# Patient Record
Sex: MALE | Race: ASIAN | NOT HISPANIC OR LATINO | Employment: OTHER | URBAN - METROPOLITAN AREA
[De-identification: names, ages, dates, MRNs, and addresses within clinical notes are randomized per-mention and may not be internally consistent; named-entity substitution may affect disease eponyms.]

---

## 2017-02-07 ENCOUNTER — OFFICE VISIT (OUTPATIENT)
Dept: MEDICAL GROUP | Facility: MEDICAL CENTER | Age: 72
End: 2017-02-07
Payer: COMMERCIAL

## 2017-02-07 VITALS
OXYGEN SATURATION: 94 % | SYSTOLIC BLOOD PRESSURE: 168 MMHG | WEIGHT: 172 LBS | HEART RATE: 75 BPM | BODY MASS INDEX: 25.48 KG/M2 | RESPIRATION RATE: 12 BRPM | DIASTOLIC BLOOD PRESSURE: 96 MMHG | HEIGHT: 69 IN | TEMPERATURE: 97.8 F

## 2017-02-07 DIAGNOSIS — E78.49 OTHER HYPERLIPIDEMIA: ICD-10-CM

## 2017-02-07 DIAGNOSIS — I10 ESSENTIAL HYPERTENSION: ICD-10-CM

## 2017-02-07 PROCEDURE — 99214 OFFICE O/P EST MOD 30 MIN: CPT | Performed by: NURSE PRACTITIONER

## 2017-02-07 RX ORDER — HYDROCHLOROTHIAZIDE 12.5 MG/1
12.5 TABLET ORAL DAILY
Qty: 90 TAB | Refills: 1 | Status: SHIPPED | OUTPATIENT
Start: 2017-02-07 | End: 2017-11-13 | Stop reason: SDUPTHER

## 2017-02-07 RX ORDER — ATORVASTATIN CALCIUM 40 MG/1
40 TABLET, FILM COATED ORAL
Qty: 90 TAB | Refills: 1 | Status: SHIPPED | OUTPATIENT
Start: 2017-02-07 | End: 2017-11-13 | Stop reason: SDUPTHER

## 2017-02-07 RX ORDER — GLIMEPIRIDE 2 MG/1
2 TABLET ORAL EVERY MORNING
Qty: 90 TAB | Refills: 1 | Status: SHIPPED | OUTPATIENT
Start: 2017-02-07 | End: 2017-11-13 | Stop reason: SDUPTHER

## 2017-02-07 RX ORDER — AMLODIPINE BESYLATE 10 MG/1
10 TABLET ORAL DAILY
Qty: 90 TAB | Refills: 1 | Status: SHIPPED | OUTPATIENT
Start: 2017-02-07 | End: 2017-11-13 | Stop reason: SDUPTHER

## 2017-02-07 RX ORDER — LISINOPRIL 40 MG/1
40 TABLET ORAL DAILY
Qty: 90 TAB | Refills: 1 | Status: SHIPPED | OUTPATIENT
Start: 2017-02-07 | End: 2017-11-13 | Stop reason: SDUPTHER

## 2017-02-07 NOTE — PROGRESS NOTES
Diabetes Focused Exam:    F/u on chronic issues     Subjective:   NHUNG Gamboa is a 71 y.o. male who presents for follow up of chronic conditions of diabetes mellitus, hypertension, and hyperlipidemia - his son is interpreting today. He indicates that he is feeling well and denies any symptoms referable to the above diagnoses. Specifically denies chest pain, palpitations, dyspnea, orthopnea, PND or peripheral edema. Also denies polyuria, polydipsia, urinary complaints, abdominal complaints, myalgias, numbness, weakness or other related symptoms.   The patient is NOT taking ASA every day and taking all other medications as prescribed. Patient denies any side effects of medication but he tells his son multiple times in the room that he feels like one of his blood pressure medications works better than the other one although he does not know the name of the medication.  After his last visit in June, glimepiride was added on and he denies any negative side effects of this. He is due for blood work.  DM: A1c goal <7. Glucose monitoring frequency: not checking at all.   Fasting sugars: unsure. Post-prandial sugars: unsure  Hypoglycemic episodes unsure  Diabetic complications: retinopathy  ACR Albumin/Creatinine Ratio goal <30   HTN: Blood pressure goal <140/<80. Currently Rx ACE/ARB: Yes  Hyperlipidemia:Cholesterol goal LDL <100, total/HDL <5. Currently Rx Statin: Yes  Last eye exam one year ago.  Denies visual blurring, double vision, eye pain and floaters  Last monofilament foot exam: today. Denies foot pain, numbness, calluses, ulcers    See medications and orders placed in encounter report.  Past medical history, family history, social history reviewed and updated as documented in medical record.  Current medications including changes today:  Current Outpatient Prescriptions   Medication Sig Dispense Refill   • glimepiride (AMARYL) 2 MG Tab Take 1 Tab by mouth every morning. 90 Tab 1   • atorvastatin (LIPITOR) 40 MG  "Tab Take 1 Tab by mouth every bedtime. 90 Tab 1   • hydrochlorothiazide (HYDRODIURIL) 12.5 MG tablet Take 1 Tab by mouth every day. 90 Tab 1   • metformin (GLUCOPHAGE) 1000 MG tablet Take 1 Tab by mouth 2 times a day, with meals. 180 Tab 1   • lisinopril (PRINIVIL, ZESTRIL) 40 MG tablet Take 1 Tab by mouth every day. 90 Tab 1   • amlodipine (NORVASC) 10 MG Tab Take 1 Tab by mouth every day. 90 Tab 1     No current facility-administered medications for this visit.     Allergies: No Known Allergies   Social History   Substance Use Topics   • Smoking status: Former Smoker     Quit date: 01/01/2007   • Smokeless tobacco: Never Used   • Alcohol Use: Yes      Comment: occasional     Exercise: work - busy as a   Health Maintenance/Immunizations: UTD    ROS  Pertinent  ROS findings as above. All other systems reviewed and are negative.      Objective:     OBJECTIVE:   Blood pressure 168/96, pulse 75, temperature 36.6 °C (97.8 °F), resp. rate 12, height 1.753 m (5' 9\"), weight 78.019 kg (172 lb), SpO2 94 %.   BMI: not obese  General: No apparent distress, conversant, cooperative and pleasant with the examination.  Psych: Alert and oriented x4, judgment and insight normal  Neck: No JVD or bruits, no adenopathy, supple  Thyroid: normal to inspection and palpation  Lungs: Lungs clear to auscultation bilaterally  Heart:  RRR. No murmurs, clicks, gallops, or rubs  Skin: No rashes, no cyanosis, no lesions or ulcers  Extremities: No cyanosis clubbing or edema.   Feet are examined   Monofilament testing with a 10 gram force: sensation intact in 4/4 bilaterally.   Visual Inspection: Feet without maceration, ulcers, fissures.       Lab Results   Component Value Date/Time    GLUCOSE - ACCU-* 06/09/2016 11:40 AM          Last labs    Lab Results   Component Value Date/Time    CHOLESTEROL,* 05/11/2016 08:02 AM    * 05/11/2016 08:02 AM    HDL 48 05/11/2016 08:02 AM    TRIGLYCERIDES 351* 05/11/2016 08:02 AM     "   Lab Results   Component Value Date/Time    SODIUM 138 05/11/2016 08:02 AM    POTASSIUM 3.8 05/11/2016 08:02 AM    GLUCOSE 168* 05/11/2016 08:02 AM     Lab Results   Component Value Date/Time    GLYCOHEMOGLOBIN 9.7* 05/11/2016 08:02 AM    GLYCOHEMOGLOBIN 7.3 10/02/2015 08:22 AM    GLYCOHEMOGLOBIN 9.9* 05/06/2015 09:43 AM     Lab Results   Component Value Date/Time    MICRO ALB CREAT RATIO see below 10/17/2015 07:40 AM    MICROALBUMIN, URINE RANDOM <0.5 10/17/2015 07:40 AM          Assessment/Plan:   Medications, refills, and referrals per orders.   1. Uncontrolled type 2 diabetes mellitus with complication, without long-term current use of insulin (CMS-[facility])  Discussed Eye exam once yearly -   - Discussed Dental exam once yearly    - Discussed vaccinations: Yearly flu vaccine, Pneumovax, and hepatitis B vaccine -  - Discussed at minimum checking BS bid - pt declines does this.  - Discussed A1C target below 7.5%  - Discussed CMP and A1C evaluation every 3 to 6 months  - Discussed Lipid and spot urine albumin to Cr ratio once per year at minimum  COMP METABOLIC PANEL    CBC WITH DIFFERENTIAL    LIPID PROFILE    HEMOGLOBIN A1C    MICROALBUMIN CREAT RATIO URINE    glimepiride (AMARYL) 2 MG Tab    metformin (GLUCOPHAGE) 1000 MG tablet   2. Other hyperlipidemia  Recommend updated lab work.  atorvastatin (LIPITOR) 40 MG Tab   3. Essential hypertension  Elevated today but pt has been out of medication for several days.  Recheck blood pressure by myself was actually worse at 170/95. Discussed the importance that he restart his medication as soon as possible. Encouraged that he start 81 mg aspirin daily. They do have a blood pressure monitor at home and I encouraged the son to give us a call if his blood pressures consistently greater than 140/90 despite restarting his medication. Hydrochlorothiazide (HYDRODIURIL) 12.5 MG tablet    lisinopril (PRINIVIL, ZESTRIL) 40 MG tablet    amlodipine (NORVASC) 10 MG Tab     Discussed  diet, exercise, disease management and weight loss goals.   Education and advise provided today:All medications, side effects and compliance (discussed carefully)  Annual eye examinations at Ophthalmology  Diabetic Sick Day rules reviewed, handout given  Foot care discussed and Podiatry visits  Home glucose monitoring emphasized  Long term diabetic complications  Low cholesterol diet, weight control and daily exercise    Followup:   Do labs and RTC 3-6 months depending on how labs return.

## 2017-02-07 NOTE — MR AVS SNAPSHOT
Howard Gamboa   2017 8:00 AM   Office Visit   MRN: [id number]    Department:  [address]   Dept Phone:  [phone]    Description:  Male : 1945   Provider:  MARC Hall           Allergies as of 2017     No Known Allergies      You were diagnosed with     Uncontrolled type 2 diabetes mellitus with complication, without long-term current use of insulin (CMS-HCC)   [1469565]       Other hyperlipidemia   [1435355]       Essential hypertension   [4635724]         Vital Signs     Smoking Status                   Former Smoker           Basic Information     Date Of Birth Sex Race Ethnicity Preferred Language    1945 Male  Non- Mandarin      Problem List              ICD-10-CM Priority Class Noted - Resolved    Hypertension I10   Unknown - Present    Hyperlipidemia E78.5   2014 - Present    Retinal vein occlusion, branch H34.8392   2014 - Present    Macular edema H35.81   2014 - Present    Adenomatous polyp of colon D12.6   10/2/2015 - Present    Microcytosis R71.8   2016 - Present    Uncontrolled type 2 diabetes mellitus with complication, without long-term current use of insulin (CMS-HCC) E11.8, E11.65   2017 - Present      Health Maintenance        Date Due Completion Dates    IMM ZOSTER VACCINE 2007 ---    DIABETES MONOFILAMENT / LE EXAM 2016, 4/10/2014, 2013 (Prv Comp)    Override on 2013: Previously completed    RETINAL SCREENING 2016    COLONOSCOPY 2016    IMM INFLUENZA (1) 2016 10/10/2013    URINE ACR / MICROALBUMIN 10/17/2016 10/17/2015, 10/17/2013    A1C SCREENING 2016, 10/2/2015, 2015, 2014, 2014, 10/17/2013, 2013, 2012, 2009, 2009    FASTING LIPID PROFILE 2017, 10/17/2015, 2015, 2014, 2013, 2009, 2009, 2009    SERUM CREATININE 2017, 10/17/2015, 2015,  5/20/2014, 10/17/2013, 5/28/2013, 5/5/2009    IMM DTaP/Tdap/Td Vaccine (2 - Td) 5/9/2022 5/9/2012            Current Immunizations     13-VALENT PCV PREVNAR 10/2/2015    Influenza TIV (IM) 10/10/2013  3:49 PM    Pneumococcal polysaccharide vaccine (PPSV-23) 4/3/2013  4:41 PM    Tdap Vaccine 5/9/2012      Below and/or attached are the medications your provider expects you to take. Review all of your home medications and newly ordered medications with your provider and/or pharmacist. Follow medication instructions as directed by your provider and/or pharmacist. Please keep your medication list with you and share with your provider. Update the information when medications are discontinued, doses are changed, or new medications (including over-the-counter products) are added; and carry medication information at all times in the event of emergency situations     Allergies:  No Known Allergies          Medications  Valid as of: February 07, 2017 -  9:20 AM    Generic Name Brand Name Tablet Size Instructions for use    AmLODIPine Besylate (Tab) NORVASC 10 MG Take 1 Tab by mouth every day.        Atorvastatin Calcium (Tab) LIPITOR 40 MG Take 1 Tab by mouth every bedtime.        Glimepiride (Tab) AMARYL 2 MG Take 1 Tab by mouth every morning.        HydroCHLOROthiazide (Tab) HYDRODIURIL 12.5 MG Take 1 Tab by mouth every day.        Lisinopril (Tab) PRINIVIL, ZESTRIL 40 MG Take 1 Tab by mouth every day.        MetFORMIN HCl (Tab) GLUCOPHAGE 1000 MG Take 1 Tab by mouth 2 times a day, with meals.        .                 Medicines prescribed today were sent to:     [facility] [address] [place] [address] [place] - [address] [facility] [place] [address]    Phone: [phone] Fax: [phone]    Open 24 Hours?: No      Medication refill instructions:       If your prescription bottle indicates you have medication refills left, it is not necessary to call your provider’s office. Please contact your pharmacy and they will refill your  medication.    If your prescription bottle indicates you do not have any refills left, you may request refills at any time through one of the following ways: The online [employer] system (except Urgent Care), by calling your provider’s office, or by asking your pharmacy to contact your provider’s office with a refill request. Medication refills are processed only during regular business hours and may not be available until the next business day. Your provider may request additional information or to have a follow-up visit with you prior to refilling your medication.   *Please Note: Medication refills are assigned a new Rx number when refilled electronically. Your pharmacy may indicate that no refills were authorized even though a new prescription for the same medication is available at the pharmacy. Please request the medicine by name with the pharmacy before contacting your provider for a refill.        Your To Do List     Future Labs/Procedures Complete By Expires    CBC WITH DIFFERENTIAL  As directed 2/8/2018    COMP METABOLIC PANEL  As directed 2/8/2018    HEMOGLOBIN A1C  As directed 2/8/2018    LIPID PROFILE  As directed 2/8/2018    MICROALBUMIN CREAT RATIO URINE  As directed 2/7/2018         [employer] Access Code: [email or web address]  Expires: 3/9/2017  9:20 AM    [employer]  A secure, online tool to manage your health information     [employer]® is a secure, online tool that connects you to your personalized health information from the privacy of your home -- day or night - making it very easy for you to manage your healthcare. Once the activation process is completed, you can even access your medical information using the [employer] frankie, which is available for free in the [employer] Frankie store or Google Play store.     [employer] provides the following levels of access (as shown below):   My Chart Features   [facility] Doctor [facility]  Specialists [facility]  Urgent  Care Non-[facility]  Primary Care  Doctor   Email  your healthcare team securely and privately 24/7 X X X    Manage appointments: schedule your next appointment; view details of past/upcoming appointments X      Request prescription refills. X      View recent personal medical records, including lab and immunizations X X X X   View health record, including health history, allergies, medications X X X X   Read reports about your outpatient visits, procedures, consult and ER notes X X X X   See your discharge summary, which is a recap of your hospital and/or ER visit that includes your diagnosis, lab results, and care plan. X X       How to register for [employer]:  1. Go to  [email or web address].  2. Click on the Sign Up Now box, which takes you to the New Member Sign Up page. You will need to provide the following information:  a. Enter your [employer] Access Code exactly as it appears at the top of this page. (You will not need to use this code after you’ve completed the sign-up process. If you do not sign up before the expiration date, you must request a new code.)   b. Enter your date of birth.   c. Enter your home email address.   d. Click Submit, and follow the next screen’s instructions.  3. Create a [employer] ID. This will be your [employer] login ID and cannot be changed, so think of one that is secure and easy to remember.  4. Create a [employer] password. You can change your password at any time.  5. Enter your Password Reset Question and Answer. This can be used at a later time if you forget your password.   6. Enter your e-mail address. This allows you to receive e-mail notifications when new information is available in [employer].  7. Click Sign Up. You can now view your health information.    For assistance activating your [employer] account, call [phone]

## 2017-04-21 PROCEDURE — 99284 EMERGENCY DEPT VISIT MOD MDM: CPT

## 2017-04-22 ENCOUNTER — HOSPITAL ENCOUNTER (EMERGENCY)
Facility: MEDICAL CENTER | Age: 72
End: 2017-04-22
Attending: EMERGENCY MEDICINE
Payer: COMMERCIAL

## 2017-04-22 VITALS
OXYGEN SATURATION: 94 % | HEART RATE: 78 BPM | RESPIRATION RATE: 18 BRPM | DIASTOLIC BLOOD PRESSURE: 99 MMHG | BODY MASS INDEX: 24.69 KG/M2 | WEIGHT: 166.67 LBS | SYSTOLIC BLOOD PRESSURE: 153 MMHG | HEIGHT: 69 IN | TEMPERATURE: 97.5 F

## 2017-04-22 DIAGNOSIS — R11.2 NAUSEA VOMITING AND DIARRHEA: ICD-10-CM

## 2017-04-22 DIAGNOSIS — R19.7 NAUSEA VOMITING AND DIARRHEA: ICD-10-CM

## 2017-04-22 LAB
ALBUMIN SERPL BCP-MCNC: 4.2 G/DL (ref 3.2–4.9)
ALBUMIN/GLOB SERPL: 1.4 G/DL
ALP SERPL-CCNC: 66 U/L (ref 30–99)
ALT SERPL-CCNC: 28 U/L (ref 2–50)
ANION GAP SERPL CALC-SCNC: 10 MMOL/L (ref 0–11.9)
APPEARANCE UR: CLEAR
AST SERPL-CCNC: 27 U/L (ref 12–45)
BASOPHILS # BLD AUTO: 0.4 % (ref 0–1.8)
BASOPHILS # BLD: 0.06 K/UL (ref 0–0.12)
BILIRUB SERPL-MCNC: 1 MG/DL (ref 0.1–1.5)
BUN SERPL-MCNC: 14 MG/DL (ref 8–22)
CALCIUM SERPL-MCNC: 8.9 MG/DL (ref 8.5–10.5)
CHLORIDE SERPL-SCNC: 99 MMOL/L (ref 96–112)
CO2 SERPL-SCNC: 25 MMOL/L (ref 20–33)
COLOR UR AUTO: YELLOW
CREAT SERPL-MCNC: 0.77 MG/DL (ref 0.5–1.4)
EOSINOPHIL # BLD AUTO: 0.01 K/UL (ref 0–0.51)
EOSINOPHIL NFR BLD: 0.1 % (ref 0–6.9)
ERYTHROCYTE [DISTWIDTH] IN BLOOD BY AUTOMATED COUNT: 35.7 FL (ref 35.9–50)
GFR SERPL CREATININE-BSD FRML MDRD: >60 ML/MIN/1.73 M 2
GLOBULIN SER CALC-MCNC: 3 G/DL (ref 1.9–3.5)
GLUCOSE SERPL-MCNC: 282 MG/DL (ref 65–99)
GLUCOSE UR QL STRIP.AUTO: 500 MG/DL
HCT VFR BLD AUTO: 46.2 % (ref 42–52)
HGB BLD-MCNC: 15.6 G/DL (ref 14–18)
IMM GRANULOCYTES # BLD AUTO: 0.1 K/UL (ref 0–0.11)
IMM GRANULOCYTES NFR BLD AUTO: 0.7 % (ref 0–0.9)
KETONES UR QL STRIP.AUTO: 15 MG/DL
LEUKOCYTE ESTERASE UR QL STRIP.AUTO: NEGATIVE
LIPASE SERPL-CCNC: 30 U/L (ref 11–82)
LYMPHOCYTES # BLD AUTO: 1.68 K/UL (ref 1–4.8)
LYMPHOCYTES NFR BLD: 11.5 % (ref 22–41)
MCH RBC QN AUTO: 26 PG (ref 27–33)
MCHC RBC AUTO-ENTMCNC: 33.8 G/DL (ref 33.7–35.3)
MCV RBC AUTO: 77 FL (ref 81.4–97.8)
MONOCYTES # BLD AUTO: 1.09 K/UL (ref 0–0.85)
MONOCYTES NFR BLD AUTO: 7.5 % (ref 0–13.4)
NEUTROPHILS # BLD AUTO: 11.65 K/UL (ref 1.82–7.42)
NEUTROPHILS NFR BLD: 79.8 % (ref 44–72)
NITRITE UR QL STRIP.AUTO: NEGATIVE
NRBC # BLD AUTO: 0 K/UL
NRBC BLD AUTO-RTO: 0 /100 WBC
PH UR STRIP.AUTO: 7.5 [PH]
PLATELET # BLD AUTO: 250 K/UL (ref 164–446)
PMV BLD AUTO: 8.5 FL (ref 9–12.9)
POTASSIUM SERPL-SCNC: 3.5 MMOL/L (ref 3.6–5.5)
PROT SERPL-MCNC: 7.2 G/DL (ref 6–8.2)
PROT UR QL STRIP: NEGATIVE MG/DL
RBC # BLD AUTO: 6 M/UL (ref 4.7–6.1)
RBC UR QL AUTO: NEGATIVE
SODIUM SERPL-SCNC: 134 MMOL/L (ref 135–145)
SP GR UR: 1.02
WBC # BLD AUTO: 14.6 K/UL (ref 4.8–10.8)

## 2017-04-22 PROCEDURE — 96361 HYDRATE IV INFUSION ADD-ON: CPT

## 2017-04-22 PROCEDURE — 81002 URINALYSIS NONAUTO W/O SCOPE: CPT

## 2017-04-22 PROCEDURE — 700111 HCHG RX REV CODE 636 W/ 250 OVERRIDE (IP): Performed by: EMERGENCY MEDICINE

## 2017-04-22 PROCEDURE — 85025 COMPLETE CBC W/AUTO DIFF WBC: CPT

## 2017-04-22 PROCEDURE — 96374 THER/PROPH/DIAG INJ IV PUSH: CPT

## 2017-04-22 PROCEDURE — 80053 COMPREHEN METABOLIC PANEL: CPT

## 2017-04-22 PROCEDURE — 83690 ASSAY OF LIPASE: CPT

## 2017-04-22 PROCEDURE — 700105 HCHG RX REV CODE 258: Performed by: EMERGENCY MEDICINE

## 2017-04-22 RX ORDER — ONDANSETRON 2 MG/ML
4 INJECTION INTRAMUSCULAR; INTRAVENOUS ONCE
Status: COMPLETED | OUTPATIENT
Start: 2017-04-22 | End: 2017-04-22

## 2017-04-22 RX ORDER — SODIUM CHLORIDE 9 MG/ML
1000 INJECTION, SOLUTION INTRAVENOUS ONCE
Status: COMPLETED | OUTPATIENT
Start: 2017-04-22 | End: 2017-04-22

## 2017-04-22 RX ORDER — DIPHENOXYLATE HYDROCHLORIDE AND ATROPINE SULFATE 2.5; .025 MG/1; MG/1
1 TABLET ORAL 4 TIMES DAILY PRN
Qty: 30 TAB | Refills: 0 | Status: SHIPPED | OUTPATIENT
Start: 2017-04-22 | End: 2017-05-20

## 2017-04-22 RX ORDER — ONDANSETRON 4 MG/1
4 TABLET, ORALLY DISINTEGRATING ORAL EVERY 8 HOURS PRN
Qty: 10 TAB | Refills: 0 | Status: SHIPPED | OUTPATIENT
Start: 2017-04-22 | End: 2017-05-20

## 2017-04-22 RX ADMIN — ONDANSETRON 4 MG: 2 INJECTION INTRAMUSCULAR; INTRAVENOUS at 03:19

## 2017-04-22 RX ADMIN — SODIUM CHLORIDE 1000 ML: 9 INJECTION, SOLUTION INTRAVENOUS at 03:21

## 2017-04-22 ASSESSMENT — ENCOUNTER SYMPTOMS
SHORTNESS OF BREATH: 0
FEVER: 0
VOMITING: 1
DIARRHEA: 1
ABDOMINAL PAIN: 1
CHILLS: 0
NAUSEA: 1

## 2017-04-22 ASSESSMENT — PAIN SCALES - GENERAL: PAINLEVEL_OUTOF10: 0

## 2017-04-22 NOTE — ED NOTES
Chief Complaint   Patient presents with   • N/V     woke with onset of nausea and vomiting     Pt actively vomiting in triage.  Pt states that he woke up and began vomiting.  Pt concerned that what he ate last night is not sitting well with pt.  No blood in vomit.  Triage process explained to patient.  Pt back to waiting room.  Pt instructed to inform RN if any changes or questions arise.

## 2017-04-22 NOTE — ED NOTES
Pt sitting up in [facility] talking to son at BS, NAD. Pt speaks Mandarin, son interpreting. Pt denies nausea or abd discomfort at this time. + diarrhea, 3- 4 episodes per pt's son. IV fluids infusing. Updated on POC. Call light in reach.

## 2017-04-22 NOTE — ED AVS SNAPSHOT
4/22/2017    Howard Gamboa  [address] [place] [address]    Dear Howard:    Critical access hospital wants to ensure your discharge home is safe and you or your loved ones have had all of your questions answered regarding your care after you leave the hospital.    Below is a list of resources and contact information should you have any questions regarding your hospital stay, follow-up instructions, or active medical symptoms.    Questions or Concerns Regarding… Contact   Medical Questions Related to Your Discharge  (7 days a week, 8am-5pm) Contact a Nurse Care Coordinator   [phone]   Medical Questions Not Related to Your Discharge  (24 hours a day / 7 days a week)  Contact the Nurse Health Line   [phone]    Medications or Discharge Instructions Refer to your discharge packet   or contact your [facility] Primary Care Provider   [phone]   Follow-up Appointment(s) Schedule your appointment via [employer]   or contact Scheduling [phone]   Billing Review your statement via [employer]  or contact Billing [phone]   Medical Records Review your records via [employer]   or contact Medical Records [phone]     You may receive a telephone call within two days of discharge. This call is to make certain you understand your discharge instructions and have the opportunity to have any questions answered. You can also easily access your medical information, test results and upcoming appointments via the [employer] free online health management tool. You can learn more and sign up at [employer]. For assistance setting up your [employer] account, please call [phone].    Once again, we want to ensure your discharge home is safe and that you have a clear understanding of any next steps in your care. If you have any questions or concerns, please do not hesitate to contact us, we are here for you. Thank you for choosing [facility] for your healthcare needs.    Sincerely,    Your [facility] Healthcare Team

## 2017-04-22 NOTE — DISCHARGE INSTRUCTIONS
Return if you have abdominal pain, fever, severe vomiting, blood in vomit or blood in stool.   Nausea and Vomiting  Nausea means you feel sick to your stomach. Throwing up (vomiting) is a reflex where stomach contents come out of your mouth.  HOME CARE   · Take medicine as told by your doctor.  · Do not force yourself to eat. However, you do need to drink fluids.  · If you feel like eating, eat a normal diet as told by your doctor.  ¨ Eat rice, wheat, potatoes, bread, lean meats, yogurt, fruits, and vegetables.  ¨ Avoid high-fat foods.  · Drink enough fluids to keep your pee (urine) clear or pale yellow.  · Ask your doctor how to replace body fluid losses (rehydrate). Signs of body fluid loss (dehydration) include:  ¨ Feeling very thirsty.  ¨ Dry lips and mouth.  ¨ Feeling dizzy.  ¨ Dark pee.  ¨ Peeing less than normal.  ¨ Feeling confused.  ¨ Fast breathing or heart rate.  GET HELP RIGHT AWAY IF:   · You have blood in your throw up.  · You have black or bloody poop (stool).  · You have a bad headache or stiff neck.  · You feel confused.  · You have bad belly (abdominal) pain.  · You have chest pain or trouble breathing.  · You do not pee at least once every 8 hours.  · You have cold, clammy skin.  · You keep throwing up after 24 to 48 hours.  · You have a fever.  MAKE SURE YOU:   · Understand these instructions.  · Will watch your condition.  · Will get help right away if you are not doing well or get worse.     This information is not intended to replace advice given to you by your health care provider. Make sure you discuss any questions you have with your health care provider.     Document Released: 06/05/2009 Document Revised: 03/11/2013 Document Reviewed: 05/18/2012  [employer] Patient Education ©2016 [employer]    Diarrhea  Diarrhea is watery poop (stool). It can make you feel weak, tired, thirsty, or give you a dry mouth (signs of dehydration). Watery poop is a sign of another problem, most  often an infection. It often lasts 2-3 days. It can last longer if it is a sign of something serious. Take care of yourself as told by your doctor.  HOME CARE   · Drink 1 cup (8 ounces) of fluid each time you have watery poop.  · Do not drink the following fluids:  ¨ Those that contain simple sugars (fructose, glucose, galactose, lactose, sucrose, maltose).  ¨ Sports drinks.  ¨ Fruit juices.  ¨ Whole milk products.  ¨ Sodas.  ¨ Drinks with caffeine (coffee, tea, soda) or alcohol.  · Oral rehydration solution may be used if the doctor says it is okay. You may make your own solution. Follow this recipe:  ¨  - teaspoon table salt.  ¨ ¾ teaspoon baking soda.  ¨  teaspoon salt substitute containing potassium chloride.  ¨ 1 tablespoons sugar.  ¨ 1 liter (34 ounces) of water.  · Avoid the following foods:  ¨ High fiber foods, such as raw fruits and vegetables.  ¨ Nuts, seeds, and whole grain breads and cereals.  ¨  Those that are sweetened with sugar alcohols (xylitol, sorbitol, mannitol).  · Try eating the following foods:  ¨ Starchy foods, such as rice, toast, pasta, low-sugar cereal, oatmeal, baked potatoes, crackers, and bagels.  ¨ Bananas.  ¨ Applesauce.  · Eat probiotic-rich foods, such as yogurt and milk products that are fermented.  · Wash your hands well after each time you have watery poop.  · Only take medicine as told by your doctor.  · Take a warm bath to help lessen burning or pain from having watery poop.  GET HELP RIGHT AWAY IF:   · You cannot drink fluids without throwing up (vomiting).  · You keep throwing up.  · You have blood in your poop, or your poop looks black and tarry.  · You do not pee (urinate) in 6-8 hours, or there is only a small amount of very dark pee.  · You have belly (abdominal) pain that gets worse or stays in the same spot (localizes).  · You are weak, dizzy, confused, or light-headed.  · You have a very bad headache.  · Your watery poop gets worse or does not get better.  · You have a  fever or lasting symptoms for more than 2-3 days.  · You have a fever and your symptoms suddenly get worse.  MAKE SURE YOU:   · Understand these instructions.  · Will watch your condition.  · Will get help right away if you are not doing well or get worse.     This information is not intended to replace advice given to you by your health care provider. Make sure you discuss any questions you have with your health care provider.     Document Released: 06/05/2009 Document Revised: 01/08/2016 Document Reviewed: 08/25/2013  [employer] Interactive Patient Education ©2016 [employer]

## 2017-04-22 NOTE — ED AVS SNAPSHOT
Migo Software Access Code: [email or web address]  Expires: 5/22/2017  4:48 AM    Migo Software  A secure, online tool to manage your health information     [employer]’s Migo Software® is a secure, online tool that connects you to your personalized health information from the privacy of your home -- day or night - making it very easy for you to manage your healthcare. Once the activation process is completed, you can even access your medical information using the Migo Software frankie, which is available for free in the Apple Frankie store or Google Play store.     Migo Software provides the following levels of access (as shown below):   My Chart Features   [facility] Primary Care Doctor [facility]  Specialists [facility]  Urgent  Care Non-[facility]  Primary Care  Doctor   Email your healthcare team securely and privately 24/7 X X X X   Manage appointments: schedule your next appointment; view details of past/upcoming appointments X      Request prescription refills. X      View recent personal medical records, including lab and immunizations X X X X   View health record, including health history, allergies, medications X X X X   Read reports about your outpatient visits, procedures, consult and ER notes X X X X   See your discharge summary, which is a recap of your hospital and/or ER visit that includes your diagnosis, lab results, and care plan. X X       How to register for Migo Software:  1. Go to  [email or web address].  2. Click on the Sign Up Now box, which takes you to the New Member Sign Up page. You will need to provide the following information:  a. Enter your Migo Software Access Code exactly as it appears at the top of this page. (You will not need to use this code after you’ve completed the sign-up process. If you do not sign up before the expiration date, you must request a new code.)   b. Enter your date of birth.   c. Enter your home email address.   d. Click Submit, and follow the next screen’s instructions.  3. Create a Migo Software ID. This will be your Migo Software  login ID and cannot be changed, so think of one that is secure and easy to remember.  4. Create a [employer] password. You can change your password at any time.  5. Enter your Password Reset Question and Answer. This can be used at a later time if you forget your password.   6. Enter your e-mail address. This allows you to receive e-mail notifications when new information is available in [employer].  7. Click Sign Up. You can now view your health information.    For assistance activating your [employer] account, call [phone]

## 2017-04-22 NOTE — ED PROVIDER NOTES
ED Provider Note    Scribed for Tommy Owusu M.D. by Walter Pearson. 4/22/2017, 3:41 AM.    Primary care provider: Sayda An M.D.  Means of arrival: Walk In  History obtained from: Patient  History limited by: None     CHIEF COMPLAINT  Chief Complaint   Patient presents with   • N/V     woke with onset of nausea and vomiting     NHUNG Gamboa is a 71 y.o. male who presents to the Emergency Department complaining of nausea and vomiting. The patient woke up with an onset of nausea and vomiting last night. He has had multiple episodes of normal appearing emesis. Patient has had a few episodes of diarrhea associated with his nausea and vomiting. He has had about 3 episodes of diarrhea since onset of his symptoms. The patient complains of mild mid upper abdominal pain associated with his symptoms. His abdominal pain has been constant. Patient's son does not believe patient ate anything unordinary in the last few days. Patient has a previous history of Diabetes. He denies any recent travel. Patient denies any fever, dysuria, hematuria, chest pain, shortness of breath.     REVIEW OF SYSTEMS  Review of Systems   Constitutional: Negative for fever and chills.   Respiratory: Negative for shortness of breath.    Cardiovascular: Negative for chest pain.   Gastrointestinal: Positive for nausea, vomiting, abdominal pain and diarrhea.   Genitourinary: Negative for dysuria and hematuria.   All other systems reviewed and are negative.    C.     PAST MEDICAL HISTORY   has a past medical history of Hypertension and Diabetes mellitus out of control (CMS-[facility]).    SURGICAL HISTORY  patient denies any surgical history    SOCIAL HISTORY  Social History   Substance Use Topics   • Smoking status: Former Smoker     Quit date: 01/01/2007   • Smokeless tobacco: Never Used   • Alcohol Use: No      History   Drug Use No     FAMILY HISTORY  Family History   Problem Relation Age of Onset   • Stroke Mother    • Diabetes Father    • Diabetes  "Brother      CURRENT MEDICATIONS  Home Medications     Reviewed by Sheron Pittman R.N. (Registered Nurse) on 04/22/17 at 0334  Med List Status: Complete    Medication Last Dose Status    amlodipine (NORVASC) 10 MG Tab 4/21/2017 Active    atorvastatin (LIPITOR) 40 MG Tab 4/21/2017 Active    glimepiride (AMARYL) 2 MG Tab 4/21/2017 Active    hydrochlorothiazide (HYDRODIURIL) 12.5 MG tablet 4/21/2017 Active    lisinopril (PRINIVIL, ZESTRIL) 40 MG tablet 4/21/2017 Active    metformin (GLUCOPHAGE) 1000 MG tablet 4/21/2017 Active              ALLERGIES  No Known Allergies    PHYSICAL EXAM  VITAL SIGNS: /90 mmHg  Pulse 80  Temp(Src) 36.1 °C (96.9 °F)  Resp 14  Ht 1.753 m (5' 9.02\")  Wt 75.6 kg (166 lb 10.7 oz)  BMI 24.60 kg/m2  SpO2 91%    Constitutional: Well developed, Well nourished, no distress.   HENT: Normocephalic, Atraumatic, Oropharynx moist.   Eyes: Conjunctiva normal, No discharge.   Cardiovascular: Normal heart rate, Normal rhythm, No murmurs, equal pulses.   Pulmonary: Normal breath sounds, No respiratory distress, No wheezing, No rales, No rhonchi.  Abdomen:Soft, No tenderness, No masses, no rebound, no guarding. Negative Santana sign, negative McBurney's point tenderness  Back: Slight left CVA tenderness   Musculoskeletal: No major deformities noted, No tenderness.   Skin: Warm, Dry, No erythema, No rash.   Neurologic: Alert & oriented x 3, Normal motor function,  No focal deficits noted.   Psychiatric: Affect normal, Judgment normal, Mood normal.     LABS  Results for orders placed or performed during the hospital encounter of 04/22/17   CBC WITH DIFFERENTIAL   Result Value Ref Range    WBC 14.6 (H) 4.8 - 10.8 K/uL    RBC 6.00 4.70 - 6.10 M/uL    Hemoglobin 15.6 14.0 - 18.0 g/dL    Hematocrit 46.2 42.0 - 52.0 %    MCV 77.0 (L) 81.4 - 97.8 fL    MCH 26.0 (L) 27.0 - 33.0 pg    MCHC 33.8 33.7 - 35.3 g/dL    RDW 35.7 (L) 35.9 - 50.0 fL    Platelet Count 250 164 - 446 K/uL    MPV 8.5 (L) 9.0 - 12.9 " fL    Neutrophils-Polys 79.80 (H) 44.00 - 72.00 %    Lymphocytes 11.50 (L) 22.00 - 41.00 %    Monocytes 7.50 0.00 - 13.40 %    Eosinophils 0.10 0.00 - 6.90 %    Basophils 0.40 0.00 - 1.80 %    Immature Granulocytes 0.70 0.00 - 0.90 %    Nucleated RBC 0.00 /100 WBC    Neutrophils (Absolute) 11.65 (H) 1.82 - 7.42 K/uL    Lymphs (Absolute) 1.68 1.00 - 4.80 K/uL    Monos (Absolute) 1.09 (H) 0.00 - 0.85 K/uL    Eos (Absolute) 0.01 0.00 - 0.51 K/uL    Baso (Absolute) 0.06 0.00 - 0.12 K/uL    Immature Granulocytes (abs) 0.10 0.00 - 0.11 K/uL    NRBC (Absolute) 0.00 K/uL   COMP METABOLIC PANEL   Result Value Ref Range    Sodium 134 (L) 135 - 145 mmol/L    Potassium 3.5 (L) 3.6 - 5.5 mmol/L    Chloride 99 96 - 112 mmol/L    Co2 25 20 - 33 mmol/L    Anion Gap 10.0 0.0 - 11.9    Glucose 282 (H) 65 - 99 mg/dL    Bun 14 8 - 22 mg/dL    Creatinine 0.77 0.50 - 1.40 mg/dL    Calcium 8.9 8.5 - 10.5 mg/dL    AST(SGOT) 27 12 - 45 U/L    ALT(SGPT) 28 2 - 50 U/L    Alkaline Phosphatase 66 30 - 99 U/L    Total Bilirubin 1.0 0.1 - 1.5 mg/dL    Albumin 4.2 3.2 - 4.9 g/dL    Total Protein 7.2 6.0 - 8.2 g/dL    Globulin 3.0 1.9 - 3.5 g/dL    A-G Ratio 1.4 g/dL   LIPASE   Result Value Ref Range    Lipase 30 11 - 82 U/L   ESTIMATED GFR   Result Value Ref Range    GFR If African American >60 >60 mL/min/1.73 m 2    GFR If Non African American >60 >60 mL/min/1.73 m 2   POC UA   Result Value Ref Range    POC Color Yellow     POC Appearance Clear     POC Glucose 500 (A) Negative mg/dL    POC Ketones 15 (A) Negative mg/dL    POC Specific Gravity 1.020 1.005-1.030    POC Blood Negative Negative    POC Urine PH 7.5 5.0-8.0    POC Protein Negative Negative mg/dL    POC Nitrites Negative Negative    POC Leukocyte Esterase Negative Negative    slight hyperglycemia  All labs reviewed by me.    COURSE & MEDICAL DECISION MAKING  Pertinent Labs & Imaging studies reviewed. (See chart for details)    3:41 AM - Patient seen and examined at bedside. Patient will  be treated with Zofran 4 mg IV. Patient resucitated with IV fluids for dehydration. Ordered urinalysis, CBC, CMP, lipase, GFR to evaluate his symptoms. The differential diagnoses include but are not limited to:   4:14 AM Recheck: Patient re-evaluated at [facility]. Patient has had improvement of nausea after IV fluids and zofran medication. . Ordered urinalysis for further evaluation.     4:48 AM Recheck: Patient re-evaluated at [facility]. Patient was updated of his urinalysis results. Patient will be prescribed Lomotil, Zofran, for treatment of his symptoms following discharge. Patient understanding and agreeable of plan.     Medical Decision Making: Patient presents with nausea vomiting and diarrhea. At this point time he has no abdominal pain certainly does not have a surgical abdomen. I suspect he has a viral gastroenteritis. His vomiting has been improved with Zofran is tolerating oral fluids. At this point time she could be discharged home in stable condition. I think the patient's leukocytosis is likely secondary to acute phase reaction with vomiting.    DISPOSITION:  Patient will be discharged home in stable condition.    FOLLOW UP:  Sayda An M.D.  [address] [address] [id number]  [phone]    Schedule an appointment as soon as possible for a visit in 3 days      OUTPATIENT MEDICATIONS:  Discharge Medication List as of 4/22/2017  4:48 AM      START taking these medications    Details   ondansetron (ZOFRAN ODT) 4 MG TABLET DISPERSIBLE Take 1 Tab by mouth every 8 hours as needed for Nausea/Vomiting., Disp-10 Tab, R-0, Print Rx Paper      diphenoxylate-atropine (LOMOTIL) 2.5-0.025 MG Tab Take 1 Tab by mouth 4 times a day as needed for Diarrhea., Disp-30 Tab, R-0, Print Rx Paper             FINAL IMPRESSION  1. Nausea vomiting and diarrhea          I, Walter Pearson (Arsenio), am scribing for, and in the presence of, Tommy Owusu M.D.    Electronically signed by: Walter Pearson (Arsenio),  4/22/2017    ITommy M.D. personally performed the services described in this documentation, as scribed by Walter Pearson in my presence, and it is both accurate and complete.    The note accurately reflects work and decisions made by me.  Tommy Owusu  4/22/2017  7:08 AM

## 2017-04-22 NOTE — ED NOTES
Discharge instructions provided with prescription teaching, pt and pt's son both verbalize understanding. Pt awake, alert, VSS. Pt ambulatory with steady gait out of ER with son.

## 2017-05-20 ENCOUNTER — APPOINTMENT (OUTPATIENT)
Dept: RADIOLOGY | Facility: MEDICAL CENTER | Age: 72
End: 2017-05-20
Attending: EMERGENCY MEDICINE
Payer: COMMERCIAL

## 2017-05-20 ENCOUNTER — HOSPITAL ENCOUNTER (EMERGENCY)
Facility: MEDICAL CENTER | Age: 72
End: 2017-05-20
Attending: EMERGENCY MEDICINE
Payer: COMMERCIAL

## 2017-05-20 ENCOUNTER — OFFICE VISIT (OUTPATIENT)
Dept: URGENT CARE | Facility: PHYSICIAN GROUP | Age: 72
End: 2017-05-20
Payer: COMMERCIAL

## 2017-05-20 VITALS
WEIGHT: 170 LBS | HEART RATE: 102 BPM | DIASTOLIC BLOOD PRESSURE: 90 MMHG | OXYGEN SATURATION: 94 % | TEMPERATURE: 97.1 F | RESPIRATION RATE: 22 BRPM | BODY MASS INDEX: 25.09 KG/M2 | SYSTOLIC BLOOD PRESSURE: 140 MMHG

## 2017-05-20 VITALS
DIASTOLIC BLOOD PRESSURE: 103 MMHG | WEIGHT: 160 LBS | HEART RATE: 98 BPM | OXYGEN SATURATION: 93 % | BODY MASS INDEX: 24.25 KG/M2 | RESPIRATION RATE: 16 BRPM | SYSTOLIC BLOOD PRESSURE: 155 MMHG | HEIGHT: 68 IN

## 2017-05-20 DIAGNOSIS — K29.70 GASTRITIS WITHOUT BLEEDING, UNSPECIFIED CHRONICITY, UNSPECIFIED GASTRITIS TYPE: ICD-10-CM

## 2017-05-20 DIAGNOSIS — I10 ESSENTIAL HYPERTENSION: ICD-10-CM

## 2017-05-20 DIAGNOSIS — R11.14 BILIOUS VOMITING WITH NAUSEA: ICD-10-CM

## 2017-05-20 DIAGNOSIS — I21.09 ANTERIOR WALL MYOCARDIAL INFARCTION (HCC): ICD-10-CM

## 2017-05-20 DIAGNOSIS — K29.00 ACUTE GASTRITIS, PRESENCE OF BLEEDING UNSPECIFIED, UNSPECIFIED GASTRITIS TYPE: ICD-10-CM

## 2017-05-20 DIAGNOSIS — R11.2 NAUSEA AND VOMITING, INTRACTABILITY OF VOMITING NOT SPECIFIED, UNSPECIFIED VOMITING TYPE: ICD-10-CM

## 2017-05-20 DIAGNOSIS — J32.9 SINUSITIS, UNSPECIFIED CHRONICITY, UNSPECIFIED LOCATION: ICD-10-CM

## 2017-05-20 DIAGNOSIS — R42 DIZZINESS: ICD-10-CM

## 2017-05-20 LAB
ALBUMIN SERPL BCP-MCNC: 4.6 G/DL (ref 3.2–4.9)
ALBUMIN/GLOB SERPL: 1.3 G/DL
ALP SERPL-CCNC: 75 U/L (ref 30–99)
ALT SERPL-CCNC: 22 U/L (ref 2–50)
ANION GAP SERPL CALC-SCNC: 12 MMOL/L (ref 0–11.9)
APTT PPP: 26 SEC (ref 24.7–36)
AST SERPL-CCNC: 17 U/L (ref 12–45)
BASOPHILS # BLD AUTO: 0.3 % (ref 0–1.8)
BASOPHILS # BLD: 0.04 K/UL (ref 0–0.12)
BILIRUB SERPL-MCNC: 1 MG/DL (ref 0.1–1.5)
BNP SERPL-MCNC: 11 PG/ML (ref 0–100)
BUN SERPL-MCNC: 15 MG/DL (ref 8–22)
CALCIUM SERPL-MCNC: 9.6 MG/DL (ref 8.5–10.5)
CHLORIDE SERPL-SCNC: 100 MMOL/L (ref 96–112)
CO2 SERPL-SCNC: 23 MMOL/L (ref 20–33)
CREAT SERPL-MCNC: 0.76 MG/DL (ref 0.5–1.4)
EOSINOPHIL # BLD AUTO: 0 K/UL (ref 0–0.51)
EOSINOPHIL NFR BLD: 0 % (ref 0–6.9)
ERYTHROCYTE [DISTWIDTH] IN BLOOD BY AUTOMATED COUNT: 37.1 FL (ref 35.9–50)
GFR SERPL CREATININE-BSD FRML MDRD: >60 ML/MIN/1.73 M 2
GLOBULIN SER CALC-MCNC: 3.5 G/DL (ref 1.9–3.5)
GLUCOSE SERPL-MCNC: 314 MG/DL (ref 65–99)
HCT VFR BLD AUTO: 48.7 % (ref 42–52)
HGB BLD-MCNC: 16.5 G/DL (ref 14–18)
IMM GRANULOCYTES # BLD AUTO: 0.07 K/UL (ref 0–0.11)
IMM GRANULOCYTES NFR BLD AUTO: 0.5 % (ref 0–0.9)
INR PPP: 0.93 (ref 0.87–1.13)
LIPASE SERPL-CCNC: 19 U/L (ref 11–82)
LYMPHOCYTES # BLD AUTO: 1.02 K/UL (ref 1–4.8)
LYMPHOCYTES NFR BLD: 7.1 % (ref 22–41)
MCH RBC QN AUTO: 26.4 PG (ref 27–33)
MCHC RBC AUTO-ENTMCNC: 33.9 G/DL (ref 33.7–35.3)
MCV RBC AUTO: 77.8 FL (ref 81.4–97.8)
MONOCYTES # BLD AUTO: 0.61 K/UL (ref 0–0.85)
MONOCYTES NFR BLD AUTO: 4.2 % (ref 0–13.4)
NEUTROPHILS # BLD AUTO: 12.67 K/UL (ref 1.82–7.42)
NEUTROPHILS NFR BLD: 87.9 % (ref 44–72)
NRBC # BLD AUTO: 0 K/UL
NRBC BLD AUTO-RTO: 0 /100 WBC
PLATELET # BLD AUTO: 312 K/UL (ref 164–446)
PMV BLD AUTO: 8.4 FL (ref 9–12.9)
POTASSIUM SERPL-SCNC: 3.6 MMOL/L (ref 3.6–5.5)
PROT SERPL-MCNC: 8.1 G/DL (ref 6–8.2)
PROTHROMBIN TIME: 12.8 SEC (ref 12–14.6)
RBC # BLD AUTO: 6.26 M/UL (ref 4.7–6.1)
SODIUM SERPL-SCNC: 135 MMOL/L (ref 135–145)
TROPONIN I SERPL-MCNC: <0.01 NG/ML (ref 0–0.04)
WBC # BLD AUTO: 14.4 K/UL (ref 4.8–10.8)

## 2017-05-20 PROCEDURE — 70450 CT HEAD/BRAIN W/O DYE: CPT

## 2017-05-20 PROCEDURE — 700111 HCHG RX REV CODE 636 W/ 250 OVERRIDE (IP): Performed by: EMERGENCY MEDICINE

## 2017-05-20 PROCEDURE — 96361 HYDRATE IV INFUSION ADD-ON: CPT

## 2017-05-20 PROCEDURE — 84484 ASSAY OF TROPONIN QUANT: CPT

## 2017-05-20 PROCEDURE — 83880 ASSAY OF NATRIURETIC PEPTIDE: CPT

## 2017-05-20 PROCEDURE — 85025 COMPLETE CBC W/AUTO DIFF WBC: CPT

## 2017-05-20 PROCEDURE — 71010 DX-CHEST-PORTABLE (1 VIEW): CPT

## 2017-05-20 PROCEDURE — 99284 EMERGENCY DEPT VISIT MOD MDM: CPT

## 2017-05-20 PROCEDURE — 93000 ELECTROCARDIOGRAM COMPLETE: CPT | Performed by: FAMILY MEDICINE

## 2017-05-20 PROCEDURE — 80053 COMPREHEN METABOLIC PANEL: CPT

## 2017-05-20 PROCEDURE — 94760 N-INVAS EAR/PLS OXIMETRY 1: CPT

## 2017-05-20 PROCEDURE — 99205 OFFICE O/P NEW HI 60 MIN: CPT | Mod: 25 | Performed by: FAMILY MEDICINE

## 2017-05-20 PROCEDURE — 96374 THER/PROPH/DIAG INJ IV PUSH: CPT

## 2017-05-20 PROCEDURE — 85610 PROTHROMBIN TIME: CPT

## 2017-05-20 PROCEDURE — 83690 ASSAY OF LIPASE: CPT

## 2017-05-20 PROCEDURE — 700101 HCHG RX REV CODE 250

## 2017-05-20 PROCEDURE — 700111 HCHG RX REV CODE 636 W/ 250 OVERRIDE (IP)

## 2017-05-20 PROCEDURE — 93005 ELECTROCARDIOGRAM TRACING: CPT | Performed by: EMERGENCY MEDICINE

## 2017-05-20 PROCEDURE — 700105 HCHG RX REV CODE 258: Performed by: EMERGENCY MEDICINE

## 2017-05-20 PROCEDURE — 85730 THROMBOPLASTIN TIME PARTIAL: CPT

## 2017-05-20 RX ORDER — ONDANSETRON 4 MG/1
4 TABLET, ORALLY DISINTEGRATING ORAL ONCE
Status: COMPLETED | OUTPATIENT
Start: 2017-05-20 | End: 2017-05-20

## 2017-05-20 RX ORDER — ASPIRIN 81 MG/1
162 TABLET, CHEWABLE ORAL ONCE
Status: COMPLETED | OUTPATIENT
Start: 2017-05-20 | End: 2017-05-20

## 2017-05-20 RX ORDER — PANTOPRAZOLE SODIUM 20 MG/1
20 TABLET, DELAYED RELEASE ORAL DAILY
Qty: 10 TAB | Refills: 0 | Status: SHIPPED | OUTPATIENT
Start: 2017-05-20 | End: 2017-11-27

## 2017-05-20 RX ORDER — ONDANSETRON 4 MG/1
4 TABLET, FILM COATED ORAL EVERY 8 HOURS PRN
Qty: 10 EACH | Refills: 0 | Status: SHIPPED | OUTPATIENT
Start: 2017-05-20 | End: 2017-11-27

## 2017-05-20 RX ORDER — SODIUM CHLORIDE 9 MG/ML
1000 INJECTION, SOLUTION INTRAVENOUS ONCE
Status: COMPLETED | OUTPATIENT
Start: 2017-05-20 | End: 2017-05-20

## 2017-05-20 RX ORDER — AMOXICILLIN AND CLAVULANATE POTASSIUM 875; 125 MG/1; MG/1
1 TABLET, FILM COATED ORAL 2 TIMES DAILY
Qty: 10 TAB | Refills: 0 | Status: SHIPPED | OUTPATIENT
Start: 2017-05-20 | End: 2017-11-27

## 2017-05-20 RX ORDER — HEPARIN SODIUM,PORCINE 1000/ML
VIAL (ML) INJECTION
Status: COMPLETED
Start: 2017-05-20 | End: 2017-05-20

## 2017-05-20 RX ORDER — ONDANSETRON 2 MG/ML
4 INJECTION INTRAMUSCULAR; INTRAVENOUS ONCE
Status: COMPLETED | OUTPATIENT
Start: 2017-05-20 | End: 2017-05-20

## 2017-05-20 RX ORDER — LIDOCAINE HYDROCHLORIDE 20 MG/ML
INJECTION, SOLUTION INFILTRATION; PERINEURAL
Status: COMPLETED
Start: 2017-05-20 | End: 2017-05-20

## 2017-05-20 RX ADMIN — SODIUM CHLORIDE 1000 ML: 9 INJECTION, SOLUTION INTRAVENOUS at 14:41

## 2017-05-20 RX ADMIN — ASPIRIN 162 MG: 81 TABLET, CHEWABLE ORAL at 14:11

## 2017-05-20 RX ADMIN — ONDANSETRON 4 MG: 4 TABLET, ORALLY DISINTEGRATING ORAL at 13:25

## 2017-05-20 RX ADMIN — ONDANSETRON 4 MG: 2 INJECTION INTRAMUSCULAR; INTRAVENOUS at 14:39

## 2017-05-20 ASSESSMENT — ENCOUNTER SYMPTOMS
FEVER: 0
MYALGIAS: 0
VOMITING: 1
COUGH: 0
ABDOMINAL PAIN: 0
CHANGE IN BOWEL HABIT: 0
NUMBER OF EPISODES OF EMESIS TODAY: 1
CHILLS: 0
FATIGUE: 1
HEADACHES: 1
NAUSEA: 1
VERTIGO: 1
DOUBLE VISION: 0
SORE THROAT: 0
DIZZINESS: 1
ANOREXIA: 1
BLURRED VISION: 0

## 2017-05-20 ASSESSMENT — PAIN SCALES - GENERAL: PAINLEVEL_OUTOF10: 0

## 2017-05-20 NOTE — PROGRESS NOTES
Subjective:      Howard Gamboa is a 71 y.o. male who presents with Emesis            Emesis  This is a new problem. The current episode started today. The problem occurs intermittently. The problem has been waxing and waning. Associated symptoms include anorexia, fatigue, headaches, nausea, vertigo and vomiting. Pertinent negatives include no abdominal pain, change in bowel habit, chest pain, chills, congestion, coughing, fever, myalgias, rash or sore throat. Associated symptoms comments: Loose stools a few days ago. Nothing aggravates the symptoms. He has tried nothing for the symptoms.       Review of Systems   Constitutional: Positive for fatigue. Negative for fever and chills.   HENT: Negative for congestion and sore throat.    Eyes: Negative for blurred vision and double vision.   Respiratory: Negative for cough.    Cardiovascular: Negative for chest pain.   Gastrointestinal: Positive for nausea, vomiting and anorexia. Negative for abdominal pain and change in bowel habit.   Musculoskeletal: Negative for myalgias.   Skin: Negative for rash.   Neurological: Positive for dizziness, vertigo and headaches.     PMH:  has a past medical history of Hypertension and Diabetes mellitus out of control (CMS-[facility]).  MEDS:   Current outpatient prescriptions:   •  glimepiride (AMARYL) 2 MG Tab, Take 1 Tab by mouth every morning., Disp: 90 Tab, Rfl: 1  •  atorvastatin (LIPITOR) 40 MG Tab, Take 1 Tab by mouth every bedtime., Disp: 90 Tab, Rfl: 1  •  hydrochlorothiazide (HYDRODIURIL) 12.5 MG tablet, Take 1 Tab by mouth every day., Disp: 90 Tab, Rfl: 1  •  metformin (GLUCOPHAGE) 1000 MG tablet, Take 1 Tab by mouth 2 times a day, with meals., Disp: 180 Tab, Rfl: 1  •  lisinopril (PRINIVIL, ZESTRIL) 40 MG tablet, Take 1 Tab by mouth every day., Disp: 90 Tab, Rfl: 1  •  amlodipine (NORVASC) 10 MG Tab, Take 1 Tab by mouth every day., Disp: 90 Tab, Rfl: 1  •  ondansetron (ZOFRAN ODT) 4 MG TABLET DISPERSIBLE, Take 1 Tab by mouth every 8  hours as needed for Nausea/Vomiting., Disp: 10 Tab, Rfl: 0  •  diphenoxylate-atropine (LOMOTIL) 2.5-0.025 MG Tab, Take 1 Tab by mouth 4 times a day as needed for Diarrhea., Disp: 30 Tab, Rfl: 0  ALLERGIES: No Known Allergies  SURGHX: History reviewed. No pertinent past surgical history.  SOCHX:  reports that he quit smoking about 10 years ago. He has never used smokeless tobacco. He reports that he does not drink alcohol or use illicit drugs.  FH: Family history was reviewed, no pertinent findings to report      Objective:     /90 mmHg  Pulse 102  Temp(Src) 36.2 °C (97.1 °F)  Resp 22  Wt 77.111 kg (170 lb)  SpO2 94%     Physical Exam   Constitutional: He appears well-developed.   HENT:   Head: Normocephalic.   Right Ear: External ear normal.   Left Ear: External ear normal.   Mouth/Throat: Oropharyngeal exudate present.   No Nasal congestion    Eyes: Right eye exhibits no discharge. Left eye exhibits no discharge.   Neck: Normal range of motion. No tracheal deviation present. No thyromegaly present.   Cardiovascular: Normal rate.  Exam reveals no friction rub.    No murmur heard.  Pulmonary/Chest: Effort normal. No stridor. No respiratory distress. He has no wheezes.   Abdominal: Soft. He exhibits no distension. There is no tenderness. There is no guarding.   Lymphadenopathy:     He has no cervical adenopathy.   Neurological: He is alert.   Skin: Skin is warm and dry. He is not diaphoretic.   Psychiatric: He has a normal mood and affect. His behavior is normal.           Assessment/Plan:     1. Essential hypertension  EKG   2. Bilious vomiting with nausea  ondansetron (ZOFRAN ODT) dispertab 4 mg   3. Dizziness     4. Acute gastritis, presence of bleeding unspecified, unspecified gastritis type  DISCONTINUED: hyoscyamine-maalox plus-lidocaine viscous (GI COCKTAIL)   5. Anterior wall myocardial infarction (CMS-HCC)  aspirin (ASA) chewable tab 162 mg       Sent to [facility] by [facility]  STEMI protocol  Discussed  plan with patient via translation line  Called and discussed with Dr. Gansert at [facility] whom accepted

## 2017-05-20 NOTE — MR AVS SNAPSHOT
Howard Gamboa   2017 12:30 PM   Office Visit   MRN: [id number]    Department:  [facility]   Dept Phone:  [phone]    Description:  Male : 1945   Provider:  Juanito Garrett M.D.           Reason for Visit     Emesis dizziness, vomiting, chest congestion, does burp a lot, cough and [id number]      Allergies as of 2017     No Known Allergies      You were diagnosed with     Essential hypertension   [0077132]       Bilious vomiting with nausea   [7412854]       Dizziness   [[id number]]       Acute gastritis, presence of bleeding unspecified, unspecified gastritis type   [1876930]         Vital Signs     Blood Pressure Pulse Temperature Respirations Weight Oxygen Saturation    140/90 mmHg 102 36.2 °C (97.1 °F) 22 77.111 kg (170 lb) 94%    Smoking Status                   Former Smoker           Basic Information     Date Of Birth Sex Race Ethnicity Preferred Language    1945 Male  Non- Mandarin      Problem List              ICD-10-CM Priority Class Noted - Resolved    Hypertension I10   Unknown - Present    Hyperlipidemia E78.5   2014 - Present    Retinal vein occlusion, branch H34.8392   2014 - Present    Macular edema H35.81   2014 - Present    Adenomatous polyp of colon D12.6   10/2/2015 - Present    Microcytosis R71.8   2016 - Present    Uncontrolled type 2 diabetes mellitus with complication, without long-term current use of insulin (CMS-[facility]) E11.8, E11.65   2017 - Present      Health Maintenance        Date Due Completion Dates    IMM ZOSTER VACCINE 2007 ---    RETINAL SCREENING 2016    COLONOSCOPY 2016    URINE ACR / MICROALBUMIN 10/17/2016 10/17/2015, 10/17/2013    A1C SCREENING 2016, 10/2/2015, 2015, 2014, 2014, 10/17/2013, 2013, 2012, 2009, 2009    FASTING LIPID PROFILE 2017, 10/17/2015, 2015, 2014, 2013, 2009, 2009,  5/5/2009    DIABETES MONOFILAMENT / LE EXAM 2/7/2018 2/7/2017, 5/7/2015, 4/10/2014, 7/1/2013 (Prv Comp)    Override on 7/1/2013: Previously completed    SERUM CREATININE 4/22/2018 4/22/2017, 5/11/2016, 10/17/2015, 5/6/2015, 5/20/2014, 10/17/2013, 5/28/2013, 5/5/2009    IMM DTaP/Tdap/Td Vaccine (2 - Td) 5/9/2022 5/9/2012            Current Immunizations     13-VALENT PCV PREVNAR 10/2/2015    Influenza TIV (IM) 10/10/2013  3:49 PM    Pneumococcal polysaccharide vaccine (PPSV-23) 4/3/2013  4:41 PM    Tdap Vaccine 5/9/2012      Below and/or attached are the medications your provider expects you to take. Review all of your home medications and newly ordered medications with your provider and/or pharmacist. Follow medication instructions as directed by your provider and/or pharmacist. Please keep your medication list with you and share with your provider. Update the information when medications are discontinued, doses are changed, or new medications (including over-the-counter products) are added; and carry medication information at all times in the event of emergency situations     Allergies:  No Known Allergies          Medications  Valid as of: May 20, 2017 -  1:53 PM    Generic Name Brand Name Tablet Size Instructions for use    AmLODIPine Besylate (Tab) NORVASC 10 MG Take 1 Tab by mouth every day.        Atorvastatin Calcium (Tab) LIPITOR 40 MG Take 1 Tab by mouth every bedtime.        Diphenoxylate-Atropine (Tab) LOMOTIL 2.5-0.025 MG Take 1 Tab by mouth 4 times a day as needed for Diarrhea.        Glimepiride (Tab) AMARYL 2 MG Take 1 Tab by mouth every morning.        HydroCHLOROthiazide (Tab) HYDRODIURIL 12.5 MG Take 1 Tab by mouth every day.        hyoscyamine-maalox plus-lidocaine viscous (GI COCKTAIL) GI COCKTAIL  Take 30 mL by mouth Once for 1 dose.        Lisinopril (Tab) PRINIVIL, ZESTRIL 40 MG Take 1 Tab by mouth every day.        MetFORMIN HCl (Tab) GLUCOPHAGE 1000 MG Take 1 Tab by mouth 2 times a day, with  meals.        .                 Medicines prescribed today were sent to:     [id number]  [place], [place] - [address]    [address] [place] [address]    Phone: [phone] Fax: [phone]    Open 24 Hours?: No      Medication refill instructions:       If your prescription bottle indicates you have medication refills left, it is not necessary to call your provider’s office. Please contact your pharmacy and they will refill your medication.    If your prescription bottle indicates you do not have any refills left, you may request refills at any time through one of the following ways: The online Ipselex system (except Urgent Care), by calling your provider’s office, or by asking your pharmacy to contact your provider’s office with a refill request. Medication refills are processed only during regular business hours and may not be available until the next business day. Your provider may request additional information or to have a follow-up visit with you prior to refilling your medication.   *Please Note: Medication refills are assigned a new Rx number when refilled electronically. Your pharmacy may indicate that no refills were authorized even though a new prescription for the same medication is available at the pharmacy. Please request the medicine by name with the pharmacy before contacting your provider for a refill.           Ipselex Access Code: [email or web address]  Expires: 5/22/2017  4:48 AM    Ipselex  A secure, online tool to manage your health information     [employer]’s Ipselex® is a secure, online tool that connects you to your personalized health information from the privacy of your home -- day or night - making it very easy for you to manage your healthcare. Once the activation process is completed, you can even access your medical information using the Ipselex frankie, which is available for free in the Apple Frankie store or Google Play store.     Ipselex provides the following levels of access  (as shown below):   My Chart Features   [facility] Doctor [facility]  Specialists [facility]  Urgent  Care Non-[facility]  Primary Care  Doctor   Email your healthcare team securely and privately 24/7 X X X    Manage appointments: schedule your next appointment; view details of past/upcoming appointments X      Request prescription refills. X      View recent personal medical records, including lab and immunizations X X X X   View health record, including health history, allergies, medications X X X X   Read reports about your outpatient visits, procedures, consult and ER notes X X X X   See your discharge summary, which is a recap of your hospital and/or ER visit that includes your diagnosis, lab results, and care plan. X X       How to register for relocality:  1. Go to  [email or web address].  2. Click on the Sign Up Now box, which takes you to the New Member Sign Up page. You will need to provide the following information:  a. Enter your relocality Access Code exactly as it appears at the top of this page. (You will not need to use this code after you’ve completed the sign-up process. If you do not sign up before the expiration date, you must request a new code.)   b. Enter your date of birth.   c. Enter your home email address.   d. Click Submit, and follow the next screen’s instructions.  3. Create a relocality ID. This will be your relocality login ID and cannot be changed, so think of one that is secure and easy to remember.  4. Create a relocality password. You can change your password at any time.  5. Enter your Password Reset Question and Answer. This can be used at a later time if you forget your password.   6. Enter your e-mail address. This allows you to receive e-mail notifications when new information is available in relocality.  7. Click Sign Up. You can now view your health information.    For assistance activating your relocality account, call [phone]

## 2017-05-20 NOTE — ED PROVIDER NOTES
"ED Provider Note    CHIEF COMPLAINT  Vomiting and headache    NHUNG Gamboa is a 71 y.o. male who presents from urgent care for evaluation of possible STEMI MI. The patient presented there with couple of days of intermittent nausea and general weakness and myalgias and a headache. They did an MI in the urgent care practitioner felt that he might have a STEMI. He was accepted in transfer. On arrival here he has a normal EKG with no ST-T wave change. No STEMI. No chest pain. He's had absolutelychest pain. No fever chills or cough. No difficulty breathing. No abdominal pain.    REVIEW OF SYSTEMS no jaw pain, no chest pain, no abdominal pain.  ALL OTHER SYSTEMS NEGATIVE    ALLERGIES  No Known Allergies    CURRENT MEDICATIONS  Home Medications     Reviewed by María Edouard (Pharmacy Tech) on 05/20/17 at 1416  Med List Status: Complete    Medication Last Dose Status    amlodipine (NORVASC) 10 MG Tab 5/19/2017 Active    atorvastatin (LIPITOR) 40 MG Tab 5/19/2017 Active    glimepiride (AMARYL) 2 MG Tab 5/19/2017 Active    hydrochlorothiazide (HYDRODIURIL) 12.5 MG tablet 5/19/2017 Active    lisinopril (PRINIVIL, ZESTRIL) 40 MG tablet 5/19/2017 Active    metformin (GLUCOPHAGE) 1000 MG tablet 5/19/2017 Active                PAST MEDICAL HISTORY  Past Medical History   Diagnosis Date   • Hypertension    • Diabetes mellitus out of control (CMS-HCC)    • Diabetes (CMS-[facility])    • High cholesterol        FAMILY HISTORY  Family History   Problem Relation Age of Onset   • Stroke Mother    • Diabetes Father    • Diabetes Brother        SOCIAL HISTORY  Nonsmoker    PHYSICAL EXAM  GENERAL: Alert  male adult  VITAL SIGNS: /103 mmHg  Pulse 98  Resp 18  Ht 1.727 m (5' 8\")  Wt 72.576 kg (160 lb)  BMI 24.33 kg/m2  SpO2 97%   Constitutional: Alert  male adult HENT: Scalp is normal size and nontender. Ears are clear. Nose is clear. Throat is clear with no stridor no drooling no trismus. Teeth are all " intact.  Eyes: Pupils equal round and reactive to light, extraocular motor fall. There is no scleral icterus.  Neck: Neck is supple and nontender. There is no meningismus. No adenitis. No thyromegaly.  Lymphatic: No adenopathy.   Cardiovascular: Heart regular rhythm without murmurs or gallops   Thorax & Lungs: No chest wall tenderness. Lungs are clear. Patient has good breath sounds bilateral. No rales, no rhonchi, no wheezes.  Abdomen: Abdomen is soft, nontender, not rigid, no guarding, and no organomegaly. There is no palpable hernia   Skin: Warm, pink, and dry with no erythema and no rash.   Back: Nontender, no midline bony tenderness, no flank tenderness.  Extremities: Full range of motion  No tenderness to palpation and no deformities noted. No calf or thigh swelling. No calf or thigh tenderness. No clinical DVT.  Neurologic: Alert & oriented . Cranial nerves are grossly intact as tested. Patient moves all 4 extremities well. Patient has good strong flexion and extension of the ankle joints knee joints hip joints and elbow joints. Sensation is normal and symmetrical in the upper and lower extremities.   Psychiatric: Patient is alert oriented coherent and rational.     EKG  EKG Interpretation    Interpreted by me    Rhythm: normal sinus   Rate: normal  Axis: normal  Ectopy: none  Conduction: normal  ST Segments: no acute change  T Waves: no acute change  Q Waves: none    Clinical Impression: Normal sinus rhythm with no specific ST-T wave change.  DX-CHEST-PORTABLE (1 VIEW)   Final Result      Hypoventilatory change with bibasilar atelectasis.      CT-HEAD W/O   Final Result      1.  White matter lucencies most consistent with small vessel ischemic change versus demyelination or gliosis.      2.  No evidence of acute cerebral infarction, hemorrhage or mass lesion.      3. Acute sinusitis.      4.  Sclerosis of the right mastoid air cells suggesting history of mastoiditis.                COURSE & MEDICAL DECISION  MAKING  Patient arrives from urgent care for evaluation of a possible STEMI. The patient had actually no  chest pain and no abdominal pain. He complained of nausea, vomiting, slight headache or the few days. His EKG is normal. No ST-T wave change. Cardiology was here with the patient arrived. He also felt that this was not a STEMI. Patient did not need to go to the Cath Lab. Needed evaluation for vomiting. Differential diagnosis: Vomiting, gastritis, hepatitis, pancreatitis, irritable bowel syndrome, colitis, intracranial event, etc.    Plan: #1 IV #2 a bolus of IV fluids for rehydration #3 intravenous Zofran No. 4. Cardiac evaluation on arrival #5. Lab including CBC, CMP, lipase, ProTime, CT of the head. EKG. #6. Review previous medical records    Review of previous medical records: Hypertension, diabetes, high cholesterol.    Laboratory and reexamination: Chest x-ray is normal. CT of the head shows white matter lucencies no acute change. Acute sinusitis. Chemistry panels unremarkable. Troponin is negative. Lipase 19. BNP 11. EKG shows no acute change. White count 14,000. Hemoglobin 16. No anemia. No bandemia.  Results for orders placed or performed during the hospital encounter of 05/20/17   Troponin   Result Value Ref Range    Troponin I <0.01 0.00 - 0.04 ng/mL   Btype Natriuretic Peptide   Result Value Ref Range    B Natriuretic Peptide 11 0 - 100 pg/mL   CBC with Differential   Result Value Ref Range    WBC 14.4 (H) 4.8 - 10.8 K/uL    RBC 6.26 (H) 4.70 - 6.10 M/uL    Hemoglobin 16.5 14.0 - 18.0 g/dL    Hematocrit 48.7 42.0 - 52.0 %    MCV 77.8 (L) 81.4 - 97.8 fL    MCH 26.4 (L) 27.0 - 33.0 pg    MCHC 33.9 33.7 - 35.3 g/dL    RDW 37.1 35.9 - 50.0 fL    Platelet Count 312 164 - 446 K/uL    MPV 8.4 (L) 9.0 - 12.9 fL    Neutrophils-Polys 87.90 (H) 44.00 - 72.00 %    Lymphocytes 7.10 (L) 22.00 - 41.00 %    Monocytes 4.20 0.00 - 13.40 %    Eosinophils 0.00 0.00 - 6.90 %    Basophils 0.30 0.00 - 1.80 %    Immature  Granulocytes 0.50 0.00 - 0.90 %    Nucleated RBC 0.00 /100 WBC    Neutrophils (Absolute) 12.67 (H) 1.82 - 7.42 K/uL    Lymphs (Absolute) 1.02 1.00 - 4.80 K/uL    Monos (Absolute) 0.61 0.00 - 0.85 K/uL    Eos (Absolute) 0.00 0.00 - 0.51 K/uL    Baso (Absolute) 0.04 0.00 - 0.12 K/uL    Immature Granulocytes (abs) 0.07 0.00 - 0.11 K/uL    NRBC (Absolute) 0.00 K/uL   Complete Metabolic Panel (CMP)   Result Value Ref Range    Sodium 135 135 - 145 mmol/L    Potassium 3.6 3.6 - 5.5 mmol/L    Chloride 100 96 - 112 mmol/L    Co2 23 20 - 33 mmol/L    Anion Gap 12.0 (H) 0.0 - 11.9    Glucose 314 (H) 65 - 99 mg/dL    Bun 15 8 - 22 mg/dL    Creatinine 0.76 0.50 - 1.40 mg/dL    Calcium 9.6 8.5 - 10.5 mg/dL    AST(SGOT) 17 12 - 45 U/L    ALT(SGPT) 22 2 - 50 U/L    Alkaline Phosphatase 75 30 - 99 U/L    Total Bilirubin 1.0 0.1 - 1.5 mg/dL    Albumin 4.6 3.2 - 4.9 g/dL    Total Protein 8.1 6.0 - 8.2 g/dL    Globulin 3.5 1.9 - 3.5 g/dL    A-G Ratio 1.3 g/dL   Prothrombin Time   Result Value Ref Range    PT 12.8 12.0 - 14.6 sec    INR 0.93 0.87 - 1.13   APTT   Result Value Ref Range    APTT 26.0 24.7 - 36.0 sec   Lipase   Result Value Ref Range    Lipase 19 11 - 82 U/L   ESTIMATED GFR   Result Value Ref Range    GFR If African American >60 >60 mL/min/1.73 m 2    GFR If Non African American >60 >60 mL/min/1.73 m 2   EKG (ER)   Result Value Ref Range    Report       [facility]    Test Date:  2017  Pt Name:    JONATHAN KAUR                     Department: ER  MRN:        [id number]                      Room:       RD 01  Gender:     M                            Technician: [id number]  :        [id number]                   Requested By:GARY GANSERT  Order #:    [id number]                    Reading MD:    Measurements  Intervals                                Axis  Rate:       103                          P:          34  NJ:         196                          QRS:        13  QRSD:       104                           T:          -12  QT:         368  QTc:        482    Interpretive Statements  SINUS TACHYCARDIA  LATE PRECORDIAL R/S TRANSITION  PROBABLE LEFT VENTRICULAR HYPERTROPHY  BORDERLINE T ABNORMALITIES, INFERIOR LEADS  ANTERIOR ST ELEVATION, PROBABLY DUE TO LVH  BORDERLINE PROLONGED QT INTERVAL  No previous ECG available for comparison        Condition jorge luis observed in the ED for several hours. Stable for discharge. He is asymptomatic at this time. He has a sinusitis and probably a gastritis. He stable for discharge with family.    Home treatment: #1 is been given a copy of all of his reports #2 Protonix and Zofran and antibiotics for the sinusitis.  FINAL IMPRESSION  1. Vomiting and dehydration  2. Sinusitis 3. Gastritis         Electronically signed by: Gary Gansert, 5/20/2017 3:26 PM

## 2017-05-20 NOTE — ED AVS SNAPSHOT
Home Care Instructions                                                                                                                Howard Gamboa   MRN: [id number]    Department:  [facility], Emergency Dept   Date of Visit:  5/20/2017            [facility], Emergency Dept    [address]    [place] [address]    Phone:  [phone]      You were seen by     Gary Gansert, M.D.      Your Diagnosis Was     Nausea and vomiting, intractability of vomiting not specified, unspecified vomiting type     R11.2       These are the medications you received during your hospitalization from 05/20/2017 1356 to 05/20/2017 1733     Date/Time Order Dose Route Action    05/20/2017 1441 NS infusion 1,000 mL 1,000 mL Intravenous New Bag    05/20/2017 1439 ondansetron (ZOFRAN) syringe/vial injection 4 mg 4 mg Intravenous Given      Follow-up Information     1. Follow up with Sayda An M.D.. Schedule an appointment as soon as possible for a visit in 1 day.    Specialty:  Family Medicine    Contact information    [address] [address] [address] [phone]        Medication Information     Review all of your home medications and newly ordered medications with your primary doctor and/or pharmacist as soon as possible. Follow medication instructions as directed by your doctor and/or pharmacist.     Please keep your complete medication list with you and share with your physician. Update the information when medications are discontinued, doses are changed, or new medications (including over-the-counter products) are added; and carry medication information at all times in the event of emergency situations.               Medication List      START taking these medications        Instructions    Morning Afternoon Evening Bedtime    amoxicillin-clavulanate 875-125 MG Tabs   Commonly known as:  AUGMENTIN        Take 1 Tab by mouth 2 times a day.   Dose:  1 Tab                        ondansetron 4 MG Tabs tablet   Commonly known as:  ZOFRAN        Take 1 Tab by mouth every 8 hours as needed for Nausea/Vomiting.   Dose:  4 mg                        pantoprazole 20 MG tablet   Commonly known as:  PROTONIX        Take 1 Tab by mouth every day.   Dose:  20 mg                          ASK your doctor about these medications        Instructions    Morning Afternoon Evening Bedtime    amlodipine 10 MG Tabs   Commonly known as:  NORVASC        Take 1 Tab by mouth every day.   Dose:  10 mg                        atorvastatin 40 MG Tabs   Commonly known as:  LIPITOR        Take 1 Tab by mouth every bedtime.   Dose:  40 mg                        glimepiride 2 MG Tabs   Commonly known as:  AMARYL        Take 1 Tab by mouth every morning.   Dose:  2 mg                        hydrochlorothiazide 12.5 MG tablet   Commonly known as:  HYDRODIURIL        Take 1 Tab by mouth every day.   Dose:  12.5 mg                        lisinopril 40 MG tablet   Commonly known as:  PRINIVIL, ZESTRIL        Take 1 Tab by mouth every day.   Dose:  40 mg                        metformin 1000 MG tablet   Commonly known as:  GLUCOPHAGE        Take 1 Tab by mouth 2 times a day, with meals.   Dose:  1000 mg                             Where to Get Your Medications      You can get these medications from any pharmacy     Bring a paper prescription for each of these medications    - amoxicillin-clavulanate 875-125 MG Tabs  - ondansetron 4 MG Tabs tablet  - pantoprazole 20 MG tablet            Procedures and tests performed during your visit     Procedure/Test Number of Times Performed    APTT 1    Btype Natriuretic Peptide 1    CBC with Differential 1    CT-HEAD W/O 1    Cardiac Monitoring 2    Complete Metabolic Panel (CMP) 1    DX-CHEST-PORTABLE (1 VIEW) 1    EKG (ER) 1    ESTIMATED GFR 1    IV Saline Lock 1    Lipase 1    Maintain O2 sats greater than 94% 1    Oxygen 1    Oxygen Therapy per Protocol 1    Prothrombin Time 1    Pulse  Ox 1    Saline Lock 1    Troponin 1        Discharge Instructions       Gastritis, Adult  Gastritis is soreness and swelling (inflammation) of the lining of the stomach. Gastritis can develop as a sudden onset (acute) or long-term (chronic) condition. If gastritis is not treated, it can lead to stomach bleeding and ulcers.  CAUSES   Gastritis occurs when the stomach lining is weak or damaged. Digestive juices from the stomach then inflame the weakened stomach lining. The stomach lining may be weak or damaged due to viral or bacterial infections. One common bacterial infection is the Helicobacter pylori infection. Gastritis can also result from excessive alcohol consumption, taking certain medicines, or having too much acid in the stomach.   SYMPTOMS   In some cases, there are no symptoms. When symptoms are present, they may include:  · Pain or a burning sensation in the upper abdomen.  · Nausea.  · Vomiting.  · An uncomfortable feeling of fullness after eating.  DIAGNOSIS   Your caregiver may suspect you have gastritis based on your symptoms and a physical exam. To determine the cause of your gastritis, your caregiver may perform the following:  · Blood or stool tests to check for the H pylori bacterium.  · Gastroscopy. A thin, flexible tube (endoscope) is passed down the esophagus and into the stomach. The endoscope has a light and camera on the end. Your caregiver uses the endoscope to view the inside of the stomach.  · Taking a tissue sample (biopsy) from the stomach to examine under a microscope.  TREATMENT   Depending on the cause of your gastritis, medicines may be prescribed. If you have a bacterial infection, such as an H pylori infection, antibiotics may be given. If your gastritis is caused by too much acid in the stomach, H2 blockers or antacids may be given. Your caregiver may recommend that you stop taking aspirin, ibuprofen, or other nonsteroidal anti-inflammatory drugs (NSAIDs).  HOME CARE  INSTRUCTIONS  · Only take over-the-counter or prescription medicines as directed by your caregiver.  · If you were given antibiotic medicines, take them as directed. Finish them even if you start to feel better.  · Drink enough fluids to keep your urine clear or pale yellow.  · Avoid foods and drinks that make your symptoms worse, such as:  · Caffeine or alcoholic drinks.  · Chocolate.  · Peppermint or mint flavorings.  · Garlic and onions.  · Spicy foods.  · Citrus fruits, such as oranges, skyla, or limes.  · Tomato-based foods such as sauce, chili, salsa, and pizza.  · Fried and fatty foods.  · Eat small, frequent meals instead of large meals.  SEEK IMMEDIATE MEDICAL CARE IF:   · You have black or dark red stools.  · You vomit blood or material that looks like coffee grounds.  · You are unable to keep fluids down.  · Your abdominal pain gets worse.  · You have a fever.  · You do not feel better after 1 week.  · You have any other questions or concerns.  MAKE SURE YOU:  · Understand these instructions.  · Will watch your condition.  · Will get help right away if you are not doing well or get worse.     This information is not intended to replace advice given to you by your health care provider. Make sure you discuss any questions you have with your health care provider.     Document Released: 12/12/2002 Document Revised: 06/18/2013 Document Reviewed: 01/30/2013  [employer] Interactive Patient Education ©2016 [employer]    Nausea and Vomiting  Nausea means you feel sick to your stomach. Throwing up (vomiting) is a reflex where stomach contents come out of your mouth.  HOME CARE   · Take medicine as told by your doctor.  · Do not force yourself to eat. However, you do need to drink fluids.  · If you feel like eating, eat a normal diet as told by your doctor.  · Eat rice, wheat, potatoes, bread, lean meats, yogurt, fruits, and vegetables.  · Avoid high-fat foods.  · Drink enough fluids to keep your pee (urine) clear  or pale yellow.  · Ask your doctor how to replace body fluid losses (rehydrate). Signs of body fluid loss (dehydration) include:  · Feeling very thirsty.  · Dry lips and mouth.  · Feeling dizzy.  · Dark pee.  · Peeing less than normal.  · Feeling confused.  · Fast breathing or heart rate.  GET HELP RIGHT AWAY IF:   · You have blood in your throw up.  · You have black or bloody poop (stool).  · You have a bad headache or stiff neck.  · You feel confused.  · You have bad belly (abdominal) pain.  · You have chest pain or trouble breathing.  · You do not pee at least once every 8 hours.  · You have cold, clammy skin.  · You keep throwing up after 24 to 48 hours.  · You have a fever.  MAKE SURE YOU:   · Understand these instructions.  · Will watch your condition.  · Will get help right away if you are not doing well or get worse.     This information is not intended to replace advice given to you by your health care provider. Make sure you discuss any questions you have with your health care provider.     Document Released: 06/05/2009 Document Revised: 03/11/2013 Document Reviewed: 05/18/2012  [employer] Interactive Patient Education ©2016 [employer] Inc.    Sinusitis, Adult  Sinusitis is redness, soreness, and inflammation of the paranasal sinuses. Paranasal sinuses are air pockets within the bones of your face. They are located beneath your eyes, in the middle of your forehead, and above your eyes. In healthy paranasal sinuses, mucus is able to drain out, and air is able to circulate through them by way of your nose. However, when your paranasal sinuses are inflamed, mucus and air can become trapped. This can allow bacteria and other germs to grow and cause infection.  Sinusitis can develop quickly and last only a short time (acute) or continue over a long period (chronic). Sinusitis that lasts for more than 12 weeks is considered chronic.  CAUSES  Causes of sinusitis include:  · Allergies.  · Structural abnormalities,  such as displacement of the cartilage that separates your nostrils (deviated septum), which can decrease the air flow through your nose and sinuses and affect sinus drainage.  · Functional abnormalities, such as when the small hairs (cilia) that line your sinuses and help remove mucus do not work properly or are not present.  SIGNS AND SYMPTOMS  Symptoms of acute and chronic sinusitis are the same. The primary symptoms are pain and pressure around the affected sinuses. Other symptoms include:  · Upper toothache.  · Earache.  · Headache.  · Bad breath.  · Decreased sense of smell and taste.  · A cough, which worsens when you are lying flat.  · Fatigue.  · Fever.  · Thick drainage from your nose, which often is green and may contain pus (purulent).  · Swelling and warmth over the affected sinuses.  DIAGNOSIS  Your health care provider will perform a physical exam. During your exam, your health care provider may perform any of the following to help determine if you have acute sinusitis or chronic sinusitis:  · Look in your nose for signs of abnormal growths in your nostrils (nasal polyps).  · Tap over the affected sinus to check for signs of infection.  · View the inside of your sinuses using an imaging device that has a light attached (endoscope).  If your health care provider suspects that you have chronic sinusitis, one or more of the following tests may be recommended:  · Allergy tests.  · Nasal culture. A sample of mucus is taken from your nose, sent to a lab, and screened for bacteria.  · Nasal cytology. A sample of mucus is taken from your nose and examined by your health care provider to determine if your sinusitis is related to an allergy.  TREATMENT  Most cases of acute sinusitis are related to a viral infection and will resolve on their own within 10 days. Sometimes, medicines are prescribed to help relieve symptoms of both acute and chronic sinusitis. These may include pain medicines, decongestants, nasal  steroid sprays, or saline sprays.  However, for sinusitis related to a bacterial infection, your health care provider will prescribe antibiotic medicines. These are medicines that will help kill the bacteria causing the infection.  Rarely, sinusitis is caused by a fungal infection. In these cases, your health care provider will prescribe antifungal medicine.  For some cases of chronic sinusitis, surgery is needed. Generally, these are cases in which sinusitis recurs more than 3 times per year, despite other treatments.  HOME CARE INSTRUCTIONS  · Drink plenty of water. Water helps thin the mucus so your sinuses can drain more easily.  · Use a humidifier.  · Inhale steam 3-4 times a day (for example, sit in the bathroom with the shower running).  · Apply a warm, moist washcloth to your face 3-4 times a day, or as directed by your health care provider.  · Use saline nasal sprays to help moisten and clean your sinuses.  · Take medicines only as directed by your health care provider.  · If you were prescribed either an antibiotic or antifungal medicine, finish it all even if you start to feel better.  SEEK IMMEDIATE MEDICAL CARE IF:  · You have increasing pain or severe headaches.  · You have nausea, vomiting, or drowsiness.  · You have swelling around your face.  · You have vision problems.  · You have a stiff neck.  · You have difficulty breathing.     This information is not intended to replace advice given to you by your health care provider. Make sure you discuss any questions you have with your health care provider.     Document Released: 12/18/2006 Document Revised: 01/08/2016 Document Reviewed: 01/01/2013  Elsevier Interactive Patient Education ©2016 Elsevier Inc.            Patient Information     Patient Information    Following emergency treatment: all patient requiring follow-up care must return either to a private physician or a clinic if your condition worsens before you are able to obtain further medical  attention, please return to the emergency room.     Billing Information    At [facility], we work to make the billing process streamlined for our patients.  Our Representatives are here to answer any questions you may have regarding your hospital bill.  If you have insurance coverage and have supplied your insurance information to us, we will submit a claim to your insurer on your behalf.  Should you have any questions regarding your bill, we can be reached online or by phone as follows:  Online: You are able pay your bills online or live chat with our representatives about any billing questions you may have. We are here to help Monday - Friday from 8:00am to 7:30pm and 9:00am - 12:00pm on Saturdays.  Please visit [email or web address]  for more information.   Phone:  [phone] or [phone]    Please note that your emergency physician, surgeon, pathologist, radiologist, anesthesiologist, and other specialists are not employed by [facility] and will therefore bill separately for their services.  Please contact them directly for any questions concerning their bills at the numbers below:     Emergency Physician Services:  [phone]  [facility]:  [phone]  [facility]:  [phone]  [facility]:  [phone]    1. Your final bill may vary from the amount quoted upon discharge if all procedures are not complete at that time, or if your doctor has additional procedures of which we are not aware. You will receive an additional bill if you return to the Emergency Department at [facility] for suture removal regardless of the facility of which the sutures were placed.     2. Please arrange for settlement of this account at the emergency registration.    3. All self-pay accounts are due in full at the time of treatment.  If you are unable to meet this obligation then payment is expected within 4-5 days.     4. If you have had  radiology studies (CT, X-ray, Ultrasound, MRI), you have received a preliminary result during your emergency department visit. Please contact the radiology department [phone] to receive a copy of your final result. Please discuss the Final result with your primary physician or with the follow up physician provided.     Crisis Hotline:  [place] Crisis Hotline:  1-109-JJDEQHI or 1-976.730.1045  [place] Crisis Hotline:    1-770.126.8869 or 093-900-4806         ED Discharge Follow Up Questions    1. In order to provide you with very good care, we would like to follow up with a phone call in the next few days.  May we have your permission to contact you?     YES /  NO    2. What is the best phone number to call you? (       )_____-__________    3. What is the best time to call you?      Morning  /  Afternoon  /  Evening                   Patient Signature:  ____________________________________________________________    Date:  ____________________________________________________________

## 2017-05-20 NOTE — ED AVS SNAPSHOT
Benesight Access Code: [email or web address]  Expires: 5/22/2017  4:48 AM    Benesight  A secure, online tool to manage your health information     [employer]’s Benesight® is a secure, online tool that connects you to your personalized health information from the privacy of your home -- day or night - making it very easy for you to manage your healthcare. Once the activation process is completed, you can even access your medical information using the Benesight frankie, which is available for free in the Apple Frankie store or Google Play store.     Benesight provides the following levels of access (as shown below):   My Chart Features   [facility] Primary Care Doctor [facility]  Specialists [facility]  Urgent  Care Non-[facility]  Primary Care  Doctor   Email your healthcare team securely and privately 24/7 X X X X   Manage appointments: schedule your next appointment; view details of past/upcoming appointments X      Request prescription refills. X      View recent personal medical records, including lab and immunizations X X X X   View health record, including health history, allergies, medications X X X X   Read reports about your outpatient visits, procedures, consult and ER notes X X X X   See your discharge summary, which is a recap of your hospital and/or ER visit that includes your diagnosis, lab results, and care plan. X X       How to register for Benesight:  1. Go to  [email or web address].  2. Click on the Sign Up Now box, which takes you to the New Member Sign Up page. You will need to provide the following information:  a. Enter your Benesight Access Code exactly as it appears at the top of this page. (You will not need to use this code after you’ve completed the sign-up process. If you do not sign up before the expiration date, you must request a new code.)   b. Enter your date of birth.   c. Enter your home email address.   d. Click Submit, and follow the next screen’s instructions.  3. Create a Benesight ID. This will be your Benesight  login ID and cannot be changed, so think of one that is secure and easy to remember.  4. Create a [employer] password. You can change your password at any time.  5. Enter your Password Reset Question and Answer. This can be used at a later time if you forget your password.   6. Enter your e-mail address. This allows you to receive e-mail notifications when new information is available in [employer].  7. Click Sign Up. You can now view your health information.    For assistance activating your [employer] account, call [phone]

## 2017-05-20 NOTE — ED NOTES
The Medication Reconciliation process has been completed by interviewing the patient    Allergies have been reviewed  Antibiotic use in 30 days - NONE    Home Pharmacy:  [facility]

## 2017-05-20 NOTE — ED AVS SNAPSHOT
5/20/2017    Howard Gamboa  [address] [place] [address]    Dear Howard:    [facility] wants to ensure your discharge home is safe and you or your loved ones have had all of your questions answered regarding your care after you leave the hospital.    Below is a list of resources and contact information should you have any questions regarding your hospital stay, follow-up instructions, or active medical symptoms.    Questions or Concerns Regarding… Contact   Medical Questions Related to Your Discharge  (7 days a week, 8am-5pm) Contact a Nurse Care Coordinator   [phone]   Medical Questions Not Related to Your Discharge  (24 hours a day / 7 days a week)  Contact the Nurse Health Line   [phone]    Medications or Discharge Instructions Refer to your discharge packet   or contact your [facility] Primary Care Provider   [phone]   Follow-up Appointment(s) Schedule your appointment via [employer]   or contact Scheduling [phone]   Billing Review your statement via [employer]  or contact Billing [phone]   Medical Records Review your records via [employer]   or contact Medical Records [phone]     You may receive a telephone call within two days of discharge. This call is to make certain you understand your discharge instructions and have the opportunity to have any questions answered. You can also easily access your medical information, test results and upcoming appointments via the Innov[employer] free online health management tool. You can learn more and sign up at [email or web address]. For assistance setting up your Innovid account, please call [phone].    Once again, we want to ensure your discharge home is safe and that you have a clear understanding of any next steps in your care. If you have any questions or concerns, please do not hesitate to contact us, we are here for you. Thank you for choosing [facility] for your healthcare needs.    Sincerely,    Your [facility] Healthcare Team

## 2017-05-20 NOTE — ADDENDUM NOTE
Addended by: HANNAH DANIEL on: 5/20/2017 02:10 PM     Modules accepted: Orders, Level of Service

## 2017-05-20 NOTE — ED NOTES
Pt to [facility] for possible stemi, pt .  Chief Complaint   Patient presents with   • N/V     sudden onset since approx 0800   • Dizziness   • Abnormal EKG   • Sent from Urgent Care     for further evaluation possible stemi    stemi cancelled per cardiology, pt to rm 1

## 2017-05-21 NOTE — ED NOTES
Pt given discharge instructions/prescriptions/ home care instructions, pt verbalized understanding of instructions given, pt ambulatory to [facility].

## 2017-05-21 NOTE — DISCHARGE INSTRUCTIONS
Gastritis, Adult  Gastritis is soreness and swelling (inflammation) of the lining of the stomach. Gastritis can develop as a sudden onset (acute) or long-term (chronic) condition. If gastritis is not treated, it can lead to stomach bleeding and ulcers.  CAUSES   Gastritis occurs when the stomach lining is weak or damaged. Digestive juices from the stomach then inflame the weakened stomach lining. The stomach lining may be weak or damaged due to viral or bacterial infections. One common bacterial infection is the Helicobacter pylori infection. Gastritis can also result from excessive alcohol consumption, taking certain medicines, or having too much acid in the stomach.   SYMPTOMS   In some cases, there are no symptoms. When symptoms are present, they may include:  · Pain or a burning sensation in the upper abdomen.  · Nausea.  · Vomiting.  · An uncomfortable feeling of fullness after eating.  DIAGNOSIS   Your caregiver may suspect you have gastritis based on your symptoms and a physical exam. To determine the cause of your gastritis, your caregiver may perform the following:  · Blood or stool tests to check for the H pylori bacterium.  · Gastroscopy. A thin, flexible tube (endoscope) is passed down the esophagus and into the stomach. The endoscope has a light and camera on the end. Your caregiver uses the endoscope to view the inside of the stomach.  · Taking a tissue sample (biopsy) from the stomach to examine under a microscope.  TREATMENT   Depending on the cause of your gastritis, medicines may be prescribed. If you have a bacterial infection, such as an H pylori infection, antibiotics may be given. If your gastritis is caused by too much acid in the stomach, H2 blockers or antacids may be given. Your caregiver may recommend that you stop taking aspirin, ibuprofen, or other nonsteroidal anti-inflammatory drugs (NSAIDs).  HOME CARE INSTRUCTIONS  · Only take over-the-counter or prescription medicines as directed by  your caregiver.  · If you were given antibiotic medicines, take them as directed. Finish them even if you start to feel better.  · Drink enough fluids to keep your urine clear or pale yellow.  · Avoid foods and drinks that make your symptoms worse, such as:  · Caffeine or alcoholic drinks.  · Chocolate.  · Peppermint or mint flavorings.  · Garlic and onions.  · Spicy foods.  · Citrus fruits, such as oranges, skyla, or limes.  · Tomato-based foods such as sauce, chili, salsa, and pizza.  · Fried and fatty foods.  · Eat small, frequent meals instead of large meals.  SEEK IMMEDIATE MEDICAL CARE IF:   · You have black or dark red stools.  · You vomit blood or material that looks like coffee grounds.  · You are unable to keep fluids down.  · Your abdominal pain gets worse.  · You have a fever.  · You do not feel better after 1 week.  · You have any other questions or concerns.  MAKE SURE YOU:  · Understand these instructions.  · Will watch your condition.  · Will get help right away if you are not doing well or get worse.     This information is not intended to replace advice given to you by your health care provider. Make sure you discuss any questions you have with your health care provider.     Document Released: 12/12/2002 Document Revised: 06/18/2013 Document Reviewed: 01/30/2013  [employer] Interactive Patient Education ©2016 [employer]    Nausea and Vomiting  Nausea means you feel sick to your stomach. Throwing up (vomiting) is a reflex where stomach contents come out of your mouth.  HOME CARE   · Take medicine as told by your doctor.  · Do not force yourself to eat. However, you do need to drink fluids.  · If you feel like eating, eat a normal diet as told by your doctor.  · Eat rice, wheat, potatoes, bread, lean meats, yogurt, fruits, and vegetables.  · Avoid high-fat foods.  · Drink enough fluids to keep your pee (urine) clear or pale yellow.  · Ask your doctor how to replace body fluid losses (rehydrate).  Signs of body fluid loss (dehydration) include:  · Feeling very thirsty.  · Dry lips and mouth.  · Feeling dizzy.  · Dark pee.  · Peeing less than normal.  · Feeling confused.  · Fast breathing or heart rate.  GET HELP RIGHT AWAY IF:   · You have blood in your throw up.  · You have black or bloody poop (stool).  · You have a bad headache or stiff neck.  · You feel confused.  · You have bad belly (abdominal) pain.  · You have chest pain or trouble breathing.  · You do not pee at least once every 8 hours.  · You have cold, clammy skin.  · You keep throwing up after 24 to 48 hours.  · You have a fever.  MAKE SURE YOU:   · Understand these instructions.  · Will watch your condition.  · Will get help right away if you are not doing well or get worse.     This information is not intended to replace advice given to you by your health care provider. Make sure you discuss any questions you have with your health care provider.     Document Released: 06/05/2009 Document Revised: 03/11/2013 Document Reviewed: 05/18/2012  [employer] Interactive Patient Education ©2016 [employer]    Sinusitis, Adult  Sinusitis is redness, soreness, and inflammation of the paranasal sinuses. Paranasal sinuses are air pockets within the bones of your face. They are located beneath your eyes, in the middle of your forehead, and above your eyes. In healthy paranasal sinuses, mucus is able to drain out, and air is able to circulate through them by way of your nose. However, when your paranasal sinuses are inflamed, mucus and air can become trapped. This can allow bacteria and other germs to grow and cause infection.  Sinusitis can develop quickly and last only a short time (acute) or continue over a long period (chronic). Sinusitis that lasts for more than 12 weeks is considered chronic.  CAUSES  Causes of sinusitis include:  · Allergies.  · Structural abnormalities, such as displacement of the cartilage that separates your nostrils (deviated  septum), which can decrease the air flow through your nose and sinuses and affect sinus drainage.  · Functional abnormalities, such as when the small hairs (cilia) that line your sinuses and help remove mucus do not work properly or are not present.  SIGNS AND SYMPTOMS  Symptoms of acute and chronic sinusitis are the same. The primary symptoms are pain and pressure around the affected sinuses. Other symptoms include:  · Upper toothache.  · Earache.  · Headache.  · Bad breath.  · Decreased sense of smell and taste.  · A cough, which worsens when you are lying flat.  · Fatigue.  · Fever.  · Thick drainage from your nose, which often is green and may contain pus (purulent).  · Swelling and warmth over the affected sinuses.  DIAGNOSIS  Your health care provider will perform a physical exam. During your exam, your health care provider may perform any of the following to help determine if you have acute sinusitis or chronic sinusitis:  · Look in your nose for signs of abnormal growths in your nostrils (nasal polyps).  · Tap over the affected sinus to check for signs of infection.  · View the inside of your sinuses using an imaging device that has a light attached (endoscope).  If your health care provider suspects that you have chronic sinusitis, one or more of the following tests may be recommended:  · Allergy tests.  · Nasal culture. A sample of mucus is taken from your nose, sent to a lab, and screened for bacteria.  · Nasal cytology. A sample of mucus is taken from your nose and examined by your health care provider to determine if your sinusitis is related to an allergy.  TREATMENT  Most cases of acute sinusitis are related to a viral infection and will resolve on their own within 10 days. Sometimes, medicines are prescribed to help relieve symptoms of both acute and chronic sinusitis. These may include pain medicines, decongestants, nasal steroid sprays, or saline sprays.  However, for sinusitis related to a  bacterial infection, your health care provider will prescribe antibiotic medicines. These are medicines that will help kill the bacteria causing the infection.  Rarely, sinusitis is caused by a fungal infection. In these cases, your health care provider will prescribe antifungal medicine.  For some cases of chronic sinusitis, surgery is needed. Generally, these are cases in which sinusitis recurs more than 3 times per year, despite other treatments.  HOME CARE INSTRUCTIONS  · Drink plenty of water. Water helps thin the mucus so your sinuses can drain more easily.  · Use a humidifier.  · Inhale steam 3-4 times a day (for example, sit in the bathroom with the shower running).  · Apply a warm, moist washcloth to your face 3-4 times a day, or as directed by your health care provider.  · Use saline nasal sprays to help moisten and clean your sinuses.  · Take medicines only as directed by your health care provider.  · If you were prescribed either an antibiotic or antifungal medicine, finish it all even if you start to feel better.  SEEK IMMEDIATE MEDICAL CARE IF:  · You have increasing pain or severe headaches.  · You have nausea, vomiting, or drowsiness.  · You have swelling around your face.  · You have vision problems.  · You have a stiff neck.  · You have difficulty breathing.     This information is not intended to replace advice given to you by your health care provider. Make sure you discuss any questions you have with your health care provider.     Document Released: 12/18/2006 Document Revised: 01/08/2016 Document Reviewed: 01/01/2013  [employer] Interactive Patient Education ©2016 [employer]

## 2017-05-25 ENCOUNTER — APPOINTMENT (OUTPATIENT)
Dept: MEDICAL GROUP | Facility: LAB | Age: 72
End: 2017-05-25

## 2017-06-11 LAB — EKG IMPRESSION: NORMAL

## 2017-11-13 DIAGNOSIS — I10 ESSENTIAL HYPERTENSION: ICD-10-CM

## 2017-11-13 DIAGNOSIS — E78.49 OTHER HYPERLIPIDEMIA: ICD-10-CM

## 2017-11-13 RX ORDER — ATORVASTATIN CALCIUM 40 MG/1
40 TABLET, FILM COATED ORAL
Qty: 90 TAB | Refills: 1 | Status: SHIPPED | OUTPATIENT
Start: 2017-11-13 | End: 2018-06-05 | Stop reason: SDUPTHER

## 2017-11-13 RX ORDER — GLIMEPIRIDE 2 MG/1
2 TABLET ORAL EVERY MORNING
Qty: 90 TAB | Refills: 1 | Status: ON HOLD | OUTPATIENT
Start: 2017-11-13 | End: 2018-07-03

## 2017-11-13 RX ORDER — LISINOPRIL 40 MG/1
40 TABLET ORAL DAILY
Qty: 90 TAB | Refills: 1 | Status: SHIPPED | OUTPATIENT
Start: 2017-11-13 | End: 2018-06-05 | Stop reason: SDUPTHER

## 2017-11-13 RX ORDER — HYDROCHLOROTHIAZIDE 12.5 MG/1
12.5 TABLET ORAL DAILY
Qty: 90 TAB | Refills: 1 | Status: SHIPPED | OUTPATIENT
Start: 2017-11-13 | End: 2018-06-05 | Stop reason: SDUPTHER

## 2017-11-13 RX ORDER — AMLODIPINE BESYLATE 10 MG/1
10 TABLET ORAL DAILY
Qty: 90 TAB | Refills: 1 | Status: SHIPPED | OUTPATIENT
Start: 2017-11-13 | End: 2019-06-11 | Stop reason: SDUPTHER

## 2017-11-27 ENCOUNTER — TELEPHONE (OUTPATIENT)
Dept: MEDICAL GROUP | Facility: LAB | Age: 72
End: 2017-11-27

## 2017-11-27 ENCOUNTER — OFFICE VISIT (OUTPATIENT)
Dept: MEDICAL GROUP | Facility: LAB | Age: 72
End: 2017-11-27
Payer: COMMERCIAL

## 2017-11-27 ENCOUNTER — HOSPITAL ENCOUNTER (OUTPATIENT)
Dept: LAB | Facility: MEDICAL CENTER | Age: 72
End: 2017-11-27
Attending: FAMILY MEDICINE
Payer: COMMERCIAL

## 2017-11-27 VITALS
HEIGHT: 69 IN | HEART RATE: 62 BPM | RESPIRATION RATE: 16 BRPM | OXYGEN SATURATION: 98 % | BODY MASS INDEX: 23.7 KG/M2 | SYSTOLIC BLOOD PRESSURE: 140 MMHG | TEMPERATURE: 97.3 F | DIASTOLIC BLOOD PRESSURE: 86 MMHG | WEIGHT: 160 LBS

## 2017-11-27 DIAGNOSIS — I10 ESSENTIAL HYPERTENSION: ICD-10-CM

## 2017-11-27 DIAGNOSIS — R14.2 BELCHING: ICD-10-CM

## 2017-11-27 DIAGNOSIS — E78.49 OTHER HYPERLIPIDEMIA: ICD-10-CM

## 2017-11-27 DIAGNOSIS — Z12.11 SCREENING FOR COLON CANCER: ICD-10-CM

## 2017-11-27 LAB
ALBUMIN SERPL BCP-MCNC: 3.9 G/DL (ref 3.2–4.9)
ALBUMIN/GLOB SERPL: 1.3 G/DL
ALP SERPL-CCNC: 62 U/L (ref 30–99)
ALT SERPL-CCNC: 21 U/L (ref 2–50)
ANION GAP SERPL CALC-SCNC: 7 MMOL/L (ref 0–11.9)
AST SERPL-CCNC: 18 U/L (ref 12–45)
BILIRUB SERPL-MCNC: 1.6 MG/DL (ref 0.1–1.5)
BUN SERPL-MCNC: 15 MG/DL (ref 8–22)
CALCIUM SERPL-MCNC: 9.6 MG/DL (ref 8.5–10.5)
CHLORIDE SERPL-SCNC: 97 MMOL/L (ref 96–112)
CHOLEST SERPL-MCNC: 114 MG/DL (ref 100–199)
CO2 SERPL-SCNC: 28 MMOL/L (ref 20–33)
CREAT SERPL-MCNC: 0.84 MG/DL (ref 0.5–1.4)
CREAT UR-MCNC: 27.1 MG/DL
GFR SERPL CREATININE-BSD FRML MDRD: >60 ML/MIN/1.73 M 2
GLOBULIN SER CALC-MCNC: 3.1 G/DL (ref 1.9–3.5)
GLUCOSE SERPL-MCNC: 299 MG/DL (ref 65–99)
HBA1C MFR BLD: 13.3 % (ref ?–5.8)
HDLC SERPL-MCNC: 42 MG/DL
INT CON NEG: NEGATIVE
INT CON POS: POSITIVE
LDLC SERPL CALC-MCNC: 36 MG/DL
MICROALBUMIN UR-MCNC: <0.7 MG/DL
MICROALBUMIN/CREAT UR: NORMAL MG/G (ref 0–30)
POTASSIUM SERPL-SCNC: 3.8 MMOL/L (ref 3.6–5.5)
PROT SERPL-MCNC: 7 G/DL (ref 6–8.2)
SODIUM SERPL-SCNC: 132 MMOL/L (ref 135–145)
TRIGL SERPL-MCNC: 179 MG/DL (ref 0–149)
TSH SERPL DL<=0.005 MIU/L-ACNC: 0.74 UIU/ML (ref 0.3–3.7)

## 2017-11-27 PROCEDURE — 82570 ASSAY OF URINE CREATININE: CPT

## 2017-11-27 PROCEDURE — 80053 COMPREHEN METABOLIC PANEL: CPT

## 2017-11-27 PROCEDURE — 82043 UR ALBUMIN QUANTITATIVE: CPT

## 2017-11-27 PROCEDURE — 83036 HEMOGLOBIN GLYCOSYLATED A1C: CPT | Performed by: FAMILY MEDICINE

## 2017-11-27 PROCEDURE — 36415 COLL VENOUS BLD VENIPUNCTURE: CPT

## 2017-11-27 PROCEDURE — 84443 ASSAY THYROID STIM HORMONE: CPT

## 2017-11-27 PROCEDURE — 99214 OFFICE O/P EST MOD 30 MIN: CPT | Performed by: FAMILY MEDICINE

## 2017-11-27 PROCEDURE — 80061 LIPID PANEL: CPT

## 2017-11-27 ASSESSMENT — PATIENT HEALTH QUESTIONNAIRE - PHQ9: CLINICAL INTERPRETATION OF PHQ2 SCORE: 0

## 2017-11-27 NOTE — PROGRESS NOTES
Chief Complaint   Patient presents with   • Follow-Up       HISTORY OF PRESENT ILLNESS: Patient is a 72 y.o. male established patient who presents today toFollow-up. He is here with his son today who is helping to interpret    He is having a lot of burping lately. This started a few months ago. Has not tried anything for this. Wonders if this is medication related    Was seen in the UC in May and he was sent to ER main for concern for STEMI. In the ER determined not to be STEMI and he was discharged home    No fever, no CP. No trouble breathing.  No abdominal pain. No CP with activity     Had flu vaccine     Patient Active Problem List    Diagnosis Date Noted   • Uncontrolled type 2 diabetes mellitus with complication, without long-term current use of insulin (CMS-HCC)  Chronic. Not checking blood sugars as he does not like to poke his fingers. Taking medication as directed. Not following a diabetic diet . His last A1c was 9.7 back in May 2006.  02/07/2017   • Microcytosis 05/26/2016   • Adenomatous polyp of colon 10/02/2015   • Hyperlipidemia  Chronic. Taking medication. Due for labs  11/20/2014   • Retinal vein occlusion, branch 11/20/2014   • Macular edema 11/20/2014   • Hypertension  Chronic. Taking medication          Current Outpatient Prescriptions:   •  glimepiride (AMARYL) 2 MG Tab, Take 1 Tab by mouth every morning., Disp: 90 Tab, Rfl: 1  •  atorvastatin (LIPITOR) 40 MG Tab, Take 1 Tab by mouth every bedtime., Disp: 90 Tab, Rfl: 1  •  hydrochlorothiazide (HYDRODIURIL) 12.5 MG tablet, Take 1 Tab by mouth every day., Disp: 90 Tab, Rfl: 1  •  metformin (GLUCOPHAGE) 1000 MG tablet, Take 1 Tab by mouth 2 times a day, with meals., Disp: 180 Tab, Rfl: 1  •  lisinopril (PRINIVIL, ZESTRIL) 40 MG tablet, Take 1 Tab by mouth every day., Disp: 90 Tab, Rfl: 1  •  amlodipine (NORVASC) 10 MG Tab, Take 1 Tab by mouth every day., Disp: 90 Tab, Rfl: 1      Allergies:Patient has no known allergies.      Social History  "  Substance Use Topics   • Smoking status: Former Smoker     Quit date: 1/1/2007   • Smokeless tobacco: Never Used   • Alcohol use No       Social History     Social History Narrative   • No narrative on file       Family History   Problem Relation Age of Onset   • Stroke Mother    • Diabetes Father    • Diabetes Brother        ROS:      Exam:    Blood pressure 140/86, pulse 62, temperature 36.3 °C (97.3 °F), resp. rate 16, height 1.753 m (5' 9\"), weight 72.6 kg (160 lb), SpO2 98 %.    General:  Well nourished, well developed male in NAD    Physical Exam   Constitutional:  Appears well-developed and well-nourished. Is active and cooperative.  Non-toxic appearance.   Head: Normocephalic and atraumatic.   Right Ear: External ear normal. Left Ear: External ear normal.   Eyes: Conjunctivae, EOM and lids are normal. No scleral icterus.   Cardiovascular: Normal rate, regular rhythm and normal heart sounds.    Pulmonary/Chest: Effort normal and breath sounds normal.   Abdominal: Soft. There is no hepatosplenomegaly. No tenderness. No rigidity and no guarding.  bowel sounds normal  Neurological: Alert. No tremor. No display of seizure activity. Coordination and gait normal.   Skin: Skin is warm and dry. Patient is not diaphoretic.   Psychiatric: patient has a normal mood and affect; speech is normal and behavior is normal.    a1c 13.3       Assessment/Plan:    1. Uncontrolled type 2 diabetes mellitus with complication, without long-term current use of insulin (CMS-HCC)  Uncontrolled.  - POCT A1C  - COMP METABOLIC PANEL; Future  - LIPID PROFILE; Future  - MICROALBUMIN CREAT RATIO URINE (LAB COLLECT); Future    2. Screening for colon cancer  Referral done  - REFERRAL TO GI FOR COLONOSCOPY    3. Belching  Referral to GI  - REFERRAL TO GI FOR COLONOSCOPY    4. Other hyperlipidemia  Check labs  - COMP METABOLIC PANEL; Future  - LIPID PROFILE; Future    5. Essential hypertension  Check labs  - COMP METABOLIC PANEL; Future  - TSH; " Future    Discussed with the son and the patient that his diabetes is very uncontrolled. Patient is taking his medication however he is not following a diabetic diet and he is not checking his blood sugars. Patient is to have labs done and is to return to clinic in the next week or 2 so we can reassess. May refer him to endocrinology. Referral to GI done as above. We did discuss that he is at risk for heart disease based on his chronic medical conditions. Denies any chest pain, chest pain with exertion. Will discuss further follow-up    Please note that this dictation was created using voice recognition software. I have made every reasonable attempt to correct obvious errors, but I expect that there are errors of grammar and possibly content that I did not discover before finalizing the note.

## 2017-12-14 ENCOUNTER — APPOINTMENT (OUTPATIENT)
Dept: MEDICAL GROUP | Facility: LAB | Age: 72
End: 2017-12-14

## 2017-12-28 ENCOUNTER — APPOINTMENT (OUTPATIENT)
Dept: MEDICAL GROUP | Facility: LAB | Age: 72
End: 2017-12-28

## 2018-06-27 ENCOUNTER — APPOINTMENT (OUTPATIENT)
Dept: RADIOLOGY | Facility: MEDICAL CENTER | Age: 73
DRG: 871 | End: 2018-06-27
Attending: EMERGENCY MEDICINE
Payer: COMMERCIAL

## 2018-06-27 ENCOUNTER — HOSPITAL ENCOUNTER (INPATIENT)
Facility: MEDICAL CENTER | Age: 73
LOS: 6 days | DRG: 871 | End: 2018-07-03
Attending: EMERGENCY MEDICINE | Admitting: INTERNAL MEDICINE
Payer: COMMERCIAL

## 2018-06-27 ENCOUNTER — HOSPITAL ENCOUNTER (OUTPATIENT)
Dept: LAB | Facility: MEDICAL CENTER | Age: 73
End: 2018-06-27
Attending: NURSE PRACTITIONER
Payer: COMMERCIAL

## 2018-06-27 ENCOUNTER — TELEPHONE (OUTPATIENT)
Dept: URGENT CARE | Facility: CLINIC | Age: 73
End: 2018-06-27

## 2018-06-27 ENCOUNTER — OFFICE VISIT (OUTPATIENT)
Dept: URGENT CARE | Facility: CLINIC | Age: 73
End: 2018-06-27
Payer: COMMERCIAL

## 2018-06-27 VITALS
DIASTOLIC BLOOD PRESSURE: 58 MMHG | BODY MASS INDEX: 23.7 KG/M2 | SYSTOLIC BLOOD PRESSURE: 142 MMHG | WEIGHT: 160 LBS | OXYGEN SATURATION: 97 % | TEMPERATURE: 99.1 F | HEART RATE: 94 BPM | RESPIRATION RATE: 16 BRPM | HEIGHT: 69 IN

## 2018-06-27 DIAGNOSIS — I10 ESSENTIAL HYPERTENSION: ICD-10-CM

## 2018-06-27 DIAGNOSIS — K52.9 GASTROENTERITIS: ICD-10-CM

## 2018-06-27 DIAGNOSIS — R73.9 HYPERGLYCEMIA: ICD-10-CM

## 2018-06-27 DIAGNOSIS — E13.10 DIABETIC KETOACIDOSIS WITHOUT COMA ASSOCIATED WITH OTHER SPECIFIED DIABETES MELLITUS (HCC): ICD-10-CM

## 2018-06-27 DIAGNOSIS — R50.9 FEVER, UNSPECIFIED FEVER CAUSE: ICD-10-CM

## 2018-06-27 DIAGNOSIS — R65.20 SEVERE SEPSIS (HCC): ICD-10-CM

## 2018-06-27 DIAGNOSIS — A41.9 SEVERE SEPSIS (HCC): ICD-10-CM

## 2018-06-27 LAB
ALBUMIN SERPL BCP-MCNC: 3.7 G/DL (ref 3.2–4.9)
ALBUMIN/GLOB SERPL: 1 G/DL
ALP SERPL-CCNC: 64 U/L (ref 30–99)
ALT SERPL-CCNC: 19 U/L (ref 2–50)
ANION GAP SERPL CALC-SCNC: 12 MMOL/L (ref 0–11.9)
ANION GAP SERPL CALC-SCNC: 17 MMOL/L (ref 0–11.9)
ANION GAP SERPL CALC-SCNC: 8 MMOL/L (ref 0–11.9)
APPEARANCE UR: CLEAR
AST SERPL-CCNC: 22 U/L (ref 12–45)
B-OH-BUTYR SERPL-MCNC: 3.08 MMOL/L (ref 0.02–0.27)
BASOPHILS # BLD AUTO: 0.1 % (ref 0–1.8)
BASOPHILS # BLD AUTO: 0.1 % (ref 0–1.8)
BASOPHILS # BLD: 0.02 K/UL (ref 0–0.12)
BASOPHILS # BLD: 0.02 K/UL (ref 0–0.12)
BILIRUB SERPL-MCNC: 2.3 MG/DL (ref 0.1–1.5)
BILIRUB UR QL STRIP.AUTO: NEGATIVE
BUN SERPL-MCNC: 19 MG/DL (ref 8–22)
BUN SERPL-MCNC: 20 MG/DL (ref 8–22)
BUN SERPL-MCNC: 21 MG/DL (ref 8–22)
CALCIUM SERPL-MCNC: 7.6 MG/DL (ref 8.4–10.2)
CALCIUM SERPL-MCNC: 8.8 MG/DL (ref 8.5–10.5)
CALCIUM SERPL-MCNC: 9.1 MG/DL (ref 8.4–10.2)
CHLORIDE SERPL-SCNC: 104 MMOL/L (ref 96–112)
CHLORIDE SERPL-SCNC: 97 MMOL/L (ref 96–112)
CHLORIDE SERPL-SCNC: 97 MMOL/L (ref 96–112)
CO2 SERPL-SCNC: 19 MMOL/L (ref 20–33)
CO2 SERPL-SCNC: 20 MMOL/L (ref 20–33)
CO2 SERPL-SCNC: 20 MMOL/L (ref 20–33)
COLOR UR: YELLOW
CREAT SERPL-MCNC: 0.85 MG/DL (ref 0.5–1.4)
CREAT SERPL-MCNC: 1.08 MG/DL (ref 0.5–1.4)
CREAT SERPL-MCNC: 1.15 MG/DL (ref 0.5–1.4)
EOSINOPHIL # BLD AUTO: 0 K/UL (ref 0–0.51)
EOSINOPHIL # BLD AUTO: 0 K/UL (ref 0–0.51)
EOSINOPHIL NFR BLD: 0 % (ref 0–6.9)
EOSINOPHIL NFR BLD: 0 % (ref 0–6.9)
ERYTHROCYTE [DISTWIDTH] IN BLOOD BY AUTOMATED COUNT: 34.8 FL (ref 35.9–50)
ERYTHROCYTE [DISTWIDTH] IN BLOOD BY AUTOMATED COUNT: 35.4 FL (ref 35.9–50)
GLOBULIN SER CALC-MCNC: 3.6 G/DL (ref 1.9–3.5)
GLUCOSE BLD-MCNC: 173 MG/DL (ref 65–99)
GLUCOSE BLD-MCNC: 238 MG/DL (ref 70–100)
GLUCOSE SERPL-MCNC: 212 MG/DL (ref 65–99)
GLUCOSE SERPL-MCNC: 268 MG/DL (ref 65–99)
GLUCOSE SERPL-MCNC: 273 MG/DL (ref 65–99)
GLUCOSE UR STRIP.AUTO-MCNC: 500 MG/DL
HCT VFR BLD AUTO: 45.3 % (ref 42–52)
HCT VFR BLD AUTO: 47.7 % (ref 42–52)
HGB BLD-MCNC: 15.3 G/DL (ref 14–18)
HGB BLD-MCNC: 15.9 G/DL (ref 14–18)
IMM GRANULOCYTES # BLD AUTO: 0.07 K/UL (ref 0–0.11)
IMM GRANULOCYTES # BLD AUTO: 0.08 K/UL (ref 0–0.11)
IMM GRANULOCYTES NFR BLD AUTO: 0.5 % (ref 0–0.9)
IMM GRANULOCYTES NFR BLD AUTO: 0.5 % (ref 0–0.9)
KETONES UR STRIP.AUTO-MCNC: >=80 MG/DL
LACTATE BLD-SCNC: 1.9 MMOL/L (ref 0.5–2)
LEUKOCYTE ESTERASE UR QL STRIP.AUTO: NEGATIVE
LYMPHOCYTES # BLD AUTO: 1.06 K/UL (ref 1–4.8)
LYMPHOCYTES # BLD AUTO: 1.49 K/UL (ref 1–4.8)
LYMPHOCYTES NFR BLD: 7 % (ref 22–41)
LYMPHOCYTES NFR BLD: 9.5 % (ref 22–41)
MCH RBC QN AUTO: 26 PG (ref 27–33)
MCH RBC QN AUTO: 26.1 PG (ref 27–33)
MCHC RBC AUTO-ENTMCNC: 33.3 G/DL (ref 33.7–35.3)
MCHC RBC AUTO-ENTMCNC: 33.8 G/DL (ref 33.7–35.3)
MCV RBC AUTO: 76.9 FL (ref 81.4–97.8)
MCV RBC AUTO: 78.2 FL (ref 81.4–97.8)
MICRO URNS: ABNORMAL
MONOCYTES # BLD AUTO: 1.42 K/UL (ref 0–0.85)
MONOCYTES # BLD AUTO: 1.85 K/UL (ref 0–0.85)
MONOCYTES NFR BLD AUTO: 11.7 % (ref 0–13.4)
MONOCYTES NFR BLD AUTO: 9.4 % (ref 0–13.4)
NEUTROPHILS # BLD AUTO: 12.32 K/UL (ref 1.82–7.42)
NEUTROPHILS # BLD AUTO: 12.5 K/UL (ref 1.82–7.42)
NEUTROPHILS NFR BLD: 78.2 % (ref 44–72)
NEUTROPHILS NFR BLD: 83 % (ref 44–72)
NITRITE UR QL STRIP.AUTO: NEGATIVE
NRBC # BLD AUTO: 0 K/UL
NRBC # BLD AUTO: 0 K/UL
NRBC BLD-RTO: 0 /100 WBC
NRBC BLD-RTO: 0 /100 WBC
PH UR STRIP.AUTO: 5.5 [PH]
PLATELET # BLD AUTO: 230 K/UL (ref 164–446)
PLATELET # BLD AUTO: 242 K/UL (ref 164–446)
PMV BLD AUTO: 8.8 FL (ref 9–12.9)
PMV BLD AUTO: 9.5 FL (ref 9–12.9)
POTASSIUM SERPL-SCNC: 3.5 MMOL/L (ref 3.6–5.5)
POTASSIUM SERPL-SCNC: 3.5 MMOL/L (ref 3.6–5.5)
POTASSIUM SERPL-SCNC: 3.7 MMOL/L (ref 3.6–5.5)
PROT SERPL-MCNC: 7.3 G/DL (ref 6–8.2)
PROT UR QL STRIP: NEGATIVE MG/DL
RBC # BLD AUTO: 5.89 M/UL (ref 4.7–6.1)
RBC # BLD AUTO: 6.1 M/UL (ref 4.7–6.1)
RBC UR QL AUTO: NEGATIVE
SODIUM SERPL-SCNC: 129 MMOL/L (ref 135–145)
SODIUM SERPL-SCNC: 132 MMOL/L (ref 135–145)
SODIUM SERPL-SCNC: 133 MMOL/L (ref 135–145)
SP GR UR REFRACTOMETRY: >1.035
WBC # BLD AUTO: 15.1 K/UL (ref 4.8–10.8)
WBC # BLD AUTO: 15.8 K/UL (ref 4.8–10.8)

## 2018-06-27 PROCEDURE — 83735 ASSAY OF MAGNESIUM: CPT

## 2018-06-27 PROCEDURE — 770020 HCHG ROOM/CARE - TELE (206)

## 2018-06-27 PROCEDURE — 700111 HCHG RX REV CODE 636 W/ 250 OVERRIDE (IP): Performed by: EMERGENCY MEDICINE

## 2018-06-27 PROCEDURE — 82010 KETONE BODYS QUAN: CPT

## 2018-06-27 PROCEDURE — 83036 HEMOGLOBIN GLYCOSYLATED A1C: CPT

## 2018-06-27 PROCEDURE — 96375 TX/PRO/DX INJ NEW DRUG ADDON: CPT

## 2018-06-27 PROCEDURE — 80048 BASIC METABOLIC PNL TOTAL CA: CPT

## 2018-06-27 PROCEDURE — 81003 URINALYSIS AUTO W/O SCOPE: CPT

## 2018-06-27 PROCEDURE — 80053 COMPREHEN METABOLIC PANEL: CPT

## 2018-06-27 PROCEDURE — 82962 GLUCOSE BLOOD TEST: CPT

## 2018-06-27 PROCEDURE — 700102 HCHG RX REV CODE 250 W/ 637 OVERRIDE(OP)

## 2018-06-27 PROCEDURE — 304561 HCHG STAT O2

## 2018-06-27 PROCEDURE — 700105 HCHG RX REV CODE 258: Performed by: EMERGENCY MEDICINE

## 2018-06-27 PROCEDURE — 36415 COLL VENOUS BLD VENIPUNCTURE: CPT

## 2018-06-27 PROCEDURE — 700102 HCHG RX REV CODE 250 W/ 637 OVERRIDE(OP): Performed by: EMERGENCY MEDICINE

## 2018-06-27 PROCEDURE — 99214 OFFICE O/P EST MOD 30 MIN: CPT | Performed by: NURSE PRACTITIONER

## 2018-06-27 PROCEDURE — 83605 ASSAY OF LACTIC ACID: CPT

## 2018-06-27 PROCEDURE — 96376 TX/PRO/DX INJ SAME DRUG ADON: CPT

## 2018-06-27 PROCEDURE — 96361 HYDRATE IV INFUSION ADD-ON: CPT

## 2018-06-27 PROCEDURE — 71045 X-RAY EXAM CHEST 1 VIEW: CPT

## 2018-06-27 PROCEDURE — 87040 BLOOD CULTURE FOR BACTERIA: CPT

## 2018-06-27 PROCEDURE — 85025 COMPLETE CBC W/AUTO DIFF WBC: CPT

## 2018-06-27 PROCEDURE — 96374 THER/PROPH/DIAG INJ IV PUSH: CPT

## 2018-06-27 PROCEDURE — 84145 PROCALCITONIN (PCT): CPT

## 2018-06-27 PROCEDURE — 99285 EMERGENCY DEPT VISIT HI MDM: CPT

## 2018-06-27 RX ORDER — SODIUM CHLORIDE 9 MG/ML
1000 INJECTION, SOLUTION INTRAVENOUS
Status: DISCONTINUED | OUTPATIENT
Start: 2018-06-27 | End: 2018-06-28

## 2018-06-27 RX ORDER — ONDANSETRON 4 MG/1
4 TABLET, ORALLY DISINTEGRATING ORAL EVERY 8 HOURS PRN
Qty: 10 TAB | Refills: 0 | Status: SHIPPED | OUTPATIENT
Start: 2018-06-27 | End: 2021-02-03

## 2018-06-27 RX ORDER — ONDANSETRON 4 MG/1
4 TABLET, ORALLY DISINTEGRATING ORAL EVERY 8 HOURS PRN
Qty: 10 TAB | Refills: 0 | Status: SHIPPED | OUTPATIENT
Start: 2018-06-27 | End: 2018-06-27

## 2018-06-27 RX ORDER — SODIUM CHLORIDE 9 MG/ML
1000 INJECTION, SOLUTION INTRAVENOUS ONCE
Status: COMPLETED | OUTPATIENT
Start: 2018-06-27 | End: 2018-06-27

## 2018-06-27 RX ORDER — ONDANSETRON 2 MG/ML
4 INJECTION INTRAMUSCULAR; INTRAVENOUS ONCE
Status: COMPLETED | OUTPATIENT
Start: 2018-06-27 | End: 2018-06-27

## 2018-06-27 RX ORDER — ACETAMINOPHEN 325 MG/1
650 TABLET ORAL EVERY 6 HOURS PRN
Status: DISCONTINUED | OUTPATIENT
Start: 2018-06-27 | End: 2018-07-03 | Stop reason: HOSPADM

## 2018-06-27 RX ORDER — SODIUM CHLORIDE 9 MG/ML
INJECTION, SOLUTION INTRAVENOUS CONTINUOUS
Status: DISCONTINUED | OUTPATIENT
Start: 2018-06-28 | End: 2018-06-28

## 2018-06-27 RX ORDER — SODIUM CHLORIDE 9 MG/ML
30 INJECTION, SOLUTION INTRAVENOUS
Status: DISCONTINUED | OUTPATIENT
Start: 2018-06-27 | End: 2018-06-28

## 2018-06-27 RX ORDER — ONDANSETRON 4 MG/1
4 TABLET, ORALLY DISINTEGRATING ORAL EVERY 4 HOURS PRN
Status: DISCONTINUED | OUTPATIENT
Start: 2018-06-27 | End: 2018-07-03 | Stop reason: HOSPADM

## 2018-06-27 RX ORDER — ONDANSETRON 2 MG/ML
4 INJECTION INTRAMUSCULAR; INTRAVENOUS EVERY 4 HOURS PRN
Status: DISCONTINUED | OUTPATIENT
Start: 2018-06-27 | End: 2018-07-03 | Stop reason: HOSPADM

## 2018-06-27 RX ADMIN — SODIUM CHLORIDE 1000 ML: 9 INJECTION, SOLUTION INTRAVENOUS at 18:55

## 2018-06-27 RX ADMIN — ONDANSETRON 4 MG: 2 INJECTION INTRAMUSCULAR; INTRAVENOUS at 23:17

## 2018-06-27 RX ADMIN — SODIUM CHLORIDE 1000 ML: 900 INJECTION, SOLUTION INTRAVENOUS at 21:22

## 2018-06-27 RX ADMIN — ONDANSETRON 4 MG: 2 INJECTION INTRAMUSCULAR; INTRAVENOUS at 18:55

## 2018-06-27 RX ADMIN — INSULIN HUMAN 5 UNITS: 100 INJECTION, SOLUTION PARENTERAL at 22:27

## 2018-06-27 ASSESSMENT — ENCOUNTER SYMPTOMS
DIZZINESS: 1
CHILLS: 0
HEARTBURN: 0
SHORTNESS OF BREATH: 0
EYE PAIN: 0
COUGH: 0
DIARRHEA: 1
NUMBER OF EPISODES OF EMESIS TODAY: 1
ANOREXIA: 0
FATIGUE: 1
MYALGIAS: 0
CONSTIPATION: 0
BLOOD IN STOOL: 0
NAUSEA: 1
VOMITING: 1
SORE THROAT: 0
FEVER: 0

## 2018-06-27 ASSESSMENT — PAIN SCALES - GENERAL
PAINLEVEL_OUTOF10: 0
PAINLEVEL_OUTOF10: 0

## 2018-06-27 NOTE — PROGRESS NOTES
Subjective:     Howard Gamboa is a 72 y.o. male who presents for Emesis (NVD, threw up 6 times today, causing hard to brearhe, dizzyness)  Patient presents clinics today with grandson who is translating for this visit. Patient complaining of nausea, vomiting, diarrhea ×2 days. Patient woke up this morning not having diarrhea however has thrown up 6 times this a.m. Patient now dry heaving as he has nothing to throw up. Patient denies any abdominal pain, fevers. Patient feeling dizzy and lightheaded from feeling fatigued. Patient has eaten minimally due to symptoms. Patient denies any recent travel, antibiotic use, contaminated foods. Grand son gave patient one Zofran early this a.m. which seemed to help relieve symptoms.     Emesis   This is a new problem. Episode onset: 2 days. The problem occurs constantly. The problem has been unchanged. Associated symptoms include fatigue, nausea and vomiting. Pertinent negatives include no anorexia, chest pain, chills, coughing, fever, myalgias, rash or sore throat. Associated symptoms comments: Diarrhea ×2 days. Nothing aggravates the symptoms. He has tried nothing for the symptoms. The treatment provided no relief.     Past Medical History:   Diagnosis Date   • Diabetes (HCC)    • Diabetes mellitus out of control (HCC)    • High cholesterol    • Hypertension    History reviewed. No pertinent surgical history.  Social History     Social History   • Marital status: Single     Spouse name: N/A   • Number of children: N/A   • Years of education: N/A     Occupational History   • Not on file.     Social History Main Topics   • Smoking status: Former Smoker     Quit date: 1/1/2007   • Smokeless tobacco: Never Used   • Alcohol use No   • Drug use: No   • Sexual activity: Not on file     Other Topics Concern   • Not on file     Social History Narrative   • No narrative on file      Family History   Problem Relation Age of Onset   • Stroke Mother    • Diabetes Father    • Diabetes Brother   "   Review of Systems   Constitutional: Positive for fatigue and malaise/fatigue. Negative for chills and fever.   HENT: Negative for sore throat.    Eyes: Negative for pain.   Respiratory: Negative for cough and shortness of breath.    Cardiovascular: Negative for chest pain.   Gastrointestinal: Positive for diarrhea, nausea and vomiting. Negative for anorexia, blood in stool, constipation, heartburn and melena.   Genitourinary: Negative for hematuria.   Musculoskeletal: Negative for myalgias.   Skin: Negative for rash.   Neurological: Positive for dizziness.   No Known Allergies   Objective:   /58   Pulse 94   Temp 37.3 °C (99.1 °F)   Resp 16   Ht 1.753 m (5' 9\")   Wt 72.6 kg (160 lb)   SpO2 97%   BMI 23.63 kg/m²   Physical Exam   Constitutional: He is oriented to person, place, and time. He appears well-developed and well-nourished. No distress.   HENT:   Head: Normocephalic and atraumatic.   Eyes: Conjunctivae and EOM are normal. Pupils are equal, round, and reactive to light.   Cardiovascular: Normal rate and regular rhythm.    No murmur heard.  Pulmonary/Chest: Effort normal and breath sounds normal. No respiratory distress. He has no decreased breath sounds. He has no wheezes. He has no rhonchi. He has no rales.   Abdominal: Soft. He exhibits no distension. Bowel sounds are increased. There is no hepatosplenomegaly. There is no tenderness. There is no rigidity, no guarding, no tenderness at McBurney's point and negative Santana's sign.   Neurological: He is alert and oriented to person, place, and time. He has normal reflexes. No sensory deficit.   Skin: Skin is warm and dry.   Psychiatric: He has a normal mood and affect.         Assessment/Plan:   Assessment    1. Gastroenteritis  ondansetron (ZOFRAN ODT) 4 MG TABLET DISPERSIBLE    BASIC METABOLIC PANEL    CBC WITH DIFFERENTIAL    DISCONTINUED: ondansetron (ZOFRAN ODT) 4 MG TABLET DISPERSIBLE   2. Uncontrolled type 2 diabetes mellitus without " complication, with long-term current use of insulin (HCC)  POC GLUCOSE    BASIC METABOLIC PANEL     Glucose 283.   Will check lab work to ensure electrolytes and anemia and infection per within normal limits. Concern for patient's uncontrolled diabetes insured patient is not in DKA.   Symptoms most likely consistent with viral gastroenteritis  Patient with nausea, vomiting, diarrhea ×2 days. Will follow up with pending test results and treatment as indicated.    Patient given precautionary s/sx that mandate immediate follow up and evaluation in the ED. Advised of risks of not doing so.    DDX, Supportive care, and indications for immediate follow-up discussed with patient.    Instructed to return to clinic or nearest emergency department if we are not available for any change in condition, further concerns, or worsening of symptoms.    The patient demonstrated a good understanding and agreed with the treatment plan.

## 2018-06-28 PROBLEM — E11.10 DKA (DIABETIC KETOACIDOSES): Status: ACTIVE | Noted: 2018-06-28

## 2018-06-28 PROBLEM — E87.1 HYPONATREMIA: Status: ACTIVE | Noted: 2018-06-28

## 2018-06-28 PROBLEM — A41.9 SEPSIS (HCC): Status: ACTIVE | Noted: 2018-06-28

## 2018-06-28 LAB
ANION GAP SERPL CALC-SCNC: 10 MMOL/L (ref 0–11.9)
ANION GAP SERPL CALC-SCNC: 13 MMOL/L (ref 0–11.9)
ANION GAP SERPL CALC-SCNC: 8 MMOL/L (ref 0–11.9)
ANION GAP SERPL CALC-SCNC: 8 MMOL/L (ref 0–11.9)
APTT PPP: 33.4 SEC (ref 24.7–36)
B-OH-BUTYR SERPL-MCNC: 2.09 MMOL/L (ref 0.02–0.27)
BASOPHILS # BLD AUTO: 0 % (ref 0–1.8)
BASOPHILS # BLD: 0 K/UL (ref 0–0.12)
BUN SERPL-MCNC: 10 MG/DL (ref 8–22)
BUN SERPL-MCNC: 12 MG/DL (ref 8–22)
BUN SERPL-MCNC: 15 MG/DL (ref 8–22)
BUN SERPL-MCNC: 19 MG/DL (ref 8–22)
CALCIUM SERPL-MCNC: 7 MG/DL (ref 8.4–10.2)
CALCIUM SERPL-MCNC: 7.2 MG/DL (ref 8.4–10.2)
CALCIUM SERPL-MCNC: 7.9 MG/DL (ref 8.4–10.2)
CALCIUM SERPL-MCNC: 8 MG/DL (ref 8.4–10.2)
CHLORIDE SERPL-SCNC: 100 MMOL/L (ref 96–112)
CHLORIDE SERPL-SCNC: 102 MMOL/L (ref 96–112)
CHLORIDE SERPL-SCNC: 102 MMOL/L (ref 96–112)
CHLORIDE SERPL-SCNC: 103 MMOL/L (ref 96–112)
CO2 SERPL-SCNC: 17 MMOL/L (ref 20–33)
CO2 SERPL-SCNC: 19 MMOL/L (ref 20–33)
CO2 SERPL-SCNC: 20 MMOL/L (ref 20–33)
CO2 SERPL-SCNC: 20 MMOL/L (ref 20–33)
CREAT SERPL-MCNC: 0.89 MG/DL (ref 0.5–1.4)
CREAT SERPL-MCNC: 0.93 MG/DL (ref 0.5–1.4)
CREAT SERPL-MCNC: 0.96 MG/DL (ref 0.5–1.4)
CREAT SERPL-MCNC: 0.99 MG/DL (ref 0.5–1.4)
EOSINOPHIL # BLD AUTO: 0 K/UL (ref 0–0.51)
EOSINOPHIL NFR BLD: 0 % (ref 0–6.9)
ERYTHROCYTE [DISTWIDTH] IN BLOOD BY AUTOMATED COUNT: 36.1 FL (ref 35.9–50)
EST. AVERAGE GLUCOSE BLD GHB EST-MCNC: 349 MG/DL
GLUCOSE BLD-MCNC: 152 MG/DL (ref 65–99)
GLUCOSE BLD-MCNC: 162 MG/DL (ref 65–99)
GLUCOSE BLD-MCNC: 212 MG/DL (ref 65–99)
GLUCOSE SERPL-MCNC: 146 MG/DL (ref 65–99)
GLUCOSE SERPL-MCNC: 183 MG/DL (ref 65–99)
GLUCOSE SERPL-MCNC: 184 MG/DL (ref 65–99)
GLUCOSE SERPL-MCNC: 220 MG/DL (ref 65–99)
HBA1C MFR BLD: 13.8 % (ref 0–5.6)
HCT VFR BLD AUTO: 43.6 % (ref 42–52)
HGB BLD-MCNC: 14.4 G/DL (ref 14–18)
IMM GRANULOCYTES # BLD AUTO: 0.08 K/UL (ref 0–0.11)
IMM GRANULOCYTES NFR BLD AUTO: 0.6 % (ref 0–0.9)
INR PPP: 1.11 (ref 0.87–1.13)
LACTATE BLD-SCNC: 1.6 MMOL/L (ref 0.5–2)
LACTATE BLD-SCNC: 1.8 MMOL/L (ref 0.5–2)
LYMPHOCYTES # BLD AUTO: 0.81 K/UL (ref 1–4.8)
LYMPHOCYTES NFR BLD: 5.7 % (ref 22–41)
MAGNESIUM SERPL-MCNC: 2 MG/DL (ref 1.5–2.5)
MAGNESIUM SERPL-MCNC: 2.1 MG/DL (ref 1.5–2.5)
MCH RBC QN AUTO: 26.2 PG (ref 27–33)
MCHC RBC AUTO-ENTMCNC: 33 G/DL (ref 33.7–35.3)
MCV RBC AUTO: 79.4 FL (ref 81.4–97.8)
MONOCYTES # BLD AUTO: 1.66 K/UL (ref 0–0.85)
MONOCYTES NFR BLD AUTO: 11.6 % (ref 0–13.4)
NEUTROPHILS # BLD AUTO: 11.78 K/UL (ref 1.82–7.42)
NEUTROPHILS NFR BLD: 82.1 % (ref 44–72)
NRBC # BLD AUTO: 0 K/UL
NRBC BLD-RTO: 0 /100 WBC
PHOSPHATE SERPL-MCNC: 2.1 MG/DL (ref 2.5–4.5)
PLATELET # BLD AUTO: 207 K/UL (ref 164–446)
PMV BLD AUTO: 9.2 FL (ref 9–12.9)
POTASSIUM SERPL-SCNC: 2.7 MMOL/L (ref 3.6–5.5)
POTASSIUM SERPL-SCNC: 3.1 MMOL/L (ref 3.6–5.5)
POTASSIUM SERPL-SCNC: 3.3 MMOL/L (ref 3.6–5.5)
POTASSIUM SERPL-SCNC: 3.6 MMOL/L (ref 3.6–5.5)
PROCALCITONIN SERPL-MCNC: 0.23 NG/ML
PROTHROMBIN TIME: 14.2 SEC (ref 12–14.6)
RBC # BLD AUTO: 5.49 M/UL (ref 4.7–6.1)
SODIUM SERPL-SCNC: 128 MMOL/L (ref 135–145)
SODIUM SERPL-SCNC: 130 MMOL/L (ref 135–145)
SODIUM SERPL-SCNC: 131 MMOL/L (ref 135–145)
SODIUM SERPL-SCNC: 133 MMOL/L (ref 135–145)
TSH SERPL DL<=0.005 MIU/L-ACNC: 0.11 UIU/ML (ref 0.38–5.33)
WBC # BLD AUTO: 14.3 K/UL (ref 4.8–10.8)

## 2018-06-28 PROCEDURE — 80048 BASIC METABOLIC PNL TOTAL CA: CPT

## 2018-06-28 PROCEDURE — 93010 ELECTROCARDIOGRAM REPORT: CPT | Performed by: INTERNAL MEDICINE

## 2018-06-28 PROCEDURE — 85025 COMPLETE CBC W/AUTO DIFF WBC: CPT

## 2018-06-28 PROCEDURE — 82010 KETONE BODYS QUAN: CPT

## 2018-06-28 PROCEDURE — 700102 HCHG RX REV CODE 250 W/ 637 OVERRIDE(OP): Performed by: HOSPITALIST

## 2018-06-28 PROCEDURE — 85610 PROTHROMBIN TIME: CPT

## 2018-06-28 PROCEDURE — 700102 HCHG RX REV CODE 250 W/ 637 OVERRIDE(OP)

## 2018-06-28 PROCEDURE — 84443 ASSAY THYROID STIM HORMONE: CPT

## 2018-06-28 PROCEDURE — 83605 ASSAY OF LACTIC ACID: CPT

## 2018-06-28 PROCEDURE — 770020 HCHG ROOM/CARE - TELE (206)

## 2018-06-28 PROCEDURE — 700105 HCHG RX REV CODE 258: Performed by: INTERNAL MEDICINE

## 2018-06-28 PROCEDURE — A9270 NON-COVERED ITEM OR SERVICE: HCPCS | Performed by: INTERNAL MEDICINE

## 2018-06-28 PROCEDURE — 700101 HCHG RX REV CODE 250: Performed by: HOSPITALIST

## 2018-06-28 PROCEDURE — 83735 ASSAY OF MAGNESIUM: CPT

## 2018-06-28 PROCEDURE — 94760 N-INVAS EAR/PLS OXIMETRY 1: CPT

## 2018-06-28 PROCEDURE — 700102 HCHG RX REV CODE 250 W/ 637 OVERRIDE(OP): Performed by: INTERNAL MEDICINE

## 2018-06-28 PROCEDURE — 85730 THROMBOPLASTIN TIME PARTIAL: CPT

## 2018-06-28 PROCEDURE — 700111 HCHG RX REV CODE 636 W/ 250 OVERRIDE (IP): Performed by: HOSPITALIST

## 2018-06-28 PROCEDURE — 99497 ADVNCD CARE PLAN 30 MIN: CPT | Performed by: HOSPITALIST

## 2018-06-28 PROCEDURE — 93005 ELECTROCARDIOGRAM TRACING: CPT | Performed by: INTERNAL MEDICINE

## 2018-06-28 PROCEDURE — 700105 HCHG RX REV CODE 258: Performed by: HOSPITALIST

## 2018-06-28 PROCEDURE — A9270 NON-COVERED ITEM OR SERVICE: HCPCS | Performed by: HOSPITALIST

## 2018-06-28 PROCEDURE — 99221 1ST HOSP IP/OBS SF/LOW 40: CPT | Mod: 25 | Performed by: HOSPITALIST

## 2018-06-28 PROCEDURE — 84100 ASSAY OF PHOSPHORUS: CPT

## 2018-06-28 PROCEDURE — 82962 GLUCOSE BLOOD TEST: CPT | Mod: 91

## 2018-06-28 RX ORDER — SODIUM CHLORIDE 9 MG/ML
500 INJECTION, SOLUTION INTRAVENOUS
Status: DISCONTINUED | OUTPATIENT
Start: 2018-06-28 | End: 2018-06-28

## 2018-06-28 RX ORDER — SODIUM CHLORIDE AND POTASSIUM CHLORIDE 150; 900 MG/100ML; MG/100ML
INJECTION, SOLUTION INTRAVENOUS CONTINUOUS
Status: DISCONTINUED | OUTPATIENT
Start: 2018-06-28 | End: 2018-07-01

## 2018-06-28 RX ORDER — SODIUM CHLORIDE 9 MG/ML
INJECTION, SOLUTION INTRAVENOUS CONTINUOUS
Status: DISCONTINUED | OUTPATIENT
Start: 2018-06-28 | End: 2018-06-28

## 2018-06-28 RX ORDER — AMLODIPINE BESYLATE 5 MG/1
10 TABLET ORAL DAILY
Status: DISCONTINUED | OUTPATIENT
Start: 2018-06-28 | End: 2018-07-03 | Stop reason: HOSPADM

## 2018-06-28 RX ORDER — SODIUM CHLORIDE 9 MG/ML
1000 INJECTION, SOLUTION INTRAVENOUS ONCE
Status: COMPLETED | OUTPATIENT
Start: 2018-06-28 | End: 2018-06-28

## 2018-06-28 RX ORDER — ATORVASTATIN CALCIUM 40 MG/1
40 TABLET, FILM COATED ORAL
Status: DISCONTINUED | OUTPATIENT
Start: 2018-06-28 | End: 2018-07-03 | Stop reason: HOSPADM

## 2018-06-28 RX ORDER — POTASSIUM CHLORIDE 20 MEQ/1
40 TABLET, EXTENDED RELEASE ORAL 2 TIMES DAILY
Status: DISCONTINUED | OUTPATIENT
Start: 2018-06-28 | End: 2018-07-02

## 2018-06-28 RX ORDER — HYDRALAZINE HYDROCHLORIDE 20 MG/ML
20 INJECTION INTRAMUSCULAR; INTRAVENOUS EVERY 6 HOURS PRN
Status: DISCONTINUED | OUTPATIENT
Start: 2018-06-28 | End: 2018-07-03 | Stop reason: HOSPADM

## 2018-06-28 RX ORDER — MAGNESIUM SULFATE HEPTAHYDRATE 40 MG/ML
2 INJECTION, SOLUTION INTRAVENOUS
Status: DISCONTINUED | OUTPATIENT
Start: 2018-06-28 | End: 2018-06-29 | Stop reason: ALTCHOICE

## 2018-06-28 RX ORDER — DEXTROSE MONOHYDRATE 25 G/50ML
25 INJECTION, SOLUTION INTRAVENOUS
Status: DISCONTINUED | OUTPATIENT
Start: 2018-06-28 | End: 2018-07-03 | Stop reason: HOSPADM

## 2018-06-28 RX ORDER — DEXTROSE AND SODIUM CHLORIDE 5; .45 G/100ML; G/100ML
INJECTION, SOLUTION INTRAVENOUS CONTINUOUS
Status: DISCONTINUED | OUTPATIENT
Start: 2018-06-28 | End: 2018-06-28

## 2018-06-28 RX ORDER — DEXTROSE MONOHYDRATE 25 G/50ML
25 INJECTION, SOLUTION INTRAVENOUS
Status: DISCONTINUED | OUTPATIENT
Start: 2018-06-28 | End: 2018-06-28

## 2018-06-28 RX ORDER — GLIMEPIRIDE 2 MG/1
2 TABLET ORAL EVERY MORNING
Status: DISCONTINUED | OUTPATIENT
Start: 2018-06-28 | End: 2018-06-28

## 2018-06-28 RX ORDER — SODIUM CHLORIDE 9 MG/ML
30 INJECTION, SOLUTION INTRAVENOUS
Status: DISCONTINUED | OUTPATIENT
Start: 2018-06-28 | End: 2018-06-28

## 2018-06-28 RX ORDER — SODIUM CHLORIDE 9 MG/ML
2000 INJECTION, SOLUTION INTRAVENOUS ONCE
Status: COMPLETED | OUTPATIENT
Start: 2018-06-28 | End: 2018-06-28

## 2018-06-28 RX ORDER — LISINOPRIL 20 MG/1
40 TABLET ORAL DAILY
Status: DISCONTINUED | OUTPATIENT
Start: 2018-06-28 | End: 2018-07-03 | Stop reason: HOSPADM

## 2018-06-28 RX ORDER — DEXTROSE AND SODIUM CHLORIDE 10; .45 G/100ML; G/100ML
INJECTION, SOLUTION INTRAVENOUS CONTINUOUS
Status: DISCONTINUED | OUTPATIENT
Start: 2018-06-28 | End: 2018-06-28

## 2018-06-28 RX ORDER — MAGNESIUM SULFATE HEPTAHYDRATE 40 MG/ML
4 INJECTION, SOLUTION INTRAVENOUS
Status: DISCONTINUED | OUTPATIENT
Start: 2018-06-28 | End: 2018-06-29 | Stop reason: ALTCHOICE

## 2018-06-28 RX ADMIN — SODIUM CHLORIDE: 9 INJECTION, SOLUTION INTRAVENOUS at 07:56

## 2018-06-28 RX ADMIN — SODIUM CHLORIDE: 9 INJECTION, SOLUTION INTRAVENOUS at 00:46

## 2018-06-28 RX ADMIN — LISINOPRIL 40 MG: 20 TABLET ORAL at 09:28

## 2018-06-28 RX ADMIN — SODIUM CHLORIDE 1000 ML: 9 INJECTION, SOLUTION INTRAVENOUS at 15:17

## 2018-06-28 RX ADMIN — CEFTRIAXONE SODIUM 2 G: 2 INJECTION, POWDER, FOR SOLUTION INTRAMUSCULAR; INTRAVENOUS at 12:24

## 2018-06-28 RX ADMIN — SODIUM CHLORIDE 2000 ML: 9 INJECTION, SOLUTION INTRAVENOUS at 10:52

## 2018-06-28 RX ADMIN — INSULIN HUMAN 3 UNITS: 100 INJECTION, SOLUTION PARENTERAL at 06:47

## 2018-06-28 RX ADMIN — INSULIN HUMAN 3 UNITS: 100 INJECTION, SOLUTION PARENTERAL at 23:55

## 2018-06-28 RX ADMIN — ACETAMINOPHEN 650 MG: 325 TABLET, FILM COATED ORAL at 20:28

## 2018-06-28 RX ADMIN — POTASSIUM CHLORIDE AND SODIUM CHLORIDE: 900; 150 INJECTION, SOLUTION INTRAVENOUS at 20:30

## 2018-06-28 RX ADMIN — POTASSIUM CHLORIDE 40 MEQ: 1500 TABLET, EXTENDED RELEASE ORAL at 17:50

## 2018-06-28 RX ADMIN — INSULIN HUMAN 3 UNITS: 100 INJECTION, SOLUTION PARENTERAL at 17:55

## 2018-06-28 RX ADMIN — SODIUM CHLORIDE: 9 INJECTION, SOLUTION INTRAVENOUS at 15:17

## 2018-06-28 RX ADMIN — AMLODIPINE BESYLATE 10 MG: 5 TABLET ORAL at 09:29

## 2018-06-28 RX ADMIN — ATORVASTATIN CALCIUM 40 MG: 40 TABLET, FILM COATED ORAL at 20:29

## 2018-06-28 RX ADMIN — GLIMEPIRIDE 2 MG: 2 TABLET ORAL at 09:29

## 2018-06-28 ASSESSMENT — ENCOUNTER SYMPTOMS
SHORTNESS OF BREATH: 0
SORE THROAT: 0
INSOMNIA: 0
BLURRED VISION: 0
HEADACHES: 0
PALPITATIONS: 0
FEVER: 0
DEPRESSION: 0
DIZZINESS: 0
CHILLS: 0
BACK PAIN: 0
COUGH: 0
NAUSEA: 1
TINGLING: 0
NECK PAIN: 0
ABDOMINAL PAIN: 0
EYE PAIN: 0
VOMITING: 1

## 2018-06-28 ASSESSMENT — PATIENT HEALTH QUESTIONNAIRE - PHQ9
2. FEELING DOWN, DEPRESSED, IRRITABLE, OR HOPELESS: NOT AT ALL
1. LITTLE INTEREST OR PLEASURE IN DOING THINGS: NOT AT ALL
SUM OF ALL RESPONSES TO PHQ9 QUESTIONS 1 AND 2: 0

## 2018-06-28 ASSESSMENT — COPD QUESTIONNAIRES
DO YOU EVER COUGH UP ANY MUCUS OR PHLEGM?: NO/ONLY WITH OCCASIONAL COLDS OR INFECTIONS
COPD SCREENING SCORE: 4
DURING THE PAST 4 WEEKS HOW MUCH DID YOU FEEL SHORT OF BREATH: NONE/LITTLE OF THE TIME
HAVE YOU SMOKED AT LEAST 100 CIGARETTES IN YOUR ENTIRE LIFE: YES

## 2018-06-28 ASSESSMENT — LIFESTYLE VARIABLES
ALCOHOL_USE: NO
EVER_SMOKED: YES
EVER_SMOKED: YES

## 2018-06-28 ASSESSMENT — PAIN SCALES - GENERAL
PAINLEVEL_OUTOF10: 0
PAINLEVEL_OUTOF10: 0

## 2018-06-28 NOTE — ED NOTES
[place] fall assessment complete. Pt is a low risk for falls. Ambulating ind. No interventions needed at this time, will cont to assess.

## 2018-06-28 NOTE — ED NOTES
Pt now changed to high fall risk due to c/o dizziness, lightheaded and unsteady on feet.  Yellow non slip socks and fall arm band placed on pt.  Receiving nurse notify of fall risk

## 2018-06-28 NOTE — CARE PLAN
Problem: Safety  Goal: Will remain free from falls  Oriented to room and equipment. Call light eduction given and call light within reach at all times. Floor dry and uncluttered. Slipper socks in place. Bed locked and in low position.     Problem: Knowledge Deficit  Goal: Knowledge of disease process/condition, treatment plan, diagnostic tests, and medications will improve  Plan of care discussed with patient. Opportunity given for questions. States understanding.

## 2018-06-28 NOTE — ED NOTES
Report received from [facility] [place].  Assumed care of pt.- PCXR done- son at bedside; awaiting orders and bed assignment

## 2018-06-28 NOTE — ASSESSMENT & PLAN NOTE
This is sepsis with associated respiratory failure.   Fevers resolving  LLL pneumonia noted on ct - initial source was not apparent  Likely not seen initially 2/2 dehydration  ID following  continue IV abx

## 2018-06-28 NOTE — H&P
Hospital Medicine History and Physical    Date of Service  6/28/2018    Chief Complaint  Chief Complaint   Patient presents with   • Sent from Urgent Care   • Hyperglycemia       History of Presenting Illness  72 y.o. male who presented 6/27/2018 with nausea and vomiting. He was seen in urgent care and had DKA and was referred to the ED. He speaks no english and his son is here to interpret. He had one loose stool yesterday. He has had no abdominal pain, hematochezia, cough, chest pain, leg swelling, dizziness. He apparently had an episode of this about a year ago as well. He has not been willing to use insulin at home despite the recommendations of his PCP. His glucose has been consistently elevated with his po meds. He has had fevers over 101 recently for the last 2 days.   Primary Care Physician  Sayda An M.D.    Consultants  none    Code Status  full- we discussed this for 16min today.     Review of Systems  Review of Systems   Constitutional: Negative for chills and fever.   HENT: Negative for sore throat.    Eyes: Negative for blurred vision and pain.   Respiratory: Negative for cough and shortness of breath.    Cardiovascular: Negative for chest pain and palpitations.   Gastrointestinal: Positive for nausea and vomiting. Negative for abdominal pain.   Genitourinary: Negative for dysuria and urgency.   Musculoskeletal: Negative for back pain and neck pain.   Skin: Negative for itching and rash.   Neurological: Negative for dizziness, tingling and headaches.   Psychiatric/Behavioral: Negative for depression. The patient does not have insomnia.    All other systems reviewed and are negative.       Past Medical History  Past Medical History:   Diagnosis Date   • Diabetes (HCC)    • Diabetes mellitus out of control (HCC)    • High cholesterol    • Hypertension        Surgical History  No past surgical history on file.    Medications  No current facility-administered medications on file prior to encounter.       Current Outpatient Prescriptions on File Prior to Encounter   Medication Sig Dispense Refill   • ondansetron (ZOFRAN ODT) 4 MG TABLET DISPERSIBLE Take 1 Tab by mouth every 8 hours as needed. 10 Tab 0   • atorvastatin (LIPITOR) 40 MG Tab TAKE ONE TABLET BY MOUTH ONCE DAILY AT BEDTIME 90 Tab 1   • hydroCHLOROthiazide (HYDRODIURIL) 12.5 MG tablet TAKE ONE TABLET BY MOUTH ONCE DAILY 90 Tab 1   • lisinopril (PRINIVIL, ZESTRIL) 40 MG tablet TAKE ONE TABLET BY MOUTH ONCE DAILY 90 Tab 1   • metformin (GLUCOPHAGE) 1000 MG tablet TAKE ONE TABLET BY MOUTH TWICE DAILY WITH  MEALS 90 Tab 1   • glimepiride (AMARYL) 2 MG Tab Take 1 Tab by mouth every morning. 90 Tab 1   • amlodipine (NORVASC) 10 MG Tab Take 1 Tab by mouth every day. 90 Tab 1       Family History  Family History   Problem Relation Age of Onset   • Stroke Mother    • Diabetes Father    • Diabetes Brother        Social History  Social History   Substance Use Topics   • Smoking status: Former Smoker     Quit date: 2007   • Smokeless tobacco: Never Used   • Alcohol use No       Allergies  No Known Allergies     Physical Exam  Laboratory   Hemodynamics  Temp (24hrs), Av.2 °C (98.9 °F), Min:36.3 °C (97.3 °F), Max:38.6 °C (101.4 °F)   Temperature: 36.9 °C (98.4 °F)  Pulse  Av.9  Min: 91  Max: 107    Blood Pressure : 145/86, NIBP: 124/79      Respiratory      Respiration: 18, Pulse Oximetry: 92 %, O2 Daily Delivery Respiratory : Room Air with O2 Available        RUL Breath Sounds: Clear, RML Breath Sounds: Clear, RLL Breath Sounds: Clear;Diminished, HONEY Breath Sounds: Clear, LLL Breath Sounds: Clear;Diminished    Physical Exam   Constitutional: He is oriented to person, place, and time. He appears well-developed and well-nourished. No distress.   Patient seen and examined  Plan discussed with NAI NGUYỄN:   Right Ear: External ear normal.   Left Ear: External ear normal.   Nose: Nose normal.   Eyes: Right eye exhibits no discharge. Left eye exhibits no  discharge. No scleral icterus.   Neck: No JVD present. No tracheal deviation present.   Cardiovascular: Normal rate, normal heart sounds and intact distal pulses.    No murmur heard.  Cap refill 2sec  Pulses 2+ throughout     Pulmonary/Chest: Effort normal and breath sounds normal. No respiratory distress. He has no wheezes. He has no rales.   Abdominal: Soft. Bowel sounds are normal. He exhibits no distension. There is no tenderness. There is no guarding.   Musculoskeletal: He exhibits no edema or tenderness.   Neurological: He is alert and oriented to person, place, and time.   Skin: Skin is warm and dry. He is not diaphoretic. No erythema.   Normal skin color   Psychiatric: He has a normal mood and affect. His behavior is normal.   Nursing note and vitals reviewed.      Recent Labs      06/27/18 1333 06/27/18 1837 06/28/18   0424   WBC  15.1*  15.8*  14.3*   RBC  6.10  5.89  5.49   HEMOGLOBIN  15.9  15.3  14.4   HEMATOCRIT  47.7  45.3  43.6   MCV  78.2*  76.9*  79.4*   MCH  26.1*  26.0*  26.2*   MCHC  33.3*  33.8  33.0*   RDW  35.4*  34.8*  36.1   PLATELETCT  242  230  207   MPV  9.5  8.8*  9.2     Recent Labs      06/27/18 1837 06/27/18 2123 06/28/18   0424   SODIUM  129*  132*  133*   POTASSIUM  3.5*  3.5*  3.6   CHLORIDE  97  104  103   CO2  20  20  17*   GLUCOSE  268*  212*  220*   BUN  19  20  19   CREATININE  1.15  0.85  0.93   CALCIUM  9.1  7.6*  8.0*     Recent Labs      06/27/18 1837 06/27/18 2123 06/28/18   0424   ALTSGPT  19   --    --    ASTSGOT  22   --    --    ALKPHOSPHAT  64   --    --    TBILIRUBIN  2.3*   --    --    GLUCOSE  268*  212*  220*                 Lab Results   Component Value Date    TROPONINI <0.01 05/20/2017     Urinalysis:    Lab Results  Component Value Date/Time   SPECGRAVITY >1.035 (A) 06/27/2018 2205   GLUCOSEUR 500 (A) 06/27/2018 2205   KETONES >=80 (A) 06/27/2018 2205   NITRITE Negative 06/27/2018 2205        Imaging  cxr- 1.  Hazy bibasilar lung base  density suggests atelectasis.  2.  No focal infiltrates identified       Assessment/Plan     I anticipate this patient will require at least two midnights for appropriate medical management, necessitating inpatient admission.    Hyponatremia   Assessment & Plan    Hypovolemic  Iv fluid and trend        Sepsis ([facility])   Assessment & Plan    This is sepsis (without associated acute organ dysfunction).   Fevers to over 101  No source noted- ?gastroenteritis??  cxr clear  Ua clear  No wounds  Teeth ok  Empiric rocephin  Blood cultures  Sepsis protocol          DKA (diabetic ketoacidoses) ([facility])   Assessment & Plan    Mild. Concern for sepsis as a source vs just poor control  Aggressive iv fluids  Initiate insulin drip if co2 dropping  Replace lytes  Control glucose  Low threshold for ICU transfer if not improving        Hyperlipidemia- (present on admission)   Assessment & Plan    statin        Hypertension- (present on admission)   Assessment & Plan    Cont meds as tolerated            VTE prophylaxis: heparin.

## 2018-06-28 NOTE — ED NOTES
Pt is referred to our facility from Urgent Care with complaints of N/V, and to follow up on mild keto-acidosis.

## 2018-06-28 NOTE — ASSESSMENT & PLAN NOTE
Needs to be on insulin on discharge. family refusing- apparently PCP has been imploring him to start insulin  Encouraged them to accept insulin  Poor control with hyperglycemia  Holding metformin/amaryl for sepsis

## 2018-06-28 NOTE — ED NOTES
Pt assessed, c/o vomiting x 10 episodes since this am. Denies diarrhea, denies fever, denies pain. Pt has hx DM type 2, blood sugar at Urgent Care was 230 this afternoon. Pt takes Metformin, does not check his sugar regularly. ERP at BS. Call light within reach, will cont to monitor.

## 2018-06-28 NOTE — ED NOTES
Pt still states he is unable to provide urine sample, aware one is needed. 2nd liter saline infusing, will cont to monitor.

## 2018-06-28 NOTE — ED PROVIDER NOTES
ED Provider Note    CHIEF COMPLAINT  Chief Complaint   Patient presents with   • Sent from Urgent Care   • Hyperglycemia       HPI  Howard Gamboa is a 72 y.o. male who presents with nausea since yesterday particularly today  He was seen at urgent care earlier today and had some labs drawn showing mildly elevated glucose.  He also had a slight increased anion gap and slight metabolic acidosis, with suspicion of mild DKA.  The patient is not really vomiting much, but has had some gagging and nausea.  There is no associated diarrhea no associated abdominal pain or other complaints.    REVIEW OF SYSTEMS  See HPI for further details.  Constitutional no reported fever chills respiratory no shortness of breath  denies dysuria or other urinary symptoms all other systems are negative.    PAST MEDICAL HISTORY  Past Medical History:   Diagnosis Date   • Diabetes (HCC)    • Diabetes mellitus out of control (HCC)    • High cholesterol    • Hypertension        FAMILY HISTORY  Family History   Problem Relation Age of Onset   • Stroke Mother    • Diabetes Father    • Diabetes Brother        SOCIAL HISTORY  Social History     Social History   • Marital status: Single     Spouse name: N/A   • Number of children: N/A   • Years of education: N/A     Social History Main Topics   • Smoking status: Former Smoker     Quit date: 1/1/2007   • Smokeless tobacco: Never Used   • Alcohol use No   • Drug use: No   • Sexual activity: Not on file     Other Topics Concern   • Not on file     Social History Narrative   • No narrative on file       SURGICAL HISTORY  History reviewed. No pertinent surgical history.    CURRENT MEDICATIONS  Home Medications     Reviewed by Jn Mcwilliams R.N. (Registered Nurse) on 06/27/18 at 1828  Med List Status: Partial   Medication Last Dose Status   amlodipine (NORVASC) 10 MG Tab 6/27/2018 Active   atorvastatin (LIPITOR) 40 MG Tab 6/27/2018 Active   glimepiride (AMARYL) 2 MG Tab 6/27/2018 Active   hydroCHLOROthiazide  "(HYDRODIURIL) 12.5 MG tablet 6/27/2018 Active   lisinopril (PRINIVIL, ZESTRIL) 40 MG tablet 6/27/2018 Active   metformin (GLUCOPHAGE) 1000 MG tablet 6/27/2018 Active   ondansetron (ZOFRAN ODT) 4 MG TABLET DISPERSIBLE  Active                ALLERGIES  No Known Allergies    PHYSICAL EXAM  VITAL SIGNS: /104   Pulse 100   Temp 36.8 °C (98.3 °F)   Resp 18   Ht 1.753 m (5' 9\") Comment: Stated  Wt 70 kg (154 lb 5.2 oz)   SpO2 96%   BMI 22.79 kg/m²     Constitutional: Well developed, Well nourished, mild distress, Non-toxic appearance.   Psychiatric:  Normal psychiatric exam, Normal affect, Normal judgement, Normal mood,   able to give a good history.  Through  does not speak English  HENT: Normocephalic, Atraumatic, Bilateral external ears normal, mucous membranes dry no oral exudates, Nose normal.   Eyes: PERRLA, EOMI, Conjunctiva and lids normal, No discharge.   Neck: Normal range of motion, No tenderness, Supple, No stridor.   Lymphatic: No cervical or axillary lymphadenopathy noted.   Cardiovascular: Normal heart rate, Normal rhythm, No murmurs,  , No gallops.   Thorax & Lungs: Normal breath sounds, No respiratory distress, No wheezing, or other abnormal breath sounds. No chest tenderness.   Abdomen: Bowel sounds normal, Soft, No tenderness, No masses, No pulsatile masses. No guarding.  Skin: Warm, Dry, No erythema, No rash.   Back: No tenderness, No CVA tenderness.   Extremities: Intact distal pulses, No edema, No tenderness, No cyanosis, No clubbing.   Musculoskeletal: Good range of motion in all major joints. No tenderness to palpation or major deformities, or injuries noted.   Neurologic: Alert & oriented x 3, Normal motor function, Normal sensory function, No focal deficits noted.          RADIOLOGY/PROCEDURES  DX-CHEST-PORTABLE (1 VIEW)   Final Result         1.  Hazy bibasilar lung base density suggests atelectasis.   2.  No focal infiltrates identified          COURSE & MEDICAL DECISION " MAKING  Pertinent Labs & Imaging studies reviewed. (See chart for details)  Patient presenting probably with mild DKA he had some serum ketones slight metabolic acidosis with elevated anion gap.  He had persistent nausea was treated with Zofran with fairly good improvement in symptoms hydrated with a couple of liters of IV fluid after which he was feeling somewhat better.  Initially he wanted to go home following administration of IV fluids and antiemetics.  However he began feeling worse with increased nausea and developed a fever.  Although no sources of the fever is identified with workup including negative urine and negative chest x-ray negative lactate.  Admission is advised as he has persistent symptoms discussed with hospitalist and will be admitted    FINAL IMPRESSION  1.  Hyperglycemia  2.  Persistent nausea vomiting  3.  Fever   4.  Mild diabetic ketoacidosis    Electronically signed by: Miky Coker, 6/27/2018 6:44 PM

## 2018-06-28 NOTE — PROGRESS NOTES
Assessment complete. IV NS running with no issue, no signs of infiltration. Pt speaks Mandarin Chinese, son in room for now to help with communication. Will get the audio  when needed. PT arrived nauseated and dry heaving. Pt was given Zofran and fluids in ER. Bed alarm on, call light within reach.

## 2018-06-28 NOTE — ED NOTES
Pt staying to be admitted, had fever on d/c. Blood cx x2 and lactic sent to lab. Report to Addie TRIMBLE.

## 2018-06-28 NOTE — PROGRESS NOTES
Tele Strip at 0027 shows SR at 98.       Measurements from am strip were as follows:  HI=0.20  QRS=0.10  QT=0.34     Tele Shift Summary:     Rhythm : SR/ST  Rate : 80s-100s  Ectopy : Per CCT Tarun, pt had no ectopy.      Telemetry monitoring strips placed in pt chart.

## 2018-06-29 ENCOUNTER — APPOINTMENT (OUTPATIENT)
Dept: RADIOLOGY | Facility: MEDICAL CENTER | Age: 73
DRG: 871 | End: 2018-06-29
Attending: HOSPITALIST
Payer: COMMERCIAL

## 2018-06-29 PROBLEM — E87.6 HYPOKALEMIA: Status: ACTIVE | Noted: 2018-06-29

## 2018-06-29 LAB
ALBUMIN SERPL BCP-MCNC: 2.8 G/DL (ref 3.2–4.9)
ALBUMIN/GLOB SERPL: 0.9 G/DL
ALP SERPL-CCNC: 58 U/L (ref 30–99)
ALT SERPL-CCNC: 23 U/L (ref 2–50)
ANION GAP SERPL CALC-SCNC: 7 MMOL/L (ref 0–11.9)
AST SERPL-CCNC: 35 U/L (ref 12–45)
BASOPHILS # BLD AUTO: 0.1 % (ref 0–1.8)
BASOPHILS # BLD: 0.01 K/UL (ref 0–0.12)
BILIRUB SERPL-MCNC: 1.5 MG/DL (ref 0.1–1.5)
BUN SERPL-MCNC: 9 MG/DL (ref 8–22)
CALCIUM SERPL-MCNC: 7.1 MG/DL (ref 8.4–10.2)
CHLORIDE SERPL-SCNC: 103 MMOL/L (ref 96–112)
CO2 SERPL-SCNC: 21 MMOL/L (ref 20–33)
CREAT SERPL-MCNC: 0.97 MG/DL (ref 0.5–1.4)
EKG IMPRESSION: NORMAL
EOSINOPHIL # BLD AUTO: 0 K/UL (ref 0–0.51)
EOSINOPHIL NFR BLD: 0 % (ref 0–6.9)
ERYTHROCYTE [DISTWIDTH] IN BLOOD BY AUTOMATED COUNT: 35.4 FL (ref 35.9–50)
GLOBULIN SER CALC-MCNC: 3 G/DL (ref 1.9–3.5)
GLUCOSE BLD-MCNC: 132 MG/DL (ref 65–99)
GLUCOSE BLD-MCNC: 153 MG/DL (ref 65–99)
GLUCOSE BLD-MCNC: 155 MG/DL (ref 65–99)
GLUCOSE BLD-MCNC: 164 MG/DL (ref 65–99)
GLUCOSE SERPL-MCNC: 161 MG/DL (ref 65–99)
HCT VFR BLD AUTO: 42.4 % (ref 42–52)
HGB BLD-MCNC: 14.1 G/DL (ref 14–18)
IMM GRANULOCYTES # BLD AUTO: 0.1 K/UL (ref 0–0.11)
IMM GRANULOCYTES NFR BLD AUTO: 0.7 % (ref 0–0.9)
LYMPHOCYTES # BLD AUTO: 1.09 K/UL (ref 1–4.8)
LYMPHOCYTES NFR BLD: 7.8 % (ref 22–41)
MAGNESIUM SERPL-MCNC: 2 MG/DL (ref 1.5–2.5)
MCH RBC QN AUTO: 25.9 PG (ref 27–33)
MCHC RBC AUTO-ENTMCNC: 33.3 G/DL (ref 33.7–35.3)
MCV RBC AUTO: 77.9 FL (ref 81.4–97.8)
MONOCYTES # BLD AUTO: 2.01 K/UL (ref 0–0.85)
MONOCYTES NFR BLD AUTO: 14.5 % (ref 0–13.4)
NEUTROPHILS # BLD AUTO: 10.69 K/UL (ref 1.82–7.42)
NEUTROPHILS NFR BLD: 76.9 % (ref 44–72)
NRBC # BLD AUTO: 0 K/UL
NRBC BLD-RTO: 0 /100 WBC
PHOSPHATE SERPL-MCNC: 2.2 MG/DL (ref 2.5–4.5)
PLATELET # BLD AUTO: 183 K/UL (ref 164–446)
PMV BLD AUTO: 8.7 FL (ref 9–12.9)
POTASSIUM SERPL-SCNC: 3.2 MMOL/L (ref 3.6–5.5)
PROT SERPL-MCNC: 5.8 G/DL (ref 6–8.2)
RBC # BLD AUTO: 5.44 M/UL (ref 4.7–6.1)
SODIUM SERPL-SCNC: 131 MMOL/L (ref 135–145)
WBC # BLD AUTO: 13.9 K/UL (ref 4.8–10.8)

## 2018-06-29 PROCEDURE — 85025 COMPLETE CBC W/AUTO DIFF WBC: CPT

## 2018-06-29 PROCEDURE — 700111 HCHG RX REV CODE 636 W/ 250 OVERRIDE (IP): Performed by: INTERNAL MEDICINE

## 2018-06-29 PROCEDURE — 700105 HCHG RX REV CODE 258: Performed by: HOSPITALIST

## 2018-06-29 PROCEDURE — 87633 RESP VIRUS 12-25 TARGETS: CPT

## 2018-06-29 PROCEDURE — 99255 IP/OBS CONSLTJ NEW/EST HI 80: CPT | Performed by: INTERNAL MEDICINE

## 2018-06-29 PROCEDURE — 700111 HCHG RX REV CODE 636 W/ 250 OVERRIDE (IP): Performed by: HOSPITALIST

## 2018-06-29 PROCEDURE — 83735 ASSAY OF MAGNESIUM: CPT

## 2018-06-29 PROCEDURE — 770020 HCHG ROOM/CARE - TELE (206)

## 2018-06-29 PROCEDURE — 87040 BLOOD CULTURE FOR BACTERIA: CPT

## 2018-06-29 PROCEDURE — A9270 NON-COVERED ITEM OR SERVICE: HCPCS | Performed by: HOSPITALIST

## 2018-06-29 PROCEDURE — 84100 ASSAY OF PHOSPHORUS: CPT

## 2018-06-29 PROCEDURE — 700102 HCHG RX REV CODE 250 W/ 637 OVERRIDE(OP): Performed by: HOSPITALIST

## 2018-06-29 PROCEDURE — 99233 SBSQ HOSP IP/OBS HIGH 50: CPT | Performed by: HOSPITALIST

## 2018-06-29 PROCEDURE — A9270 NON-COVERED ITEM OR SERVICE: HCPCS | Performed by: INTERNAL MEDICINE

## 2018-06-29 PROCEDURE — 74177 CT ABD & PELVIS W/CONTRAST: CPT

## 2018-06-29 PROCEDURE — 82962 GLUCOSE BLOOD TEST: CPT

## 2018-06-29 PROCEDURE — 700117 HCHG RX CONTRAST REV CODE 255: Performed by: HOSPITALIST

## 2018-06-29 PROCEDURE — 80053 COMPREHEN METABOLIC PANEL: CPT

## 2018-06-29 PROCEDURE — 82010 KETONE BODYS QUAN: CPT

## 2018-06-29 PROCEDURE — 700102 HCHG RX REV CODE 250 W/ 637 OVERRIDE(OP): Performed by: INTERNAL MEDICINE

## 2018-06-29 PROCEDURE — 700101 HCHG RX REV CODE 250: Performed by: HOSPITALIST

## 2018-06-29 RX ORDER — POTASSIUM CHLORIDE 20 MEQ/1
40 TABLET, EXTENDED RELEASE ORAL ONCE
Status: COMPLETED | OUTPATIENT
Start: 2018-06-29 | End: 2018-06-29

## 2018-06-29 RX ADMIN — POTASSIUM CHLORIDE AND SODIUM CHLORIDE: 900; 150 INJECTION, SOLUTION INTRAVENOUS at 03:15

## 2018-06-29 RX ADMIN — INSULIN HUMAN 3 UNITS: 100 INJECTION, SOLUTION PARENTERAL at 05:48

## 2018-06-29 RX ADMIN — POTASSIUM CHLORIDE 40 MEQ: 1500 TABLET, EXTENDED RELEASE ORAL at 08:37

## 2018-06-29 RX ADMIN — POTASSIUM CHLORIDE AND SODIUM CHLORIDE: 900; 150 INJECTION, SOLUTION INTRAVENOUS at 10:44

## 2018-06-29 RX ADMIN — POTASSIUM CHLORIDE 40 MEQ: 1500 TABLET, EXTENDED RELEASE ORAL at 20:59

## 2018-06-29 RX ADMIN — ONDANSETRON 4 MG: 2 INJECTION, SOLUTION INTRAMUSCULAR; INTRAVENOUS at 03:29

## 2018-06-29 RX ADMIN — DIBASIC SODIUM PHOSPHATE, MONOBASIC POTASSIUM PHOSPHATE AND MONOBASIC SODIUM PHOSPHATE 1 TABLET: 852; 155; 130 TABLET ORAL at 20:59

## 2018-06-29 RX ADMIN — INSULIN HUMAN 7 UNITS: 100 INJECTION, SOLUTION PARENTERAL at 23:51

## 2018-06-29 RX ADMIN — ATORVASTATIN CALCIUM 40 MG: 40 TABLET, FILM COATED ORAL at 20:59

## 2018-06-29 RX ADMIN — ACETAMINOPHEN 650 MG: 325 TABLET, FILM COATED ORAL at 16:20

## 2018-06-29 RX ADMIN — ONDANSETRON 4 MG: 2 INJECTION, SOLUTION INTRAMUSCULAR; INTRAVENOUS at 17:07

## 2018-06-29 RX ADMIN — POTASSIUM CHLORIDE AND SODIUM CHLORIDE: 900; 150 INJECTION, SOLUTION INTRAVENOUS at 16:23

## 2018-06-29 RX ADMIN — DIBASIC SODIUM PHOSPHATE, MONOBASIC POTASSIUM PHOSPHATE AND MONOBASIC SODIUM PHOSPHATE 1 TABLET: 852; 155; 130 TABLET ORAL at 10:39

## 2018-06-29 RX ADMIN — INSULIN HUMAN 3 UNITS: 100 INJECTION, SOLUTION PARENTERAL at 12:26

## 2018-06-29 RX ADMIN — LISINOPRIL 40 MG: 20 TABLET ORAL at 08:37

## 2018-06-29 RX ADMIN — PROCHLORPERAZINE EDISYLATE 10 MG: 5 INJECTION INTRAMUSCULAR; INTRAVENOUS at 05:43

## 2018-06-29 RX ADMIN — ACETAMINOPHEN 650 MG: 325 TABLET, FILM COATED ORAL at 10:43

## 2018-06-29 RX ADMIN — POTASSIUM CHLORIDE 40 MEQ: 1500 TABLET, EXTENDED RELEASE ORAL at 10:39

## 2018-06-29 RX ADMIN — SODIUM CHLORIDE 1 G: 900 INJECTION INTRAVENOUS at 08:37

## 2018-06-29 RX ADMIN — AMLODIPINE BESYLATE 10 MG: 5 TABLET ORAL at 08:37

## 2018-06-29 RX ADMIN — DIBASIC SODIUM PHOSPHATE, MONOBASIC POTASSIUM PHOSPHATE AND MONOBASIC SODIUM PHOSPHATE 1 TABLET: 852; 155; 130 TABLET ORAL at 16:00

## 2018-06-29 RX ADMIN — IOHEXOL 100 ML: 350 INJECTION, SOLUTION INTRAVENOUS at 21:27

## 2018-06-29 RX ADMIN — ACETAMINOPHEN 650 MG: 325 TABLET, FILM COATED ORAL at 23:48

## 2018-06-29 ASSESSMENT — ENCOUNTER SYMPTOMS
COUGH: 0
NAUSEA: 0
DIZZINESS: 0
CHILLS: 0
TINGLING: 0
HEADACHES: 0
DEPRESSION: 0
SORE THROAT: 0
INSOMNIA: 0
ABDOMINAL PAIN: 0
NECK PAIN: 0
SHORTNESS OF BREATH: 0
PALPITATIONS: 0
BACK PAIN: 0
VOMITING: 0
BLURRED VISION: 0
EYE PAIN: 0
FEVER: 0

## 2018-06-29 ASSESSMENT — PAIN SCALES - GENERAL
PAINLEVEL_OUTOF10: 0
PAINLEVEL_OUTOF10: 0

## 2018-06-29 ASSESSMENT — PATIENT HEALTH QUESTIONNAIRE - PHQ9
SUM OF ALL RESPONSES TO PHQ9 QUESTIONS 1 AND 2: 0
1. LITTLE INTEREST OR PLEASURE IN DOING THINGS: NOT AT ALL
2. FEELING DOWN, DEPRESSED, IRRITABLE, OR HOPELESS: NOT AT ALL

## 2018-06-29 NOTE — PROGRESS NOTES
Telemetry summary:     Rhythm: Sinus rhythm with LBB  WV: 0.20  QRS: 0.12  QT: 0.36  Ectopy: none  Rate: 91

## 2018-06-29 NOTE — PROGRESS NOTES
Bedside report received from Rosalee TRIMBLE. Assumed care. POC discussed. Pt resting comfortably in bed. Safety precautions in place.

## 2018-06-29 NOTE — PROGRESS NOTES
Angelica from Lab called with critical result of K 2.7 at 0637. Critical lab result read back to Angelica. Primary NAI Ballard notified.

## 2018-06-29 NOTE — PROGRESS NOTES
Bedside report given to Amie TRIMBLE. POC discussed. Pt resting comfortably in bed. Safety precautions in place.

## 2018-06-29 NOTE — PROGRESS NOTES
Late entry:    0730: report received from night RN, POC discussed. Pt resting in bed. Bed alarm and call light in reach.     0755: Assessment completed. Lines and gtt's verified. Bed alarm on and call light in reach.     0830: Pt's son Gordon at bedside, updated on care.     1000: Dr. Post at bedside, POC discussed. New orders placed. Pt to transfer to ICU.     1015: BMP resulted. Discussed with Dr. Post, hold on transfer now. Bolus NS x 2 L and repeat BMP in 4 hours. Pt and pt's son updated.     1230: FSBS 152. Bolus infusing. Pt voiding per urinal. Bed alarm on.     1410: BMP completed. Son left for home. Requesting for RN to update with labs.     1510: BMP results discussed with Dr. Post. New orders placed. NS x 1 L. Repeat BMP in 4 hours.     1745: Family at bedside. Updated on care.  Next BMP due at 1800. Potassium replacement given.     1830: Dr. Post updated that BMP in process. States that if CO2 is not less than previous @ 1400, d/c Q4 hour BMP and check in AM. Family updated.

## 2018-06-29 NOTE — CARE PLAN
Problem: Safety  Goal: Will remain free from falls    Intervention: Implement fall precautions  Bed in low position, pt near nurses station, frequent checks, call light in reach and bed alarm on. Pt not calling for assist. Pt will remain free from falls.       Problem: Knowledge Deficit  Goal: Knowledge of disease process/condition, treatment plan, diagnostic tests, and medications will improve    Intervention: Explain information regarding disease process/condition, treatment plan, diagnostic tests, and medications and document in education  POC discussed with pt and pt's son Gordon.

## 2018-06-29 NOTE — CARE PLAN
Problem: Communication  Goal: The ability to communicate needs accurately and effectively will improve  Outcome: PROGRESSING SLOWER THAN EXPECTED  Pt is non English speaking. Son at bedside. They decline formal  services. Pt and son re oriented to room and unit, POC discussed, asked to give staff feedback and to call for needs.    Problem: Safety  Goal: Will remain free from injury  Outcome: PROGRESSING AS EXPECTED  Bed alarm on when no family present. Family instructed to call for assistance. Hourly rounding in place. Non slip socks on. Call light and belongings in reach.

## 2018-06-29 NOTE — PROGRESS NOTES
[facility] Hospitalist Progress Note    Date of Service: 2018    Chief Complaint  72 y.o. male admitted 2018 with dka and sepsis of an unknown source    Interval Problem Update  K replaced  Dka resolving  Phos replaced  tmax 102 today  He has no complaints and would like to go home.       Consultants/Specialty  Discussed wtih ID today    Disposition  hospital        Review of Systems   Constitutional: Negative for chills and fever.   HENT: Negative for sore throat.    Eyes: Negative for blurred vision and pain.   Respiratory: Negative for cough and shortness of breath.    Cardiovascular: Negative for chest pain and palpitations.   Gastrointestinal: Negative for abdominal pain, nausea and vomiting.   Genitourinary: Negative for dysuria and urgency.   Musculoskeletal: Negative for back pain and neck pain.   Skin: Negative for itching and rash.   Neurological: Negative for dizziness, tingling and headaches.   Psychiatric/Behavioral: Negative for depression. The patient does not have insomnia.    All other systems reviewed and are negative.     Physical Exam  Laboratory/Imaging   Hemodynamics  Temp (24hrs), Av.1 °C (98.8 °F), Min:36.4 °C (97.6 °F), Max:38.3 °C (101 °F)   Temperature: 36.7 °C (98 °F)  Pulse  Av  Min: 81  Max: 107    Blood Pressure : 139/70      Respiratory      Respiration: 20, Pulse Oximetry: 90 %, O2 Daily Delivery Respiratory : Room Air with O2 Available        RUL Breath Sounds: Clear, RML Breath Sounds: Clear, RLL Breath Sounds: Clear, HONEY Breath Sounds: Clear, LLL Breath Sounds: Clear;Diminished    Fluids    Intake/Output Summary (Last 24 hours) at 18 0915  Last data filed at 18 0900   Gross per 24 hour   Intake             4015 ml   Output             1575 ml   Net             2440 ml       Nutrition  Orders Placed This Encounter   Procedures   • Diet Order Clear Liquid, Clear Liquids - No Red Foods     Standing Status:   Standing     Number of Occurrences:   1     Order  Specific Question:   Diet:     Answer:   Clear Liquid [10]     Order Specific Question:   Diet:     Answer:   Clear Liquids - No Red Foods [12]     Physical Exam   Constitutional: He is oriented to person, place, and time. He appears well-developed and well-nourished. No distress.   Patient seen and examined  Plan discussed with RN   HENT:   Right Ear: External ear normal.   Left Ear: External ear normal.   Nose: Nose normal.   Eyes: Right eye exhibits no discharge. Left eye exhibits no discharge. No scleral icterus.   Neck: No JVD present. No tracheal deviation present.   Cardiovascular: Normal rate, normal heart sounds and intact distal pulses.    No murmur heard.  Cap refill 2sec  Pulses 2+ throughout     Pulmonary/Chest: Effort normal and breath sounds normal. No respiratory distress. He has no wheezes. He has no rales.   Abdominal: Soft. Bowel sounds are normal. He exhibits no distension. There is no tenderness. There is no guarding.   Musculoskeletal: He exhibits no edema or tenderness.   Neurological: He is alert and oriented to person, place, and time.   Skin: Skin is warm and dry. He is not diaphoretic. No erythema.   Normal skin color   Psychiatric: He has a normal mood and affect. His behavior is normal.   Nursing note and vitals reviewed.      Recent Labs      06/27/18   1837  06/28/18   0424  06/29/18   0006   WBC  15.8*  14.3*  13.9*   RBC  5.89  5.49  5.44   HEMOGLOBIN  15.3  14.4  14.1   HEMATOCRIT  45.3  43.6  42.4   MCV  76.9*  79.4*  77.9*   MCH  26.0*  26.2*  25.9*   MCHC  33.8  33.0*  33.3*   RDW  34.8*  36.1  35.4*   PLATELETCT  230  207  183   MPV  8.8*  9.2  8.7*     Recent Labs      06/28/18   1406  06/28/18   1807  06/29/18   0006   SODIUM  131*  128*  131*   POTASSIUM  3.1*  2.7*  3.2*   CHLORIDE  102  100  103   CO2  19*  20  21   GLUCOSE  146*  183*  161*   BUN  12  10  9   CREATININE  0.96  0.89  0.97   CALCIUM  7.2*  7.0*  7.1*     Recent Labs      06/28/18   1002   APTT  33.4   INR   1.11                  Assessment/Plan     Hypokalemia   Assessment & Plan    Trend and replace        Hyponatremia   Assessment & Plan    Hypovolemic  Iv fluid and trend        Sepsis (HCC)   Assessment & Plan    This is sepsis (without associated acute organ dysfunction).   Fevers to over 102 now  ?alternate source  Ct abdomen  ID consulted  Check HIV  Repeat cultures with next fever            DKA (diabetic ketoacidoses) ([facility])   Assessment & Plan    Mild. Concern for sepsis as a source vs just poor control  Resolving  Continue fluids and ssi  Eating          Hyperlipidemia- (present on admission)   Assessment & Plan    statin        Diabetes mellitus type 2, uncontrolled ([facility])- (present on admission)   Assessment & Plan    Needs to be on insulin on discharge.   Treat dka          Hypertension- (present on admission)   Assessment & Plan    Cont meds as tolerated          Quality-Core Measures   Reviewed items::  EKG reviewed, Labs reviewed, Medications reviewed and Radiology images reviewed  Unger catheter::  No Unger  DVT prophylaxis pharmacological::  Heparin  DVT prophylaxis - mechanical:  SCDs  Ulcer Prophylaxis::  Yes  Antibiotics:  Treating active infection/contamination beyond 24 hours perioperative coverage  Assessed for rehabilitation services:  Patient was assess for and/or received rehabilitation services during this hospitalization

## 2018-06-29 NOTE — PROGRESS NOTES
Beside report received from Nellie TRIMBLE. Safety precautions in place. Call light within reach. Pt resting in bed.

## 2018-06-29 NOTE — PROGRESS NOTES
Tele strip at 0804 shows SR w/ HR of 85  Measurements: .18/.12/.34    Tele Shift Summary:  Rhythm: SR, BBB  Rate: 80's-90's  Ectopy: Per CCT Erin, pt had no ectopy.    Telemetry monitoring strips placed in pt chart.

## 2018-06-29 NOTE — PROGRESS NOTES
Due meds given. Pt noted to be febrile, tylenol given per MAR. Blankets removed. Pt c/o being cold, wants to go home. POC discussed with pt and son. Offered to change pt's linens, pt refusing. Will CTM.

## 2018-06-29 NOTE — CONSULTS
INFECTIOUS DISEASES INPATIENT CONSULT NOTE     Date of Service: 6/29/2018    Consult Requested By: Miguel Angel Post M.D.    Reason for Consultation: Fevers    History of Present Illness:   Howard Gamboa is a 72 y.o. man with a history of diabetes, hyperlipidemia and hypertension admitted 6/27/2018 for hyperglycemia.  Patient is Cantonese speaking only and no family is at bedside so history is obtained by review of the medical records.  Per report, patient has been experiencing nausea and vomiting at home.  Given nausea and vomiting as well as malaise, he was initially seen an urgent care.  There he was noted to be be hyperglycemic and in diabetic ketoacidosis and thus was referred to the ED for further evaluation and management.  Per report, there has been no abdominal pain, cough, shortness of breath, leg swelling, diarrhea or dysuria.  On presentation, he was initially afebrile but developed a fever of 101.4 with a leukocytosis of 15.1.  Blood sugar was 220 on arrival.  Chest x-ray revealed hazy bibasilar lung base densities suggesting atelectasis.  Urinalysis was unremarkable. He was started on ceftriaxone. Blood cultures are also negative to date. Despite antibiotics, he continues to have high-grade fevers with T-max today of 102.8.  Infectious disease service was consulted for further antibiotic recommendations and management.    Review Of Systems:  Unable to obtain as patient is only Cantonese speaking    PMH:   Past Medical History:   Diagnosis Date   • Diabetes (HCC)    • Diabetes mellitus out of control (HCC)    • High cholesterol    • Hypertension        PSH:  Unable to obtain as patient is only Cantonese speaking    FAMILY HX:  Family History   Problem Relation Age of Onset   • Stroke Mother    • Diabetes Father    • Diabetes Brother        SOCIAL HX:  Social History     Social History   • Marital status: Single     Spouse name: N/A   • Number of children: N/A   • Years of education: N/A     Occupational  History   • Not on file.     Social History Main Topics   • Smoking status: Former Smoker     Quit date: 1/1/2007   • Smokeless tobacco: Never Used   • Alcohol use No   • Drug use: No   • Sexual activity: Not on file     Other Topics Concern   • Not on file     Social History Narrative   • No narrative on file     History   Smoking Status   • Former Smoker   • Quit date: 1/1/2007   Smokeless Tobacco   • Never Used     History   Alcohol Use No       Allergies/Intolerances:  No Known Allergies    History reviewed via medical records    Other Current Medications:    Current Facility-Administered Medications:   •  phosphorus (K-PHOS-NEUTRAL, PHOSPHA 250 NEUTRAL) per tablet 1 Tab, 1 Tab, Oral, TID, Miguel Angel Post M.D., 1 Tab at 06/29/18 1039  •  prochlorperazine (COMPAZINE) injection 10 mg, 10 mg, Intravenous, Q4HRS PRN, Adelita Lackey M.D., 10 mg at 06/29/18 0543  •  atorvastatin (LIPITOR) tablet 40 mg, 40 mg, Oral, QHS, Yung Mcbride M.D., 40 mg at 06/28/18 2029  •  lisinopril (PRINIVIL) tablet 40 mg, 40 mg, Oral, DAILY, Yung Mcbride M.D., 40 mg at 06/29/18 0837  •  amLODIPine (NORVASC) tablet 10 mg, 10 mg, Oral, DAILY, Yung Mcbride M.D., 10 mg at 06/29/18 0837  •  hydrALAZINE (APRESOLINE) injection 20 mg, 20 mg, Intravenous, Q6HRS PRN, Yung Mcbride M.D.  •  insulin regular (HUMULIN R) injection 3-14 Units, 3-14 Units, Subcutaneous, Q6HRS, 3 Units at 06/29/18 1226 **AND** Accu-Chek Q6 if NPO, , , Q6H **AND** NOTIFY MD and PharmD, , , Once **AND** glucose 4 g chewable tablet 16 g, 16 g, Oral, Q15 MIN PRN **AND** dextrose 50% (D50W) injection 25 mL, 25 mL, Intravenous, Q15 MIN PRN, Miguel Angel K [place], M.D.  •  potassium chloride SA (Kdur) tablet 40 mEq, 40 mEq, Oral, BID, Miguel Angel Post M.D., 40 mEq at 06/29/18 0837  •  cefTRIAXone (ROCEPHIN) 1 g in  mL IVPB, 1 g, Intravenous, Q24HRS, Miguel Angel Post M.D., Stopped at 06/29/18 0907  •  0.9 % NaCl with KCl 20 mEq infusion, , Intravenous, Continuous,  "Miguel Angel Post M.D., Last Rate: 150 mL/hr at 18 1044  •  Respiratory Care per Protocol, , Nebulization, Continuous RT, Yung Mcbride M.D.  •  acetaminophen (TYLENOL) tablet 650 mg, 650 mg, Oral, Q6HRS PRN, Yung Mcbride M.D., 650 mg at 18 1043  •  ondansetron (ZOFRAN) syringe/vial injection 4 mg, 4 mg, Intravenous, Q4HRS PRN, Yung Mcbride M.D., 4 mg at 18 0329  •  ondansetron (ZOFRAN ODT) dispertab 4 mg, 4 mg, Oral, Q4HRS PRN, Yung Mcbride M.D.  [unfilled]    Most Recent Vital Signs:  /70   Pulse 87   Temp 36.9 °C (98.4 °F)   Resp 20   Ht 1.753 m (5' 9\") Comment: Stated  Wt 70 kg (154 lb 5.2 oz)   SpO2 91%   BMI 22.79 kg/m²   Temp  Av.3 °C (99.1 °F)  Min: 36.3 °C (97.3 °F)  Max: 39.3 °C (102.8 °F)    Physical Exam:  General: well-appearing, no acute distress, covered in blankets, nontoxic  HEENT: sclera anicteric, PERRL, EOMI, MMM, no oral lesions, fair dentition  Neck: supple, no lymphadenopathy  Chest: Few basilar rales, no r/r/w, normal work of breathing.  Cardiac: RRR, normal S1 S2, no m/r/g   Abdomen: + bowel sounds, soft, non-tender, non-distended, no HSM  Extremities: WWP, no edema, 2+ pulses  Skin: no rashes or worrisome lesions  Neuro: Alert, non-focal exam, speech fluent, moves all extremities    Pertinent Lab Results:  Recent Labs      18   0424  18   0006   WBC  15.8*  14.3*  13.9*      Recent Labs      18   0424  18   0006   HEMOGLOBIN  15.3  14.4  14.1   HEMATOCRIT  45.3  43.6  42.4   MCV  76.9*  79.4*  77.9*   MCH  26.0*  26.2*  25.9*   PLATELETCT  230  207  183         Recent Labs      18   1406  18   1807  18   0006   SODIUM  131*  128*  131*   POTASSIUM  3.1*  2.7*  3.2*   CHLORIDE  102  100  103   CO2  19*  20  21   CREATININE  0.96  0.89  0.97        Recent Labs      18   1837  18   0006   ALBUMIN  3.7  2.8*        Pertinent Micro:  Results     Procedure Component " "Value Units Date/Time    Culture Respiratory W/ GRM STN [[id number]]     Order Status:  No result Specimen:  Respirate from Sputum     Urine Culture [[id number]]     Order Status:  No result Specimen:  Urine     BLOOD CULTURE [[id number]] Collected:  06/27/18 2308    Order Status:  Completed Specimen:  Blood from Peripheral Updated:  06/28/18 0615     Significant Indicator NEG     Source BLD     Site Peripheral     Blood Culture No Growth    Note: Blood cultures are incubated for 5 days and  are monitored continuously.Positive blood cultures  are called to the RN and reported as soon as  they are identified.    Blood culture testing and Gram stain, if indicated, are  performed at [facility], [address] [place] [address].  Positive blood cultures are  sent to [facility], [address] [place] [address], for organism identification and  susceptibility testing.      Narrative:       1 of 2 for Blood Culture x 2 sites order. Per Hospital  Policy: Only change Specimen Src: to \"Line\" if specified by  physician order.    Blood Culture [[id number]] Collected:  06/28/18 0000    Order Status:  Canceled Specimen:  Other from Peripheral     Blood Culture [[id number]] Collected:  06/28/18 0000    Order Status:  Canceled Specimen:  Other from Peripheral     BLOOD CULTURE [[id number]] Collected:  06/27/18 2312    Order Status:  Completed Specimen:  Blood from Peripheral Updated:  06/27/18 2315    Narrative:       2 of 2 blood culture x2  Sites order. Per Hospital Policy:  Only change Specimen Src: to \"Line\" if specified by physician  order.    Urinalysis, culture if indicated [[id number]]  (Abnormal) Collected:  06/27/18 2205    Order Status:  Completed Specimen:  Urine Updated:  06/27/18 2214     Micro Urine Req see below     Comment: Microscopic examination not performed when specimen is clear  and chemically negative for protein, blood, leukocyte esterase  and nitrite.          " Color Yellow     Character Clear     Ph 5.5     Glucose 500 (A) mg/dL      Ketones >=80 (A) mg/dL      Protein Negative mg/dL      Bilirubin Negative     Nitrite Negative     Leukocyte Esterase Negative     Occult Blood Negative        Blood Culture   Date Value Ref Range Status   06/27/2018   Preliminary    No Growth    Note: Blood cultures are incubated for 5 days and  are monitored continuously.Positive blood cultures  are called to the RN and reported as soon as  they are identified.    Blood culture testing and Gram stain, if indicated, are  performed at [facility], [address] [place] [address].  Positive blood cultures are  sent to [facility], [address] [place] [address], for organism identification and  susceptibility testing.          Studies:  Dx-chest-portable (1 View)    Result Date: 6/27/2018 6/27/2018 11:03 PM HISTORY/REASON FOR EXAM: Sepsis TECHNIQUE/EXAM DESCRIPTION:  Single AP view of the chest. COMPARISON: May 20, 2017 FINDINGS: The cardiac silhouette appears within normal limits. The mediastinal contour appears within normal limits.  The central pulmonary vasculature appears normal. Bilateral lung volumes are diminished.  Hazy linear density in the bilateral lung bases is observed. No significant pleural effusions are identified. The bony structures appear age-appropriate.     1.  Hazy bibasilar lung base density suggests atelectasis. 2.  No focal infiltrates identified      IMPRESSION:   1.  Fevers   2.  Acute respiratory failure   3.  Possible pneumonia  4.  Persistent leukocytosis  5.  Type 2 diabetes mellitus      PLAN:   Howard Gamboa is a 72 y.o. Cantonese speaking male with a history of diabetes mellitus type 2, hyperlipidemia, and hypertension admitted for hyperglycemia and diabetic ketoacidosis.  Patient also found to have fevers.  Source of fevers is currently unclear at this time.  He does have some basilar opacities on imaging  concerning for possible pneumonia; he is requiring 2 L nasal cannula. Urine is unremarkable and blood cultures are negative to date.  Will check a respiratory viral panel given persistent fevers.  A CT of the abdomen and pelvis has also been ordered to assess for any intra-abdominal infection.  Continue current antibiotics for now.  Encourage incentive spirometer. Further recommendations per clinical course and imaging results.      Plan of care discussed with SIMON Post M.D.. Will continue to follow    Blanca Schofield M.D.

## 2018-06-29 NOTE — PROGRESS NOTES
Assessment completed. Son at bedside, declines formal  services. Due meds given. Pt states no pain or n/v. IV ABX infusing. 225 ml clear yellow urine out. No other needs/complaints. Will monitor.

## 2018-06-29 NOTE — PROGRESS NOTES
Restless feels very warm.  Temp 105.1 Temporal per CNA, RN retook and 103.8.  Tylenol given and STAT BC x2 ordered per nursing communication.

## 2018-06-29 NOTE — PROGRESS NOTES
2000- Nellie buenrostro RN was notified by Savannah TRIMBLE about critical lab. No note was reported by Nellie buenrostro RN that she notified the MD about critical lab. Dr Lackey notified of critical lab by Rosalee JODRAN RN and diet order changed from NPO to clear liquids. MD Ordered new potassium lab to be drawn at 0000.  Notified on pt temp being 101 F. Tylenol was given. Call light is within reach. Will continue to monitor.     2331- Pt temp is now 97.6. Pt excreted 200 mL of urine. Call light is within reach. Bed alarm is on. Fall protocol is in place.

## 2018-06-30 PROBLEM — J18.9 PNEUMONIA: Status: ACTIVE | Noted: 2018-06-30

## 2018-06-30 LAB
ALBUMIN SERPL BCP-MCNC: 3 G/DL (ref 3.2–4.9)
ALBUMIN/GLOB SERPL: 0.8 G/DL
ALP SERPL-CCNC: 74 U/L (ref 30–99)
ALT SERPL-CCNC: 43 U/L (ref 2–50)
ANION GAP SERPL CALC-SCNC: 8 MMOL/L (ref 0–11.9)
AST SERPL-CCNC: 78 U/L (ref 12–45)
B-OH-BUTYR SERPL-MCNC: 2.93 MMOL/L (ref 0.02–0.27)
BASOPHILS # BLD AUTO: 0.1 % (ref 0–1.8)
BASOPHILS # BLD: 0.01 K/UL (ref 0–0.12)
BILIRUB SERPL-MCNC: 1.3 MG/DL (ref 0.1–1.5)
BUN SERPL-MCNC: 6 MG/DL (ref 8–22)
CALCIUM SERPL-MCNC: 7.8 MG/DL (ref 8.4–10.2)
CHLORIDE SERPL-SCNC: 100 MMOL/L (ref 96–112)
CO2 SERPL-SCNC: 23 MMOL/L (ref 20–33)
CREAT SERPL-MCNC: 0.87 MG/DL (ref 0.5–1.4)
EOSINOPHIL # BLD AUTO: 0 K/UL (ref 0–0.51)
EOSINOPHIL NFR BLD: 0 % (ref 0–6.9)
ERYTHROCYTE [DISTWIDTH] IN BLOOD BY AUTOMATED COUNT: 34.8 FL (ref 35.9–50)
GLOBULIN SER CALC-MCNC: 3.7 G/DL (ref 1.9–3.5)
GLUCOSE BLD-MCNC: 144 MG/DL (ref 65–99)
GLUCOSE BLD-MCNC: 217 MG/DL (ref 65–99)
GLUCOSE BLD-MCNC: 228 MG/DL (ref 65–99)
GLUCOSE BLD-MCNC: 281 MG/DL (ref 65–99)
GLUCOSE SERPL-MCNC: 160 MG/DL (ref 65–99)
HCT VFR BLD AUTO: 46.3 % (ref 42–52)
HGB BLD-MCNC: 15.5 G/DL (ref 14–18)
HIV 1+2 AB+HIV1 P24 AG SERPL QL IA: NON REACTIVE
IMM GRANULOCYTES # BLD AUTO: 0.06 K/UL (ref 0–0.11)
IMM GRANULOCYTES NFR BLD AUTO: 0.5 % (ref 0–0.9)
LYMPHOCYTES # BLD AUTO: 0.94 K/UL (ref 1–4.8)
LYMPHOCYTES NFR BLD: 8.3 % (ref 22–41)
MAGNESIUM SERPL-MCNC: 2.1 MG/DL (ref 1.5–2.5)
MCH RBC QN AUTO: 25.5 PG (ref 27–33)
MCHC RBC AUTO-ENTMCNC: 33.5 G/DL (ref 33.7–35.3)
MCV RBC AUTO: 76.2 FL (ref 81.4–97.8)
MONOCYTES # BLD AUTO: 1.27 K/UL (ref 0–0.85)
MONOCYTES NFR BLD AUTO: 11.2 % (ref 0–13.4)
NEUTROPHILS # BLD AUTO: 9.01 K/UL (ref 1.82–7.42)
NEUTROPHILS NFR BLD: 79.9 % (ref 44–72)
NRBC # BLD AUTO: 0 K/UL
NRBC BLD-RTO: 0 /100 WBC
PHOSPHATE SERPL-MCNC: 1.7 MG/DL (ref 2.5–4.5)
PLATELET # BLD AUTO: 211 K/UL (ref 164–446)
PMV BLD AUTO: 9 FL (ref 9–12.9)
POTASSIUM SERPL-SCNC: 3.3 MMOL/L (ref 3.6–5.5)
PROT SERPL-MCNC: 6.7 G/DL (ref 6–8.2)
RBC # BLD AUTO: 6.08 M/UL (ref 4.7–6.1)
SODIUM SERPL-SCNC: 131 MMOL/L (ref 135–145)
WBC # BLD AUTO: 11.3 K/UL (ref 4.8–10.8)

## 2018-06-30 PROCEDURE — 86713 LEGIONELLA ANTIBODY: CPT

## 2018-06-30 PROCEDURE — 700105 HCHG RX REV CODE 258: Performed by: INTERNAL MEDICINE

## 2018-06-30 PROCEDURE — 700105 HCHG RX REV CODE 258

## 2018-06-30 PROCEDURE — 82962 GLUCOSE BLOOD TEST: CPT

## 2018-06-30 PROCEDURE — 700102 HCHG RX REV CODE 250 W/ 637 OVERRIDE(OP): Performed by: HOSPITALIST

## 2018-06-30 PROCEDURE — 84100 ASSAY OF PHOSPHORUS: CPT

## 2018-06-30 PROCEDURE — A9270 NON-COVERED ITEM OR SERVICE: HCPCS | Performed by: INTERNAL MEDICINE

## 2018-06-30 PROCEDURE — 85025 COMPLETE CBC W/AUTO DIFF WBC: CPT

## 2018-06-30 PROCEDURE — 80053 COMPREHEN METABOLIC PANEL: CPT

## 2018-06-30 PROCEDURE — 83735 ASSAY OF MAGNESIUM: CPT

## 2018-06-30 PROCEDURE — A9270 NON-COVERED ITEM OR SERVICE: HCPCS | Performed by: HOSPITALIST

## 2018-06-30 PROCEDURE — 99232 SBSQ HOSP IP/OBS MODERATE 35: CPT | Performed by: HOSPITALIST

## 2018-06-30 PROCEDURE — 700111 HCHG RX REV CODE 636 W/ 250 OVERRIDE (IP): Performed by: INTERNAL MEDICINE

## 2018-06-30 PROCEDURE — G0475 HIV COMBINATION ASSAY: HCPCS

## 2018-06-30 PROCEDURE — 700102 HCHG RX REV CODE 250 W/ 637 OVERRIDE(OP): Performed by: INTERNAL MEDICINE

## 2018-06-30 PROCEDURE — 770020 HCHG ROOM/CARE - TELE (206)

## 2018-06-30 PROCEDURE — 700111 HCHG RX REV CODE 636 W/ 250 OVERRIDE (IP)

## 2018-06-30 PROCEDURE — 700101 HCHG RX REV CODE 250: Performed by: HOSPITALIST

## 2018-06-30 PROCEDURE — 99233 SBSQ HOSP IP/OBS HIGH 50: CPT | Performed by: INTERNAL MEDICINE

## 2018-06-30 PROCEDURE — 87040 BLOOD CULTURE FOR BACTERIA: CPT | Mod: 91

## 2018-06-30 RX ORDER — POTASSIUM CHLORIDE 20 MEQ/1
40 TABLET, EXTENDED RELEASE ORAL ONCE
Status: COMPLETED | OUTPATIENT
Start: 2018-06-30 | End: 2018-06-30

## 2018-06-30 RX ORDER — LEVOFLOXACIN 5 MG/ML
750 INJECTION, SOLUTION INTRAVENOUS EVERY 24 HOURS
Status: DISCONTINUED | OUTPATIENT
Start: 2018-06-30 | End: 2018-07-02

## 2018-06-30 RX ADMIN — DIBASIC SODIUM PHOSPHATE, MONOBASIC POTASSIUM PHOSPHATE AND MONOBASIC SODIUM PHOSPHATE 1 TABLET: 852; 155; 130 TABLET ORAL at 08:44

## 2018-06-30 RX ADMIN — LEVOFLOXACIN 750 MG: 5 INJECTION, SOLUTION INTRAVENOUS at 13:44

## 2018-06-30 RX ADMIN — POTASSIUM CHLORIDE 40 MEQ: 1500 TABLET, EXTENDED RELEASE ORAL at 09:00

## 2018-06-30 RX ADMIN — PIPERACILLIN SODIUM AND TAZOBACTAM SODIUM 4.5 G: 4; .5 INJECTION, POWDER, FOR SOLUTION INTRAVENOUS at 16:27

## 2018-06-30 RX ADMIN — ACETAMINOPHEN 650 MG: 325 TABLET, FILM COATED ORAL at 13:28

## 2018-06-30 RX ADMIN — LISINOPRIL 40 MG: 20 TABLET ORAL at 08:40

## 2018-06-30 RX ADMIN — POTASSIUM CHLORIDE 40 MEQ: 1500 TABLET, EXTENDED RELEASE ORAL at 20:12

## 2018-06-30 RX ADMIN — INSULIN HUMAN 4 UNITS: 100 INJECTION, SOLUTION PARENTERAL at 12:00

## 2018-06-30 RX ADMIN — ACETAMINOPHEN 650 MG: 325 TABLET, FILM COATED ORAL at 20:12

## 2018-06-30 RX ADMIN — AMLODIPINE BESYLATE 10 MG: 5 TABLET ORAL at 08:40

## 2018-06-30 RX ADMIN — ATORVASTATIN CALCIUM 40 MG: 40 TABLET, FILM COATED ORAL at 20:12

## 2018-06-30 RX ADMIN — PIPERACILLIN SODIUM AND TAZOBACTAM SODIUM 1.5 G: 4; .5 INJECTION, POWDER, FOR SOLUTION INTRAVENOUS at 05:15

## 2018-06-30 RX ADMIN — ACETAMINOPHEN 650 MG: 325 TABLET, FILM COATED ORAL at 06:08

## 2018-06-30 RX ADMIN — PIPERACILLIN SODIUM AND TAZOBACTAM SODIUM 4.5 G: 4; .5 INJECTION, POWDER, FOR SOLUTION INTRAVENOUS at 09:21

## 2018-06-30 RX ADMIN — DIBASIC SODIUM PHOSPHATE, MONOBASIC POTASSIUM PHOSPHATE AND MONOBASIC SODIUM PHOSPHATE 1 TABLET: 852; 155; 130 TABLET ORAL at 20:12

## 2018-06-30 RX ADMIN — INSULIN HUMAN 4 UNITS: 100 INJECTION, SOLUTION PARENTERAL at 17:23

## 2018-06-30 RX ADMIN — POTASSIUM CHLORIDE 40 MEQ: 1500 TABLET, EXTENDED RELEASE ORAL at 08:43

## 2018-06-30 RX ADMIN — DIBASIC SODIUM PHOSPHATE, MONOBASIC POTASSIUM PHOSPHATE AND MONOBASIC SODIUM PHOSPHATE 1 TABLET: 852; 155; 130 TABLET ORAL at 16:05

## 2018-06-30 RX ADMIN — POTASSIUM CHLORIDE AND SODIUM CHLORIDE: 900; 150 INJECTION, SOLUTION INTRAVENOUS at 16:27

## 2018-06-30 RX ADMIN — VANCOMYCIN HYDROCHLORIDE: 1 INJECTION, POWDER, LYOPHILIZED, FOR SOLUTION INTRAVENOUS at 05:58

## 2018-06-30 RX ADMIN — POTASSIUM CHLORIDE AND SODIUM CHLORIDE: 900; 150 INJECTION, SOLUTION INTRAVENOUS at 00:56

## 2018-06-30 ASSESSMENT — ENCOUNTER SYMPTOMS
FEVER: 1
NAUSEA: 0
VOMITING: 0
CHILLS: 0

## 2018-06-30 ASSESSMENT — PAIN SCALES - GENERAL
PAINLEVEL_OUTOF10: 0
PAINLEVEL_OUTOF10: 0

## 2018-06-30 NOTE — PROGRESS NOTES
" services used to communicate with patient to explain POC and high fever, pt stated he was \"dizzy\" and \"has not eaten in 3 days\" pt updated that he does have a clear diet at this time and will see if we can Advance diet.  Pt is medicated with Zofran.      Pt also educated to please use call light for assistance, as he is impulsive and restless and attempting to get up to use bathroom. Pt continues to be incontinent requiring 3 complete linen changes since 1500.     "

## 2018-06-30 NOTE — PROGRESS NOTES
"Pharmacy Kinetics 72 y.o. male on vancomycin day # 1 2018    Currently on Vancomycin Loaded in ER with 1800 mg at 06:00    Indication for Treatment: HCAP, PNA    Pertinent history per medical record: Admitted on 2018 for Sepsis.    Other antibiotics: Zosyn 4.5 GM q8h    Allergies: Patient has no known allergies.     List concerns for renal function: Elderly, Vanco/Zosyn combo    Pertinent cultures to date:    BCX1 NGTD    Recent Labs      18   1333  18   1837  18   0424  18   0006  18   0451   WBC  15.1*  15.8*  14.3*  13.9*  11.3*   NEUTSPOLYS  83.00*  78.20*  82.10*  76.90*  79.90*     Recent Labs      18   1837   18   1002  18   1406  18   1807  18   0006  18   0450   BUN  19   < >  15  12  10  9  6*   CREATININE  1.15   < >  0.99  0.96  0.89  0.97  0.87   ALBUMIN  3.7   --    --    --    --   2.8*  3.0*    < > = values in this interval not displayed.     No results for input(s): VANCOTROUGH, VANCOPEAK, VANCORANDOM in the last 72 hours.  Intake/Output Summary (Last 24 hours) at 18 0702  Last data filed at 18 0600   Gross per 24 hour   Intake             2090 ml   Output             2375 ml   Net             -285 ml      Blood pressure 122/70, pulse 100, temperature 36.8 °C (98.2 °F), resp. rate 18, height 1.753 m (5' 9\"), weight 74.2 kg (163 lb 9.3 oz), SpO2 91 %. Temp (24hrs), Av.8 °C (100.1 °F), Min:36.6 °C (97.8 °F), Max:39.9 °C (103.8 °F)      A/P   1. Vancomycin dose change: 900 mg q 12h  2. Next vancomycin level:  05:30  3. Goal trough: 16-20  4. Comments: Will continue to monitor and adjust dose as needed per protocol.    Jessy Guzman    "

## 2018-06-30 NOTE — PROGRESS NOTES
"Family at bedside very concerned that pt is \"not getting better, wanting to be discharged home now\", explained and educated that pt is sick, high fever and we are in the process of finding the source of infection.  Lab tests in process.  And awaiting CT of abdomen.       Called CT regarding estimate time of CT.  CT tech brought up oral contrast, will have night RN start contrast.  (note time started and call CT at 2 hours after starting for scan)      Late entry:  Pt son agrees of staying in hospital tonight.   "

## 2018-06-30 NOTE — PROGRESS NOTES
Pt given tylenol for temp of a 100.6F. Pt sleeping. No family present. Bed alarm is on. Call light is within reach.

## 2018-06-30 NOTE — PROGRESS NOTES
2030- Phoned CT to let them know pt would be ready to come down at 2100. Sisi CT tech 2100 is a good time to bring pt down. Pt son at bedside.  Pt refused  services. Son stated he can translate.Confirmed that consent was signed and dated by patient.     2100- RN and CNA transported pt to CT via wheelchair. Pt tolerated well down to CT and back to the unit. PT changed, cleaned, and settled into bed.

## 2018-06-30 NOTE — PROGRESS NOTES
Notified by radiologist of new left lung base infiltrates likely pneumonia noted on CT abdomen/pelvis done today when compared to chest x-ray from 6/27. The patient is currently on ceftriaxone. He continues to be febrile with leukocytosis and borderline tachycardia. Will change to cover for HCAP with vancomycin and zosyn. Pneumonia work up initiated. ID following.

## 2018-06-30 NOTE — PROGRESS NOTES
Beside report received from Noelle TRIMBLE. Safety precautions in place. Call light within reach. Pt resting in bed.

## 2018-06-30 NOTE — PROGRESS NOTES
[facility] Hospitalist Progress Note    Date of Service: 2018    Chief Complaint  72 y.o. male admitted 2018 with dka and sepsis 2/2 pneumonia    Interval Problem Update  Fevers recurrent over night and in am.   Ct reviewed and shows LLL pneumonia.   K replaced  Phos replaced      Long discussion with family. They have a poor insight into the severity of his illness and have repeatedly asked to take him home as he does not want to be here. Discussed the dangers of leaving with sepsis and they have agreed to stay fo ranother night.   Consultants/Specialty  Discussed wtih ID today    Disposition  hospital    Ct abd:  1.  Left lung base pulmonary infiltrates.  2.  Small bilateral pleural effusions, right greater than left.  3.  Enlarged prostate, workup and evaluation for causes of prostate enlargement recommended as clinically appropriate.  4.  Moderate quantity of stool in the colon favors changes of constipation.  5.  Small collection of abdominal ascites  6.  Atherosclerosis    Review of Systems   Unable to perform ROS: Language      Physical Exam  Laboratory/Imaging   Hemodynamics  Temp (24hrs), Av.9 °C (100.2 °F), Min:36.6 °C (97.8 °F), Max:39.9 °C (103.8 °F)   Temperature: (!) 38.6 °C (101.4 °F) (Rn notified)  Pulse  Av.2  Min: 81  Max: 107    Blood Pressure : 116/59      Respiratory      Respiration: 18, Pulse Oximetry: 94 %        RUL Breath Sounds: Clear, RML Breath Sounds: Clear, RLL Breath Sounds: Clear, HONEY Breath Sounds: Clear, LLL Breath Sounds: Clear    Fluids    Intake/Output Summary (Last 24 hours) at 18 0824  Last data filed at 18 0600   Gross per 24 hour   Intake             3190 ml   Output             2150 ml   Net             1040 ml       Nutrition  Orders Placed This Encounter   Procedures   • Diet Order Clear Liquid, Clear Liquids - No Red Foods     Standing Status:   Standing     Number of Occurrences:   1     Order Specific Question:   Diet:     Answer:   Clear  Liquid [10]     Order Specific Question:   Diet:     Answer:   Clear Liquids - No Red Foods [12]     Physical Exam   Constitutional: He is oriented to person, place, and time. He appears well-developed and well-nourished. No distress.   Patient seen and examined  Plan discussed with NAI CASTELLONT:   Right Ear: External ear normal.   Left Ear: External ear normal.   Nose: Nose normal.   Eyes: Right eye exhibits no discharge. Left eye exhibits no discharge. No scleral icterus.   Neck: No JVD present. No tracheal deviation present.   Cardiovascular: Normal rate, normal heart sounds and intact distal pulses.    No murmur heard.       Pulmonary/Chest: Effort normal. No respiratory distress. He has rales.   LLL rhales now aparent   Abdominal: Soft. Bowel sounds are normal. He exhibits no distension. There is no tenderness.   Musculoskeletal: He exhibits no edema or tenderness.   Neurological: He is alert and oriented to person, place, and time.   Skin: Skin is warm and dry. He is not diaphoretic. No erythema.   Psychiatric: He has a normal mood and affect. His behavior is normal.   Nursing note and vitals reviewed.      Recent Labs      06/28/18   0424  06/29/18   0006  06/30/18   0451   WBC  14.3*  13.9*  11.3*   RBC  5.49  5.44  6.08   HEMOGLOBIN  14.4  14.1  15.5   HEMATOCRIT  43.6  42.4  46.3   MCV  79.4*  77.9*  76.2*   MCH  26.2*  25.9*  25.5*   MCHC  33.0*  33.3*  33.5*   RDW  36.1  35.4*  34.8*   PLATELETCT  207  183  211   MPV  9.2  8.7*  9.0     Recent Labs      06/28/18   1807  06/29/18   0006  06/30/18   0450   SODIUM  128*  131*  131*   POTASSIUM  2.7*  3.2*  3.3*   CHLORIDE  100  103  100   CO2  20  21  23   GLUCOSE  183*  161*  160*   BUN  10  9  6*   CREATININE  0.89  0.97  0.87   CALCIUM  7.0*  7.1*  7.8*     Recent Labs      06/28/18   1002   APTT  33.4   INR  1.11                  Assessment/Plan     Pneumonia   Assessment & Plan    LLL  Iv abx per ID  pulm toilet        Hypokalemia   Assessment & Plan     Trend and replace        Hyponatremia   Assessment & Plan    Hypovolemic  Iv fluid and trend  Still not improved        Sepsis ([facility])   Assessment & Plan    This is sepsis (without associated acute organ dysfunction).   Fevers to 101 still  LLL pneumonia noted on ct and today he has rhales.   Likely not seen initially 2/2 dehydration  Ct abdomen reviewed  ID following              DKA (diabetic ketoacidoses) ([facility])   Assessment & Plan    Resolved.   2/2 sepsis and poor baseline control          Hyperlipidemia- (present on admission)   Assessment & Plan    statin        Diabetes mellitus type 2, uncontrolled ([facility])- (present on admission)   Assessment & Plan    Needs to be on insulin on discharge.   Treat dka  Poor control with hyperglycemia            Hypertension- (present on admission)   Assessment & Plan    Cont meds as tolerated          Quality-Core Measures   Reviewed items::  EKG reviewed, Labs reviewed, Medications reviewed and Radiology images reviewed  Unger catheter::  No Unger  DVT prophylaxis pharmacological::  Heparin  DVT prophylaxis - mechanical:  SCDs  Ulcer Prophylaxis::  Yes  Antibiotics:  Treating active infection/contamination beyond 24 hours perioperative coverage  Assessed for rehabilitation services:  Patient was assess for and/or received rehabilitation services during this hospitalization

## 2018-06-30 NOTE — PROGRESS NOTES
Infectious Disease Progress Note    Author: Violeta Lala M.D. Date & Time of service: 2018  12:58 PM    Chief Complaint:  Fever and PNA    Interval History:  72 y.o. man with a history of diabetes, hyperlipidemia and hypertension admitted 2018 for hyperglycemia   Tmax 103.8, WBC 11.3 no resp distress  Labs Reviewed, Medications Reviewed and Radiology Reviewed.    Review of Systems:  Review of Systems   Unable to perform ROS: Other   Constitutional: Positive for fever. Negative for chills.   Gastrointestinal: Negative for nausea and vomiting.   sleepy    Hemodynamics:  Temp (24hrs), Av.8 °C (100 °F), Min:36.6 °C (97.8 °F), Max:39.9 °C (103.8 °F)  Temperature: 38 °C (100.4 °F)  Pulse  Av.1  Min: 81  Max: 107   Blood Pressure : 131/74       Physical Exam:  Physical Exam   Constitutional: He appears well-developed.   HENT:   Head: Normocephalic and atraumatic.   Eyes: EOM are normal. Pupils are equal, round, and reactive to light.   Neck: Neck supple.   Cardiovascular: Normal rate.    Pulmonary/Chest: Effort normal. No respiratory distress. He has rales.   Abdominal: Soft. He exhibits no distension.   Musculoskeletal: He exhibits no edema.   Neurological:   somnolent   Skin: No rash noted. He is not diaphoretic.   Nursing note and vitals reviewed.      Meds:    Current Facility-Administered Medications:   •  MD ALERT... vancomycin  •  [COMPLETED] piperacillin-tazobactam **AND** piperacillin-tazobactam  •  vancomycin  •  phosphorus  •  prochlorperazine  •  atorvastatin  •  lisinopril  •  amLODIPine  •  hydrALAZINE  •  insulin regular **AND** Accu-Chek Q6 if NPO **AND** NOTIFY MD and PharmD **AND** glucose 4 g **AND** dextrose 50%  •  potassium chloride SA  •  0.9 % NaCl with KCl 20 mEq 1,000 mL  •  Respiratory Care per Protocol  •  acetaminophen  •  ondansetron  •  ondansetron    Labs:  Recent Labs      18   0424  18   0006  18   0451   WBC  14.3*  13.9*  11.3*   RBC  5.49   5.44  6.08   HEMOGLOBIN  14.4  14.1  15.5   HEMATOCRIT  43.6  42.4  46.3   MCV  79.4*  77.9*  76.2*   MCH  26.2*  25.9*  25.5*   RDW  36.1  35.4*  34.8*   PLATELETCT  207  183  211   MPV  9.2  8.7*  9.0   NEUTSPOLYS  82.10*  76.90*  79.90*   LYMPHOCYTES  5.70*  7.80*  8.30*   MONOCYTES  11.60  14.50*  11.20   EOSINOPHILS  0.00  0.00  0.00   BASOPHILS  0.00  0.10  0.10     Recent Labs      06/28/18 1807 06/29/18   0006  06/30/18   0450   SODIUM  128*  131*  131*   POTASSIUM  2.7*  3.2*  3.3*   CHLORIDE  100  103  100   CO2  20  21  23   GLUCOSE  183*  161*  160*   BUN  10  9  6*     Recent Labs      06/27/18 1837 06/28/18 1807 06/29/18 0006 06/30/18   0450   ALBUMIN  3.7   --    --   2.8*  3.0*   TBILIRUBIN  2.3*   --    --   1.5  1.3   ALKPHOSPHAT  64   --    --   58  74   TOTPROTEIN  7.3   --    --   5.8*  6.7   ALTSGPT  19   --    --   23  43   ASTSGOT  22   --    --   35  78*   CREATININE  1.15   < >  0.89  0.97  0.87    < > = values in this interval not displayed.       Imaging:  Ct-abdomen-pelvis With    Result Date: 6/30/2018 6/29/2018 8:47 PM HISTORY/REASON FOR EXAM:  Recurrent fevers TECHNIQUE/EXAM DESCRIPTION:  CT abdomen and pelvis with contrast. Sequential axial CT images were obtained from lung bases to the proximal femurs after the uneventful administration of 100 cc Omnipaque 350 intravenous contrast. Low dose optimization technique was utilized for this CT exam including automated exposure control and adjustment of the mA and/or kV according to patient size. COMPARISON:  None CT ABDOMEN FINDINGS: Small bilateral pleural effusions are seen, right greater than left. Area of consolidation in the left lung base is seen. The liver is normal in contour. The liver is normal in size. No intrahepatic biliary ductal dilatation is noted. The gallbladder appears within normal limits. The spleen is unremarkable. The pancreas is grossly normal. Bilateral adrenal glands appear within normal limits. The  "kidneys are unremarkable.  The ureters are normal in caliber along their visualized course. Moderate stool is seen within the colon, otherwise the colon appears unremarkable. The appendix is not definitively identified. The small bowel is unremarkable. The bony structures are age appropriate. Atherosclerotic calcifications are seen. Small collection of abdominal ascites is seen. CT PELVIS FINDINGS: The bladder is within normal limits. The prostate appears enlarged.     1.  Left lung base pulmonary infiltrates. 2.  Small bilateral pleural effusions, right greater than left. 3.  Enlarged prostate, workup and evaluation for causes of prostate enlargement recommended as clinically appropriate. 4.  Moderate quantity of stool in the colon favors changes of constipation. 5.  Small collection of abdominal ascites 6.  Atherosclerosis These findings were discussed with the patient's clinician, Dr. Lackey, on 6/30/2018 2:01 AM.    Dx-chest-portable (1 View)    Result Date: 6/27/2018 6/27/2018 11:03 PM HISTORY/REASON FOR EXAM: Sepsis TECHNIQUE/EXAM DESCRIPTION:  Single AP view of the chest. COMPARISON: May 20, 2017 FINDINGS: The cardiac silhouette appears within normal limits. The mediastinal contour appears within normal limits.  The central pulmonary vasculature appears normal. Bilateral lung volumes are diminished.  Hazy linear density in the bilateral lung bases is observed. No significant pleural effusions are identified. The bony structures appear age-appropriate.     1.  Hazy bibasilar lung base density suggests atelectasis. 2.  No focal infiltrates identified      Micro:  Results     Procedure Component Value Units Date/Time    BLOOD CULTURE [[id number]] Collected:  06/30/18 0742    Order Status:  Completed Specimen:  Blood from Peripheral Updated:  06/30/18 1151    Narrative:       Per Hospital Policy: Only change Specimen Src: to \"Line\" if  specified by physician order.    BLOOD CULTURE [[id number]] Collected:  " "06/29/18 1645    Order Status:  Completed Specimen:  Blood from Peripheral Updated:  06/30/18 0831     Significant Indicator NEG     Source BLD     Site Peripheral     Blood Culture No Growth    Note: Blood cultures are incubated for 5 days and  are monitored continuously.Positive blood cultures  are called to the RN and reported as soon as  they are identified.     Blood culture testing and Gram stain, if indicated, are  performed at [facility], [address] [place] [address].  Positive blood cultures are  sent to [facility], [address] [place] [address], for organism identification and  susceptibility testing.      Narrative:       Per Hospital Policy: Only change Specimen Src: to \"Line\" if  specified by physician order.    BLOOD CULTURE [[id number]] Collected:  06/29/18 1645    Order Status:  Completed Specimen:  Blood from Peripheral Updated:  06/30/18 0831     Significant Indicator NEG     Source BLD     Site Peripheral     Blood Culture No Growth    Note: Blood cultures are incubated for 5 days and  are monitored continuously.Positive blood cultures  are called to the RN and reported as soon as  they are identified.     Blood culture testing and Gram stain, if indicated, are  performed at [facility], [address] [place] [address].[place], [place].  Positive blood cultures are  sent to [facility], [address] [place] [address], for organism identification and  susceptibility testing.      Narrative:       Per Hospital Policy: Only change Specimen Src: to \"Line\" if  specified by physician order.    BLOOD CULTURE [[id number]] Collected:  06/27/18 2312    Order Status:  Completed Specimen:  Blood from Peripheral Updated:  06/30/18 0831     Significant Indicator NEG     Source BLD     Site Peripheral     Blood Culture No Growth    Note: Blood cultures are incubated for 5 days and  are monitored continuously.Positive blood " "cultures  are called to the RN and reported as soon as  they are identified.     Blood culture testing and Gram stain, if indicated, are  performed at [facility], [facility]  [facility].[place], [place].  Positive blood cultures are  sent to [facility], [address] [place] [address], for organism identification and  susceptibility testing.      Narrative:       2 of 2 blood culture x2  Sites order. Per Hospital Policy:  Only change Specimen Src: to \"Line\" if specified by physician  order.    BLOOD CULTURE [[id number]] Collected:  06/30/18 0743    Order Status:  Completed Specimen:  Blood from Peripheral Updated:  06/30/18 0759    Narrative:       Per Hospital Policy: Only change Specimen Src: to \"Line\" if  specified by physician order.    Culture Respiratory W/ GRM STN [[id number]]     Order Status:  Completed Specimen:  Respirate from Sputum     RESPIRATORY VIRUS PANEL BY PCR [[id number]] Collected:  06/29/18 1438    Order Status:  Completed Specimen:  Nasal from Nasopharyngeal Updated:  06/29/18 1450    Narrative:       Collected By:[id number] AVERY PINEDA     Culture Respiratory W/ GRM STN [[id number]]     Order Status:  Completed Specimen:  Respirate from Sputum     Urine Culture [[id number]]     Order Status:  No result Specimen:  Urine     BLOOD CULTURE [[id number]] Collected:  06/27/18 2308    Order Status:  Completed Specimen:  Blood from Peripheral Updated:  06/28/18 0615     Significant Indicator NEG     Source BLD     Site Peripheral     Blood Culture No Growth    Note: Blood cultures are incubated for 5 days and  are monitored continuously.Positive blood cultures  are called to the RN and reported as soon as  they are identified.    Blood culture testing and Gram stain, if indicated, are  performed at [facility], [facility]  [facility].[place], [place].  Positive blood cultures are  sent to [facility], [address] [place] [address], " "[place], for organism identification and  susceptibility testing.      Narrative:       1 of 2 for Blood Culture x 2 sites order. Per Hospital  Policy: Only change Specimen Src: to \"Line\" if specified by  physician order.    Blood Culture [[id number]] Collected:  06/28/18 0000    Order Status:  Canceled Specimen:  Other from Peripheral     Blood Culture [[id number]] Collected:  06/28/18 0000    Order Status:  Canceled Specimen:  Other from Peripheral     Urinalysis, culture if indicated [[id number]]  (Abnormal) Collected:  06/27/18 2205    Order Status:  Completed Specimen:  Urine Updated:  06/27/18 2214     Micro Urine Req see below     Comment: Microscopic examination not performed when specimen is clear  and chemically negative for protein, blood, leukocyte esterase  and nitrite.          Color Yellow     Character Clear     Ph 5.5     Glucose 500 (A) mg/dL      Ketones >=80 (A) mg/dL      Protein Negative mg/dL      Bilirubin Negative     Nitrite Negative     Leukocyte Esterase Negative     Occult Blood Negative          Assessment:  Active Hospital Problems    Diagnosis   • Pneumonia [J18.9]   • Hypokalemia [E87.6]   • DKA (diabetic ketoacidoses) ([facility]) [E13.10]   • Sepsis (HCC) [A41.9]   • Hyponatremia [E87.1]   • Hyperlipidemia [E78.5]   • Hypertension [I10]   • Diabetes mellitus type 2, uncontrolled (HCC) [E11.65]       Plan:  Pneumonia  Remains febrile  Decreasing leukocytosis  Blood cxs neg X4  Resp viral panel and Legionella pending  HIV neg  LLL PNA by CT  Continue Zosyn  Change vanco to levo to cover atypicals    Leukocytosis  Multifactorial  Monitor    Diabetes mellitus  Keep BS under 150 to help control current infection      Discussed with internal medicine Dr Post  "

## 2018-06-30 NOTE — PROGRESS NOTES
Received report from night shift RN (Rosalee). Discussed plan of care, assumed care of patient. Safety checks in place.

## 2018-06-30 NOTE — CARE PLAN
Problem: Safety  Goal: Will remain free from falls    Intervention: Implement fall precautions  Bed alarm in place.      Problem: Knowledge Deficit  Goal: Knowledge of the prescribed therapeutic regimen will improve    Intervention: Discuss information regarding therpeutic regimen and document in education  Discussed plan of care.

## 2018-06-30 NOTE — PROGRESS NOTES
Telemetry Summary     Rhythm: Sinus Rhythm   HR :   NV :0.20  QRS :0.08  QT :0.38     Additional notes: No ectopy per monitor tech

## 2018-06-30 NOTE — PROGRESS NOTES
Daughter at bedside. Language barrier is frustrating for the patient, Daughter is interpreting. Pt is upset he can not go home.

## 2018-06-30 NOTE — CARE PLAN
Problem: Safety  Goal: Will remain free from injury  Outcome: PROGRESSING AS EXPECTED  Hourly rounding, Call light within reach, Fall protocol and Safety precautions in place. Pt verbalized understanding.    Problem: Infection  Goal: Will remain free from infection  Outcome: PROGRESSING AS EXPECTED  Ensure adequate hand washing before and after pt care. Pt verbalized understanding.

## 2018-07-01 LAB
ALBUMIN SERPL BCP-MCNC: 3 G/DL (ref 3.2–4.9)
ALBUMIN/GLOB SERPL: 0.9 G/DL
ALP SERPL-CCNC: 80 U/L (ref 30–99)
ALT SERPL-CCNC: 54 U/L (ref 2–50)
ANION GAP SERPL CALC-SCNC: 10 MMOL/L (ref 0–11.9)
ANION GAP SERPL CALC-SCNC: 11 MMOL/L (ref 0–11.9)
AST SERPL-CCNC: 104 U/L (ref 12–45)
BASOPHILS # BLD AUTO: 0 % (ref 0–1.8)
BASOPHILS # BLD: 0 K/UL (ref 0–0.12)
BILIRUB SERPL-MCNC: 1.4 MG/DL (ref 0.1–1.5)
BUN SERPL-MCNC: 7 MG/DL (ref 8–22)
BUN SERPL-MCNC: 7 MG/DL (ref 8–22)
CALCIUM SERPL-MCNC: 7.1 MG/DL (ref 8.4–10.2)
CALCIUM SERPL-MCNC: 7.1 MG/DL (ref 8.4–10.2)
CHLORIDE SERPL-SCNC: 98 MMOL/L (ref 96–112)
CHLORIDE SERPL-SCNC: 99 MMOL/L (ref 96–112)
CO2 SERPL-SCNC: 18 MMOL/L (ref 20–33)
CO2 SERPL-SCNC: 22 MMOL/L (ref 20–33)
CREAT SERPL-MCNC: 0.86 MG/DL (ref 0.5–1.4)
CREAT SERPL-MCNC: 0.88 MG/DL (ref 0.5–1.4)
EOSINOPHIL # BLD AUTO: 0 K/UL (ref 0–0.51)
EOSINOPHIL NFR BLD: 0 % (ref 0–6.9)
ERYTHROCYTE [DISTWIDTH] IN BLOOD BY AUTOMATED COUNT: 34.5 FL (ref 35.9–50)
FLUAV H1 2009 PAND RNA SPEC QL NAA+PROBE: NOT DETECTED
FLUAV H1 RNA SPEC QL NAA+PROBE: NOT DETECTED
FLUAV H3 RNA SPEC QL NAA+PROBE: NOT DETECTED
FLUAV RNA SPEC QL NAA+PROBE: NOT DETECTED
FLUBV RNA SPEC QL NAA+PROBE: NOT DETECTED
GLOBULIN SER CALC-MCNC: 3.4 G/DL (ref 1.9–3.5)
GLUCOSE BLD-MCNC: 195 MG/DL (ref 65–99)
GLUCOSE BLD-MCNC: 252 MG/DL (ref 65–99)
GLUCOSE BLD-MCNC: 263 MG/DL (ref 65–99)
GLUCOSE BLD-MCNC: 286 MG/DL (ref 65–99)
GLUCOSE SERPL-MCNC: 175 MG/DL (ref 65–99)
GLUCOSE SERPL-MCNC: 319 MG/DL (ref 65–99)
HADV DNA SPEC QL NAA+PROBE: NOT DETECTED
HADV DNA SPEC QL NAA+PROBE: NOT DETECTED
HCT VFR BLD AUTO: 43.5 % (ref 42–52)
HGB BLD-MCNC: 14.8 G/DL (ref 14–18)
HMPV RNA SPEC QL NAA+PROBE: NOT DETECTED
HPIV1 RNA SPEC QL NAA+PROBE: NOT DETECTED
HPIV2 RNA SPEC QL NAA+PROBE: NOT DETECTED
HPIV3 RNA SPEC QL NAA+PROBE: NOT DETECTED
IMM GRANULOCYTES # BLD AUTO: 0.07 K/UL (ref 0–0.11)
IMM GRANULOCYTES NFR BLD AUTO: 0.9 % (ref 0–0.9)
LV EJECT FRACT  99904: 70
LV EJECT FRACT MOD 2C 99903: 69.11
LV EJECT FRACT MOD 4C 99902: 65.45
LV EJECT FRACT MOD BP 99901: 65.51
LYMPHOCYTES # BLD AUTO: 0.63 K/UL (ref 1–4.8)
LYMPHOCYTES NFR BLD: 7.7 % (ref 22–41)
MAGNESIUM SERPL-MCNC: 2 MG/DL (ref 1.5–2.5)
MCH RBC QN AUTO: 25.5 PG (ref 27–33)
MCHC RBC AUTO-ENTMCNC: 34 G/DL (ref 33.7–35.3)
MCV RBC AUTO: 75 FL (ref 81.4–97.8)
MONOCYTES # BLD AUTO: 0.68 K/UL (ref 0–0.85)
MONOCYTES NFR BLD AUTO: 8.3 % (ref 0–13.4)
NEUTROPHILS # BLD AUTO: 6.81 K/UL (ref 1.82–7.42)
NEUTROPHILS NFR BLD: 83.1 % (ref 44–72)
NRBC # BLD AUTO: 0 K/UL
NRBC BLD-RTO: 0 /100 WBC
PHOSPHATE SERPL-MCNC: 1.1 MG/DL (ref 2.5–4.5)
PLATELET # BLD AUTO: 189 K/UL (ref 164–446)
PMV BLD AUTO: 9.2 FL (ref 9–12.9)
POTASSIUM SERPL-SCNC: 2.6 MMOL/L (ref 3.6–5.5)
POTASSIUM SERPL-SCNC: 3.2 MMOL/L (ref 3.6–5.5)
PROT SERPL-MCNC: 6.4 G/DL (ref 6–8.2)
RBC # BLD AUTO: 5.8 M/UL (ref 4.7–6.1)
RHINOVIRUS RNA SPEC QL NAA+PROBE: NOT DETECTED
RSV A RNA SPEC QL NAA+PROBE: NOT DETECTED
RSV B RNA SPEC QL NAA+PROBE: NOT DETECTED
SODIUM SERPL-SCNC: 127 MMOL/L (ref 135–145)
SODIUM SERPL-SCNC: 131 MMOL/L (ref 135–145)
VANCOMYCIN TROUGH SERPL-MCNC: 4.9 UG/ML (ref 10–20)
WBC # BLD AUTO: 8.2 K/UL (ref 4.8–10.8)

## 2018-07-01 PROCEDURE — 700102 HCHG RX REV CODE 250 W/ 637 OVERRIDE(OP): Performed by: HOSPITALIST

## 2018-07-01 PROCEDURE — 99232 SBSQ HOSP IP/OBS MODERATE 35: CPT | Performed by: HOSPITALIST

## 2018-07-01 PROCEDURE — 80202 ASSAY OF VANCOMYCIN: CPT

## 2018-07-01 PROCEDURE — 700105 HCHG RX REV CODE 258: Performed by: INTERNAL MEDICINE

## 2018-07-01 PROCEDURE — 82962 GLUCOSE BLOOD TEST: CPT

## 2018-07-01 PROCEDURE — 93308 TTE F-UP OR LMTD: CPT | Mod: 26 | Performed by: INTERNAL MEDICINE

## 2018-07-01 PROCEDURE — 84100 ASSAY OF PHOSPHORUS: CPT

## 2018-07-01 PROCEDURE — 85025 COMPLETE CBC W/AUTO DIFF WBC: CPT

## 2018-07-01 PROCEDURE — 700111 HCHG RX REV CODE 636 W/ 250 OVERRIDE (IP): Performed by: HOSPITALIST

## 2018-07-01 PROCEDURE — 80048 BASIC METABOLIC PNL TOTAL CA: CPT

## 2018-07-01 PROCEDURE — 99232 SBSQ HOSP IP/OBS MODERATE 35: CPT | Performed by: INTERNAL MEDICINE

## 2018-07-01 PROCEDURE — A9270 NON-COVERED ITEM OR SERVICE: HCPCS | Performed by: INTERNAL MEDICINE

## 2018-07-01 PROCEDURE — A9270 NON-COVERED ITEM OR SERVICE: HCPCS | Performed by: HOSPITALIST

## 2018-07-01 PROCEDURE — 93306 TTE W/DOPPLER COMPLETE: CPT

## 2018-07-01 PROCEDURE — 700111 HCHG RX REV CODE 636 W/ 250 OVERRIDE (IP): Performed by: INTERNAL MEDICINE

## 2018-07-01 PROCEDURE — 700102 HCHG RX REV CODE 250 W/ 637 OVERRIDE(OP): Performed by: INTERNAL MEDICINE

## 2018-07-01 PROCEDURE — 80053 COMPREHEN METABOLIC PANEL: CPT

## 2018-07-01 PROCEDURE — 770020 HCHG ROOM/CARE - TELE (206)

## 2018-07-01 PROCEDURE — 83735 ASSAY OF MAGNESIUM: CPT

## 2018-07-01 RX ORDER — POTASSIUM CHLORIDE 20 MEQ/1
40 TABLET, EXTENDED RELEASE ORAL ONCE
Status: COMPLETED | OUTPATIENT
Start: 2018-07-01 | End: 2018-07-01

## 2018-07-01 RX ORDER — POTASSIUM CHLORIDE 7.45 MG/ML
10 INJECTION INTRAVENOUS ONCE
Status: COMPLETED | OUTPATIENT
Start: 2018-07-01 | End: 2018-07-01

## 2018-07-01 RX ORDER — POTASSIUM CHLORIDE 7.45 MG/ML
10 INJECTION INTRAVENOUS
Status: DISPENSED | OUTPATIENT
Start: 2018-07-01 | End: 2018-07-01

## 2018-07-01 RX ADMIN — POTASSIUM CHLORIDE 10 MEQ: 7.46 INJECTION, SOLUTION INTRAVENOUS at 13:51

## 2018-07-01 RX ADMIN — SODIUM CHLORIDE 3 G: 900 INJECTION INTRAVENOUS at 13:09

## 2018-07-01 RX ADMIN — SODIUM CHLORIDE 3 G: 900 INJECTION INTRAVENOUS at 08:56

## 2018-07-01 RX ADMIN — POTASSIUM CHLORIDE 10 MEQ: 7.46 INJECTION, SOLUTION INTRAVENOUS at 07:15

## 2018-07-01 RX ADMIN — LISINOPRIL 40 MG: 20 TABLET ORAL at 09:21

## 2018-07-01 RX ADMIN — POTASSIUM CHLORIDE 10 MEQ: 7.46 INJECTION, SOLUTION INTRAVENOUS at 08:00

## 2018-07-01 RX ADMIN — INSULIN HUMAN 7 UNITS: 100 INJECTION, SOLUTION PARENTERAL at 13:06

## 2018-07-01 RX ADMIN — AMLODIPINE BESYLATE 10 MG: 5 TABLET ORAL at 09:21

## 2018-07-01 RX ADMIN — POTASSIUM CHLORIDE 10 MEQ: 7.46 INJECTION, SOLUTION INTRAVENOUS at 11:58

## 2018-07-01 RX ADMIN — ATORVASTATIN CALCIUM 40 MG: 40 TABLET, FILM COATED ORAL at 20:23

## 2018-07-01 RX ADMIN — POTASSIUM CHLORIDE 40 MEQ: 1500 TABLET, EXTENDED RELEASE ORAL at 20:23

## 2018-07-01 RX ADMIN — DIBASIC SODIUM PHOSPHATE, MONOBASIC POTASSIUM PHOSPHATE AND MONOBASIC SODIUM PHOSPHATE 1 TABLET: 852; 155; 130 TABLET ORAL at 09:21

## 2018-07-01 RX ADMIN — LEVOFLOXACIN 750 MG: 5 INJECTION, SOLUTION INTRAVENOUS at 08:56

## 2018-07-01 RX ADMIN — DIBASIC SODIUM PHOSPHATE, MONOBASIC POTASSIUM PHOSPHATE AND MONOBASIC SODIUM PHOSPHATE 1 TABLET: 852; 155; 130 TABLET ORAL at 16:23

## 2018-07-01 RX ADMIN — SODIUM CHLORIDE 3 G: 900 INJECTION INTRAVENOUS at 17:52

## 2018-07-01 RX ADMIN — DIBASIC SODIUM PHOSPHATE, MONOBASIC POTASSIUM PHOSPHATE AND MONOBASIC SODIUM PHOSPHATE 1 TABLET: 852; 155; 130 TABLET ORAL at 20:23

## 2018-07-01 RX ADMIN — ACETAMINOPHEN 650 MG: 325 TABLET, FILM COATED ORAL at 16:23

## 2018-07-01 RX ADMIN — PIPERACILLIN SODIUM AND TAZOBACTAM SODIUM 4.5 G: 4; .5 INJECTION, POWDER, FOR SOLUTION INTRAVENOUS at 00:49

## 2018-07-01 RX ADMIN — INSULIN HUMAN 12 UNITS: 100 INJECTION, SOLUTION PARENTERAL at 23:44

## 2018-07-01 RX ADMIN — SODIUM CHLORIDE 3 G: 900 INJECTION INTRAVENOUS at 23:41

## 2018-07-01 RX ADMIN — POTASSIUM CHLORIDE 40 MEQ: 1500 TABLET, EXTENDED RELEASE ORAL at 09:21

## 2018-07-01 RX ADMIN — INSULIN HUMAN 3 UNITS: 100 INJECTION, SOLUTION PARENTERAL at 05:47

## 2018-07-01 RX ADMIN — POTASSIUM CHLORIDE 40 MEQ: 1500 TABLET, EXTENDED RELEASE ORAL at 13:16

## 2018-07-01 RX ADMIN — INSULIN HUMAN 7 UNITS: 100 INJECTION, SOLUTION PARENTERAL at 00:46

## 2018-07-01 RX ADMIN — INSULIN HUMAN 7 UNITS: 100 INJECTION, SOLUTION PARENTERAL at 17:49

## 2018-07-01 ASSESSMENT — ENCOUNTER SYMPTOMS
FEVER: 0
WHEEZING: 0
SHORTNESS OF BREATH: 0
SPUTUM PRODUCTION: 0
NAUSEA: 0
CHILLS: 0
VOMITING: 0
COUGH: 0
HEMOPTYSIS: 0

## 2018-07-01 ASSESSMENT — PATIENT HEALTH QUESTIONNAIRE - PHQ9
2. FEELING DOWN, DEPRESSED, IRRITABLE, OR HOPELESS: NOT AT ALL
SUM OF ALL RESPONSES TO PHQ9 QUESTIONS 1 AND 2: 0
1. LITTLE INTEREST OR PLEASURE IN DOING THINGS: NOT AT ALL

## 2018-07-01 ASSESSMENT — PAIN SCALES - GENERAL
PAINLEVEL_OUTOF10: 0
PAINLEVEL_OUTOF10: 0

## 2018-07-01 NOTE — PROGRESS NOTES
Ester from Lab called with critical result of Potassium 2.6 at 6:30am. Critical lab result read back to Ester.   Dr. Leti Valentino notified of critical lab result at 06:35am.  Critical lab result read back by Dr. Leti Valentino and new order added.

## 2018-07-01 NOTE — PROGRESS NOTES
Alert and oriented  to person,place and time.on 3L of O2, sat 94%. Denied pain at this time.Due medication given per MAR. States understanding of POC. Son and wife by bedside.

## 2018-07-01 NOTE — CARE PLAN
Problem: Safety  Goal: Will remain free from injury  Outcome: PROGRESSING AS EXPECTED  Hourly rounding.  Non-skid socks. Bed locked & in low position. Personal belongings and call light  within reach. .      Problem: Skin Integrity  Goal: Risk for impaired skin integrity will decrease  Outcome: PROGRESSING AS EXPECTED  Kept dry and clean. place pillows on bony prominences to prevent skin breakdown.

## 2018-07-01 NOTE — PROGRESS NOTES
Telemetry Report: pt has been SR/ST.  HR: 80s-106  Measurements: (.16/.10/.34)  Ectopy: R PAC    Measurements and updates per Helga CHAMORRO

## 2018-07-01 NOTE — PROGRESS NOTES
[facility] Hospitalist Progress Note    Date of Service: 2018    Chief Complaint  72 y.o. male admitted 2018 with dka and sepsis 2/2 pneumonia    Interval Problem Update  Now hypoxic requiring 3L NC  Phos and k very low- replaced  Stopped fluids, echo ordered      Long discussion with family. They have a poor insight into the severity of his illness and have repeatedly asked to take him home as he does not want to be here. Discussed the dangers of leaving with sepsis and they have agreed to stay fo ranother night.   Consultants/Specialty  Discussed wtih ID today    Disposition  hospital    Ct abd:  1.  Left lung base pulmonary infiltrates.  2.  Small bilateral pleural effusions, right greater than left.  3.  Enlarged prostate, workup and evaluation for causes of prostate enlargement recommended as clinically appropriate.  4.  Moderate quantity of stool in the colon favors changes of constipation.  5.  Small collection of abdominal ascites  6.  Atherosclerosis    Review of Systems   Unable to perform ROS: Language      Physical Exam  Laboratory/Imaging   Hemodynamics  Temp (24hrs), Av.3 °C (99.2 °F), Min:36.7 °C (98.1 °F), Max:38 °C (100.4 °F)   Temperature: 37.6 °C (99.7 °F)  Pulse  Av.2  Min: 81  Max: 107    Blood Pressure : 134/66      Respiratory      Respiration: 18, Pulse Oximetry: 89 % (rn aware)        RUL Breath Sounds: Clear, RML Breath Sounds: Clear, RLL Breath Sounds: Clear, HONEY Breath Sounds: Clear, LLL Breath Sounds: Clear    Fluids    Intake/Output Summary (Last 24 hours) at 18 0823  Last data filed at 18 0020   Gross per 24 hour   Intake             1460 ml   Output              700 ml   Net              760 ml       Nutrition  Orders Placed This Encounter   Procedures   • Diet Order Clear Liquid, Clear Liquids - No Red Foods     Standing Status:   Standing     Number of Occurrences:   1     Order Specific Question:   Diet:     Answer:   Clear Liquid [10]     Order Specific  Question:   Diet:     Answer:   Clear Liquids - No Red Foods [12]     Physical Exam   Constitutional: He is oriented to person, place, and time. He appears well-developed and well-nourished. No distress.   Patient seen and examined  Plan discussed with NAI CASTELLONT:   Right Ear: External ear normal.   Left Ear: External ear normal.   Nose: Nose normal.   Eyes: Right eye exhibits no discharge. Left eye exhibits no discharge. No scleral icterus.   Neck: No JVD present. No tracheal deviation present.   Cardiovascular: Normal rate, normal heart sounds and intact distal pulses.    No murmur heard.       Pulmonary/Chest: Effort normal. No respiratory distress. He has rales.   LLL rhales now aparent   Abdominal: Soft. Bowel sounds are normal. He exhibits no distension. There is no tenderness.   Musculoskeletal: He exhibits no edema or tenderness.   Neurological: He is alert and oriented to person, place, and time.   Skin: Skin is warm and dry. He is not diaphoretic. No erythema.   Psychiatric: He has a normal mood and affect. His behavior is normal.   Nursing note and vitals reviewed.      Recent Labs      06/29/18   0006  06/30/18   0451  07/01/18   0530   WBC  13.9*  11.3*  8.2   RBC  5.44  6.08  5.80   HEMOGLOBIN  14.1  15.5  14.8   HEMATOCRIT  42.4  46.3  43.5   MCV  77.9*  76.2*  75.0*   MCH  25.9*  25.5*  25.5*   MCHC  33.3*  33.5*  34.0   RDW  35.4*  34.8*  34.5*   PLATELETCT  183  211  189   MPV  8.7*  9.0  9.2     Recent Labs      06/29/18   0006  06/30/18   0450  07/01/18   0530   SODIUM  131*  131*  131*   POTASSIUM  3.2*  3.3*  2.6*   CHLORIDE  103  100  98   CO2  21  23  22   GLUCOSE  161*  160*  175*   BUN  9  6*  7*   CREATININE  0.97  0.87  0.86   CALCIUM  7.1*  7.8*  7.1*     Recent Labs      06/28/18   1002   APTT  33.4   INR  1.11                  Assessment/Plan     Pneumonia   Assessment & Plan    LLL  Iv abx per ID  pulm toilet        Hypokalemia   Assessment & Plan    Severe today  Trend and replace           Hyponatremia   Assessment & Plan    Hypovolemic  Iv fluid and trend  Still not improved        Sepsis ([facility])   Assessment & Plan    This is sepsis (without associated acute organ dysfunction).   Fevers to 101 still  LLL pneumonia noted on ct and today he has rhales.   Likely not seen initially 2/2 dehydration  ID following              DKA (diabetic ketoacidoses) ([facility])   Assessment & Plan    Resolved.   2/2 sepsis and poor baseline control          Hyperlipidemia- (present on admission)   Assessment & Plan    statin        Diabetes mellitus type 2, uncontrolled ([facility])- (present on admission)   Assessment & Plan    Needs to be on insulin on discharge.   Treat dka  Poor control with hyperglycemia            Hypertension- (present on admission)   Assessment & Plan    Cont meds as tolerated          Quality-Core Measures   Reviewed items::  EKG reviewed, Labs reviewed, Medications reviewed and Radiology images reviewed  Unger catheter::  No Unger  DVT prophylaxis pharmacological::  Heparin  DVT prophylaxis - mechanical:  SCDs  Ulcer Prophylaxis::  Yes  Antibiotics:  Treating active infection/contamination beyond 24 hours perioperative coverage  Assessed for rehabilitation services:  Patient was assess for and/or received rehabilitation services during this hospitalization

## 2018-07-01 NOTE — PROGRESS NOTES
Infectious Disease Progress Note    Author: Voileta Lala M.D. Date & Time of service: 2018  1:31 PM    Chief Complaint:  Fever and PNA    Interval History:  72 y.o. man with a history of diabetes, hyperlipidemia and hypertension admitted 2018 for hyperglycemia   Tmax 103.8, WBC 11.3 no resp distress   AF (100.1), WBC 8.2 No dyspnea  Labs Reviewed, Medications Reviewed and Radiology Reviewed.    Review of Systems:  Review of Systems   Unable to perform ROS: Other   Constitutional: Negative for chills and fever.   Respiratory: Negative for cough, hemoptysis, sputum production, shortness of breath and wheezing.    Gastrointestinal: Negative for nausea and vomiting.   All other systems reviewed and are negative.  limited by language    Hemodynamics:  Temp (24hrs), Av.2 °C (99 °F), Min:36.6 °C (97.8 °F), Max:37.8 °C (100.1 °F)  Temperature: 36.6 °C (97.8 °F)  Pulse  Av  Min: 81  Max: 107   Blood Pressure : 145/83       Physical Exam:  Physical Exam   Constitutional: No distress.   HENT:   Head: Normocephalic.   Mouth/Throat: No oropharyngeal exudate.   Neck: Normal range of motion.   Cardiovascular: Normal heart sounds.    Pulmonary/Chest: No respiratory distress. He has no wheezes. He has no rales.   Abdominal: Bowel sounds are normal. There is no tenderness. There is no rebound.   Musculoskeletal: He exhibits no edema.   Skin: No erythema.   Nursing note and vitals reviewed.      Meds:    Current Facility-Administered Medications:   •  ampicillin-sulbactam (UNASYN) 3 g IVPB  •  PEDS potassium chloride (KCL-PERIPHERAL) IV  •  levoFLOXacin (LEVAQUIN) IVPB  •  phosphorus  •  prochlorperazine  •  atorvastatin  •  lisinopril  •  amLODIPine  •  hydrALAZINE  •  insulin regular **AND** [CANCELED] Accu-Chek Q6 if NPO **AND** NOTIFY MD and PharmD **AND** glucose 4 g **AND** dextrose 50%  •  potassium chloride SA  •  Respiratory Care per Protocol  •  acetaminophen  •  ondansetron  •   ondansetron    Labs:  Recent Labs      06/29/18   0006  06/30/18   0451  07/01/18   0530   WBC  13.9*  11.3*  8.2   RBC  5.44  6.08  5.80   HEMOGLOBIN  14.1  15.5  14.8   HEMATOCRIT  42.4  46.3  43.5   MCV  77.9*  76.2*  75.0*   MCH  25.9*  25.5*  25.5*   RDW  35.4*  34.8*  34.5*   PLATELETCT  183  211  189   MPV  8.7*  9.0  9.2   NEUTSPOLYS  76.90*  79.90*  83.10*   LYMPHOCYTES  7.80*  8.30*  7.70*   MONOCYTES  14.50*  11.20  8.30   EOSINOPHILS  0.00  0.00  0.00   BASOPHILS  0.10  0.10  0.00     Recent Labs      06/29/18   0006  06/30/18   0450  07/01/18   0530   SODIUM  131*  131*  131*   POTASSIUM  3.2*  3.3*  2.6*   CHLORIDE  103  100  98   CO2  21  23  22   GLUCOSE  161*  160*  175*   BUN  9  6*  7*     Recent Labs      06/29/18   0006  06/30/18   0450  07/01/18   0530   ALBUMIN  2.8*  3.0*  3.0*   TBILIRUBIN  1.5  1.3  1.4   ALKPHOSPHAT  58  74  80   TOTPROTEIN  5.8*  6.7  6.4   ALTSGPT  23  43  54*   ASTSGOT  35  78*  104*   CREATININE  0.97  0.87  0.86       Imaging:  Ct-abdomen-pelvis With    Result Date: 6/30/2018 6/29/2018 8:47 PM HISTORY/REASON FOR EXAM:  Recurrent fevers TECHNIQUE/EXAM DESCRIPTION:  CT abdomen and pelvis with contrast. Sequential axial CT images were obtained from lung bases to the proximal femurs after the uneventful administration of 100 cc Omnipaque 350 intravenous contrast. Low dose optimization technique was utilized for this CT exam including automated exposure control and adjustment of the mA and/or kV according to patient size. COMPARISON:  None CT ABDOMEN FINDINGS: Small bilateral pleural effusions are seen, right greater than left. Area of consolidation in the left lung base is seen. The liver is normal in contour. The liver is normal in size. No intrahepatic biliary ductal dilatation is noted. The gallbladder appears within normal limits. The spleen is unremarkable. The pancreas is grossly normal. Bilateral adrenal glands appear within normal limits. The kidneys are  unremarkable.  The ureters are normal in caliber along their visualized course. Moderate stool is seen within the colon, otherwise the colon appears unremarkable. The appendix is not definitively identified. The small bowel is unremarkable. The bony structures are age appropriate. Atherosclerotic calcifications are seen. Small collection of abdominal ascites is seen. CT PELVIS FINDINGS: The bladder is within normal limits. The prostate appears enlarged.     1.  Left lung base pulmonary infiltrates. 2.  Small bilateral pleural effusions, right greater than left. 3.  Enlarged prostate, workup and evaluation for causes of prostate enlargement recommended as clinically appropriate. 4.  Moderate quantity of stool in the colon favors changes of constipation. 5.  Small collection of abdominal ascites 6.  Atherosclerosis These findings were discussed with the patient's clinician, Dr. Lackey, on 6/30/2018 2:01 AM.    Dx-chest-portable (1 View)    Result Date: 6/27/2018 6/27/2018 11:03 PM HISTORY/REASON FOR EXAM: Sepsis TECHNIQUE/EXAM DESCRIPTION:  Single AP view of the chest. COMPARISON: May 20, 2017 FINDINGS: The cardiac silhouette appears within normal limits. The mediastinal contour appears within normal limits.  The central pulmonary vasculature appears normal. Bilateral lung volumes are diminished.  Hazy linear density in the bilateral lung bases is observed. No significant pleural effusions are identified. The bony structures appear age-appropriate.     1.  Hazy bibasilar lung base density suggests atelectasis. 2.  No focal infiltrates identified      Micro:  Results     Procedure Component Value Units Date/Time    BLOOD CULTURE [[id number]] Collected:  06/30/18 0742    Order Status:  Completed Specimen:  Blood from Peripheral Updated:  07/01/18 0617     Significant Indicator NEG     Source BLD     Site Peripheral     Blood Culture No Growth    Note: Blood cultures are incubated for 5 days and  are monitored  "continuously.Positive blood cultures  are called to the RN and reported as soon as  they are identified.    Blood culture testing and Gram stain, if indicated, are  performed at [facility], [facility]  [place], [place].  Positive blood cultures are  sent to [facility], [address] [place] [address], for organism identification and  susceptibility testing.      Narrative:       Per Hospital Policy: Only change Specimen Src: to \"Line\" if  specified by physician order.    BLOOD CULTURE [[id number]] Collected:  06/30/18 0743    Order Status:  Completed Specimen:  Blood from Peripheral Updated:  07/01/18 0617     Significant Indicator NEG     Source BLD     Site Peripheral     Blood Culture No Growth    Note: Blood cultures are incubated for 5 days and  are monitored continuously.Positive blood cultures  are called to the RN and reported as soon as  they are identified.    Blood culture testing and Gram stain, if indicated, are  performed at [facility], [address] [place] [address].  Positive blood cultures are  sent to [facility], [address] [place] [address], for organism identification and  susceptibility testing.      Narrative:       Per Hospital Policy: Only change Specimen Src: to \"Line\" if  specified by physician order.    BLOOD CULTURE [[id number]] Collected:  06/29/18 1645    Order Status:  Completed Specimen:  Blood from Peripheral Updated:  06/30/18 0831     Significant Indicator NEG     Source BLD     Site Peripheral     Blood Culture No Growth    Note: Blood cultures are incubated for 5 days and  are monitored continuously.Positive blood cultures  are called to the RN and reported as soon as  they are identified.     Blood culture testing and Gram stain, if indicated, are  performed at [facility], [facility]  [employer] [place], [place].  Positive blood cultures are  sent to " "[facility], [address] [place] [address], for organism identification and  susceptibility testing.      Narrative:       Per Hospital Policy: Only change Specimen Src: to \"Line\" if  specified by physician order.    BLOOD CULTURE [[id number]] Collected:  06/29/18 1645    Order Status:  Completed Specimen:  Blood from Peripheral Updated:  06/30/18 0831     Significant Indicator NEG     Source BLD     Site Peripheral     Blood Culture No Growth    Note: Blood cultures are incubated for 5 days and  are monitored continuously.Positive blood cultures  are called to the RN and reported as soon as  they are identified.     Blood culture testing and Gram stain, if indicated, are  performed at [facility], [address] [place] [address].[place], [place].  Positive blood cultures are  sent to [facility], [address] [place] [address], for organism identification and  susceptibility testing.      Narrative:       Per Hospital Policy: Only change Specimen Src: to \"Line\" if  specified by physician order.    BLOOD CULTURE [[id number]] Collected:  06/27/18 2312    Order Status:  Completed Specimen:  Blood from Peripheral Updated:  06/30/18 0831     Significant Indicator NEG     Source BLD     Site Peripheral     Blood Culture No Growth    Note: Blood cultures are incubated for 5 days and  are monitored continuously.Positive blood cultures  are called to the RN and reported as soon as  they are identified.     Blood culture testing and Gram stain, if indicated, are  performed at [facility], [facility]  [facility].[place], [place].  Positive blood cultures are  sent to [facility], [address] [place] [address], for organism identification and  susceptibility testing.      Narrative:       2 of 2 blood culture x2  Sites order. Per Hospital Policy:  Only change Specimen Src: to \"Line\" if specified by physician  order.    Culture Respiratory " "W/ [facility] [[id number]]     Order Status:  Completed Specimen:  Respirate from Sputum     RESPIRATORY VIRUS PANEL BY PCR [[id number]] Collected:  06/29/18 1438    Order Status:  Completed Specimen:  Nasal from Nasopharyngeal Updated:  06/29/18 1450    Narrative:       Collected By:[id number] AVERY PINDEA R    Culture Respiratory W/ [facility] [[id number]]     Order Status:  Completed Specimen:  Respirate from Sputum     Urine Culture [[id number]]     Order Status:  No result Specimen:  Urine     BLOOD CULTURE [[id number]] Collected:  06/27/18 2308    Order Status:  Completed Specimen:  Blood from Peripheral Updated:  06/28/18 0615     Significant Indicator NEG     Source BLD     Site Peripheral     Blood Culture No Growth    Note: Blood cultures are incubated for 5 days and  are monitored continuously.Positive blood cultures  are called to the RN and reported as soon as  they are identified.    Blood culture testing and Gram stain, if indicated, are  performed at [address].  Positive blood cultures are  sent to [facility], [address] [place] [address], for organism identification and  susceptibility testing.      Narrative:       1 of 2 for Blood Culture x 2 sites order. Per Hospital  Policy: Only change Specimen Src: to \"Line\" if specified by  physician order.    Blood Culture [[id number]] Collected:  06/28/18 0000    Order Status:  Canceled Specimen:  Other from Peripheral     Blood Culture [[id number]] Collected:  06/28/18 0000    Order Status:  Canceled Specimen:  Other from Peripheral     Urinalysis, culture if indicated [[id number]]  (Abnormal) Collected:  06/27/18 2205    Order Status:  Completed Specimen:  Urine Updated:  06/27/18 2214     Micro Urine Req see below     Comment: Microscopic examination not performed when specimen is clear  and chemically negative for protein, blood, leukocyte esterase  and nitrite.          Color Yellow "     Character Clear     Ph 5.5     Glucose 500 (A) mg/dL      Ketones >=80 (A) mg/dL      Protein Negative mg/dL      Bilirubin Negative     Nitrite Negative     Leukocyte Esterase Negative     Occult Blood Negative          Assessment:  Active Hospital Problems    Diagnosis   • Pneumonia [J18.9]   • Hypokalemia [E87.6]   • DKA (diabetic ketoacidoses) ([facility]) [E13.10]   • Sepsis ([facility]) [A41.9]   • Hyponatremia [E87.1]   • Hyperlipidemia [E78.5]   • Hypertension [I10]   • Diabetes mellitus type 2, uncontrolled ([facility]) [E11.65]       Plan:  Pneumonia  Less febrile  Resolved leukocytosis  Blood cxs neg X6  Resp viral panel and Legionella pending  HIV neg  LLL PNA by CT  Unasyn and Levo for now  Change to PO tomorrow if tolerating PO well and continues to improve    Leukocytosis, resolved today  Multifactorial  Monitor    Diabetes mellitus  Keep BS under 150 to help control current infection      Discussed with internal medicine Dr Post

## 2018-07-01 NOTE — PROGRESS NOTES
0830Report received, plan of care reviewed and discussed, assessment complete, oriented to room, bed alarm on, nonskid socks applied, advised to call for assistance.   1030 Discussed plan of care, family at bedside, will continue to monitor.  1230 Up to bathroom, refused to ambulate in the corey.  1430 Sleeping, family at bedside.  1630 Up in bed, eating food family brought in, no complaints.  1845 Report given to next shift.  Cardiac Summary.  Sinus rhythm (0.16/0.08/0.38)

## 2018-07-02 ENCOUNTER — APPOINTMENT (OUTPATIENT)
Dept: RADIOLOGY | Facility: MEDICAL CENTER | Age: 73
DRG: 871 | End: 2018-07-02
Attending: HOSPITALIST
Payer: COMMERCIAL

## 2018-07-02 PROBLEM — R65.20 SEVERE SEPSIS (HCC): Status: ACTIVE | Noted: 2018-06-28

## 2018-07-02 PROBLEM — J96.01 ACUTE HYPOXEMIC RESPIRATORY FAILURE (HCC): Status: ACTIVE | Noted: 2018-07-02

## 2018-07-02 LAB
ALBUMIN SERPL BCP-MCNC: 2.6 G/DL (ref 3.2–4.9)
ALBUMIN/GLOB SERPL: 0.7 G/DL
ALP SERPL-CCNC: 98 U/L (ref 30–99)
ALT SERPL-CCNC: 70 U/L (ref 2–50)
ANION GAP SERPL CALC-SCNC: 7 MMOL/L (ref 0–11.9)
AST SERPL-CCNC: 145 U/L (ref 12–45)
BASOPHILS # BLD AUTO: 0 % (ref 0–1.8)
BASOPHILS # BLD: 0 K/UL (ref 0–0.12)
BILIRUB SERPL-MCNC: 0.9 MG/DL (ref 0.1–1.5)
BUN SERPL-MCNC: 7 MG/DL (ref 8–22)
CALCIUM SERPL-MCNC: 7.2 MG/DL (ref 8.4–10.2)
CHLORIDE SERPL-SCNC: 100 MMOL/L (ref 96–112)
CO2 SERPL-SCNC: 25 MMOL/L (ref 20–33)
CREAT SERPL-MCNC: 0.78 MG/DL (ref 0.5–1.4)
EOSINOPHIL # BLD AUTO: 0 K/UL (ref 0–0.51)
EOSINOPHIL NFR BLD: 0 % (ref 0–6.9)
ERYTHROCYTE [DISTWIDTH] IN BLOOD BY AUTOMATED COUNT: 34.9 FL (ref 35.9–50)
GLOBULIN SER CALC-MCNC: 3.6 G/DL (ref 1.9–3.5)
GLUCOSE BLD-MCNC: 177 MG/DL (ref 65–99)
GLUCOSE BLD-MCNC: 192 MG/DL (ref 65–99)
GLUCOSE BLD-MCNC: 260 MG/DL (ref 65–99)
GLUCOSE BLD-MCNC: 352 MG/DL (ref 65–99)
GLUCOSE SERPL-MCNC: 177 MG/DL (ref 65–99)
HCT VFR BLD AUTO: 39.4 % (ref 42–52)
HGB BLD-MCNC: 13.4 G/DL (ref 14–18)
IMM GRANULOCYTES # BLD AUTO: 0.05 K/UL (ref 0–0.11)
IMM GRANULOCYTES NFR BLD AUTO: 0.8 % (ref 0–0.9)
L PNEUMO IGG TITR SER IF: NORMAL {TITER}
LYMPHOCYTES # BLD AUTO: 0.85 K/UL (ref 1–4.8)
LYMPHOCYTES NFR BLD: 12.8 % (ref 22–41)
MAGNESIUM SERPL-MCNC: 2 MG/DL (ref 1.5–2.5)
MCH RBC QN AUTO: 25.4 PG (ref 27–33)
MCHC RBC AUTO-ENTMCNC: 34 G/DL (ref 33.7–35.3)
MCV RBC AUTO: 74.8 FL (ref 81.4–97.8)
MONOCYTES # BLD AUTO: 1.03 K/UL (ref 0–0.85)
MONOCYTES NFR BLD AUTO: 15.5 % (ref 0–13.4)
NEUTROPHILS # BLD AUTO: 4.72 K/UL (ref 1.82–7.42)
NEUTROPHILS NFR BLD: 70.9 % (ref 44–72)
NRBC # BLD AUTO: 0 K/UL
NRBC BLD-RTO: 0 /100 WBC
PHOSPHATE SERPL-MCNC: 1.3 MG/DL (ref 2.5–4.5)
PLATELET # BLD AUTO: 201 K/UL (ref 164–446)
PMV BLD AUTO: 9.3 FL (ref 9–12.9)
POTASSIUM SERPL-SCNC: 3 MMOL/L (ref 3.6–5.5)
PROT SERPL-MCNC: 6.2 G/DL (ref 6–8.2)
RBC # BLD AUTO: 5.27 M/UL (ref 4.7–6.1)
SODIUM SERPL-SCNC: 132 MMOL/L (ref 135–145)
WBC # BLD AUTO: 6.7 K/UL (ref 4.8–10.8)

## 2018-07-02 PROCEDURE — 99233 SBSQ HOSP IP/OBS HIGH 50: CPT | Performed by: HOSPITALIST

## 2018-07-02 PROCEDURE — 82962 GLUCOSE BLOOD TEST: CPT | Mod: 91

## 2018-07-02 PROCEDURE — 700105 HCHG RX REV CODE 258: Performed by: INTERNAL MEDICINE

## 2018-07-02 PROCEDURE — 80053 COMPREHEN METABOLIC PANEL: CPT

## 2018-07-02 PROCEDURE — 99232 SBSQ HOSP IP/OBS MODERATE 35: CPT | Performed by: INTERNAL MEDICINE

## 2018-07-02 PROCEDURE — A9270 NON-COVERED ITEM OR SERVICE: HCPCS | Performed by: HOSPITALIST

## 2018-07-02 PROCEDURE — 85025 COMPLETE CBC W/AUTO DIFF WBC: CPT

## 2018-07-02 PROCEDURE — 71045 X-RAY EXAM CHEST 1 VIEW: CPT

## 2018-07-02 PROCEDURE — 700102 HCHG RX REV CODE 250 W/ 637 OVERRIDE(OP): Performed by: HOSPITALIST

## 2018-07-02 PROCEDURE — 770006 HCHG ROOM/CARE - MED/SURG/GYN SEMI*

## 2018-07-02 PROCEDURE — 83735 ASSAY OF MAGNESIUM: CPT

## 2018-07-02 PROCEDURE — 700102 HCHG RX REV CODE 250 W/ 637 OVERRIDE(OP): Performed by: INTERNAL MEDICINE

## 2018-07-02 PROCEDURE — 700111 HCHG RX REV CODE 636 W/ 250 OVERRIDE (IP): Performed by: INTERNAL MEDICINE

## 2018-07-02 PROCEDURE — 84100 ASSAY OF PHOSPHORUS: CPT

## 2018-07-02 PROCEDURE — A9270 NON-COVERED ITEM OR SERVICE: HCPCS | Performed by: INTERNAL MEDICINE

## 2018-07-02 PROCEDURE — 700111 HCHG RX REV CODE 636 W/ 250 OVERRIDE (IP): Performed by: HOSPITALIST

## 2018-07-02 RX ORDER — LEVOFLOXACIN 500 MG/1
500 TABLET, FILM COATED ORAL EVERY 24 HOURS
Status: DISCONTINUED | OUTPATIENT
Start: 2018-07-03 | End: 2018-07-03 | Stop reason: HOSPADM

## 2018-07-02 RX ORDER — FUROSEMIDE 10 MG/ML
10 INJECTION INTRAMUSCULAR; INTRAVENOUS
Status: DISCONTINUED | OUTPATIENT
Start: 2018-07-02 | End: 2018-07-03 | Stop reason: HOSPADM

## 2018-07-02 RX ORDER — LEVOFLOXACIN 250 MG/1
750 TABLET, FILM COATED ORAL EVERY 24 HOURS
Status: DISCONTINUED | OUTPATIENT
Start: 2018-07-03 | End: 2018-07-02

## 2018-07-02 RX ORDER — POTASSIUM CHLORIDE 20 MEQ/1
40 TABLET, EXTENDED RELEASE ORAL 3 TIMES DAILY
Status: DISCONTINUED | OUTPATIENT
Start: 2018-07-02 | End: 2018-07-03 | Stop reason: HOSPADM

## 2018-07-02 RX ADMIN — LISINOPRIL 40 MG: 20 TABLET ORAL at 08:51

## 2018-07-02 RX ADMIN — INSULIN HUMAN 3 UNITS: 100 INJECTION, SOLUTION PARENTERAL at 05:30

## 2018-07-02 RX ADMIN — ATORVASTATIN CALCIUM 40 MG: 40 TABLET, FILM COATED ORAL at 21:23

## 2018-07-02 RX ADMIN — POTASSIUM CHLORIDE 40 MEQ: 1500 TABLET, EXTENDED RELEASE ORAL at 15:36

## 2018-07-02 RX ADMIN — FUROSEMIDE 10 MG: 10 INJECTION, SOLUTION INTRAMUSCULAR; INTRAVENOUS at 09:03

## 2018-07-02 RX ADMIN — SODIUM CHLORIDE 3 G: 900 INJECTION INTRAVENOUS at 05:27

## 2018-07-02 RX ADMIN — DIBASIC SODIUM PHOSPHATE, MONOBASIC POTASSIUM PHOSPHATE AND MONOBASIC SODIUM PHOSPHATE 1 TABLET: 852; 155; 130 TABLET ORAL at 15:36

## 2018-07-02 RX ADMIN — INSULIN HUMAN 4 UNITS: 100 INJECTION, SOLUTION PARENTERAL at 23:35

## 2018-07-02 RX ADMIN — LEVOFLOXACIN 750 MG: 5 INJECTION, SOLUTION INTRAVENOUS at 08:53

## 2018-07-02 RX ADMIN — DIBASIC SODIUM PHOSPHATE, MONOBASIC POTASSIUM PHOSPHATE AND MONOBASIC SODIUM PHOSPHATE 1 TABLET: 852; 155; 130 TABLET ORAL at 21:23

## 2018-07-02 RX ADMIN — DIBASIC SODIUM PHOSPHATE, MONOBASIC POTASSIUM PHOSPHATE AND MONOBASIC SODIUM PHOSPHATE 1 TABLET: 852; 155; 130 TABLET ORAL at 08:51

## 2018-07-02 RX ADMIN — SODIUM CHLORIDE 3 G: 900 INJECTION INTRAVENOUS at 12:14

## 2018-07-02 RX ADMIN — FUROSEMIDE 10 MG: 10 INJECTION, SOLUTION INTRAMUSCULAR; INTRAVENOUS at 15:36

## 2018-07-02 RX ADMIN — POTASSIUM CHLORIDE 40 MEQ: 1500 TABLET, EXTENDED RELEASE ORAL at 08:52

## 2018-07-02 RX ADMIN — INSULIN HUMAN 7 UNITS: 100 INJECTION, SOLUTION PARENTERAL at 12:23

## 2018-07-02 RX ADMIN — AMLODIPINE BESYLATE 10 MG: 5 TABLET ORAL at 08:52

## 2018-07-02 RX ADMIN — INSULIN HUMAN 3 UNITS: 100 INJECTION, SOLUTION PARENTERAL at 18:05

## 2018-07-02 RX ADMIN — POTASSIUM CHLORIDE 40 MEQ: 1500 TABLET, EXTENDED RELEASE ORAL at 21:23

## 2018-07-02 ASSESSMENT — ENCOUNTER SYMPTOMS
WEAKNESS: 1
VOMITING: 0
HEMOPTYSIS: 0
WHEEZING: 0
CHILLS: 0
NAUSEA: 0
FEVER: 0
SPUTUM PRODUCTION: 0
SHORTNESS OF BREATH: 0
COUGH: 0

## 2018-07-02 ASSESSMENT — PAIN SCALES - GENERAL
PAINLEVEL_OUTOF10: 0

## 2018-07-02 ASSESSMENT — PATIENT HEALTH QUESTIONNAIRE - PHQ9
SUM OF ALL RESPONSES TO PHQ9 QUESTIONS 1 AND 2: 0
1. LITTLE INTEREST OR PLEASURE IN DOING THINGS: NOT AT ALL
1. LITTLE INTEREST OR PLEASURE IN DOING THINGS: NOT AT ALL
2. FEELING DOWN, DEPRESSED, IRRITABLE, OR HOPELESS: NOT AT ALL
SUM OF ALL RESPONSES TO PHQ9 QUESTIONS 1 AND 2: 0
2. FEELING DOWN, DEPRESSED, IRRITABLE, OR HOPELESS: NOT AT ALL

## 2018-07-02 NOTE — PROGRESS NOTES
[facility] Hospitalist Progress Note    Date of Service: 2018    Chief Complaint  72 y.o. male admitted 2018 with dka and sepsis 2/2 pneumonia    Interval Problem Update  K replaced  Forced diuresis started as still on o2  No fevers now      Consultants/Specialty  ID    Disposition  hospital    Echo:  Echocardiography Laboratory  Normal left ventricular systolic function.  Left ventricular ejection fraction is visually estimated to be 70%.  Mild concentric left ventricular hypertrophy.  No significant valve abnormalities.   Right heart pressures are normal.  No prior study is available for comparison.      Ct abd:  1.  Left lung base pulmonary infiltrates.  2.  Small bilateral pleural effusions, right greater than left.  3.  Enlarged prostate, workup and evaluation for causes of prostate enlargement recommended as clinically appropriate.  4.  Moderate quantity of stool in the colon favors changes of constipation.  5.  Small collection of abdominal ascites  6.  Atherosclerosis    Review of Systems   Unable to perform ROS: Language      Physical Exam  Laboratory/Imaging   Hemodynamics  Temp (24hrs), Av.1 °C (98.8 °F), Min:36.4 °C (97.6 °F), Max:38.5 °C (101.3 °F)   Temperature: 36.6 °C (97.9 °F)  Pulse  Av  Min: 77  Max: 107    Blood Pressure : 128/71      Respiratory      Respiration: 19, Pulse Oximetry: 95 %        RUL Breath Sounds: Diminished, RML Breath Sounds: Diminished, RLL Breath Sounds: Diminished, HONEY Breath Sounds: Diminished, LLL Breath Sounds: Diminished    Fluids    Intake/Output Summary (Last 24 hours) at 18 0843  Last data filed at 18 0513   Gross per 24 hour   Intake              900 ml   Output              850 ml   Net               50 ml       Nutrition  Orders Placed This Encounter   Procedures   • Diet Order Consistent Carbohydrate     Standing Status:   Standing     Number of Occurrences:   1     Order Specific Question:   Diet:     Answer:   Consistent Carbohydrate  [4]     Physical Exam   Constitutional: He is oriented to person, place, and time. He appears well-developed and well-nourished. No distress.   Patient seen and examined  Plan discussed with NAI NGUYỄN:   Right Ear: External ear normal.   Left Ear: External ear normal.   Nose: Nose normal.   Eyes: Right eye exhibits no discharge. Left eye exhibits no discharge. No scleral icterus.   Neck: No JVD present. No tracheal deviation present.   Cardiovascular: Normal rate, normal heart sounds and intact distal pulses.    No murmur heard.       Pulmonary/Chest: Effort normal. He has no wheezes. He has rales.   LLL rhales now aparent   Abdominal: Soft. Bowel sounds are normal. He exhibits no distension. There is no rebound.   Musculoskeletal: He exhibits no edema or deformity.   Neurological: He is alert and oriented to person, place, and time. No cranial nerve deficit.   Skin: Skin is warm and dry. He is not diaphoretic. No erythema.   Psychiatric: He has a normal mood and affect. His behavior is normal.   Nursing note and vitals reviewed.      Recent Labs      06/30/18   0451  07/01/18   0530  07/02/18   0503   WBC  11.3*  8.2  6.7   RBC  6.08  5.80  5.27   HEMOGLOBIN  15.5  14.8  13.4*   HEMATOCRIT  46.3  43.5  39.4*   MCV  76.2*  75.0*  74.8*   MCH  25.5*  25.5*  25.4*   MCHC  33.5*  34.0  34.0   RDW  34.8*  34.5*  34.9*   PLATELETCT  211  189  201   MPV  9.0  9.2  9.3     Recent Labs      07/01/18   0530  07/01/18   1257  07/02/18   0503   SODIUM  131*  127*  132*   POTASSIUM  2.6*  3.2*  3.0*   CHLORIDE  98  99  100   CO2  22  18*  25   GLUCOSE  175*  319*  177*   BUN  7*  7*  7*   CREATININE  0.86  0.88  0.78   CALCIUM  7.1*  7.1*  7.2*                      Assessment/Plan     Acute hypoxemic respiratory failure (HCC)   Assessment & Plan    2/2 pneumonia.   Forced diuresis now that sepsis improving  Titrate off o2 as able.           Pneumonia   Assessment & Plan    LLL  Iv abx per ID  pulm toilet        Hypokalemia    Assessment & Plan    Trend and replace          Hyponatremia   Assessment & Plan    Hypovolemic  Iv fluid and trend  improving        Severe sepsis (HCC)   Assessment & Plan    This is sepsis with associated respiratory failure.   Fevers resolving  LLL pneumonia noted on ct - initial source was not apparent  Likely not seen initially 2/2 dehydration  ID following  continue IV abx              DKA (diabetic ketoacidoses) ([facility])   Assessment & Plan    Resolved.   2/2 sepsis and poor baseline control          Hyperlipidemia- (present on admission)   Assessment & Plan    statin        Diabetes mellitus type 2, uncontrolled ([facility])- (present on admission)   Assessment & Plan    Needs to be on insulin on discharge. family refusing- apparently PCP has been imploring him to start insulin  Encouraged them to accept insulin  Poor control with hyperglycemia  Holding metformin/amaryl for sepsis          Hypertension- (present on admission)   Assessment & Plan    Cont meds as tolerated          Quality-Core Measures   Reviewed items::  EKG reviewed, Labs reviewed, Medications reviewed and Radiology images reviewed  Unger catheter::  No Unger  DVT prophylaxis pharmacological::  Heparin  DVT prophylaxis - mechanical:  SCDs  Ulcer Prophylaxis::  Yes  Antibiotics:  Treating active infection/contamination beyond 24 hours perioperative coverage  Assessed for rehabilitation services:  Patient was assess for and/or received rehabilitation services during this hospitalization

## 2018-07-02 NOTE — CARE PLAN
Problem: Communication  Goal: The ability to communicate needs accurately and effectively will improve  Outcome: PROGRESSING AS EXPECTED  Communicated with family and patient about plan of care. All questions answered.     Problem: Safety  Goal: Will remain free from injury  Outcome: PROGRESSING AS EXPECTED  Bed alarm is on, bed in lowest position, and locked. Hourly rounding.

## 2018-07-02 NOTE — CARE PLAN
Problem: Safety  Goal: Will remain free from injury    Intervention: Provide assistance with mobility   07/02/18 1308   OTHER   Assistance / Tolerance Assistance of One;Tires quickly;General Weakness

## 2018-07-02 NOTE — DISCHARGE PLANNING
Per IDT rounds, pt will be weaned off O2 and be given potassium.  Pt was transitioned to oral abx.

## 2018-07-02 NOTE — PROGRESS NOTES
Received report from Sheila TRIMBLE. Discussed plan of care and safety measures in place. No further needs at this time.

## 2018-07-02 NOTE — PROGRESS NOTES
Pt is A&Ox4, resting comfortably in bed. Assessment completed and denies any pain at this time. Due medication have been given. Pt is up for meal, bed is in lowest postion, bed alarm is on, and side rails up x2, pt calls when needs assistance and call light within reach.

## 2018-07-02 NOTE — PROGRESS NOTES
Infectious Disease Progress Note    Author: Violeta Lala M.D. Date & Time of service: 2018  1:38 PM    Chief Complaint:  Fever and PNA    Interval History:  72 y.o. man with a history of diabetes, hyperlipidemia and hypertension admitted 2018 for hyperglycemia   Tmax 103.8, WBC 11.3 no resp distress   AF (100.1), WBC 8.2 No dyspnea   Temp 101.3 yesterday, current temp 97.6 feels better-has not ambulated much  Labs Reviewed, Medications Reviewed and Radiology Reviewed.    Review of Systems:  Review of Systems   Constitutional: Negative for chills and fever.   Respiratory: Negative for cough, hemoptysis, sputum production, shortness of breath and wheezing.    Gastrointestinal: Negative for nausea and vomiting.   Neurological: Positive for weakness.   All other systems reviewed and are negative.  limited by language    Hemodynamics:  Temp (24hrs), Av.1 °C (98.8 °F), Min:36.4 °C (97.6 °F), Max:38.5 °C (101.3 °F)  Temperature: 36.6 °C (97.8 °F)  Pulse  Av  Min: 77  Max: 107   Blood Pressure : 128/72       Physical Exam:  Physical Exam   Constitutional: He appears well-developed and well-nourished. No distress.   HENT:   Head: Normocephalic and atraumatic.   Poor dentition   Eyes: EOM are normal. Pupils are equal, round, and reactive to light.   Neck: Neck supple.   Cardiovascular: Normal rate.    Pulmonary/Chest: Effort normal. No respiratory distress.   Abdominal: Bowel sounds are normal. He exhibits no distension.   Musculoskeletal: He exhibits no edema.   Neurological: He is alert.   Skin: No erythema.   Nursing note and vitals reviewed.      Meds:    Current Facility-Administered Medications:   •  potassium chloride SA  •  furosemide  •  [START ON 7/3/2018] levoFLOXacin  •  phosphorus  •  prochlorperazine  •  atorvastatin  •  lisinopril  •  amLODIPine  •  hydrALAZINE  •  insulin regular **AND** [CANCELED] Accu-Chek Q6 if NPO **AND** NOTIFY MD and PharmD **AND** glucose 4 g **AND**  dextrose 50%  •  Respiratory Care per Protocol  •  acetaminophen  •  ondansetron  •  ondansetron    Labs:  Recent Labs      06/30/18   0451  07/01/18   0530  07/02/18   0503   WBC  11.3*  8.2  6.7   RBC  6.08  5.80  5.27   HEMOGLOBIN  15.5  14.8  13.4*   HEMATOCRIT  46.3  43.5  39.4*   MCV  76.2*  75.0*  74.8*   MCH  25.5*  25.5*  25.4*   RDW  34.8*  34.5*  34.9*   PLATELETCT  211  189  201   MPV  9.0  9.2  9.3   NEUTSPOLYS  79.90*  83.10*  70.90   LYMPHOCYTES  8.30*  7.70*  12.80*   MONOCYTES  11.20  8.30  15.50*   EOSINOPHILS  0.00  0.00  0.00   BASOPHILS  0.10  0.00  0.00     Recent Labs      07/01/18   0530  07/01/18   1257  07/02/18   0503   SODIUM  131*  127*  132*   POTASSIUM  2.6*  3.2*  3.0*   CHLORIDE  98  99  100   CO2  22  18*  25   GLUCOSE  175*  319*  177*   BUN  7*  7*  7*     Recent Labs      06/30/18   0450  07/01/18   0530  07/01/18   1257  07/02/18   0503   ALBUMIN  3.0*  3.0*   --   2.6*   TBILIRUBIN  1.3  1.4   --   0.9   ALKPHOSPHAT  74  80   --   98   TOTPROTEIN  6.7  6.4   --   6.2   ALTSGPT  43  54*   --   70*   ASTSGOT  78*  104*   --   145*   CREATININE  0.87  0.86  0.88  0.78       Imaging:  Ct-abdomen-pelvis With    Result Date: 6/30/2018 6/29/2018 8:47 PM HISTORY/REASON FOR EXAM:  Recurrent fevers TECHNIQUE/EXAM DESCRIPTION:  CT abdomen and pelvis with contrast. Sequential axial CT images were obtained from lung bases to the proximal femurs after the uneventful administration of 100 cc Omnipaque 350 intravenous contrast. Low dose optimization technique was utilized for this CT exam including automated exposure control and adjustment of the mA and/or kV according to patient size. COMPARISON:  None CT ABDOMEN FINDINGS: Small bilateral pleural effusions are seen, right greater than left. Area of consolidation in the left lung base is seen. The liver is normal in contour. The liver is normal in size. No intrahepatic biliary ductal dilatation is noted. The gallbladder appears within normal  limits. The spleen is unremarkable. The pancreas is grossly normal. Bilateral adrenal glands appear within normal limits. The kidneys are unremarkable.  The ureters are normal in caliber along their visualized course. Moderate stool is seen within the colon, otherwise the colon appears unremarkable. The appendix is not definitively identified. The small bowel is unremarkable. The bony structures are age appropriate. Atherosclerotic calcifications are seen. Small collection of abdominal ascites is seen. CT PELVIS FINDINGS: The bladder is within normal limits. The prostate appears enlarged.     1.  Left lung base pulmonary infiltrates. 2.  Small bilateral pleural effusions, right greater than left. 3.  Enlarged prostate, workup and evaluation for causes of prostate enlargement recommended as clinically appropriate. 4.  Moderate quantity of stool in the colon favors changes of constipation. 5.  Small collection of abdominal ascites 6.  Atherosclerosis These findings were discussed with the patient's clinician, Dr. Lackey, on 6/30/2018 2:01 AM.    Dx-chest-portable (1 View)    Result Date: 6/27/2018 6/27/2018 11:03 PM HISTORY/REASON FOR EXAM: Sepsis TECHNIQUE/EXAM DESCRIPTION:  Single AP view of the chest. COMPARISON: May 20, 2017 FINDINGS: The cardiac silhouette appears within normal limits. The mediastinal contour appears within normal limits.  The central pulmonary vasculature appears normal. Bilateral lung volumes are diminished.  Hazy linear density in the bilateral lung bases is observed. No significant pleural effusions are identified. The bony structures appear age-appropriate.     1.  Hazy bibasilar lung base density suggests atelectasis. 2.  No focal infiltrates identified      Micro:  Results     Procedure Component Value Units Date/Time    RESPIRATORY VIRUS PANEL BY PCR [[id number]] Collected:  06/29/18 1438    Order Status:  Completed Specimen:  Nasal from Nasopharyngeal Updated:  07/01/18 0243      "Influenza virus A RNA Not Detected     Influenza virus A H1 RNA Not Detected     Influenza virus A H3 RNA Not Detected     Influenza A 2009, H1N1, PCR Not Detected     Influenza virus B, PCR Not Detected     Resp Syncytial Virus A, PCR Not Detected     Resp Syncytial Virus B, PCR Not Detected     Parainfluenza virus 1, PCR Not Detected     Parainfluenza virus 2, PCR Not Detected     Parainfluenza virus 3, PCR Not Detected     Human Metapneumovirus, PCR Not Detected     Human Rhinovirus, PCR Not Detected     Adenovirus B/E, PCR Not Detected     Adenovirus C, PCR Not Detected     Comment: Performed by [facility],  [address] [place] [id number] [phone]  [email or web address], Malik Silverman MD - Lab. Director         Narrative:       Collected By:[id number] AVERY LEE    BLOOD CULTURE [[id number]] Collected:  06/30/18 0742    Order Status:  Completed Specimen:  Blood from Peripheral Updated:  07/01/18 0617     Significant Indicator NEG     Source BLD     Site Peripheral     Blood Culture No Growth    Note: Blood cultures are incubated for 5 days and  are monitored continuously.Positive blood cultures  are called to the RN and reported as soon as  they are identified.    Blood culture testing and Gram stain, if indicated, are  performed at [facility], [address] [place] [address].  Positive blood cultures are  sent to [facility], [address] [place] [address], for organism identification and  susceptibility testing.      Narrative:       Per Hospital Policy: Only change Specimen Src: to \"Line\" if  specified by physician order.    BLOOD CULTURE [[id number]] Collected:  06/30/18 0743    Order Status:  Completed Specimen:  Blood from Peripheral Updated:  07/01/18 0617     Significant Indicator NEG     Source BLD     Site Peripheral     Blood Culture No Growth    Note: Blood cultures are incubated for 5 days and  are monitored continuously.Positive blood " "cultures  are called to the RN and reported as soon as  they are identified.    Blood culture testing and Gram stain, if indicated, are  performed at [facility], [address] [place] [address].  Positive blood cultures are  sent to [facility], [address] [place] [address], for organism identification and  susceptibility testing.      Narrative:       Per Hospital Policy: Only change Specimen Src: to \"Line\" if  specified by physician order.    BLOOD CULTURE [[id number]] Collected:  06/29/18 1645    Order Status:  Completed Specimen:  Blood from Peripheral Updated:  06/30/18 0831     Significant Indicator NEG     Source BLD     Site Peripheral     Blood Culture No Growth    Note: Blood cultures are incubated for 5 days and  are monitored continuously.Positive blood cultures  are called to the RN and reported as soon as  they are identified.     Blood culture testing and Gram stain, if indicated, are  performed at [facility], [address] [place] [address].  Positive blood cultures are  sent to [facility], [address] [place] [address], for organism identification and  susceptibility testing.      Narrative:       Per Hospital Policy: Only change Specimen Src: to \"Line\" if  specified by physician order.    BLOOD CULTURE [[id number]] Collected:  06/29/18 1645    Order Status:  Completed Specimen:  Blood from Peripheral Updated:  06/30/18 0831     Significant Indicator NEG     Source BLD     Site Peripheral     Blood Culture No Growth    Note: Blood cultures are incubated for 5 days and  are monitored continuously.Positive blood cultures  are called to the RN and reported as soon as  they are identified.     Blood culture testing and Gram stain, if indicated, are  performed at [facility], [facility]  [place], [place].  Positive blood cultures are  sent to [facility]" "Laboratory, [address] [place] [address], for organism identification and  susceptibility testing.      Narrative:       Per Hospital Policy: Only change Specimen Src: to \"Line\" if  specified by physician order.    BLOOD CULTURE [[id number]] Collected:  06/27/18 2312    Order Status:  Completed Specimen:  Blood from Peripheral Updated:  06/30/18 0831     Significant Indicator NEG     Source BLD     Site Peripheral     Blood Culture No Growth    Note: Blood cultures are incubated for 5 days and  are monitored continuously.Positive blood cultures  are called to the RN and reported as soon as  they are identified.     Blood culture testing and Gram stain, if indicated, are  performed at [facility], [facility]  [employer] [place], [place].  Positive blood cultures are  sent to [facility], [address] [place] [address], for organism identification and  susceptibility testing.      Narrative:       2 of 2 blood culture x2  Sites order. Per Hospital Policy:  Only change Specimen Src: to \"Line\" if specified by physician  order.    Culture Respiratory W/ GRM STN [[id number]]     Order Status:  Completed Specimen:  Respirate from Sputum     Culture Respiratory W/ GRM STN [[id number]]     Order Status:  Completed Specimen:  Respirate from Sputum     Urine Culture [[id number]]     Order Status:  No result Specimen:  Urine     BLOOD CULTURE [[id number]] Collected:  06/27/18 2308    Order Status:  Completed Specimen:  Blood from Peripheral Updated:  06/28/18 0615     Significant Indicator NEG     Source BLD     Site Peripheral     Blood Culture No Growth    Note: Blood cultures are incubated for 5 days and  are monitored continuously.Positive blood cultures  are called to the RN and reported as soon as  they are identified.    Blood culture testing and Gram stain, if indicated, are  performed at [facility], [facility]  [employer] [facility].[place], [place].  Positive blood " "cultures are  sent to [facility], [address] [place] [address], for organism identification and  susceptibility testing.      Narrative:       1 of 2 for Blood Culture x 2 sites order. Per Hospital  Policy: Only change Specimen Src: to \"Line\" if specified by  physician order.    Blood Culture [[id number]] Collected:  06/28/18 0000    Order Status:  Canceled Specimen:  Other from Peripheral     Blood Culture [[id number]] Collected:  06/28/18 0000    Order Status:  Canceled Specimen:  Other from Peripheral     Urinalysis, culture if indicated [[id number]]  (Abnormal) Collected:  06/27/18 2205    Order Status:  Completed Specimen:  Urine Updated:  06/27/18 2214     Micro Urine Req see below     Comment: Microscopic examination not performed when specimen is clear  and chemically negative for protein, blood, leukocyte esterase  and nitrite.          Color Yellow     Character Clear     Ph 5.5     Glucose 500 (A) mg/dL      Ketones >=80 (A) mg/dL      Protein Negative mg/dL      Bilirubin Negative     Nitrite Negative     Leukocyte Esterase Negative     Occult Blood Negative          Assessment:  Active Hospital Problems    Diagnosis   • Pneumonia [J18.9]   • Hypokalemia [E87.6]   • DKA (diabetic ketoacidoses) ([facility]) [E13.10]   • Sepsis ([facility]) [A41.9]   • Hyponatremia [E87.1]   • Hyperlipidemia [E78.5]   • Hypertension [I10]   • Diabetes mellitus type 2, uncontrolled ([facility]) [E11.65]       Plan:  Pneumonia  Less febrile overall-last fever yesterday  Resolved leukocytosis  Blood cxs neg X6  Resp viral panel and Legionella pending  HIV neg  LLL PNA by CT  DC Unasyn  Change to PO levo  Encourage oral intake and OOB    Leukocytosis, resolved   Multifactorial  Monitor    Diabetes mellitus  Keep BS under 150 to help control current infection      Discussed with internal medicine Dr Post/family  "

## 2018-07-02 NOTE — CARE PLAN
Problem: Communication  Goal: The ability to communicate needs accurately and effectively will improve    Intervention: [place] patient and significant other/support system to call light to alert staff of needs   07/02/18 1308   OTHER   Oriented to: All of the Following : Location of Bathroom, Visiting Policy, Unit Routine, Call Light and Bedside Controls, Bedside Rail Policy, Smoking Policy, Rights and Responsibilities, Bedside Report, and Patient Education Notebook

## 2018-07-03 VITALS
TEMPERATURE: 97.2 F | HEIGHT: 69 IN | SYSTOLIC BLOOD PRESSURE: 137 MMHG | OXYGEN SATURATION: 92 % | HEART RATE: 80 BPM | DIASTOLIC BLOOD PRESSURE: 90 MMHG | WEIGHT: 160.27 LBS | RESPIRATION RATE: 18 BRPM | BODY MASS INDEX: 23.74 KG/M2

## 2018-07-03 PROBLEM — E87.6 HYPOKALEMIA: Status: RESOLVED | Noted: 2018-06-29 | Resolved: 2018-07-03

## 2018-07-03 PROBLEM — E87.1 HYPONATREMIA: Status: RESOLVED | Noted: 2018-06-28 | Resolved: 2018-07-03

## 2018-07-03 PROBLEM — E11.10 DKA (DIABETIC KETOACIDOSES): Status: RESOLVED | Noted: 2018-06-28 | Resolved: 2018-07-03

## 2018-07-03 PROBLEM — R65.20 SEVERE SEPSIS (HCC): Status: RESOLVED | Noted: 2018-06-28 | Resolved: 2018-07-03

## 2018-07-03 PROBLEM — A41.9 SEVERE SEPSIS (HCC): Status: RESOLVED | Noted: 2018-06-28 | Resolved: 2018-07-03

## 2018-07-03 PROBLEM — J96.01 ACUTE HYPOXEMIC RESPIRATORY FAILURE (HCC): Status: RESOLVED | Noted: 2018-07-02 | Resolved: 2018-07-03

## 2018-07-03 LAB
ANION GAP SERPL CALC-SCNC: 9 MMOL/L (ref 0–11.9)
BACTERIA BLD CULT: NORMAL
BACTERIA BLD CULT: NORMAL
BUN SERPL-MCNC: 7 MG/DL (ref 8–22)
CALCIUM SERPL-MCNC: 7.5 MG/DL (ref 8.4–10.2)
CHLORIDE SERPL-SCNC: 96 MMOL/L (ref 96–112)
CO2 SERPL-SCNC: 23 MMOL/L (ref 20–33)
CREAT SERPL-MCNC: 0.77 MG/DL (ref 0.5–1.4)
GLUCOSE BLD-MCNC: 219 MG/DL (ref 65–99)
GLUCOSE BLD-MCNC: 240 MG/DL (ref 65–99)
GLUCOSE BLD-MCNC: 241 MG/DL (ref 65–99)
GLUCOSE SERPL-MCNC: 228 MG/DL (ref 65–99)
POTASSIUM SERPL-SCNC: 3.2 MMOL/L (ref 3.6–5.5)
SIGNIFICANT IND 70042: NORMAL
SIGNIFICANT IND 70042: NORMAL
SITE SITE: NORMAL
SITE SITE: NORMAL
SODIUM SERPL-SCNC: 128 MMOL/L (ref 135–145)
SOURCE SOURCE: NORMAL
SOURCE SOURCE: NORMAL

## 2018-07-03 PROCEDURE — 82962 GLUCOSE BLOOD TEST: CPT

## 2018-07-03 PROCEDURE — 99232 SBSQ HOSP IP/OBS MODERATE 35: CPT | Performed by: INTERNAL MEDICINE

## 2018-07-03 PROCEDURE — 700102 HCHG RX REV CODE 250 W/ 637 OVERRIDE(OP): Performed by: INTERNAL MEDICINE

## 2018-07-03 PROCEDURE — G8978 MOBILITY CURRENT STATUS: HCPCS | Mod: CJ

## 2018-07-03 PROCEDURE — G8988 SELF CARE GOAL STATUS: HCPCS | Mod: CJ

## 2018-07-03 PROCEDURE — 97161 PT EVAL LOW COMPLEX 20 MIN: CPT

## 2018-07-03 PROCEDURE — A9270 NON-COVERED ITEM OR SERVICE: HCPCS | Performed by: INTERNAL MEDICINE

## 2018-07-03 PROCEDURE — 97165 OT EVAL LOW COMPLEX 30 MIN: CPT

## 2018-07-03 PROCEDURE — G8987 SELF CARE CURRENT STATUS: HCPCS | Mod: CJ

## 2018-07-03 PROCEDURE — 700111 HCHG RX REV CODE 636 W/ 250 OVERRIDE (IP): Performed by: HOSPITALIST

## 2018-07-03 PROCEDURE — 700102 HCHG RX REV CODE 250 W/ 637 OVERRIDE(OP): Performed by: HOSPITALIST

## 2018-07-03 PROCEDURE — A9270 NON-COVERED ITEM OR SERVICE: HCPCS | Performed by: HOSPITALIST

## 2018-07-03 PROCEDURE — G8980 MOBILITY D/C STATUS: HCPCS | Mod: CJ

## 2018-07-03 PROCEDURE — 80048 BASIC METABOLIC PNL TOTAL CA: CPT

## 2018-07-03 PROCEDURE — G8989 SELF CARE D/C STATUS: HCPCS | Mod: CJ

## 2018-07-03 PROCEDURE — 99239 HOSP IP/OBS DSCHRG MGMT >30: CPT | Performed by: HOSPITALIST

## 2018-07-03 PROCEDURE — G8979 MOBILITY GOAL STATUS: HCPCS | Mod: CJ

## 2018-07-03 RX ORDER — POTASSIUM CHLORIDE 20 MEQ/1
40 TABLET, EXTENDED RELEASE ORAL ONCE
Status: COMPLETED | OUTPATIENT
Start: 2018-07-03 | End: 2018-07-03

## 2018-07-03 RX ORDER — LEVOFLOXACIN 500 MG/1
500 TABLET, FILM COATED ORAL EVERY 24 HOURS
Qty: 1 TAB | Refills: 0 | Status: SHIPPED | OUTPATIENT
Start: 2018-07-04 | End: 2018-07-03

## 2018-07-03 RX ORDER — LEVOFLOXACIN 500 MG/1
500 TABLET, FILM COATED ORAL EVERY 24 HOURS
Qty: 4 TAB | Refills: 0 | Status: SHIPPED | OUTPATIENT
Start: 2018-07-04 | End: 2018-07-08

## 2018-07-03 RX ORDER — INSULIN GLARGINE 100 [IU]/ML
10 INJECTION, SOLUTION SUBCUTANEOUS EVERY EVENING
Status: DISCONTINUED | OUTPATIENT
Start: 2018-07-03 | End: 2018-07-03 | Stop reason: HOSPADM

## 2018-07-03 RX ORDER — PEN NEEDLE, DIABETIC 30 GX3/16"
4 NEEDLE, DISPOSABLE MISCELLANEOUS
Qty: 120 EACH | Refills: 1 | Status: SHIPPED | OUTPATIENT
Start: 2018-07-03 | End: 2018-11-26 | Stop reason: SDUPTHER

## 2018-07-03 RX ADMIN — INSULIN HUMAN 4 UNITS: 100 INJECTION, SOLUTION PARENTERAL at 05:27

## 2018-07-03 RX ADMIN — LEVOFLOXACIN 500 MG: 500 TABLET, FILM COATED ORAL at 09:28

## 2018-07-03 RX ADMIN — DIBASIC SODIUM PHOSPHATE, MONOBASIC POTASSIUM PHOSPHATE AND MONOBASIC SODIUM PHOSPHATE 1 TABLET: 852; 155; 130 TABLET ORAL at 09:28

## 2018-07-03 RX ADMIN — FUROSEMIDE 10 MG: 10 INJECTION, SOLUTION INTRAMUSCULAR; INTRAVENOUS at 05:24

## 2018-07-03 RX ADMIN — AMLODIPINE BESYLATE 10 MG: 5 TABLET ORAL at 09:28

## 2018-07-03 RX ADMIN — INSULIN HUMAN 4 UNITS: 100 INJECTION, SOLUTION PARENTERAL at 11:01

## 2018-07-03 RX ADMIN — LISINOPRIL 40 MG: 20 TABLET ORAL at 09:29

## 2018-07-03 RX ADMIN — POTASSIUM CHLORIDE 40 MEQ: 1500 TABLET, EXTENDED RELEASE ORAL at 09:28

## 2018-07-03 RX ADMIN — POTASSIUM CHLORIDE 40 MEQ: 1500 TABLET, EXTENDED RELEASE ORAL at 09:32

## 2018-07-03 ASSESSMENT — ENCOUNTER SYMPTOMS
SHORTNESS OF BREATH: 0
NAUSEA: 0
SPUTUM PRODUCTION: 0
VOMITING: 0
WHEEZING: 0
COUGH: 0
FEVER: 0
CHILLS: 0
HEMOPTYSIS: 0

## 2018-07-03 ASSESSMENT — PATIENT HEALTH QUESTIONNAIRE - PHQ9
1. LITTLE INTEREST OR PLEASURE IN DOING THINGS: NOT AT ALL
2. FEELING DOWN, DEPRESSED, IRRITABLE, OR HOPELESS: NOT AT ALL
SUM OF ALL RESPONSES TO PHQ9 QUESTIONS 1 AND 2: 0

## 2018-07-03 ASSESSMENT — ACTIVITIES OF DAILY LIVING (ADL): TOILETING: INDEPENDENT

## 2018-07-03 ASSESSMENT — COGNITIVE AND FUNCTIONAL STATUS - GENERAL
TOILETING: A LITTLE
DRESSING REGULAR LOWER BODY CLOTHING: A LITTLE
DAILY ACTIVITIY SCORE: 21
SUGGESTED CMS G CODE MODIFIER DAILY ACTIVITY: CJ
HELP NEEDED FOR BATHING: A LITTLE

## 2018-07-03 ASSESSMENT — GAIT ASSESSMENTS
GAIT LEVEL OF ASSIST: CONTACT GUARD ASSIST
ASSISTIVE DEVICE: FRONT WHEEL WALKER
DEVIATION: SHUFFLED GAIT
DISTANCE (FEET): 200

## 2018-07-03 ASSESSMENT — PAIN SCALES - GENERAL: PAINLEVEL_OUTOF10: 0

## 2018-07-03 NOTE — THERAPY
"Occupational Therapy Evaluation completed.   Functional Status:  Pt is a 71 y/o male, admit with increasing weakness and pneumonia. Pt lives with his wife, and currently his son, both of whom can assist with care after d/c. Pt presents with a mild decrease in standing balance- due to c/o dizziness, is at the SBA level with functional mobility with the use of a walker, requires Supervision to Modified I with ADLS. Pt will be seen for initial evaluation and treatment only, as he is functional in this setting.  Plan of Care: Patient with no further skilled OT needs in the acute care setting at this time  Discharge Recommendations:  Equipment: No Equipment Needed. Post-acute therapy Currently anticipate no further skilled therapy needs once patient is discharged from the inpatient setting.    See \"Rehab Therapy-Acute\" Patient Summary Report for complete documentation.    "

## 2018-07-03 NOTE — DISCHARGE PLANNING
Order placed for FWW.  Pt chose [facility].     SW provided pt with the FWW.

## 2018-07-03 NOTE — PROGRESS NOTES
Patient received stable, vitals wnl alert times 4, room air, bed alarm, non english speaking, not on tele, iv access patent, full code, nka, compliant with all meds, will continue to monitor.

## 2018-07-03 NOTE — DISCHARGE SUMMARY
Discharge Summary    CHIEF COMPLAINT ON ADMISSION  Chief Complaint   Patient presents with   • Sent from Urgent Care   • Hyperglycemia       Reason for Admission  sent from urgent care; keto-acidosis.    Admission Date  6/27/2018    CODE STATUS  Full Code    HPI & HOSPITAL COURSE  This is a 72 y.o. Male with history of type 2 diabetes, uncontrolled with an A1c over 13 initially presented to the hospital with feeling of fevers and productive cough.  In the ER his initial diagnosis was diabetic ketoacidosis in conjunction with a pneumonia.  As a result he was started on the DKA protocol in the ICU, he was also started on IV antibiotics.  Infectious disease was consulted as well.  Appropriate blood and sputum cultures were done.  He underwent a CT of his chest which revealed a left lower lobe pneumonia.  And as a result of IV fluids he developed mild hypoxia for which his hypoxia was resolved after IV diuresis.  Hence at this point patient is clinically stable, apparently patient's primary care physician was wanting to start him on insulin but the family has been refusing.  I have really counseled the patient and family of the very super importance of him going on insulin as medications orally alone will not provide a significant A1c improvement.  Family now open to it, diabetic education given, a home care kit also provided.  I have also the provided patient with insulin pens which are intuitive and easier than syringes.  At this point patient denies having any fevers or chills, shortness of breath chest pain or nausea vomiting  I have instructed them to make an appointment with her primary care doc in 1 week       Therefore, he is discharged in good and stable condition to home with close outpatient follow-up.    The patient met 2-midnight criteria for an inpatient stay at the time of discharge.    Discharge Date  7/3/2018    FOLLOW UP ITEMS POST DISCHARGE  PCP in 1 week    DISCHARGE DIAGNOSES  Active Problems:     Hypertension POA: Yes    Diabetes mellitus type 2, uncontrolled (HCC) POA: Yes    Hyperlipidemia POA: Yes    Pneumonia POA: Unknown  Resolved Problems:    DKA (diabetic ketoacidoses) (HCC) POA: Unknown    Severe sepsis (HCC) POA: Unknown    Hyponatremia POA: Unknown    Hypokalemia POA: Unknown    Acute hypoxemic respiratory failure (HCC) POA: Unknown      FOLLOW UP  No future appointments.  Sayda An M.D.  [id number] [id number]  [facility] [address]  [phone]    In 1 day        MEDICATIONS ON DISCHARGE     Medication List      START taking these medications      Instructions   glucose blood strip   Test strips order: Test strips for Free style meter. Use TID and in times of high or low blood sugars.     glucose monitoring kit monitoring kit   1 Each by Does not apply route Once for 1 dose.  Dose:  1 Each     insulin glargine 100 UNIT/ML Sopn injection  Commonly known as:  LANTUS   Inject 10 Units as instructed every evening.  Dose:  10 Units     insulin lispro (Human) 100 UNIT/ML Sopn injection  Commonly known as:  HUMALOG   Doctor's comments:  Patient given a sliding scale paper  Inject 2-10 Units as instructed 3 times a day before meals for 30 days.  Dose:  2-10 Units     levoFLOXacin 500 MG tablet  Start taking on:  7/4/2018  Commonly known as:  LEVAQUIN   Take 1 Tab by mouth every 24 hours for 4 days.  Dose:  500 mg     Pen Needles 32G X 4 MM Misc   4 PENS by Does not apply route 4 Times a Day,Before Meals and at Bedtime.  Dose:  4 PEN        CONTINUE taking these medications      Instructions   amLODIPine 10 MG Tabs  Commonly known as:  NORVASC   Take 1 Tab by mouth every day.  Dose:  10 mg     atorvastatin 40 MG Tabs  Commonly known as:  LIPITOR   TAKE ONE TABLET BY MOUTH ONCE DAILY AT BEDTIME     hydroCHLOROthiazide 12.5 MG tablet  Commonly known as:  HYDRODIURIL   TAKE ONE TABLET BY MOUTH ONCE DAILY     lisinopril 40 MG tablet  Commonly known as:  PRINIVIL, ZESTRIL   TAKE ONE TABLET BY MOUTH  ONCE DAILY     metformin 1000 MG tablet  Commonly known as:  GLUCOPHAGE   TAKE ONE TABLET BY MOUTH TWICE DAILY WITH  MEALS     ondansetron 4 MG Tbdp  Commonly known as:  ZOFRAN ODT   Take 1 Tab by mouth every 8 hours as needed.  Dose:  4 mg        STOP taking these medications    glimepiride 2 MG Tabs  Commonly known as:  AMARYL            Allergies  No Known Allergies    DIET  Orders Placed This Encounter   Procedures   • Diet Order Consistent Carbohydrate     Standing Status:   Standing     Number of Occurrences:   1     Order Specific Question:   Diet:     Answer:   Consistent Carbohydrate [4]       ACTIVITY  As tolerated.  Weight bearing as tolerated    CONSULTATIONS  None    PROCEDURES  None    LABORATORY  Lab Results   Component Value Date    SODIUM 128 (L) 07/03/2018    POTASSIUM 3.2 (L) 07/03/2018    CHLORIDE 96 07/03/2018    CO2 23 07/03/2018    GLUCOSE 228 (H) 07/03/2018    BUN 7 (L) 07/03/2018    CREATININE 0.77 07/03/2018    CREATININE 1.07 05/05/2009        Lab Results   Component Value Date    WBC 6.7 07/02/2018    HEMOGLOBIN 13.4 (L) 07/02/2018    HEMATOCRIT 39.4 (L) 07/02/2018    PLATELETCT 201 07/02/2018        Total time of the discharge process exceeds 40 minutes.

## 2018-07-03 NOTE — CARE PLAN
Problem: Communication  Goal: The ability to communicate needs accurately and effectively will improve    Intervention: Develop alternate methods of communication with patient and significant other/support system  Pt does not speak english, advised family to inform the pt to point on the parts of the body that is painful or is bothering him so we can provide a better care when relative is not around.      Problem: Safety  Goal: Will remain free from injury  Outcome: PROGRESSING AS EXPECTED  Assess pt's gait and mobility, ensure safety measure kept in place as pt appears to be weak on his legs, check if non skid socks are on, upper bed rails are up, room is free from clutter/spill and call light must within reach

## 2018-07-03 NOTE — FACE TO FACE
Face to Face Note  -  Durable Medical Equipment    Stacy Reza M.D. - NPI: [id number]  I certify that this patient is under my care and that they had a durable medical equipment(DME)face to face encounter by myself that meets the physician DME face-to-face encounter requirements with this patient on:    Date of encounter:   Patient:                    MRN:                       YOB: 2016  Howard Gamboa  [id number]  1945     The encounter with the patient was in whole, or in part, for the following medical condition, which is the primary reason for durable medical equipment:  Other - weakness    I certify that, based on my findings, the following durable medical equipment is medically necessary:  Walkers.    HOME O2 Saturation Measurements:(Values must be present for Home Oxygen orders)         ,     ,         My Clinical findings support the need for the above equipment due to:  Abnormal Gait    Supporting Symptoms: abnormal gait

## 2018-07-03 NOTE — DISCHARGE INSTRUCTIONS
Hyperglycemia  Hyperglycemia is when the sugar (glucose) level in your blood is too high. It may not cause symptoms. If you do have symptoms, they may include warning signs, such as:  · Feeling more thirsty than normal.  · Hunger.  · Feeling tired.  · Needing to pee (urinate) more than normal.  · Blurry eyesight (vision).  You may get other symptoms as it gets worse, such as:  · Dry mouth.  · Not being hungry (loss of appetite).  · Fruity-smelling breath.  · Weakness.  · Weight gain or loss that is not planned. Weight loss may be fast.  · A tingling or numb feeling in your hands or feet.  · Headache.  · Skin that does not bounce back quickly when it is lightly pinched and released (poor skin turgor).  · Pain in your belly (abdomen).  · Cuts or bruises that heal slowly.  High blood sugar can happen to people who do or do not have diabetes. High blood sugar can happen slowly or quickly, and it can be an emergency.  Follow these instructions at home:  General instructions  · Take over-the-counter and prescription medicines only as told by your doctor.  · Do not use products that contain nicotine or tobacco, such as cigarettes and e-cigarettes. If you need help quitting, ask your doctor.  · Limit alcohol intake to no more than 1 drink per day for nonpregnant women and 2 drinks per day for men. One drink equals 12 oz of beer, 5 oz of wine, or 1½ oz of hard liquor.  · Manage stress. If you need help with this, ask your doctor.  · Keep all follow-up visits as told by your doctor. This is important.  Eating and drinking  · Stay at a healthy weight.  · Exercise regularly, as told by your doctor.  · Drink enough fluid, especially when you:  ¨ Exercise.  ¨ Get sick.  ¨ Are in hot temperatures.  · Eat healthy foods, such as:  ¨ Low-fat (lean) proteins.  ¨ Complex carbs (complex carbohydrates), such as whole wheat bread or brown rice.  ¨ Fresh fruits and vegetables.  ¨ Low-fat dairy products.  ¨ Healthy fats.  · Drink enough  fluid to keep your pee (urine) clear or pale yellow.  If you have diabetes:  · Make sure you know the symptoms of hyperglycemia.  · Follow your diabetes management plan, as told by your doctor. Make sure you:  ¨ Take insulin and medicines as told.  ¨ Follow your exercise plan.  ¨ Follow your meal plan. Eat on time. Do not skip meals.  ¨ Check your blood sugar as often as told. Make sure to check before and after exercise. If you exercise longer or in a different way than you normally do, check your blood sugar more often.  ¨ Follow your sick day plan whenever you cannot eat or drink normally. Make this plan ahead of time with your doctor.  · Share your diabetes management plan with people in your workplace, school, and household.  · Check your urine for ketones when you are ill and as told by your doctor.  · Carry a card or wear jewelry that says that you have diabetes.  Contact a doctor if:  · Your blood sugar level is higher than 240 mg/dL (13.3 mmol/L) for 2 days in a row.  · You have problems keeping your blood sugar in your target range.  · High blood sugar happens often for you.  Get help right away if:  · You have trouble breathing.  · You have a change in how you think, feel, or act (mental status).  · You feel sick to your stomach (nauseous), and that feeling does not go away.  · You cannot stop throwing up (vomiting).  These symptoms may be an emergency. Do not wait to see if the symptoms will go away. Get medical help right away. Call your local emergency services (911 in the [place] Do not drive yourself to the hospital.   Summary  · Hyperglycemia is when the sugar (glucose) level in your blood is too high.  · High blood sugar can happen to people who do or do not have diabetes.  · Make sure you drink enough fluids, eat healthy foods, and exercise regularly.  · Contact your doctor if you have problems keeping your blood sugar in your target range.  This information is not intended to replace advice given  to you by your health care provider. Make sure you discuss any questions you have with your health care provider.  Document Released: 10/15/2010 Document Revised: 09/04/2017 Document Reviewed: 09/04/2017  [employer] Patient Education © 2017 [employer]    Discharge Instructions    Discharged to home by car with relative. Discharged via wheelchair, hospital escort: Yes.  Special equipment needed: Not Applicable    Be sure to schedule a follow-up appointment with your primary care doctor or any specialists as instructed.     Discharge Plan:   Diet Plan: Discussed  Activity Level: Discussed  Confirmed Follow up Appointment: Appointment Scheduled  Confirmed Symptoms Management: Discussed  Medication Reconciliation Updated: Yes  Pneumococcal Vaccine Administered/Refused: Not given - Patient refused pneumococcal vaccine  Influenza Vaccine Indication: Not indicated: Previously immunized this influenza season and > 8 years of age    I understand that a diet low in cholesterol, fat, and sodium is recommended for good health. Unless I have been given specific instructions below for another diet, I accept this instruction as my diet prescription.   Other diet: diabetic    Special Instructions: None    · Is patient discharged on Warfarin / Coumadin?   No     Depression / Suicide Risk    As you are discharged from this [facility] facility, it is important to learn how to keep safe from harming yourself.    Recognize the warning signs:  · Abrupt changes in personality, positive or negative- including increase in energy   · Giving away possessions  · Change in eating patterns- significant weight changes-  positive or negative  · Change in sleeping patterns- unable to sleep or sleeping all the time   · Unwillingness or inability to communicate  · Depression  · Unusual sadness, discouragement and loneliness  · Talk of wanting to die  · Neglect of personal appearance   · Rebelliousness- reckless behavior  · Withdrawal  from people/activities they love  · Confusion- inability to concentrate     If you or a loved one observes any of these behaviors or has concerns about self-harm, here's what you can do:  · Talk about it- your feelings and reasons for harming yourself  · Remove any means that you might use to hurt yourself (examples: pills, rope, extension cords, firearm)  · Get professional help from the community (Mental Health, Substance Abuse, psychological counseling)  · Do not be alone:Call your Safe Contact- someone whom you trust who will be there for you.  · Call your local CRISIS HOTLINE 953-3775 or 603-393-7476  · Call your local Children's Mobile Crisis Response Team [place] (634) 951-7739 or www.[employer]  · Call the toll free National Suicide Prevention Hotlines   · National Suicide Prevention Lifeline 171-704-LAWV (5004)  · National Hope Line Network 800-SUICIDE (928-1103)

## 2018-07-03 NOTE — THERAPY
"Physical Therapy Evaluation completed.   Bed Mobility:  Supine to Sit: Modified Independent  Transfers: Sit to Stand: Stand by Assist  Gait: Level Of Assist: Contact Guard Assist with Front-Wheel Walker; [place] without AD- mild LOB      Plan of Care: Patient with no further skilled PT needs in the acute care setting at this time  Discharge Recommendations: Equipment: Front-Wheel Walker. Post-acute therapy Discharge to home with outpatient or home health for additional skilled therapy services.    Pt is a 71 yo male presenting with mild balance deficits and deconditioning. Pts gait is much improved when ambulating with FWW. Recommend pt uses FWW 24/7 at home. Pt has supportive son at home who can assist as needed, pt plans to DC home today.  No further acute PT needs, DC PT.    See \"Rehab Therapy-Acute\" Patient Summary Report for complete documentation.     "

## 2018-07-03 NOTE — PROGRESS NOTES
Infectious Disease Progress Note    Author: Violeta Lala M.D. Date & Time of service: 7/3/2018  11:24 AM    Chief Complaint:  Fever and PNA    Interval History:  72 y.o. man with a history of diabetes, hyperlipidemia and hypertension admitted 2018 for hyperglycemia   Tmax 103.8, WBC 11.3 no resp distress   AF (100.1), WBC 8.2 No dyspnea   Temp 101.3 yesterday, current temp 97.6 feels better-has not ambulated much  7/3 AF WBC 6.7 feels better-no dyspnea  Labs Reviewed, Medications Reviewed and Radiology Reviewed.    Review of Systems:  Review of Systems   Constitutional: Negative for chills and fever.   Respiratory: Negative for cough, hemoptysis, sputum production, shortness of breath and wheezing.    Gastrointestinal: Negative for nausea and vomiting.   All other systems reviewed and are negative.  limited by language    Hemodynamics:  Temp (24hrs), Av.5 °C (97.7 °F), Min:36.2 °C (97.2 °F), Max:36.7 °C (98.1 °F)  Temperature: 36.2 °C (97.2 °F)  Pulse  Av.9  Min: 77  Max: 107   Blood Pressure : 137/90       Physical Exam:  Physical Exam   Constitutional: He appears well-developed and well-nourished. No distress.   HENT:   Head: Normocephalic and atraumatic.   Mouth/Throat: No oropharyngeal exudate.   Poor dentition   Eyes: Conjunctivae are normal. No scleral icterus.   Neck: Neck supple.   Cardiovascular: Normal rate and normal heart sounds.    No murmur heard.  Pulmonary/Chest: Effort normal and breath sounds normal. He has no wheezes. He has no rales.   Abdominal: Soft. Bowel sounds are normal. He exhibits no distension. There is no tenderness.   Musculoskeletal: He exhibits no edema.   Neurological: He is alert.   Skin: No rash noted. No erythema.   Nursing note and vitals reviewed.      Meds:    Current Facility-Administered Medications:   •  potassium chloride SA  •  furosemide  •  levoFLOXacin  •  phosphorus  •  prochlorperazine  •  atorvastatin  •  lisinopril  •  amLODIPine  •   hydrALAZINE  •  insulin regular **AND** [CANCELED] Accu-Chek Q6 if NPO **AND** NOTIFY MD and PharmD **AND** glucose 4 g **AND** dextrose 50%  •  Respiratory Care per Protocol  •  acetaminophen  •  ondansetron  •  ondansetron    Labs:  Recent Labs      07/01/18   0530  07/02/18   0503   WBC  8.2  6.7   RBC  5.80  5.27   HEMOGLOBIN  14.8  13.4*   HEMATOCRIT  43.5  39.4*   MCV  75.0*  74.8*   MCH  25.5*  25.4*   RDW  34.5*  34.9*   PLATELETCT  189  201   MPV  9.2  9.3   NEUTSPOLYS  83.10*  70.90   LYMPHOCYTES  7.70*  12.80*   MONOCYTES  8.30  15.50*   EOSINOPHILS  0.00  0.00   BASOPHILS  0.00  0.00     Recent Labs      07/01/18   1257  07/02/18   0503  07/03/18   0430   SODIUM  127*  132*  128*   POTASSIUM  3.2*  3.0*  3.2*   CHLORIDE  99  100  96   CO2  18*  25  23   GLUCOSE  319*  177*  228*   BUN  7*  7*  7*     Recent Labs      07/01/18   0530  07/01/18   1257  07/02/18   0503  07/03/18   0430   ALBUMIN  3.0*   --   2.6*   --    TBILIRUBIN  1.4   --   0.9   --    ALKPHOSPHAT  80   --   98   --    TOTPROTEIN  6.4   --   6.2   --    ALTSGPT  54*   --   70*   --    ASTSGOT  104*   --   145*   --    CREATININE  0.86  0.88  0.78  0.77       Imaging:  Ct-abdomen-pelvis With    Result Date: 6/30/2018 6/29/2018 8:47 PM HISTORY/REASON FOR EXAM:  Recurrent fevers TECHNIQUE/EXAM DESCRIPTION:  CT abdomen and pelvis with contrast. Sequential axial CT images were obtained from lung bases to the proximal femurs after the uneventful administration of 100 cc Omnipaque 350 intravenous contrast. Low dose optimization technique was utilized for this CT exam including automated exposure control and adjustment of the mA and/or kV according to patient size. COMPARISON:  None CT ABDOMEN FINDINGS: Small bilateral pleural effusions are seen, right greater than left. Area of consolidation in the left lung base is seen. The liver is normal in contour. The liver is normal in size. No intrahepatic biliary ductal dilatation is noted. The  gallbladder appears within normal limits. The spleen is unremarkable. The pancreas is grossly normal. Bilateral adrenal glands appear within normal limits. The kidneys are unremarkable.  The ureters are normal in caliber along their visualized course. Moderate stool is seen within the colon, otherwise the colon appears unremarkable. The appendix is not definitively identified. The small bowel is unremarkable. The bony structures are age appropriate. Atherosclerotic calcifications are seen. Small collection of abdominal ascites is seen. CT PELVIS FINDINGS: The bladder is within normal limits. The prostate appears enlarged.     1.  Left lung base pulmonary infiltrates. 2.  Small bilateral pleural effusions, right greater than left. 3.  Enlarged prostate, workup and evaluation for causes of prostate enlargement recommended as clinically appropriate. 4.  Moderate quantity of stool in the colon favors changes of constipation. 5.  Small collection of abdominal ascites 6.  Atherosclerosis These findings were discussed with the patient's clinician, Dr. Lackey, on 6/30/2018 2:01 AM.    Dx-chest-portable (1 View)    Result Date: 6/27/2018 6/27/2018 11:03 PM HISTORY/REASON FOR EXAM: Sepsis TECHNIQUE/EXAM DESCRIPTION:  Single AP view of the chest. COMPARISON: May 20, 2017 FINDINGS: The cardiac silhouette appears within normal limits. The mediastinal contour appears within normal limits.  The central pulmonary vasculature appears normal. Bilateral lung volumes are diminished.  Hazy linear density in the bilateral lung bases is observed. No significant pleural effusions are identified. The bony structures appear age-appropriate.     1.  Hazy bibasilar lung base density suggests atelectasis. 2.  No focal infiltrates identified      Micro:  Results     Procedure Component Value Units Date/Time    BLOOD CULTURE [[id number]] Collected:  06/27/18 2312    Order Status:  Completed Specimen:  Blood from Peripheral Updated:  07/03/18  "0217     Significant Indicator NEG     Source BLD     Site Peripheral     Blood Culture No growth after 5 days of incubation.  Blood culture testing and Gram stain, if indicated, are  performed at [facility], [facility]  [facility].[employer], [place].  Positive blood cultures are  sent to [facility], [address] [place] [address], for organism identification and  susceptibility testing.      Narrative:       2 of 2 blood culture x2  Sites order. Per Hospital Policy:  Only change Specimen Src: to \"Line\" if specified by physician  order.    BLOOD CULTURE [[id number]] Collected:  06/27/18 2308    Order Status:  Completed Specimen:  Blood from Peripheral Updated:  07/03/18 0017     Significant Indicator NEG     Source BLD     Site Peripheral     Blood Culture No growth after 5 days of incubation.  Blood culture testing and Gram stain, if indicated, are  performed at [facility] [place] [facility], [facility]  [facility].[employer], [place].  Positive blood cultures are  sent to [facility], [address] [place] [address], for organism identification and  susceptibility testing.      Narrative:       1 of 2 for Blood Culture x 2 sites order. Per Hospital  Policy: Only change Specimen Src: to \"Line\" if specified by  physician order.    RESPIRATORY VIRUS PANEL BY PCR [[id number]] Collected:  06/29/18 1438    Order Status:  Completed Specimen:  Nasal from Nasopharyngeal Updated:  07/01/18 1825     Influenza virus A RNA Not Detected     Influenza virus A H1 RNA Not Detected     Influenza virus A H3 RNA Not Detected     Influenza A 2009, H1N1, PCR Not Detected     Influenza virus B, PCR Not Detected     Resp Syncytial Virus A, PCR Not Detected     Resp Syncytial Virus B, PCR Not Detected     Parainfluenza virus 1, PCR Not Detected     Parainfluenza virus 2, PCR Not Detected     Parainfluenza virus 3, PCR Not Detected     Human Metapneumovirus, PCR Not Detected     " "Human Rhinovirus, PCR Not Detected     Adenovirus B/E, PCR Not Detected     Adenovirus C, PCR Not Detected     Comment: Performed by [facility],  [address] [place] [place] [address] [phone]  [email or web address], Malik Silverman MD - Lab. Director         Narrative:       Collected By:[id number] AVERY LEE    BLOOD CULTURE [[id number]] Collected:  06/30/18 0742    Order Status:  Completed Specimen:  Blood from Peripheral Updated:  07/01/18 0617     Significant Indicator NEG     Source BLD     Site Peripheral     Blood Culture No Growth    Note: Blood cultures are incubated for 5 days and  are monitored continuously.Positive blood cultures  are called to the RN and reported as soon as  they are identified.    Blood culture testing and Gram stain, if indicated, are  performed at [facility], [address] [place] [address].  Positive blood cultures are  sent to [facility], [address] [place] [address], for organism identification and  susceptibility testing.      Narrative:       Per Hospital Policy: Only change Specimen Src: to \"Line\" if  specified by physician order.    BLOOD CULTURE [[id number]] Collected:  06/30/18 0743    Order Status:  Completed Specimen:  Blood from Peripheral Updated:  07/01/18 0617     Significant Indicator NEG     Source BLD     Site Peripheral     Blood Culture No Growth    Note: Blood cultures are incubated for 5 days and  are monitored continuously.Positive blood cultures  are called to the RN and reported as soon as  they are identified.    Blood culture testing and Gram stain, if indicated, are  performed at [facility], [facility]  [facility].[place], [place].  Positive blood cultures are  sent to [facility], [address] [place] [address], for organism identification and  susceptibility testing.      Narrative:       Per Hospital Policy: Only change Specimen Src: to \"Line\" if  specified by " "physician order.    BLOOD CULTURE [[id number]] Collected:  06/29/18 1645    Order Status:  Completed Specimen:  Blood from Peripheral Updated:  06/30/18 0831     Significant Indicator NEG     Source BLD     Site Peripheral     Blood Culture No Growth    Note: Blood cultures are incubated for 5 days and  are monitored continuously.Positive blood cultures  are called to the RN and reported as soon as  they are identified.     Blood culture testing and Gram stain, if indicated, are  performed at [facility], [facility]  [address] [place], [place].  Positive blood cultures are  sent to [facility], [address] [place] [address], for organism identification and  susceptibility testing.      Narrative:       Per Hospital Policy: Only change Specimen Src: to \"Line\" if  specified by physician order.    BLOOD CULTURE [[id number]] Collected:  06/29/18 1645    Order Status:  Completed Specimen:  Blood from Peripheral Updated:  06/30/18 0831     Significant Indicator NEG     Source BLD     Site Peripheral     Blood Culture No Growth    Note: Blood cultures are incubated for 5 days and  are monitored continuously.Positive blood cultures  are called to the RN and reported as soon as  they are identified.     Blood culture testing and Gram stain, if indicated, are  performed at [facility], [facility]  [employer] [place], [place].  Positive blood cultures are  sent to [facility], [address] [place] [address], for organism identification and  susceptibility testing.      Narrative:       Per Hospital Policy: Only change Specimen Src: to \"Line\" if  specified by physician order.    Culture Respiratory W/ GRM STN [[id number]]     Order Status:  Completed Specimen:  Respirate from Sputum     Culture Respiratory W/ GRM STN [[id number]]     Order Status:  Completed Specimen:  Respirate from Sputum     Urine Culture [[id number]]     Order Status:  No result " Specimen:  Urine     Blood Culture [[id number]] Collected:  06/28/18 0000    Order Status:  Canceled Specimen:  Other from Peripheral     Blood Culture [[id number]] Collected:  06/28/18 0000    Order Status:  Canceled Specimen:  Other from Peripheral     Urinalysis, culture if indicated [[id number]]  (Abnormal) Collected:  06/27/18 2205    Order Status:  Completed Specimen:  Urine Updated:  06/27/18 2214     Micro Urine Req see below     Comment: Microscopic examination not performed when specimen is clear  and chemically negative for protein, blood, leukocyte esterase  and nitrite.          Color Yellow     Character Clear     Ph 5.5     Glucose 500 (A) mg/dL      Ketones >=80 (A) mg/dL      Protein Negative mg/dL      Bilirubin Negative     Nitrite Negative     Leukocyte Esterase Negative     Occult Blood Negative          Assessment:  Active Hospital Problems    Diagnosis   • Pneumonia [J18.9]   • Hypokalemia [E87.6]   • DKA (diabetic ketoacidoses) ([facility]) [E13.10]   • Sepsis ([facility]) [A41.9]   • Hyponatremia [E87.1]   • Hyperlipidemia [E78.5]   • Hypertension [I10]   • Diabetes mellitus type 2, uncontrolled ([facility]) [E11.65]       Plan:  Pneumonia  Afebrile over 24 hours  Resolved leukocytosis  Blood cxs neg X6  Resp viral panel neg  Legionella pending  HIV neg  LLL PNA by CT  Continue PO levo  Stop date 7/8/2018    Leukocytosis, resolved   Multifactorial  Monitor    Diabetes mellitus  Keep BS under 150 to help control current infection      Discussed with internal medicine Dr Post/family

## 2018-07-03 NOTE — PROGRESS NOTES
1838 Received report from NAI Rossi, POC discussed, assumed pt care.    1900 pt family on bedside, patient appears to be calm, does not speak english thus pt's family act as a , when asked for pain level pt deny any but occassionally feels lightheadedness with movement, O2 saturation is at 91% on room air, discussed POC and medications to be given. Ensure safety measure kept in place.    2123 due meds given, safety measure kept in place.

## 2018-07-04 LAB
BACTERIA BLD CULT: NORMAL
BACTERIA BLD CULT: NORMAL
SIGNIFICANT IND 70042: NORMAL
SIGNIFICANT IND 70042: NORMAL
SITE SITE: NORMAL
SITE SITE: NORMAL
SOURCE SOURCE: NORMAL
SOURCE SOURCE: NORMAL

## 2018-07-05 ENCOUNTER — HOSPITAL ENCOUNTER (OUTPATIENT)
Dept: LAB | Facility: MEDICAL CENTER | Age: 73
End: 2018-07-05
Attending: FAMILY MEDICINE
Payer: COMMERCIAL

## 2018-07-05 ENCOUNTER — OFFICE VISIT (OUTPATIENT)
Dept: MEDICAL GROUP | Facility: MEDICAL CENTER | Age: 73
End: 2018-07-05
Payer: COMMERCIAL

## 2018-07-05 VITALS
WEIGHT: 147.71 LBS | HEIGHT: 69 IN | TEMPERATURE: 97.2 F | RESPIRATION RATE: 16 BRPM | OXYGEN SATURATION: 97 % | SYSTOLIC BLOOD PRESSURE: 122 MMHG | DIASTOLIC BLOOD PRESSURE: 62 MMHG | HEART RATE: 80 BPM | BODY MASS INDEX: 21.88 KG/M2

## 2018-07-05 DIAGNOSIS — E87.8 DISORDER OF ELECTROLYTES: ICD-10-CM

## 2018-07-05 DIAGNOSIS — Z09 HOSPITAL DISCHARGE FOLLOW-UP: ICD-10-CM

## 2018-07-05 DIAGNOSIS — E78.2 MIXED HYPERLIPIDEMIA: ICD-10-CM

## 2018-07-05 DIAGNOSIS — I10 ESSENTIAL HYPERTENSION: ICD-10-CM

## 2018-07-05 LAB
ANION GAP SERPL CALC-SCNC: 11 MMOL/L (ref 0–11.9)
BACTERIA BLD CULT: NORMAL
BACTERIA BLD CULT: NORMAL
BUN SERPL-MCNC: 16 MG/DL (ref 8–22)
CALCIUM SERPL-MCNC: 8.7 MG/DL (ref 8.5–10.5)
CHLORIDE SERPL-SCNC: 95 MMOL/L (ref 96–112)
CO2 SERPL-SCNC: 24 MMOL/L (ref 20–33)
CREAT SERPL-MCNC: 0.99 MG/DL (ref 0.5–1.4)
GLUCOSE SERPL-MCNC: 417 MG/DL (ref 65–99)
POTASSIUM SERPL-SCNC: 3.9 MMOL/L (ref 3.6–5.5)
SIGNIFICANT IND 70042: NORMAL
SIGNIFICANT IND 70042: NORMAL
SITE SITE: NORMAL
SITE SITE: NORMAL
SODIUM SERPL-SCNC: 130 MMOL/L (ref 135–145)
SOURCE SOURCE: NORMAL
SOURCE SOURCE: NORMAL

## 2018-07-05 PROCEDURE — 80048 BASIC METABOLIC PNL TOTAL CA: CPT

## 2018-07-05 PROCEDURE — 99214 OFFICE O/P EST MOD 30 MIN: CPT | Performed by: FAMILY MEDICINE

## 2018-07-05 PROCEDURE — 36415 COLL VENOUS BLD VENIPUNCTURE: CPT

## 2018-07-05 NOTE — PROGRESS NOTES
CC: New patient ( DM, HTN, HLD), posthospitalization follow up.    HPI:  Lawrence presents today to establish a new PCP, was Dr Cuellar's patient.    Patient was recently admitted to [facility] ( 6/27- 7/3/2018) was treated for for DKA, and PNA. His pneumonia resolved, denies cough, and SOB, has normal oxygen saturation, still on oral levofloxacin, 3 doses left). . Came in to establish a new PCP, and for post hospitalization follow up. Patient hospital discharge summary was reviewed., and the following medical issue were discussed:    Essential hypertension  BP has been adequately controlled on current medication. Denies headache, chest pain, and SOB.Has been on lisinopril 40 mg daily, and HCTZ 12.5 mg daily ( has been having low Na, and K level), has been felling a little bit lightheaded.    Mixed hyperlipidemia  He has been tolerating the statin. Denies muscle pain LFTs has been normal, BP has been on lipitor 20 mg daily.    Uncontrolled type 2 diabetes mellitus with complication, without long-term current use of insulin ([facility])  Patient last's  A1C was 13.8, in the past he was refusing taking insulin, so he was recently admitted for DKA. Was put recently on insulin . BG in the clinc today is 311.he has been Metformin 1000 mg bid, Lantus 10 unit daily, Humalog based on sliding scale ( has been on 4-8 units).    Disorder of electrolytes  Na, and K was low on discharge ( 128, and 3.2 respectively).He has been feeling tired, weak,and lightheaded, no vomiting, has been having some hearing issues.    Will discuss HM next visit.    Patient Active Problem List    Diagnosis Date Noted   • Pneumonia 06/30/2018   • Uncontrolled type 2 diabetes mellitus with complication, without long-term current use of insulin ([facility]) 02/07/2017   • Microcytosis 05/26/2016   • Adenomatous polyp of colon 10/02/2015   • Hyperlipidemia 11/20/2014   • Retinal vein occlusion, branch 11/20/2014   • Macular edema 11/20/2014   • Hypertension    •  Diabetes mellitus type 2, uncontrolled (HCC)        Current Outpatient Prescriptions   Medication Sig Dispense Refill   • atorvastatin (LIPITOR) 40 MG Tab TAKE ONE TABLET BY MOUTH ONCE DAILY AT BEDTIME 90 Tab 1   • hydroCHLOROthiazide (HYDRODIURIL) 12.5 MG tablet TAKE ONE TABLET BY MOUTH ONCE DAILY 90 Tab 1   • lisinopril (PRINIVIL, ZESTRIL) 40 MG tablet TAKE ONE TABLET BY MOUTH ONCE DAILY 90 Tab 1   • metformin (GLUCOPHAGE) 1000 MG tablet TAKE ONE TABLET BY MOUTH TWICE DAILY WITH  MEALS 90 Tab 1   • insulin glargine (LANTUS) 100 UNIT/ML Solution Pen-injector injection Inject 10 Units as instructed every evening. 2 PEN 1   • insulin lispro, Human, (HUMALOG) 100 UNIT/ML Solution Pen-injector injection Inject 2-10 Units as instructed 3 times a day before meals for 30 days. 2 PEN 1   • levoFLOXacin (LEVAQUIN) 500 MG tablet Take 1 Tab by mouth every 24 hours for 4 days. 4 Tab 0   • glucose blood strip Test strips order: Test strips for Free style meter. Use TID and in times of high or low blood sugars. 100 Strip 0   • Insulin Pen Needle (PEN NEEDLES) 32G X 4 MM Misc 4 PENS by Does not apply route 4 Times a Day,Before Meals and at Bedtime. 120 Each 1   • ondansetron (ZOFRAN ODT) 4 MG TABLET DISPERSIBLE Take 1 Tab by mouth every 8 hours as needed. 10 Tab 0   • amlodipine (NORVASC) 10 MG Tab Take 1 Tab by mouth every day. 90 Tab 1     No current facility-administered medications for this visit.          Allergies as of 07/05/2018   • (No Known Allergies)        Social History     Social History   • Marital status: Single     Spouse name: N/A   • Number of children: N/A   • Years of education: N/A     Occupational History   • Not on file.     Social History Main Topics   • Smoking status: Former Smoker     Quit date: 1/1/2007   • Smokeless tobacco: Never Used   • Alcohol use No   • Drug use: No   • Sexual activity: Not on file     Other Topics Concern   • Not on file     Social History Narrative   • No narrative on file  "      Family History   Problem Relation Age of Onset   • Stroke Mother    • Diabetes Father    • Diabetes Brother        No past surgical history on file.    ROS:  Denies any Headache, Blurred Vision, Confusion Chest pain,  Shortness of breath,  Abdominal pain, Changes of bowel or bladder, Lower ext edema, Fevers, Nights sweats, Weight Changes, Focal weakness or numbness.  All other systems are negative.    /62   Pulse 80   Temp 36.2 °C (97.2 °F)   Resp 16   Ht 1.753 m (5' 9\")   Wt 67 kg (147 lb 11.3 oz)   SpO2 97%   BMI 21.81 kg/m²     Physical Exam:  Gen:         Alert and oriented, No apparent distress.  HEENT:   Perrla, TM clear,  Oralpharynx no erythema or exudates.  Neck:       No Jugular venous distension, Lymphadenopathy, Thyromegaly, Bruits.  Lungs:     Clear to auscultation bilaterally  CV:          Regular rate and rhythm. No murmurs, rubs or gallops.  Abd:         Soft non tender, non distended. Normal active bowel sounds. No                                        Hepatosplenomegaly, No pulsatile masses.  Ext:          No clubbing, cyanosis, edema.      Assessment and Plan.   72 y.o. male     1. Essential hypertension  Has been adequately controlled on current medication. Denies headache, chest pain, and SOB.  Continue on lisinopril 40 mg daily  Will stop HCTZ 12.5 mg( has been having low Na, and K level), patient advised to check his BP 2 times a day for a week and send it to me.    2. Mixed hyperlipidemia  He has been tolerating the statin. Denies muscle pain LFTs has been normal  Continue on lipitor 20 mg daily.    3. Uncontrolled type 2 diabetes mellitus with complication, without long-term current use of insulin (HCC)  Last A1C was 13.8, was admitted recently for DKA. Was put recently on insulin . BG in the [facility] today is 311.  Continue on :  Metformin 1000 mg bid.  Lantus 10 unit daily.  Humalog based on sliding scale ( has been on 4-8 units).  Advised to check his BG 3 times daily and " keep a log. RTC in a month for reevaluation.    4. Disorder of electrolytes  Na, and K was low on discharge ( 128, and 3.2 respectively).  Will repeat BMP today, and act accordingly.    - BASIC METABOLIC PANEL; Future    5. Hospital discharge follow-up  Pneumonia resolved.  however he still on oral antibiotics ( levofloxacin, 3 doises left) .

## 2018-07-10 ENCOUNTER — OFFICE VISIT (OUTPATIENT)
Dept: URGENT CARE | Facility: CLINIC | Age: 73
End: 2018-07-10
Payer: COMMERCIAL

## 2018-07-10 VITALS
RESPIRATION RATE: 16 BRPM | TEMPERATURE: 97.7 F | WEIGHT: 148 LBS | HEART RATE: 79 BPM | HEIGHT: 69 IN | SYSTOLIC BLOOD PRESSURE: 126 MMHG | BODY MASS INDEX: 21.92 KG/M2 | DIASTOLIC BLOOD PRESSURE: 84 MMHG | OXYGEN SATURATION: 95 %

## 2018-07-10 DIAGNOSIS — H91.92 HEARING LOSS OF LEFT EAR, UNSPECIFIED HEARING LOSS TYPE: Primary | ICD-10-CM

## 2018-07-10 DIAGNOSIS — R42 DIZZINESS: ICD-10-CM

## 2018-07-10 PROCEDURE — 99213 OFFICE O/P EST LOW 20 MIN: CPT | Performed by: PHYSICIAN ASSISTANT

## 2018-07-10 NOTE — PROGRESS NOTES
Subjective:      Pt is a 72 y.o. male who presents with Dizziness (light headed x 2 weeks only when standing up, Lt ear problem)            HPI  Pt was just released from the hospital for DKA issues last week and now noted left ear fullness and dizziness and light headed x 2 weeks. Pt has not taken any Rx medications for this condition. Pt states the pain is a 1-2/10, aching in nature and worse during the day while standing. Pt denies CP, SOB, NVD, paresthesias, headaches,  change in vision, hives, or other joint pain. The pt's medication list, problem list, and allergies have been evaluated and reviewed during today's visit.    PMH:  Past Medical History:   Diagnosis Date   • Diabetes (HCC)    • Diabetes mellitus out of control (HCC)    • High cholesterol    • Hypertension        PSH:  Negative per pt.      Fam Hx:    family history includes Diabetes in his brother and father; Stroke in his mother.  Family Status   Relation Status   • Mother    • Father    • Brother Alive       Soc HX:  Social History     Social History   • Marital status: Single     Spouse name: N/A   • Number of children: N/A   • Years of education: N/A     Occupational History   • Not on file.     Social History Main Topics   • Smoking status: Former Smoker     Quit date: 2007   • Smokeless tobacco: Never Used   • Alcohol use No   • Drug use: No   • Sexual activity: Not on file     Other Topics Concern   • Not on file     Social History Narrative   • No narrative on file         Medications:    Current Outpatient Prescriptions:   •  atorvastatin (LIPITOR) 40 MG Tab, TAKE ONE TABLET BY MOUTH ONCE DAILY AT BEDTIME, Disp: 90 Tab, Rfl: 1  •  lisinopril (PRINIVIL, ZESTRIL) 40 MG tablet, TAKE ONE TABLET BY MOUTH ONCE DAILY, Disp: 90 Tab, Rfl: 1  •  metformin (GLUCOPHAGE) 1000 MG tablet, TAKE ONE TABLET BY MOUTH TWICE DAILY WITH  MEALS, Disp: 90 Tab, Rfl: 1  •  amlodipine (NORVASC) 10 MG Tab, Take 1 Tab by mouth every day., Disp: 90  "Tab, Rfl: 1  •  insulin glargine (LANTUS) 100 UNIT/ML Solution Pen-injector injection, Inject 10 Units as instructed every evening., Disp: 2 PEN, Rfl: 1  •  insulin lispro, Human, (HUMALOG) 100 UNIT/ML Solution Pen-injector injection, Inject 2-10 Units as instructed 3 times a day before meals for 30 days., Disp: 2 PEN, Rfl: 1  •  glucose blood strip, Test strips order: Test strips for Free style meter. Use TID and in times of high or low blood sugars., Disp: 100 Strip, Rfl: 0  •  Insulin Pen Needle (PEN NEEDLES) 32G X 4 MM Misc, 4 PENS by Does not apply route 4 Times a Day,Before Meals and at Bedtime., Disp: 120 Each, Rfl: 1  •  ondansetron (ZOFRAN ODT) 4 MG TABLET DISPERSIBLE, Take 1 Tab by mouth every 8 hours as needed., Disp: 10 Tab, Rfl: 0  •  hydroCHLOROthiazide (HYDRODIURIL) 12.5 MG tablet, TAKE ONE TABLET BY MOUTH ONCE DAILY, Disp: 90 Tab, Rfl: 1      Allergies:  Patient has no known allergies.    ROS  Constitutional: Negative for fever, chills and malaise/fatigue.   HENT: Negative for congestion and sore throat.  +left ear fullness  Eyes: Negative for blurred vision, double vision and photophobia.   Respiratory: Negative for cough and shortness of breath.    Cardiovascular: Negative for chest pain and palpitations.   Gastrointestinal: Negative for heartburn, nausea, vomiting, abdominal pain, diarrhea and constipation.   Genitourinary: Negative for dysuria and flank pain.   Musculoskeletal: Negative for joint pain and myalgias.   Skin: Negative for itching and rash.   Neurological: POS for dizziness, NEG tingling and headaches.   Endo/Heme/Allergies: Does not bruise/bleed easily.   Psychiatric/Behavioral: Negative for depression. The patient is not nervous/anxious.           Objective:     /84   Pulse 79   Temp 36.5 °C (97.7 °F)   Resp 16   Ht 1.753 m (5' 9\")   Wt 67.1 kg (148 lb)   SpO2 95%   BMI 21.86 kg/m²      Physical Exam         Physical Exam   Constitutional: Pt is oriented to person, " place, and time. Pt appears well-developed and well-nourished. No distress.   HENT:   Head: Normocephalic and atraumatic.   Right Ear: Hearing, tympanic membrane, external ear and ear canal normal.   Left Ear: Hearing, tympanic membrane, external ear and ear canal normal.   Nose: wnl  Mouth/Throat: Oropharynx is clear and moist. No oropharyngeal exudate.   Eyes: Conjunctivae normal and EOM are normal. Pupils are equal, round, and reactive to light. Right eye exhibits no discharge. Left eye exhibits no discharge.   Neck: Normal range of motion. Neck supple. No tracheal deviation present. No thyromegaly present.   Cardiovascular: Normal rate, regular rhythm, normal heart sounds and intact distal pulses.  Exam reveals no gallop and no friction rub.    No murmur heard.  Pulmonary/Chest: Effort normal and breath sounds normal. No respiratory distress. Pt has no wheezes. Pt has no rales. Pt exhibits no tenderness.   Abdominal: Soft. Bowel sounds are normal. Pt exhibits no distension and no mass. There is no tenderness. There is no rebound and no guarding.   Musculoskeletal: Normal range of motion. Pt exhibits no edema and no tenderness.   Lymphadenopathy:     Pt has no cervical adenopathy.   Neurological: Pt is alert and oriented to person, place, and time. Pt has normal reflexes. Pt displays normal reflexes. No cranial nerve deficit. Pt exhibits normal muscle tone. Coordination normal.   Skin: Skin is warm and dry. No rash noted. No erythema.   Psychiatric: Pt has a normal mood and affect. Pt behavior is normal. Judgment and thought content normal.          Assessment/Plan:     1. Hearing loss of left ear, unspecified hearing loss type      2. Dizziness      I called [facility] and they were willing to see pt at 10:45 am today for full ENT evaluation  Rest, fluids encouraged.  AVS with medical info given.  Pt was in full understanding and agreement with the plan.  Follow-up as needed if symptoms worsen or fail to  improve.

## 2018-07-12 ENCOUNTER — APPOINTMENT (OUTPATIENT)
Dept: RADIOLOGY | Facility: MEDICAL CENTER | Age: 73
End: 2018-07-12
Attending: EMERGENCY MEDICINE
Payer: COMMERCIAL

## 2018-07-12 ENCOUNTER — HOSPITAL ENCOUNTER (EMERGENCY)
Facility: MEDICAL CENTER | Age: 73
End: 2018-07-12
Attending: EMERGENCY MEDICINE
Payer: COMMERCIAL

## 2018-07-12 VITALS
OXYGEN SATURATION: 95 % | RESPIRATION RATE: 16 BRPM | HEART RATE: 72 BPM | BODY MASS INDEX: 23.02 KG/M2 | DIASTOLIC BLOOD PRESSURE: 84 MMHG | HEIGHT: 68 IN | TEMPERATURE: 98.2 F | WEIGHT: 151.9 LBS | SYSTOLIC BLOOD PRESSURE: 128 MMHG

## 2018-07-12 DIAGNOSIS — R42 VERTIGO: ICD-10-CM

## 2018-07-12 LAB
ALBUMIN SERPL BCP-MCNC: 3.7 G/DL (ref 3.2–4.9)
ALBUMIN/GLOB SERPL: 1.1 G/DL
ALP SERPL-CCNC: 118 U/L (ref 30–99)
ALT SERPL-CCNC: 36 U/L (ref 2–50)
ANION GAP SERPL CALC-SCNC: 9 MMOL/L (ref 0–11.9)
AST SERPL-CCNC: 21 U/L (ref 12–45)
BASOPHILS # BLD AUTO: 0.5 % (ref 0–1.8)
BASOPHILS # BLD: 0.03 K/UL (ref 0–0.12)
BILIRUB SERPL-MCNC: 0.8 MG/DL (ref 0.1–1.5)
BUN SERPL-MCNC: 15 MG/DL (ref 8–22)
CALCIUM SERPL-MCNC: 9.6 MG/DL (ref 8.5–10.5)
CHLORIDE SERPL-SCNC: 101 MMOL/L (ref 96–112)
CO2 SERPL-SCNC: 26 MMOL/L (ref 20–33)
CREAT SERPL-MCNC: 0.77 MG/DL (ref 0.5–1.4)
EKG IMPRESSION: NORMAL
EOSINOPHIL # BLD AUTO: 0.02 K/UL (ref 0–0.51)
EOSINOPHIL NFR BLD: 0.3 % (ref 0–6.9)
ERYTHROCYTE [DISTWIDTH] IN BLOOD BY AUTOMATED COUNT: 38.1 FL (ref 35.9–50)
GLOBULIN SER CALC-MCNC: 3.3 G/DL (ref 1.9–3.5)
GLUCOSE SERPL-MCNC: 138 MG/DL (ref 65–99)
HCT VFR BLD AUTO: 42.5 % (ref 42–52)
HGB BLD-MCNC: 13.9 G/DL (ref 14–18)
IMM GRANULOCYTES # BLD AUTO: 0.04 K/UL (ref 0–0.11)
IMM GRANULOCYTES NFR BLD AUTO: 0.6 % (ref 0–0.9)
LYMPHOCYTES # BLD AUTO: 2.32 K/UL (ref 1–4.8)
LYMPHOCYTES NFR BLD: 36.1 % (ref 22–41)
MCH RBC QN AUTO: 25.6 PG (ref 27–33)
MCHC RBC AUTO-ENTMCNC: 32.7 G/DL (ref 33.7–35.3)
MCV RBC AUTO: 78.4 FL (ref 81.4–97.8)
MONOCYTES # BLD AUTO: 0.91 K/UL (ref 0–0.85)
MONOCYTES NFR BLD AUTO: 14.2 % (ref 0–13.4)
NEUTROPHILS # BLD AUTO: 3.1 K/UL (ref 1.82–7.42)
NEUTROPHILS NFR BLD: 48.3 % (ref 44–72)
NRBC # BLD AUTO: 0 K/UL
NRBC BLD-RTO: 0 /100 WBC
PLATELET # BLD AUTO: 471 K/UL (ref 164–446)
PMV BLD AUTO: 8 FL (ref 9–12.9)
POTASSIUM SERPL-SCNC: 4.1 MMOL/L (ref 3.6–5.5)
PROT SERPL-MCNC: 7 G/DL (ref 6–8.2)
RBC # BLD AUTO: 5.42 M/UL (ref 4.7–6.1)
SODIUM SERPL-SCNC: 136 MMOL/L (ref 135–145)
WBC # BLD AUTO: 6.4 K/UL (ref 4.8–10.8)

## 2018-07-12 PROCEDURE — 93005 ELECTROCARDIOGRAM TRACING: CPT

## 2018-07-12 PROCEDURE — 99284 EMERGENCY DEPT VISIT MOD MDM: CPT

## 2018-07-12 PROCEDURE — 93005 ELECTROCARDIOGRAM TRACING: CPT | Performed by: EMERGENCY MEDICINE

## 2018-07-12 PROCEDURE — 700102 HCHG RX REV CODE 250 W/ 637 OVERRIDE(OP): Performed by: EMERGENCY MEDICINE

## 2018-07-12 PROCEDURE — 80053 COMPREHEN METABOLIC PANEL: CPT

## 2018-07-12 PROCEDURE — A9270 NON-COVERED ITEM OR SERVICE: HCPCS | Performed by: EMERGENCY MEDICINE

## 2018-07-12 PROCEDURE — 85025 COMPLETE CBC W/AUTO DIFF WBC: CPT

## 2018-07-12 PROCEDURE — 70450 CT HEAD/BRAIN W/O DYE: CPT

## 2018-07-12 PROCEDURE — 70551 MRI BRAIN STEM W/O DYE: CPT

## 2018-07-12 RX ORDER — MECLIZINE HYDROCHLORIDE 25 MG/1
25 TABLET ORAL ONCE
Status: COMPLETED | OUTPATIENT
Start: 2018-07-12 | End: 2018-07-12

## 2018-07-12 RX ORDER — MECLIZINE HYDROCHLORIDE 25 MG/1
25 TABLET ORAL 3 TIMES DAILY PRN
Qty: 30 TAB | Refills: 0 | Status: SHIPPED | OUTPATIENT
Start: 2018-07-12 | End: 2021-02-03

## 2018-07-12 RX ORDER — MECLIZINE HYDROCHLORIDE 25 MG/1
25 TABLET ORAL 3 TIMES DAILY PRN
Qty: 30 TAB | Refills: 0 | Status: SHIPPED | OUTPATIENT
Start: 2018-07-12 | End: 2019-05-29

## 2018-07-12 RX ADMIN — MECLIZINE HYDROCHLORIDE 25 MG: 25 TABLET ORAL at 12:40

## 2018-07-12 NOTE — ED NOTES
Pt ambulated to rm 6 ,from triage.  Gait steady.  Agree with triage note.  Lightheadedness worse with position change.

## 2018-07-12 NOTE — ED PROVIDER NOTES
"ED Provider Note    CHIEF COMPLAINT  Chief Complaint   Patient presents with   • Dizziness     \" like the room is spinning\" x 3 weeks   • Hearing Loss     to left ear       HPI  Howard Gamboa is a 72 y.o. male who presents with vertigo, lightheadedness, worse when upright, for the last 2 weeks. Evidently he was admitted 2 weeks ago with diabetic ketoacidosis pneumonia, discharged on 3 August however sensitivities have vertigo. Some fullness in his right ear, imprecise hearing on the right. No nausea no vomiting no diarrhea no headache no chest pain no neck pain no abdominal pain. No tenderness.     REVIEW OF SYSTEMS  See HPI for further details. Story of diabetes elevated lipids hypertension Denies other G.I., G.U.. endrocine, cardiovascular, respriatory or neurological problems. All other systems are negative.     PAST MEDICAL HISTORY  Past Medical History:   Diagnosis Date   • Diabetes ([facility])    • Diabetes mellitus out of control ([facility])    • High cholesterol    • Hypertension        FAMILY HISTORY  Family History   Problem Relation Age of Onset   • Stroke Mother    • Diabetes Father    • Diabetes Brother        SOCIAL HISTORY  Social History     Social History   • Marital status: Single     Spouse name: N/A   • Number of children: N/A   • Years of education: N/A     Social History Main Topics   • Smoking status: Former Smoker     Quit date: 1/1/2007   • Smokeless tobacco: Never Used   • Alcohol use No   • Drug use: No   • Sexual activity: Not on file     Other Topics Concern   • Not on file     Social History Narrative   • No narrative on file       SURGICAL HISTORY  No past surgical history on file.    CURRENT MEDICATIONS  Home Medications    **Home medications have not yet been reviewed for this encounter**         ALLERGIES  No Known Allergies    PHYSICAL EXAM  VITAL SIGNS: /93   Pulse 81   Temp 36.8 °C (98.2 °F)   Resp 16   Ht 1.727 m (5' 8\")   Wt 68.9 kg (151 lb 14.4 oz)   SpO2 95%   BMI 23.10 kg/m²  "   Constitutional: Well developed, Well nourished, No acute distress, Non-toxic appearance.   HENT: Normocephalic, Atraumatic, Bilateral external ears normal, Oropharynx moist, No oral exudates, Nose normal. His tympanic membranes are normal bilaterally  Eyes: PERRL, EOMI, Conjunctiva normal, No discharge.   Neck: Normal range of motion, No tenderness, Supple, No stridor.   Lymphatic: No lymphadenopathy noted.   Cardiovascular: Normal heart rate, Normal rhythm, No murmurs, No rubs, No gallops.   Thorax & Lungs: Normal breath sounds, No respiratory distress, No wheezing, No chest tenderness.   Abdomen:  No tenderness, no guarding no rigidity and the abdomen is soft.  No masses, No pulsatile masses.  Skin: Warm, Dry, No erythema, No rash.   Back: No tenderness, No CVA tenderness.   Extremities: Intact distal pulses, No edema, No tenderness, No cyanosis, No clubbing.   Musculoskeletal: Good range of motion in all major joints. No tenderness to palpation or major deformities noted.   Neurologic: Alert & oriented x 3, Normal motor function, Normal sensory function, No focal deficits noted.   Psychiatric: Affect normal, Judgment normal, Mood normal.   EKG Interpretation    Interpreted by me    Rhythm: normal sinus   Rate: normal  Axis: -17     Ectopy: none  Conduction: normal  ST Segments: no acute change  T Waves: no acute change  Q Waves: none  Voltage criteria for left ventricular hypertrophy  Clinical Impression: I do have an old EKG to compare to and I do not see significant changes    RADIOLOGY/PROCEDURES  MR-BRAIN-W/O   Final Result      1.  No acute intracranial abnormality      2.  Underlying white matter changes and cerebral volume loss      CT-HEAD W/O   Final Result      1. Cerebral atrophy.   2. White matter lucencies most consistent with small vessel ischemic change versus demyelination or gliosis.   3. Otherwise, Head CT without contrast with no acute findings. No evidence of acute cerebral infarction,  hemorrhage or mass lesion.              COURSE & MEDICAL DECISION MAKING  Pertinent Labs & Imaging studies reviewed. (See chart for details) white count and normal hematocrit and normal platelet count 471. There is no shift. Electrolytes normal renal function and normal liver function normal        This patient has suffered from vertigo since discharge for pneumonia, evidently is seen other physicians however has not had Antivert. He does have an appointment with ENT who evidently wanted MRI done    He has been given Antivert here in the emergency Department with good results. We'll be sent home with Antivert.  FINAL IMPRESSION  1.   1. Vertigo        2.   3.     Disposition  A she has been observed in the emergency room for several hours, MRI demonstrates no acute abnormalities. He will follow-up with his primary care physician, is given Antivert here in the go home with. Discharge instructions are understood. This patient is to return if fever vomiting or no better in 12 hours. Follow up with the [facility] clinic or private physician. Information sheets on vertigo Electronically signed by: Rubin Trujillo, 7/12/2018 12:27 PM

## 2018-07-12 NOTE — ED TRIAGE NOTES
".  Chief Complaint   Patient presents with   • Dizziness     \" like the room is spinning\" x 3 weeks   • Hearing Loss     to left ear     ./93   Pulse 81   Temp 36.8 °C (98.2 °F)   Resp 16   Ht 1.727 m (5' 8\")   Wt 68.9 kg (151 lb 14.4 oz)   SpO2 95%   BMI 23.10 kg/m²     Ambulatory to triage with above complaints, family translating, declines use of language line video , educated on triage process, placed in senior lounge with family member, told to inform staff of any changes in condition.    "

## 2018-07-13 NOTE — DISCHARGE INSTRUCTIONS
Dizziness  Dizziness is a common problem. It is a feeling of unsteadiness or light-headedness. You may feel like you are about to faint. Dizziness can lead to injury if you stumble or fall. Anyone can become dizzy, but dizziness is more common in older adults. This condition can be caused by a number of things, including medicines, dehydration, or illness.  Follow these instructions at home:  Taking these steps may help with your condition:  Eating and drinking  · Drink enough fluid to keep your urine clear or pale yellow. This helps to keep you from becoming dehydrated. Try to drink more clear fluids, such as water.  · Do not drink alcohol.  · Limit your caffeine intake if directed by your health care provider.  · Limit your salt intake if directed by your health care provider.  Activity  · Avoid making quick movements.  ¨ Rise slowly from chairs and steady yourself until you feel okay.  ¨ In the morning, first sit up on the side of the bed. When you feel okay, stand slowly while you hold onto something until you know that your balance is fine.  · Move your legs often if you need to  one place for a long time. Tighten and relax your muscles in your legs while you are standing.  · Do not drive or operate heavy machinery if you feel dizzy.  · Avoid bending down if you feel dizzy. Place items in your home so that they are easy for you to reach without leaning over.  Lifestyle  · Do not use any tobacco products, including cigarettes, chewing tobacco, or electronic cigarettes. If you need help quitting, ask your health care provider.  · Try to reduce your stress level, such as with yoga or meditation. Talk with your health care provider if you need help.  General instructions  · Watch your dizziness for any changes.  · Take medicines only as directed by your health care provider. Talk with your health care provider if you think that your dizziness is caused by a medicine that you are taking.  · Tell a friend or  a family member that you are feeling dizzy. If he or she notices any changes in your behavior, have this person call your health care provider.  · Keep all follow-up visits as directed by your health care provider. This is important.  Contact a health care provider if:  · Your dizziness does not go away.  · Your dizziness or light-headedness gets worse.  · You feel nauseous.  · You have reduced hearing.  · You have new symptoms.  · You are unsteady on your feet or you feel like the room is spinning.  Get help right away if:  · You vomit or have diarrhea and are unable to eat or drink anything.  · You have problems talking, walking, swallowing, or using your arms, hands, or legs.  · You feel generally weak.  · You are not thinking clearly or you have trouble forming sentences. It may take a friend or family member to notice this.  · You have chest pain, abdominal pain, shortness of breath, or sweating.  · Your vision changes.  · You notice any bleeding.  · You have a headache.  · You have neck pain or a stiff neck.  · You have a fever.  This information is not intended to replace advice given to you by your health care provider. Make sure you discuss any questions you have with your health care provider.  Document Released: 06/13/2002 Document Revised: 05/25/2017 Document Reviewed: 12/14/2015  [employer] Interactive Patient Education © 2017 [employer]    Vertigo  Introduction  Vertigo means that you feel like you are moving when you are not. Vertigo can also make you feel like things around you are moving when they are not. This feeling can come and go at any time. Vertigo often goes away on its own.  Follow these instructions at home:  · Avoid making fast movements.  · Avoid driving.  · Avoid using heavy machinery.  · Avoid doing any task or activity that might cause danger to you or other people if you would have a vertigo attack while you are doing it.  · Sit down right away if you feel dizzy or have trouble with  your balance.  · Take over-the-counter and prescription medicines only as told by your doctor.  · Follow instructions from your doctor about which positions or movements you should avoid.  · Drink enough fluid to keep your pee (urine) clear or pale yellow.  · Keep all follow-up visits as told by your doctor. This is important.  Contact a doctor if:  · Medicine does not help your vertigo.  · You have a fever.  · Your problems get worse or you have new symptoms.  · Your family or friends see changes in your behavior.  · You feel sick to your stomach (nauseous) or you throw up (vomit).  · You have a “pins and needles” feeling or you are numb in part of your body.  Get help right away if:  · You have trouble moving or talking.  · You are always dizzy.  · You pass out (faint).  · You get very bad headaches.  · You feel weak or have trouble using your hands, arms, or legs.  · You have changes in your hearing.  · You have changes in your seeing (vision).  · You get a stiff neck.  · Bright light starts to bother you.  This information is not intended to replace advice given to you by your health care provider. Make sure you discuss any questions you have with your health care provider.  Document Released: 09/26/2009 Document Revised: 05/25/2017 Document Reviewed: 04/11/2016  © 2017 [employer]

## 2018-07-23 ENCOUNTER — OFFICE VISIT (OUTPATIENT)
Dept: MEDICAL GROUP | Facility: MEDICAL CENTER | Age: 73
End: 2018-07-23
Payer: COMMERCIAL

## 2018-07-23 VITALS
HEART RATE: 82 BPM | BODY MASS INDEX: 23.35 KG/M2 | RESPIRATION RATE: 16 BRPM | WEIGHT: 157.63 LBS | OXYGEN SATURATION: 94 % | HEIGHT: 69 IN | DIASTOLIC BLOOD PRESSURE: 62 MMHG | SYSTOLIC BLOOD PRESSURE: 130 MMHG | TEMPERATURE: 98.3 F

## 2018-07-23 DIAGNOSIS — R42 DIZZINESS: ICD-10-CM

## 2018-07-23 PROCEDURE — 99214 OFFICE O/P EST MOD 30 MIN: CPT | Performed by: FAMILY MEDICINE

## 2018-07-23 NOTE — PROGRESS NOTES
CC: Dizziness.    HPI:   Howard presents today to discuss the following medical issues:    Patient has been having current dizziness, more when he stand up but can occurs any time will sitting and standing.Patient has recent h/o DKA, has been treating him for uncontrolled diabetes. No carotid US in records. Patient is asking for extension of his sick leave because he is a  and he stands all time during his work which he can do be cause of the dizziness.    Patient Active Problem List    Diagnosis Date Noted   • Pneumonia 06/30/2018   • Uncontrolled type 2 diabetes mellitus with complication, without long-term current use of insulin (HCC) 02/07/2017   • Microcytosis 05/26/2016   • Adenomatous polyp of colon 10/02/2015   • Hyperlipidemia 11/20/2014   • Retinal vein occlusion, branch 11/20/2014   • Macular edema 11/20/2014   • Hypertension    • Diabetes mellitus type 2, uncontrolled ([facility] [place])        Current Outpatient Prescriptions   Medication Sig Dispense Refill   • meclizine (ANTIVERT) 25 MG Tab Take 1 Tab by mouth 3 times a day as needed. 30 Tab 0   • atorvastatin (LIPITOR) 40 MG Tab TAKE ONE TABLET BY MOUTH ONCE DAILY AT BEDTIME 90 Tab 1   • hydroCHLOROthiazide (HYDRODIURIL) 12.5 MG tablet TAKE ONE TABLET BY MOUTH ONCE DAILY 90 Tab 1   • lisinopril (PRINIVIL, ZESTRIL) 40 MG tablet TAKE ONE TABLET BY MOUTH ONCE DAILY 90 Tab 1   • metformin (GLUCOPHAGE) 1000 MG tablet TAKE ONE TABLET BY MOUTH TWICE DAILY WITH  MEALS 90 Tab 1   • meclizine (ANTIVERT) 25 MG Tab Take 1 Tab by mouth 3 times a day as needed. 30 Tab 0   • insulin glargine (LANTUS) 100 UNIT/ML Solution Pen-injector injection Inject 10 Units as instructed every evening. 2 PEN 1   • insulin lispro, Human, (HUMALOG) 100 UNIT/ML Solution Pen-injector injection Inject 2-10 Units as instructed 3 times a day before meals for 30 days. 2 PEN 1   • glucose blood strip Test strips order: Test strips for Free style meter. Use TID and in times of high or low blood  "sugars. 100 Strip 0   • Insulin Pen Needle (PEN NEEDLES) 32G X 4 MM Misc 4 PENS by Does not apply route 4 Times a Day,Before Meals and at Bedtime. 120 Each 1   • ondansetron (ZOFRAN ODT) 4 MG TABLET DISPERSIBLE Take 1 Tab by mouth every 8 hours as needed. 10 Tab 0   • amlodipine (NORVASC) 10 MG Tab Take 1 Tab by mouth every day. 90 Tab 1     No current facility-administered medications for this visit.          Allergies as of 07/23/2018   • (No Known Allergies)        ROS: Denies any chest pain, Shortness of breath, Changes bowel or bladder, Lower extremity edema.    Physical Exam:  /62   Pulse 82   Temp 36.8 °C (98.3 °F)   Resp 16   Ht 1.753 m (5' 9\")   Wt 71.5 kg (157 lb 10.1 oz)   SpO2 94%   BMI 23.28 kg/m²   Gen.: Well-developed, well-nourished, no apparent distress,pleasant and cooperative with the examination  Skin:  Warm and dry with good turgor. No rashes or suspicious lesions in visible areas  Ears: clear, visible TM bilaterally.  Cor: Regular rate and rhythm without murmur, gallop or rub.  Lungs: Respirations unlabored.Clear to auscultation with equal breath sounds bilaterally. No wheezes, rhonchi.  Extremities: No edema.  Neck : no carotid bruit     Assessment and Plan.   72 y.o. male     1. Dizziness  Probably due to dehydration because of his high BG.  Recommend hydration.  His BG has beeb above 200 most of the time, however improved from last time.Recommend increase his lantus for diabetes from 10 units to 13 units.  Will check carotid US.  Consider sending him to vestibular PT if no improvement.    - US-CAROTID DOPPLER; Future      "

## 2018-08-02 ENCOUNTER — HOSPITAL ENCOUNTER (OUTPATIENT)
Dept: RADIOLOGY | Facility: MEDICAL CENTER | Age: 73
End: 2018-08-02
Attending: FAMILY MEDICINE
Payer: COMMERCIAL

## 2018-08-02 DIAGNOSIS — R42 DIZZINESS: ICD-10-CM

## 2018-08-02 PROCEDURE — 93880 EXTRACRANIAL BILAT STUDY: CPT

## 2018-08-06 ENCOUNTER — OFFICE VISIT (OUTPATIENT)
Dept: MEDICAL GROUP | Facility: MEDICAL CENTER | Age: 73
End: 2018-08-06
Payer: COMMERCIAL

## 2018-08-06 VITALS
HEART RATE: 71 BPM | HEIGHT: 69 IN | DIASTOLIC BLOOD PRESSURE: 84 MMHG | RESPIRATION RATE: 16 BRPM | SYSTOLIC BLOOD PRESSURE: 124 MMHG | BODY MASS INDEX: 23.97 KG/M2 | WEIGHT: 161.82 LBS | OXYGEN SATURATION: 96 % | TEMPERATURE: 97.8 F

## 2018-08-06 DIAGNOSIS — R42 DIZZINESS: ICD-10-CM

## 2018-08-06 PROCEDURE — 99214 OFFICE O/P EST MOD 30 MIN: CPT | Performed by: FAMILY MEDICINE

## 2018-08-06 NOTE — PROGRESS NOTES
CC: uncontrolled DM, dizziness    HPI:   Howard presents today to discus the following;    Uncontrolled type 2 diabetes mellitus without complication, with long-term current use of insulin ([facility])  Patient has been checking his BG for the past month, it has been high,most of the time is more than 200, but less than 250. Denies polyuria, and polydipsia. He has been on Lantus 10 units daily, Humalog as sliding scale ( 4-8 units), and metformin 1000 mg bid.    Dizziness  Symptoms has resolved was probably dehydration. Carotid US showed no significant abnormality.    Patient Active Problem List    Diagnosis Date Noted   • Pneumonia 06/30/2018   • Uncontrolled type 2 diabetes mellitus with complication, without long-term current use of insulin ([facility]) 02/07/2017   • Microcytosis 05/26/2016   • Adenomatous polyp of colon 10/02/2015   • Hyperlipidemia 11/20/2014   • Retinal vein occlusion, branch 11/20/2014   • Macular edema 11/20/2014   • Hypertension    • Diabetes mellitus type 2, uncontrolled ([facility])        Current Outpatient Prescriptions   Medication Sig Dispense Refill   • meclizine (ANTIVERT) 25 MG Tab Take 1 Tab by mouth 3 times a day as needed. 30 Tab 0   • insulin glargine (LANTUS) 100 UNIT/ML Solution Pen-injector injection Inject 10 Units as instructed every evening. 2 PEN 1   • ondansetron (ZOFRAN ODT) 4 MG TABLET DISPERSIBLE Take 1 Tab by mouth every 8 hours as needed. 10 Tab 0   • atorvastatin (LIPITOR) 40 MG Tab TAKE ONE TABLET BY MOUTH ONCE DAILY AT BEDTIME 90 Tab 1   • hydroCHLOROthiazide (HYDRODIURIL) 12.5 MG tablet TAKE ONE TABLET BY MOUTH ONCE DAILY 90 Tab 1   • lisinopril (PRINIVIL, ZESTRIL) 40 MG tablet TAKE ONE TABLET BY MOUTH ONCE DAILY 90 Tab 1   • metformin (GLUCOPHAGE) 1000 MG tablet TAKE ONE TABLET BY MOUTH TWICE DAILY WITH  MEALS 90 Tab 1   • meclizine (ANTIVERT) 25 MG Tab Take 1 Tab by mouth 3 times a day as needed. 30 Tab 0   • glucose blood strip Test strips order: Test strips for Free style  "meter. Use TID and in times of high or low blood sugars. 100 Strip 0   • Insulin Pen Needle (PEN NEEDLES) 32G X 4 MM Misc 4 PENS by Does not apply route 4 Times a Day,Before Meals and at Bedtime. 120 Each 1   • amlodipine (NORVASC) 10 MG Tab Take 1 Tab by mouth every day. 90 Tab 1     No current facility-administered medications for this visit.          Allergies as of 08/06/2018   • (No Known Allergies)        ROS: Denies any chest pain, Shortness of breath, Changes bowel or bladder, Lower extremity edema.    Physical Exam:  /84   Pulse 71   Temp 36.6 °C (97.8 °F)   Resp 16   Ht 1.753 m (5' 9\")   Wt 73.4 kg (161 lb 13.1 oz)   SpO2 96%   BMI 23.90 kg/m²   Gen.: Well-developed, well-nourished, no apparent distress,pleasant and cooperative with the examination  Skin:  Warm and dry with good turgor.   Neck: Trachea midline,no masses or adenopathy. No JVD.  Cor: Regular rate and rhythm without murmur, gallop or rub.  Lungs: Clear to auscultation with equal breath sounds bilaterally.   Extremities: No edema.      Assessment and Plan.   72 y.o. male     1. Uncontrolled type 2 diabetes mellitus without complication, with long-term current use of insulin (HCC)  Still high, however has improved .  Patient advised to increase the lantus by 3 unit ( BG gaol is less than 200), total should not be more than 20 unit..  Continue in Humalog ( 4-8 unit) based on sliding scale.  Continue on metformin 1000 mg bid.  RTC in 2 month to check his A1C    2. Dizziness  Resolved.It was probably dehydration ( patient has been in a good hydration regimen)  Carotid US showed no significant abnormality.  "

## 2018-08-09 ENCOUNTER — TELEPHONE (OUTPATIENT)
Dept: MEDICAL GROUP | Facility: MEDICAL CENTER | Age: 73
End: 2018-08-09

## 2018-08-09 NOTE — TELEPHONE ENCOUNTER
1. Caller Name: Howard                      Call Back Number: [phone] (home)       2. Message: Patient needs a work note to go back on the 15 th, the daughter will pick it up later this afternoon     3. Patient approves office to leave a detailed voicemail/MyChart message: N\A

## 2018-08-13 ENCOUNTER — TELEPHONE (OUTPATIENT)
Dept: MEDICAL GROUP | Facility: MEDICAL CENTER | Age: 73
End: 2018-08-13

## 2018-08-17 ENCOUNTER — OFFICE VISIT (OUTPATIENT)
Dept: MEDICAL GROUP | Facility: MEDICAL CENTER | Age: 73
End: 2018-08-17
Payer: COMMERCIAL

## 2018-08-17 VITALS
OXYGEN SATURATION: 95 % | HEIGHT: 69 IN | TEMPERATURE: 99.2 F | BODY MASS INDEX: 23.99 KG/M2 | RESPIRATION RATE: 16 BRPM | WEIGHT: 162 LBS | SYSTOLIC BLOOD PRESSURE: 142 MMHG | HEART RATE: 82 BPM | DIASTOLIC BLOOD PRESSURE: 80 MMHG

## 2018-08-17 DIAGNOSIS — Z76.0 MEDICATION REFILL: ICD-10-CM

## 2018-08-17 DIAGNOSIS — R42 DIZZINESS: ICD-10-CM

## 2018-08-17 PROCEDURE — 99212 OFFICE O/P EST SF 10 MIN: CPT | Performed by: FAMILY MEDICINE

## 2018-08-17 NOTE — LETTER
August 17, 2018        Lawrence On Ng      Patient is Okay to go back to work.

## 2018-08-17 NOTE — LETTER
August 17, 2018        Lawrence On Ng        Patient is Okay to go back to work.         Marlo Watts M.D.  Marlo Watts M.D

## 2018-08-17 NOTE — PROGRESS NOTES
CC: A permission to go back to work    HPI:   Howard presents today wants a permission to go back to work, patient had severe dizziness that was related to dehydration and uncontrolled diabetes( newly diagnosed at the hospital ). Now is resolved. His blood glucose has improved.Has been having a normal appetite. Patient works as a .      Patient Active Problem List    Diagnosis Date Noted   • Pneumonia 06/30/2018   • Uncontrolled type 2 diabetes mellitus with complication, without long-term current use of insulin (HCC) 02/07/2017   • Microcytosis 05/26/2016   • Adenomatous polyp of colon 10/02/2015   • Hyperlipidemia 11/20/2014   • Retinal vein occlusion, branch 11/20/2014   • Macular edema 11/20/2014   • Hypertension    • Diabetes mellitus type 2, uncontrolled ([facility])        Current Outpatient Prescriptions   Medication Sig Dispense Refill   • glucose blood strip Test strips order: Test strips for Free style meter. Use TID and in times of high or low blood sugars. 100 Strip 0   • meclizine (ANTIVERT) 25 MG Tab Take 1 Tab by mouth 3 times a day as needed. 30 Tab 0   • insulin glargine (LANTUS) 100 UNIT/ML Solution Pen-injector injection Inject 10 Units as instructed every evening. 2 PEN 1   • ondansetron (ZOFRAN ODT) 4 MG TABLET DISPERSIBLE Take 1 Tab by mouth every 8 hours as needed. 10 Tab 0   • atorvastatin (LIPITOR) 40 MG Tab TAKE ONE TABLET BY MOUTH ONCE DAILY AT BEDTIME 90 Tab 1   • hydroCHLOROthiazide (HYDRODIURIL) 12.5 MG tablet TAKE ONE TABLET BY MOUTH ONCE DAILY 90 Tab 1   • lisinopril (PRINIVIL, ZESTRIL) 40 MG tablet TAKE ONE TABLET BY MOUTH ONCE DAILY 90 Tab 1   • metformin (GLUCOPHAGE) 1000 MG tablet TAKE ONE TABLET BY MOUTH TWICE DAILY WITH  MEALS 90 Tab 1   • meclizine (ANTIVERT) 25 MG Tab Take 1 Tab by mouth 3 times a day as needed. 30 Tab 0   • Insulin Pen Needle (PEN NEEDLES) 32G X 4 MM Misc 4 PENS by Does not apply route 4 Times a Day,Before Meals and at Bedtime. 120 Each 1   • amlodipine  "(NORVASC) 10 MG Tab Take 1 Tab by mouth every day. 90 Tab 1     No current facility-administered medications for this visit.          Allergies as of 08/17/2018   • (No Known Allergies)        ROS: Denies any chest pain, Shortness of breath, Changes bowel or bladder, Lower extremity edema.    Physical Exam:  /80   Pulse 82   Temp 37.3 °C (99.2 °F)   Resp 16   Ht 1.753 m (5' 9\")   Wt 73.5 kg (162 lb)   SpO2 95%   BMI 23.92 kg/m²   Gen.: Well-developed, well-nourished, no apparent distress,pleasant and cooperative with the examination  Skin:  Warm and dry with good turgor. No rashes or suspicious lesions in visible areas  HEENT:Sinuses nontender with palpation, TMs clear, nares patent with pink mucosa and clear rhinorrhea,no septal deviation ,polyps or lesions. lips without lesions, oropharynx clear.  Neck: Trachea midline,no masses or adenopathy. No JVD.  Cor: Regular rate and rhythm without murmur, gallop or rub.  Lungs: Respirations unlabored.Clear to auscultation with equal breath sounds bilaterally. No wheezes, rhonchi.  Extremities: No cyanosis, clubbing or edema.      Assessment and Plan.   72 y.o. male     1. Dizziness  Resolved.  Now he can go back to work.  A note to go back to work provided per patient request.    2. Medication refill    - glucose blood strip; Test strips order: Test strips for Free style meter. Use TID and in times of high or low blood sugars.  Dispense: 100 Strip; Refill: 0      "

## 2018-08-20 DIAGNOSIS — Z76.0 MEDICATION REFILL: ICD-10-CM

## 2018-09-28 ENCOUNTER — OFFICE VISIT (OUTPATIENT)
Dept: MEDICAL GROUP | Facility: MEDICAL CENTER | Age: 73
End: 2018-09-28
Payer: COMMERCIAL

## 2018-09-28 VITALS
HEART RATE: 68 BPM | SYSTOLIC BLOOD PRESSURE: 160 MMHG | DIASTOLIC BLOOD PRESSURE: 80 MMHG | OXYGEN SATURATION: 96 % | WEIGHT: 167 LBS | RESPIRATION RATE: 16 BRPM | HEIGHT: 69 IN | BODY MASS INDEX: 24.73 KG/M2 | TEMPERATURE: 97.8 F

## 2018-09-28 DIAGNOSIS — E78.2 MIXED HYPERLIPIDEMIA: ICD-10-CM

## 2018-09-28 DIAGNOSIS — I10 ESSENTIAL HYPERTENSION: ICD-10-CM

## 2018-09-28 LAB
HBA1C MFR BLD: 10.6 % (ref ?–5.8)
INT CON NEG: NEGATIVE
INT CON POS: POSITIVE

## 2018-09-28 PROCEDURE — 83036 HEMOGLOBIN GLYCOSYLATED A1C: CPT | Performed by: FAMILY MEDICINE

## 2018-09-28 PROCEDURE — 99214 OFFICE O/P EST MOD 30 MIN: CPT | Performed by: FAMILY MEDICINE

## 2018-09-30 NOTE — PROGRESS NOTES
CC:Uncontrolled DM, Elevated BP, HLD    HPI:   Howard presents today to discuss the following:    Uncontrolled type 2 diabetes mellitus without complication, with long-term current use of insulin ([facility])  His A1C today is 10.6( It was 13 when he was diagnosed in 6/2018. Patient has been on lantus 10 unit at night however, he has not been taking regularly. He was also on metformin 1000 mg bid which he has been taking in a daily basis.he has been checking his BG at home every once in a while, it it has been high most of the time( above 200). Denies polyuria, polydipsia. He stated tht he has been feeling energetic, no tiredness.    Essential hypertension  His BP today is elevated.However, denies headache, chest pain, and SOB.Stated that he has been chesking his BP at home, and has been less than 140/90  Has been on Lisinopril 40 mg daily.He has not been taking HCTZ, was stopped last visit to avoid side effects of hyperglycemia.    Mixed hyperlipidemia  He has been tolerating the statin. Denies muscle pain LFTs has been normal, has been on Liptior 40 mg daily.      Patient Active Problem List    Diagnosis Date Noted   • Pneumonia 06/30/2018   • Uncontrolled type 2 diabetes mellitus with complication, without long-term current use of insulin ([facility]) 02/07/2017   • Microcytosis 05/26/2016   • Adenomatous polyp of colon 10/02/2015   • Hyperlipidemia 11/20/2014   • Retinal vein occlusion, branch 11/20/2014   • Macular edema 11/20/2014   • Hypertension    • Diabetes mellitus type 2, uncontrolled ([facility])        Current Outpatient Prescriptions   Medication Sig Dispense Refill   • insulin lispro (HUMALOG) 100 UNIT/ML Solution Take is based on sliding scale 10 mL 3   • insulin glargine (LANTUS) 100 UNIT/ML Solution Pen-injector injection Inject 10 Units as instructed every evening. 2 PEN 1   • metformin (GLUCOPHAGE) 1000 MG tablet Take 1 Tab by mouth 2 times a day, with meals. 180 Tab 3   • atorvastatin (LIPITOR) 40 MG Tab TAKE ONE  "TABLET BY MOUTH ONCE DAILY AT BEDTIME 90 Tab 1   • lisinopril (PRINIVIL, ZESTRIL) 40 MG tablet TAKE ONE TABLET BY MOUTH ONCE DAILY 90 Tab 1   • glucose blood strip Test strips order: Test strips for Free style meter. Use TID and in times of high or low blood sugars. 100 Strip 0   • meclizine (ANTIVERT) 25 MG Tab Take 1 Tab by mouth 3 times a day as needed. 30 Tab 0   • meclizine (ANTIVERT) 25 MG Tab Take 1 Tab by mouth 3 times a day as needed. 30 Tab 0   • Insulin Pen Needle (PEN NEEDLES) 32G X 4 MM Misc 4 PENS by Does not apply route 4 Times a Day,Before Meals and at Bedtime. 120 Each 1   • ondansetron (ZOFRAN ODT) 4 MG TABLET DISPERSIBLE Take 1 Tab by mouth every 8 hours as needed. 10 Tab 0   • hydroCHLOROthiazide (HYDRODIURIL) 12.5 MG tablet TAKE ONE TABLET BY MOUTH ONCE DAILY 90 Tab 1   • amlodipine (NORVASC) 10 MG Tab Take 1 Tab by mouth every day. 90 Tab 1     No current facility-administered medications for this visit.          Allergies as of 09/28/2018   • (No Known Allergies)        ROS: Denies any chest pain, Shortness of breath, Changes bowel or bladder, Lower extremity edema.    Physical Exam:  /80 (BP Location: Right arm, Patient Position: Sitting, BP Cuff Size: Adult)   Pulse 68   Temp 36.6 °C (97.8 °F)   Resp 16   Ht 1.753 m (5' 9\")   Wt 75.8 kg (167 lb)   SpO2 96%   BMI 24.66 kg/m²   Gen.: Well-developed, well-nourished, no apparent distress,pleasant and cooperative with the examination  Skin:  Warm and dry with good turgor. No rashes or suspicious lesions in visible areas  HEENT:Sinuses nontender with palpation, TMs clear, nares patent with pink mucosa and clear rhinorrhea,no septal deviation ,polyps or lesions. lips without lesions, oropharynx clear.  Neck: Trachea midline,no masses or adenopathy. No JVD.  Cor: Regular rate and rhythm without murmur, gallop or rub.  Lungs: Respirations unlabored.Clear to auscultation with equal breath sounds bilaterally. No wheezes, " rhonchi.  Extremities: No cyanosis, clubbing or edema.      Assessment and Plan.   73 y.o. male     1. Uncontrolled type 2 diabetes mellitus without complication, with long-term current use of insulin ([facility])  A1C today is 10.6( was 13).  However patient has been on lantus 10 unit at night (which he has been taking only once in a while), and metformin 1000 mg bid( has been taking it daily).  Patient advised to take the lantus in a regular basis, and I add Short actin humalog to be taken based on sliding scale, instruction on the sliding scale is given to the patient and his daughter. Advised to check his BG 3 times daily, and RTC in a month with BG log for evaluation.    - POCT  A1C  - insulin lispro (HUMALOG) 100 UNIT/ML Solution; Take is based on sliding scale  Dispense: 10 mL; Refill: 3  - insulin glargine (LANTUS) 100 UNIT/ML Solution Pen-injector injection; Inject 10 Units as instructed every evening.  Dispense: 2 PEN; Refill: 1    2. Essential hypertension  BP today is elevated.Asymptomatic. Denies headache, chest pain, and SOB.Stated that he has been chesking his BP at home, and has been less than 140/90  For now continue on Lisinopril 40 mg daily.  Has not been taking HCTZ. Advised to restart at low dose ( 12.5 mg daily).    3. Mixed hyperlipidemia  He has been tolerating the statin. Denies muscle pain LFTs has been normal  Continue on Liptior 40 mg daily.

## 2018-10-05 ENCOUNTER — TELEPHONE (OUTPATIENT)
Dept: MEDICAL GROUP | Facility: MEDICAL CENTER | Age: 73
End: 2018-10-05

## 2018-10-05 DIAGNOSIS — E11.9 DIABETES MELLITUS TYPE 2 IN NONOBESE (HCC): ICD-10-CM

## 2018-10-05 NOTE — TELEPHONE ENCOUNTER
VOICEMAIL  1. Caller Name: Howard Gamboa                        Call Back Number: [phone] (home)       2. Message: patient needs humalog pen he doesn't know how to use the vials if you can change please     3. Patient approves office to leave a detailed voicemail/MyChart message: yes

## 2018-11-02 ENCOUNTER — OFFICE VISIT (OUTPATIENT)
Dept: MEDICAL GROUP | Facility: MEDICAL CENTER | Age: 73
End: 2018-11-02
Payer: COMMERCIAL

## 2018-11-02 VITALS
SYSTOLIC BLOOD PRESSURE: 152 MMHG | OXYGEN SATURATION: 95 % | HEART RATE: 74 BPM | BODY MASS INDEX: 24.88 KG/M2 | DIASTOLIC BLOOD PRESSURE: 82 MMHG | WEIGHT: 168 LBS | RESPIRATION RATE: 16 BRPM | TEMPERATURE: 98.3 F | HEIGHT: 69 IN

## 2018-11-02 PROCEDURE — 99213 OFFICE O/P EST LOW 20 MIN: CPT | Performed by: FAMILY MEDICINE

## 2018-11-02 NOTE — PROGRESS NOTES
CC: Uncontrolled diabetes    HPI:   Howard presents today for follow-up of his uncontrolled diabetes.  His last A1C was 10.6.  Has been on Lantus 20 unit at night, Humalog based on sliding scale.  And metformin 1000 mg twice a day.  Patient has not been checking his blood sugar 3 times a day as he was told.  Has been taking it only in the morning, he stated that he does not have time because of his work.  However his blood sugar has been mostly above 200.  So probably he has not been taking short acting insulin as instructed.    Patient Active Problem List    Diagnosis Date Noted   • Pneumonia 06/30/2018   • Uncontrolled type 2 diabetes mellitus with complication, without long-term current use of insulin (HCC) 02/07/2017   • Microcytosis 05/26/2016   • Adenomatous polyp of colon 10/02/2015   • Hyperlipidemia 11/20/2014   • Retinal vein occlusion, branch 11/20/2014   • Macular edema 11/20/2014   • Hypertension    • Diabetes mellitus type 2, uncontrolled (HCC)        Current Outpatient Prescriptions   Medication Sig Dispense Refill   • metformin (GLUCOPHAGE) 1000 MG tablet Take 1 Tab by mouth 2 times a day, with meals. 180 Tab 3   • insulin lispro, Human, (HUMALOG KWIKPEN) 100 UNIT/ML Solution Pen-injector injection Take it as directed based on sliding scale. 10 mL 3   • insulin glargine (LANTUS) 100 UNIT/ML Solution Pen-injector injection Inject 10 Units as instructed every evening. 2 PEN 1   • glucose blood strip Test strips order: Test strips for Free style meter. Use TID and in times of high or low blood sugars. 100 Strip 0   • meclizine (ANTIVERT) 25 MG Tab Take 1 Tab by mouth 3 times a day as needed. 30 Tab 0   • meclizine (ANTIVERT) 25 MG Tab Take 1 Tab by mouth 3 times a day as needed. 30 Tab 0   • Insulin Pen Needle (PEN NEEDLES) 32G X 4 MM Misc 4 PENS by Does not apply route 4 Times a Day,Before Meals and at Bedtime. 120 Each 1   • ondansetron (ZOFRAN ODT) 4 MG TABLET DISPERSIBLE Take 1 Tab by mouth every 8  "hours as needed. 10 Tab 0   • hydroCHLOROthiazide (HYDRODIURIL) 12.5 MG tablet TAKE ONE TABLET BY MOUTH ONCE DAILY 90 Tab 1   • amlodipine (NORVASC) 10 MG Tab Take 1 Tab by mouth every day. 90 Tab 1   • atorvastatin (LIPITOR) 40 MG Tab TAKE ONE TABLET BY MOUTH ONCE DAILY AT BEDTIME 90 Tab 1   • lisinopril (PRINIVIL, ZESTRIL) 40 MG tablet TAKE ONE TABLET BY MOUTH ONCE DAILY 90 Tab 1     No current facility-administered medications for this visit.          Allergies as of 11/02/2018   • (No Known Allergies)        ROS: Denies any chest pain, Shortness of breath, Changes bowel or bladder, Lower extremity edema.    Physical Exam:  /82 (BP Location: Right arm, Patient Position: Sitting)   Pulse 74   Temp 36.8 °C (98.3 °F)   Resp 16   Ht 1.753 m (5' 9\")   Wt 76.2 kg (168 lb)   SpO2 95%   BMI 24.81 kg/m²   Gen.: Well-developed, well-nourished, no apparent distress,pleasant and cooperative with the examination  No physical exam needed.      Assessment and Plan.   73 y.o. male     1. Uncontrolled type 2 diabetes mellitus with complication, without long-term current use of insulin ([facility])  Blood glucose log showed that he has been checking his blood sugar only in the morning and it has been always above 200.  Continue on Lantus 20 units every night, insulin sliding scale adjusted to start with 6 unit between 150-200, and increase by 2 units.  Will recheck A1c in 2 months. Advised to check his blood sugar 3 times a day and take his short acting insulin based on sliding scale.      "

## 2018-11-08 DIAGNOSIS — I10 ESSENTIAL HYPERTENSION: ICD-10-CM

## 2018-11-09 DIAGNOSIS — I10 ESSENTIAL HYPERTENSION: ICD-10-CM

## 2018-11-09 RX ORDER — LISINOPRIL 40 MG/1
TABLET ORAL
Qty: 90 TAB | Refills: 1 | Status: SHIPPED | OUTPATIENT
Start: 2018-11-09 | End: 2019-05-10 | Stop reason: SDUPTHER

## 2018-11-09 RX ORDER — LISINOPRIL 40 MG/1
TABLET ORAL
Qty: 90 TAB | Refills: 1 | Status: SHIPPED | OUTPATIENT
Start: 2018-11-09 | End: 2018-11-09 | Stop reason: SDUPTHER

## 2018-11-09 NOTE — TELEPHONE ENCOUNTER
Was the patient seen in the last year in this department? Yes lov 11/2/18    Does patient have an active prescription for medications requested? No     Received Request Via: Pharmacy

## 2018-11-26 DIAGNOSIS — Z76.0 MEDICATION REFILL: ICD-10-CM

## 2018-11-26 RX ORDER — PEN NEEDLE, DIABETIC 30 GX3/16"
4 NEEDLE, DISPOSABLE MISCELLANEOUS
Qty: 120 EACH | Refills: 1 | Status: SHIPPED | OUTPATIENT
Start: 2018-11-26 | End: 2019-03-11 | Stop reason: SDUPTHER

## 2018-12-10 RX ORDER — ATORVASTATIN CALCIUM 20 MG/1
TABLET, FILM COATED ORAL
Qty: 180 TAB | Refills: 1 | Status: SHIPPED | OUTPATIENT
Start: 2018-12-10 | End: 2019-04-17

## 2018-12-10 RX ORDER — ATORVASTATIN CALCIUM 20 MG/1
TABLET, FILM COATED ORAL
Qty: 180 TAB | Refills: 1 | Status: SHIPPED | OUTPATIENT
Start: 2018-12-10 | End: 2018-12-10 | Stop reason: SDUPTHER

## 2019-01-11 ENCOUNTER — OFFICE VISIT (OUTPATIENT)
Dept: MEDICAL GROUP | Facility: MEDICAL CENTER | Age: 74
End: 2019-01-11
Payer: COMMERCIAL

## 2019-01-11 VITALS
DIASTOLIC BLOOD PRESSURE: 80 MMHG | TEMPERATURE: 97.7 F | HEART RATE: 68 BPM | RESPIRATION RATE: 16 BRPM | OXYGEN SATURATION: 94 % | HEIGHT: 69 IN | BODY MASS INDEX: 24.88 KG/M2 | SYSTOLIC BLOOD PRESSURE: 154 MMHG | WEIGHT: 168 LBS

## 2019-01-11 DIAGNOSIS — I10 ESSENTIAL HYPERTENSION: ICD-10-CM

## 2019-01-11 DIAGNOSIS — E11.65 UNCONTROLLED TYPE 2 DIABETES MELLITUS WITH HYPERGLYCEMIA (HCC): ICD-10-CM

## 2019-01-11 DIAGNOSIS — E78.2 MIXED HYPERLIPIDEMIA: ICD-10-CM

## 2019-01-11 LAB
HBA1C MFR BLD: 12.6 % (ref ?–5.8)
INT CON NEG: NEGATIVE
INT CON POS: POSITIVE

## 2019-01-11 PROCEDURE — 99214 OFFICE O/P EST MOD 30 MIN: CPT | Performed by: FAMILY MEDICINE

## 2019-01-11 PROCEDURE — 83036 HEMOGLOBIN GLYCOSYLATED A1C: CPT | Performed by: FAMILY MEDICINE

## 2019-01-11 ASSESSMENT — PATIENT HEALTH QUESTIONNAIRE - PHQ9: CLINICAL INTERPRETATION OF PHQ2 SCORE: 0

## 2019-01-11 NOTE — PROGRESS NOTES
CC: Uncontrolled diabetes, hypertension, hyperlipidemia.    HPI:   Howard presents today to discuss the following medical issues:    Uncontrolled type 2 diabetes mellitus with hyperglycemia ([facility])  A1c today is 12.6.  Patient has been noncompliant with his insulin.  I seems that he does not care about it.However has been taking his oral medication (metformin thousand milligrams twice a day.).  Discussed with the patient and daughter the importance of treating diabetes.  We discussed diabetes complications.  Patient understand and he promised to take his medication as prescribed.  Is supposed to be on Lantus 10 unit at night and short acting insulin, Humalog based on sliding scale.      Essential hypertension  Blood pressure today is high.  However he has been asymptomatic.  Denies headache, chest pain, shortness of breath.  He has been on lisinopril 40 mg, and hydrochlorothiazide 12.5 mg.  othiazide to 25 mg daily.    Mixed hyperlipidemia  He has been tolerating the statin. Denies muscle pain LFTs has been normal, has been on atorvastatin 40 mg daily.    Patient Active Problem List    Diagnosis Date Noted   • Pneumonia 06/30/2018   • Uncontrolled type 2 diabetes mellitus with complication, without long-term current use of insulin ([facility]) 02/07/2017   • Microcytosis 05/26/2016   • Adenomatous polyp of colon 10/02/2015   • Hyperlipidemia 11/20/2014   • Retinal vein occlusion, branch 11/20/2014   • Macular edema 11/20/2014   • Hypertension    • Diabetes mellitus type 2, uncontrolled (HCC)        Current Outpatient Prescriptions   Medication Sig Dispense Refill   • atorvastatin (LIPITOR) 20 MG Tab TAKE 2 TABLETS BY MOUTH ONCE DAILY 180 Tab 1   • Insulin Pen Needle (PEN NEEDLES) 32G X 4 MM Misc 4 PENS by Does not apply route 4 Times a Day,Before Meals and at Bedtime. 120 Each 1   • glucose blood strip Test strips order: Test strips for Free style meter. Use TID and in times of high or low blood sugars. 100 Strip 0   •  "lisinopril (PRINIVIL, ZESTRIL) 40 MG tablet TAKE 1 TABLET BY MOUTH ONCE DAILY 90 Tab 1   • metformin (GLUCOPHAGE) 1000 MG tablet Take 1 Tab by mouth 2 times a day, with meals. 180 Tab 3   • insulin lispro, Human, (HUMALOG KWIKPEN) 100 UNIT/ML Solution Pen-injector injection Take it as directed based on sliding scale. 10 mL 3   • insulin glargine (LANTUS) 100 UNIT/ML Solution Pen-injector injection Inject 10 Units as instructed every evening. 2 PEN 1   • meclizine (ANTIVERT) 25 MG Tab Take 1 Tab by mouth 3 times a day as needed. 30 Tab 0   • meclizine (ANTIVERT) 25 MG Tab Take 1 Tab by mouth 3 times a day as needed. 30 Tab 0   • ondansetron (ZOFRAN ODT) 4 MG TABLET DISPERSIBLE Take 1 Tab by mouth every 8 hours as needed. 10 Tab 0   • atorvastatin (LIPITOR) 40 MG Tab TAKE ONE TABLET BY MOUTH ONCE DAILY AT BEDTIME 90 Tab 1   • hydroCHLOROthiazide (HYDRODIURIL) 12.5 MG tablet TAKE ONE TABLET BY MOUTH ONCE DAILY 90 Tab 1   • amlodipine (NORVASC) 10 MG Tab Take 1 Tab by mouth every day. 90 Tab 1     No current facility-administered medications for this visit.          Allergies as of 01/11/2019   • (No Known Allergies)        ROS: Denies any chest pain, Shortness of breath, Changes bowel or bladder, Lower extremity edema.    Physical Exam:  /80 (BP Location: Right arm, Patient Position: Sitting, BP Cuff Size: Adult)   Pulse 68   Temp 36.5 °C (97.7 °F)   Resp 16   Ht 1.753 m (5' 9\")   Wt 76.2 kg (168 lb)   SpO2 94%   BMI 24.81 kg/m²   Gen.: Well-developed, well-nourished, no apparent distress,pleasant and cooperative with the examination  Skin:  Warm and dry with good turgor. No rashes or suspicious lesions in visible areas  HEENT:Sinuses nontender with palpation, TMs clear, nares patent with pink mucosa and clear rhinorrhea,no septal deviation ,polyps or lesions. lips without lesions, oropharynx clear.  Neck: Trachea midline,no masses or adenopathy. No JVD.  Cor: Regular rate and rhythm without murmur, gallop " or rub.  Lungs: Respirations unlabored.Clear to auscultation with equal breath sounds bilaterally. No wheezes, rhonchi.  Extremities: No cyanosis, clubbing or edema.        Assessment and Plan.   73 y.o. male     1. Uncontrolled type 2 diabetes mellitus with hyperglycemia ([facility])  A1c today is 12.6.  Noncompliant.  Patient has been noncompliant with the insulin.  However has been taking his oral medication.  Discussed with the patient and daughter the importance of treating diabetes.  We discussed diabetes complications.  Patient understand and he promised to take his medication as prescribed.   For now continue on Lantus 10 unit at night and short acting insulin, Humalog based on sliding scale.  Continue metformin 1000 mg twice a day.    Discussed with patient low-carb and fat diet, and exercise   To be seen by diabetes nurse on the same day I will see him next.    - POCT  A1C    2. Essential hypertension  Slightly elevated.   Continue lisinopril 40 mg daily.  Will increase hydrochlorothiazide to 25 mg daily.    3. Mixed hyperlipidemia  He has been tolerating the statin. Denies muscle pain LFTs has been normal  Continue on atorvastatin 40 mg daily.

## 2019-02-11 DIAGNOSIS — Z76.0 MEDICATION REFILL: ICD-10-CM

## 2019-03-11 DIAGNOSIS — Z76.0 MEDICATION REFILL: ICD-10-CM

## 2019-03-11 RX ORDER — PEN NEEDLE, DIABETIC 30 GX3/16"
4 NEEDLE, DISPOSABLE MISCELLANEOUS
Qty: 120 EACH | Refills: 1 | Status: SHIPPED | OUTPATIENT
Start: 2019-03-11 | End: 2019-05-29 | Stop reason: SDUPTHER

## 2019-04-17 ENCOUNTER — OFFICE VISIT (OUTPATIENT)
Dept: MEDICAL GROUP | Facility: MEDICAL CENTER | Age: 74
End: 2019-04-17
Payer: COMMERCIAL

## 2019-04-17 VITALS
HEIGHT: 69 IN | BODY MASS INDEX: 25.48 KG/M2 | OXYGEN SATURATION: 95 % | HEART RATE: 72 BPM | WEIGHT: 172 LBS | SYSTOLIC BLOOD PRESSURE: 134 MMHG | DIASTOLIC BLOOD PRESSURE: 74 MMHG

## 2019-04-17 DIAGNOSIS — E11.65 UNCONTROLLED TYPE 2 DIABETES MELLITUS WITH HYPERGLYCEMIA (HCC): ICD-10-CM

## 2019-04-17 DIAGNOSIS — I10 ESSENTIAL HYPERTENSION: ICD-10-CM

## 2019-04-17 DIAGNOSIS — E78.2 MIXED HYPERLIPIDEMIA: ICD-10-CM

## 2019-04-17 LAB
HBA1C MFR BLD: 10.3 % (ref 0–5.6)
INT CON NEG: ABNORMAL
INT CON POS: ABNORMAL

## 2019-04-17 PROCEDURE — 83036 HEMOGLOBIN GLYCOSYLATED A1C: CPT | Performed by: FAMILY MEDICINE

## 2019-04-17 PROCEDURE — 99214 OFFICE O/P EST MOD 30 MIN: CPT | Performed by: FAMILY MEDICINE

## 2019-04-17 NOTE — PROGRESS NOTES
RN-CDE Note    Subjective:     Health changes since last visit/interval Hx: He is only taking his Humalog based on a sliding scale and has not been taking his Lantus    Medications (including changes made today)  Current Outpatient Prescriptions   Medication Sig Dispense Refill   • Insulin Pen Needle (PEN NEEDLES) 32G X 4 MM Misc 4 PENS by Does not apply route 4 Times a Day,Before Meals and at Bedtime. 120 Each 1   • glucose blood strip Test strips order: Test strips for Free style meter. Use TID and in times of high or low blood sugars. 100 Strip 0   • lisinopril (PRINIVIL, ZESTRIL) 40 MG tablet TAKE 1 TABLET BY MOUTH ONCE DAILY 90 Tab 1   • metformin (GLUCOPHAGE) 1000 MG tablet Take 1 Tab by mouth 2 times a day, with meals. 180 Tab 3   • insulin lispro, Human, (HUMALOG KWIKPEN) 100 UNIT/ML Solution Pen-injector injection Take it as directed based on sliding scale. 10 mL 3   • meclizine (ANTIVERT) 25 MG Tab Take 1 Tab by mouth 3 times a day as needed. 30 Tab 0   • atorvastatin (LIPITOR) 40 MG Tab TAKE ONE TABLET BY MOUTH ONCE DAILY AT BEDTIME 90 Tab 1   • insulin glargine (LANTUS) 100 UNIT/ML Solution Pen-injector injection Inject 10 Units as instructed every evening. (Patient not taking: Reported on 4/17/2019) 2 PEN 1   • meclizine (ANTIVERT) 25 MG Tab Take 1 Tab by mouth 3 times a day as needed. 30 Tab 0   • ondansetron (ZOFRAN ODT) 4 MG TABLET DISPERSIBLE Take 1 Tab by mouth every 8 hours as needed. 10 Tab 0   • hydroCHLOROthiazide (HYDRODIURIL) 12.5 MG tablet TAKE ONE TABLET BY MOUTH ONCE DAILY (Patient not taking: Reported on 4/17/2019) 90 Tab 1   • amlodipine (NORVASC) 10 MG Tab Take 1 Tab by mouth every day. (Patient not taking: Reported on 4/17/2019) 90 Tab 1     No current facility-administered medications for this visit.        Taking daily ASA: No  Taking above medications as prescribed: no Not taking the Lantus  SIDE EFFECTS: Patient denies side effects to medications    Exercise: works as a cook  Diet:  He eats a lot of rice at breakfast and dinner  Patient's body mass index is 25.4 kg/m². Exercise and nutrition counseling were performed at this visit.      Health Maintenance:   Health Maintenance Due   Topic Date Due   • IMM HEP B VACCINE (1 of 3 - Risk 3-dose series) 08/23/1966   • IMM ZOSTER VACCINES (1 of 2) 08/23/1997   • RETINAL SCREENING  08/12/2016   • COLONOSCOPY  08/21/2016   • DIABETES MONOFILAMENT / LE EXAM  02/07/2018   • FASTING LIPID PROFILE  11/27/2018   • URINE ACR / MICROALBUMIN  11/27/2018       DM:   Last A1c:   Lab Results   Component Value Date/Time    HBA1C 10.3 (A) 04/17/2019 10:16 AM      A1C GOAL: < 8    Glucose monitoring frequency: Testing twice daily  140-400's  Hypoglycemic episodes: no    Last Retinal Exam: Needs  Daily Foot Exam: Yes   Routine Dental Exams: No    Lab Results   Component Value Date/Time    MALBCRT see below 11/27/2017 09:49 AM    MICROALBUR <0.7 11/27/2017 09:49 AM        ACR Albumin/Creatinine Ratio goal <30     HTN:   Blood pressure goal <140/<80 .   Currently Rx ACE/ARB: Yes    Dyslipidemia:    Lab Results   Component Value Date/Time    CHOLSTRLTOT 114 11/27/2017 09:48 AM    LDL 36 11/27/2017 09:48 AM    HDL 42 11/27/2017 09:48 AM    TRIGLYCERIDE 179 (H) 11/27/2017 09:48 AM       Lab Results   Component Value Date/Time    SODIUM 136 07/12/2018 12:58 PM    POTASSIUM 4.1 07/12/2018 12:58 PM    CHLORIDE 101 07/12/2018 12:58 PM    CO2 26 07/12/2018 12:58 PM    GLUCOSE 138 (H) 07/12/2018 12:58 PM    BUN 15 07/12/2018 12:58 PM    CREATININE 0.77 07/12/2018 12:58 PM    CREATININE 1.07 05/05/2009     Lab Results   Component Value Date/Time    ALKPHOSPHAT 118 (H) 07/12/2018 12:58 PM    ASTSGOT 21 07/12/2018 12:58 PM    ALTSGPT 36 07/12/2018 12:58 PM    TBILIRUBIN 0.8 07/12/2018 12:58 PM        Currently Rx Statin: Yes    He  reports that he quit smoking about 12 years ago. He has never used smokeless tobacco.    Objective:     Exam:  Monofilament: done   Monofilament  testing with a 10 gram force: sensation intact: intact bilaterally  Visual Inspection: Feet without maceration, ulcers, fissures.  Pedal pulses: intact bilaterally    Plan:     Discussed and educated on: He jon;lstart taking Lantus 15 units at bedtime.  He will take Humalog 10 units before breakfast and dinner to cover his rice.  He will test his blood sugars before breakfast, dinner, and bedtime.  We will follow up with me in one month to review his blood sugars.      Recommended medication changes:         MD note    CC: Uncontrolled diabetes, hypertension, hyperlipidemia    HPI:   Howard presents today to discuss the following medical issues:    Uncontrolled type 2 diabetes mellitus with hyperglycemia (HCC)  Patient has slightly improved of his A1c from 12.6 last visit to 10.3 today.  He has not been taking the Lantus, has been taking the Humalog 6 units 2-3 times a day however not based on sliding scale, because he has not been checking his blood sugar.  Denies hypoglycemic episodes, has been having polyuria however no polydipsia.    Essential hypertension  Blood pressure has been adequately controlled.  Denies headache, chest pain, shortness of breath.  Patient has been on lisinopril 40 mg daily.    Mixed hyperlipidemia  He has been tolerating the statin. Denies muscle pain LFTs has been normal, patient has been on atorvastatin 40 mg daily.      Patient Active Problem List    Diagnosis Date Noted   • Pneumonia 06/30/2018   • Uncontrolled type 2 diabetes mellitus with complication, without long-term current use of insulin (HCC) 02/07/2017   • Microcytosis 05/26/2016   • Adenomatous polyp of colon 10/02/2015   • Hyperlipidemia 11/20/2014   • Retinal vein occlusion, branch 11/20/2014   • Macular edema 11/20/2014   • Hypertension    • Diabetes mellitus type 2, uncontrolled (HCC)        Current Outpatient Prescriptions   Medication Sig Dispense Refill   • Insulin Pen Needle (PEN NEEDLES) 32G X 4 MM Misc 4 PENS by  "Does not apply route 4 Times a Day,Before Meals and at Bedtime. 120 Each 1   • glucose blood strip Test strips order: Test strips for Free style meter. Use TID and in times of high or low blood sugars. 100 Strip 0   • lisinopril (PRINIVIL, ZESTRIL) 40 MG tablet TAKE 1 TABLET BY MOUTH ONCE DAILY 90 Tab 1   • metformin (GLUCOPHAGE) 1000 MG tablet Take 1 Tab by mouth 2 times a day, with meals. 180 Tab 3   • insulin lispro, Human, (HUMALOG KWIKPEN) 100 UNIT/ML Solution Pen-injector injection Take it as directed based on sliding scale. 10 mL 3   • meclizine (ANTIVERT) 25 MG Tab Take 1 Tab by mouth 3 times a day as needed. 30 Tab 0   • atorvastatin (LIPITOR) 40 MG Tab TAKE ONE TABLET BY MOUTH ONCE DAILY AT BEDTIME 90 Tab 1   • insulin glargine (LANTUS) 100 UNIT/ML Solution Pen-injector injection Inject 10 Units as instructed every evening. (Patient not taking: Reported on 4/17/2019) 2 PEN 1   • meclizine (ANTIVERT) 25 MG Tab Take 1 Tab by mouth 3 times a day as needed. 30 Tab 0   • ondansetron (ZOFRAN ODT) 4 MG TABLET DISPERSIBLE Take 1 Tab by mouth every 8 hours as needed. 10 Tab 0   • hydroCHLOROthiazide (HYDRODIURIL) 12.5 MG tablet TAKE ONE TABLET BY MOUTH ONCE DAILY (Patient not taking: Reported on 4/17/2019) 90 Tab 1   • amlodipine (NORVASC) 10 MG Tab Take 1 Tab by mouth every day. (Patient not taking: Reported on 4/17/2019) 90 Tab 1     No current facility-administered medications for this visit.          Allergies as of 04/17/2019   • (No Known Allergies)        ROS: Denies any chest pain, Shortness of breath, Changes bowel or bladder, Lower extremity edema.    Physical Exam:  /74   Pulse 72   Ht 1.753 m (5' 9\")   Wt 78 kg (172 lb)   SpO2 95%   BMI 25.40 kg/m²   Gen.: Well-developed, well-nourished, no apparent distress,pleasant and cooperative with the examination  Skin:  Warm and dry with good turgor. No rashes or suspicious lesions in visible areas  HEENT:Sinuses nontender with palpation, TMs clear, " nares patent with pink mucosa and clear rhinorrhea,no septal deviation ,polyps or lesions. lips without lesions, oropharynx clear.  Neck: Trachea midline,no masses or adenopathy. No JVD.  Cor: Regular rate and rhythm without murmur, gallop or rub.  Lungs: Respirations unlabored.Clear to auscultation with equal breath sounds bilaterally. No wheezes, rhonchi.  Extremities: No cyanosis, clubbing or edema.        Assessment and Plan.   73 y.o. male     1. Uncontrolled type 2 diabetes mellitus with hyperglycemia (HCC)  Slightly improved A1c from 12.6 to 10.3(today).  Patient has not been taking the Lantus, patient advised to start taking Lantus at 15 units at night.  Continue Humalog, he has been taking only 6 units not based on sliding scale, because he has not been checking his blood sugar.  However advised to take Humalog 10 units before lunch and before dinner.  Patient advised to check his blood sugar 3 times a day keep a log, return to the clinic in a month for reevaluation.  Patient was seen by diabetic nurse, I agree with her plan.    - Diabetic Monofilament LE Exam  - POCT  A1C  - Comp Metabolic Panel; Future  - Lipid Profile; Future  - MICROALBUMIN CREAT RATIO URINE (LAB COLLECT); Future    2. Essential hypertension  Adequate control.  Continue lisinopril 40 mg daily.    3. Mixed hyperlipidemia  He has been tolerating the statin. Denies muscle pain LFTs has been normal  Continue atorvastatin 40 mg daily.

## 2019-05-04 ENCOUNTER — HOSPITAL ENCOUNTER (OUTPATIENT)
Dept: LAB | Facility: MEDICAL CENTER | Age: 74
End: 2019-05-04
Attending: FAMILY MEDICINE
Payer: COMMERCIAL

## 2019-05-04 DIAGNOSIS — E11.65 UNCONTROLLED TYPE 2 DIABETES MELLITUS WITH HYPERGLYCEMIA (HCC): ICD-10-CM

## 2019-05-04 LAB
ALBUMIN SERPL BCP-MCNC: 4.2 G/DL (ref 3.2–4.9)
ALBUMIN/GLOB SERPL: 1.5 G/DL
ALP SERPL-CCNC: 61 U/L (ref 30–99)
ALT SERPL-CCNC: 22 U/L (ref 2–50)
ANION GAP SERPL CALC-SCNC: 7 MMOL/L (ref 0–11.9)
AST SERPL-CCNC: 20 U/L (ref 12–45)
BILIRUB SERPL-MCNC: 1.4 MG/DL (ref 0.1–1.5)
BUN SERPL-MCNC: 14 MG/DL (ref 8–22)
CALCIUM SERPL-MCNC: 9.1 MG/DL (ref 8.5–10.5)
CHLORIDE SERPL-SCNC: 103 MMOL/L (ref 96–112)
CHOLEST SERPL-MCNC: 124 MG/DL (ref 100–199)
CO2 SERPL-SCNC: 28 MMOL/L (ref 20–33)
CREAT SERPL-MCNC: 0.89 MG/DL (ref 0.5–1.4)
CREAT UR-MCNC: 68.2 MG/DL
FASTING STATUS PATIENT QL REPORTED: NORMAL
GLOBULIN SER CALC-MCNC: 2.8 G/DL (ref 1.9–3.5)
GLUCOSE SERPL-MCNC: 204 MG/DL (ref 65–99)
HDLC SERPL-MCNC: 46 MG/DL
LDLC SERPL CALC-MCNC: 43 MG/DL
MICROALBUMIN UR-MCNC: 1.2 MG/DL
MICROALBUMIN/CREAT UR: 18 MG/G (ref 0–30)
POTASSIUM SERPL-SCNC: 3.9 MMOL/L (ref 3.6–5.5)
PROT SERPL-MCNC: 7 G/DL (ref 6–8.2)
SODIUM SERPL-SCNC: 138 MMOL/L (ref 135–145)
TRIGL SERPL-MCNC: 175 MG/DL (ref 0–149)

## 2019-05-04 PROCEDURE — 80053 COMPREHEN METABOLIC PANEL: CPT

## 2019-05-04 PROCEDURE — 80061 LIPID PANEL: CPT

## 2019-05-04 PROCEDURE — 82570 ASSAY OF URINE CREATININE: CPT

## 2019-05-04 PROCEDURE — 82043 UR ALBUMIN QUANTITATIVE: CPT

## 2019-05-04 PROCEDURE — 36415 COLL VENOUS BLD VENIPUNCTURE: CPT

## 2019-05-10 DIAGNOSIS — I10 ESSENTIAL HYPERTENSION: ICD-10-CM

## 2019-05-12 RX ORDER — LISINOPRIL 40 MG/1
TABLET ORAL
Qty: 90 TAB | Refills: 1 | Status: SHIPPED | OUTPATIENT
Start: 2019-05-12 | End: 2019-06-12 | Stop reason: SDUPTHER

## 2019-05-29 ENCOUNTER — OFFICE VISIT (OUTPATIENT)
Dept: MEDICAL GROUP | Facility: MEDICAL CENTER | Age: 74
End: 2019-05-29
Payer: COMMERCIAL

## 2019-05-29 VITALS
OXYGEN SATURATION: 95 % | HEIGHT: 69 IN | TEMPERATURE: 97.3 F | WEIGHT: 173 LBS | DIASTOLIC BLOOD PRESSURE: 80 MMHG | HEART RATE: 79 BPM | BODY MASS INDEX: 25.62 KG/M2 | SYSTOLIC BLOOD PRESSURE: 116 MMHG

## 2019-05-29 DIAGNOSIS — E11.65 UNCONTROLLED TYPE 2 DIABETES MELLITUS WITH HYPERGLYCEMIA (HCC): ICD-10-CM

## 2019-05-29 PROCEDURE — 99214 OFFICE O/P EST MOD 30 MIN: CPT | Performed by: FAMILY MEDICINE

## 2019-05-29 RX ORDER — PEN NEEDLE, DIABETIC 30 GX3/16"
4 NEEDLE, DISPOSABLE MISCELLANEOUS
Qty: 120 EACH | Refills: 11 | Status: SHIPPED | OUTPATIENT
Start: 2019-05-29 | End: 2019-07-01 | Stop reason: SDUPTHER

## 2019-05-29 NOTE — PROGRESS NOTES
RN-LETITIAE Note    Subjective:     Health changes since last visit/interval Hx: States feeling good.  Daughter here interpreting for him.    Medications (including changes made today)  Current Outpatient Prescriptions   Medication Sig Dispense Refill   • lisinopril (PRINIVIL, ZESTRIL) 40 MG tablet TAKE 1 TABLET BY MOUTH ONCE DAILY 90 Tab 1   • insulin glargine (LANTUS) 100 UNIT/ML Solution Pen-injector injection Inject 10 Units as instructed every evening. (Patient taking differently: Inject 15 Units as instructed every evening.) 2 PEN 1   • Insulin Pen Needle (PEN NEEDLES) 32G X 4 MM Misc 4 PENS by Does not apply route 4 Times a Day,Before Meals and at Bedtime. 120 Each 1   • glucose blood strip Test strips order: Test strips for Free style meter. Use TID and in times of high or low blood sugars. 100 Strip 0   • metformin (GLUCOPHAGE) 1000 MG tablet Take 1 Tab by mouth 2 times a day, with meals. 180 Tab 3   • insulin lispro, Human, (HUMALOG KWIKPEN) 100 UNIT/ML Solution Pen-injector injection Take it as directed based on sliding scale. (Patient taking differently: 2-12 Units. Take it as directed based on sliding scale.) 10 mL 3   • meclizine (ANTIVERT) 25 MG Tab Take 1 Tab by mouth 3 times a day as needed. 30 Tab 0   • atorvastatin (LIPITOR) 40 MG Tab TAKE ONE TABLET BY MOUTH ONCE DAILY AT BEDTIME 90 Tab 1   • hydroCHLOROthiazide (HYDRODIURIL) 12.5 MG tablet TAKE ONE TABLET BY MOUTH ONCE DAILY 90 Tab 1   • amlodipine (NORVASC) 10 MG Tab Take 1 Tab by mouth every day. 90 Tab 1   • ondansetron (ZOFRAN ODT) 4 MG TABLET DISPERSIBLE Take 1 Tab by mouth every 8 hours as needed. 10 Tab 0     No current facility-administered medications for this visit.        Taking daily ASA: No  Taking above medications as prescribed: yes  SIDE EFFECTS: Patient denies side effects to medications    Exercise: Walking daily.  Diet: Eating a lot of rice still at breakfast and dinner.  Patient's body mass index is 25.55 kg/m². Exercise and  nutrition counseling were performed at this visit.      Health Maintenance:   Health Maintenance Due   Topic Date Due   • HEPATITIS C SCREENING  1945   • IMM HEP B VACCINE (1 of 3 - Risk 3-dose series) 08/03/1966   • IMM ZOSTER VACCINES (1 of 2) 08/03/1997   • RETINAL SCREENING  08/12/2016   • COLONOSCOPY  08/21/2016       DM:   Last A1c:   Lab Results   Component Value Date/Time    HBA1C 10.3 (A) 04/17/2019 10:16 AM      A1C GOAL: < 8    Glucose monitoring frequency: Testing twice daily  Breakfast: 142, 194, 191 175, 200, 192, 193 and Dinner: 22,138, 260,214, 260,214,261  Hypoglycemic episodes: no    Last Retinal Exam: needs  Daily Foot Exam: Yes   Routine Dental Exams: Yes    Lab Results   Component Value Date/Time    MALBCRT 18 05/04/2019 07:34 AM    MICROALBUR 1.2 05/04/2019 07:34 AM        ACR Albumin/Creatinine Ratio goal <30     HTN:   Blood pressure goal <140/<80 .   Currently Rx ACE/ARB: Yes    Dyslipidemia:    Lab Results   Component Value Date/Time    CHOLSTRLTOT 124 05/04/2019 07:34 AM    LDL 43 05/04/2019 07:34 AM    HDL 46 05/04/2019 07:34 AM    TRIGLYCERIDE 175 (H) 05/04/2019 07:34 AM       Lab Results   Component Value Date/Time    SODIUM 138 05/04/2019 07:34 AM    POTASSIUM 3.9 05/04/2019 07:34 AM    CHLORIDE 103 05/04/2019 07:34 AM    CO2 28 05/04/2019 07:34 AM    GLUCOSE 204 (H) 05/04/2019 07:34 AM    BUN 14 05/04/2019 07:34 AM    CREATININE 0.89 05/04/2019 07:34 AM    CREATININE 1.07 05/05/2009     Lab Results   Component Value Date/Time    ALKPHOSPHAT 61 05/04/2019 07:34 AM    ASTSGOT 20 05/04/2019 07:34 AM    ALTSGPT 22 05/04/2019 07:34 AM    TBILIRUBIN 1.4 05/04/2019 07:34 AM        Currently Rx Statin: Yes    He  reports that he quit smoking about 12 years ago. He has never used smokeless tobacco.    Objective:     Exam:  Monofilament: done   Monofilament testing with a 10 gram force: sensation intact: intact bilaterally  Visual Inspection: Feet without maceration, ulcers,  fissures.  Pedal pulses: intact bilaterally    Plan:     Discussed and educated on:   - All medications, side effects and compliance (discussed carefully)  - Annual eye examinations at Ophthalmology  - Home glucose monitoring emphasized  - Weight control and daily exercise    Recommended medication changes: Increase Lantus to 15 units at bedtime and he will take Humalog 6 units before breakfast and dinner to cover his rice.          MD Note    CC: Uncontrolled diabetes    HPI:   Lawrence presents today discuss the following    Uncontrolled type 2 diabetes mellitus with hyperglycemia ([facility])  Patient came in today for follow-up of his uncontrolled diabetes, he brought his blood blood glucose log.  His blood glucose has been mostly between 250-300.  As per daughter he has been compliant with his medication.  He is currently on Lantus 10 units nightly, and Humalog 10 units twice a day.  Has been trying to eat healthy, exercise in a regular basis.  The last A1c was done in April 2019 was 10.3.    Patient Active Problem List    Diagnosis Date Noted   • Pneumonia 06/30/2018   • Uncontrolled type 2 diabetes mellitus with complication, without long-term current use of insulin ([facility]) 02/07/2017   • Microcytosis 05/26/2016   • Adenomatous polyp of colon 10/02/2015   • Hyperlipidemia 11/20/2014   • Retinal vein occlusion, branch 11/20/2014   • Macular edema 11/20/2014   • Hypertension    • Diabetes mellitus type 2, uncontrolled ([facility])        Current Outpatient Prescriptions   Medication Sig Dispense Refill   • Insulin Pen Needle (PEN NEEDLES) 32G X 4 MM Misc 4 PENS by Does not apply route 4 Times a Day,Before Meals and at Bedtime. 120 Each 11   • SITagliptin (JANUVIA) 25 MG Tab Take 1 Tab by mouth every day. 30 Tab 3   • lisinopril (PRINIVIL, ZESTRIL) 40 MG tablet TAKE 1 TABLET BY MOUTH ONCE DAILY 90 Tab 1   • insulin glargine (LANTUS) 100 UNIT/ML Solution Pen-injector injection Inject 10 Units as instructed every evening. (Patient  "taking differently: Inject 15 Units as instructed every evening.) 2 PEN 1   • glucose blood strip Test strips order: Test strips for Free style meter. Use TID and in times of high or low blood sugars. 100 Strip 0   • metformin (GLUCOPHAGE) 1000 MG tablet Take 1 Tab by mouth 2 times a day, with meals. 180 Tab 3   • insulin lispro, Human, (HUMALOG KWIKPEN) 100 UNIT/ML Solution Pen-injector injection Take it as directed based on sliding scale. (Patient taking differently: 2-12 Units. Take it as directed based on sliding scale.) 10 mL 3   • meclizine (ANTIVERT) 25 MG Tab Take 1 Tab by mouth 3 times a day as needed. 30 Tab 0   • atorvastatin (LIPITOR) 40 MG Tab TAKE ONE TABLET BY MOUTH ONCE DAILY AT BEDTIME 90 Tab 1   • hydroCHLOROthiazide (HYDRODIURIL) 12.5 MG tablet TAKE ONE TABLET BY MOUTH ONCE DAILY 90 Tab 1   • amlodipine (NORVASC) 10 MG Tab Take 1 Tab by mouth every day. 90 Tab 1   • ondansetron (ZOFRAN ODT) 4 MG TABLET DISPERSIBLE Take 1 Tab by mouth every 8 hours as needed. 10 Tab 0     No current facility-administered medications for this visit.          Allergies as of 05/29/2019   • (No Known Allergies)        ROS: Denies any chest pain, Shortness of breath, Changes bowel or bladder, Lower extremity edema.    Physical Exam:  /80   Pulse 79   Temp 36.3 °C (97.3 °F)   Ht 1.753 m (5' 9\")   Wt 78.5 kg (173 lb)   SpO2 95%   BMI 25.55 kg/m²   Gen.: Well-developed, well-nourished, no apparent distress,pleasant and cooperative with the examination  Skin:  Warm and dry with good turgor. No rashes or suspicious lesions in visible areas  Cor: Regular rate and rhythm without murmur, gallop or rub.  Lungs: Respirations unlabored.Clear to auscultation with equal breath sounds bilaterally. No wheezes, rhonchi.  Extremities: No cyanosis, clubbing or edema.    Assessment and Plan.   73 y.o. male     1. Uncontrolled type 2 diabetes mellitus with hyperglycemia (HCC)  Came in today with blood sugar log, his blood " glucose has been mostly between 250-300.  Has been on Lantus 10 units nightly, and Humalog 10 units twice a day.  Will increase Lantus to 15 units at night, and Humalog 6 units before breakfast and dinner.  Will add Januvia 25 mg daily.  Patient is counseled about lifestyle modification, low carbohydrate and low-fat diet, and age appropriate exercise  Return to the clinic in 2 months to check the A1c..  Patient was seen by the diabetic nurse today, I agree with our plan.    - Insulin Pen Needle (PEN NEEDLES) 32G X 4 MM Misc; 4 PENS by Does not apply route 4 Times a Day,Before Meals and at Bedtime.  Dispense: 120 Each; Refill: 11  - SITagliptin (JANUVIA) 25 MG Tab; Take 1 Tab by mouth every day.  Dispense: 30 Tab; Refill: 3

## 2019-06-03 DIAGNOSIS — Z76.0 MEDICATION REFILL: ICD-10-CM

## 2019-06-03 DIAGNOSIS — E11.65 UNCONTROLLED TYPE 2 DIABETES MELLITUS WITH HYPERGLYCEMIA (HCC): ICD-10-CM

## 2019-06-04 ENCOUNTER — TELEPHONE (OUTPATIENT)
Dept: MEDICAL GROUP | Facility: MEDICAL CENTER | Age: 74
End: 2019-06-04

## 2019-06-04 DIAGNOSIS — E11.9 DIABETES MELLITUS TYPE 2 IN NONOBESE (HCC): ICD-10-CM

## 2019-06-11 DIAGNOSIS — E78.49 OTHER HYPERLIPIDEMIA: ICD-10-CM

## 2019-06-11 DIAGNOSIS — I10 ESSENTIAL HYPERTENSION: ICD-10-CM

## 2019-06-11 RX ORDER — AMLODIPINE BESYLATE 10 MG/1
10 TABLET ORAL DAILY
Qty: 90 TAB | Refills: 1 | Status: SHIPPED | OUTPATIENT
Start: 2019-06-11 | End: 2019-11-11 | Stop reason: SDUPTHER

## 2019-06-11 RX ORDER — ATORVASTATIN CALCIUM 40 MG/1
TABLET, FILM COATED ORAL
Qty: 90 TAB | Refills: 3 | Status: SHIPPED | OUTPATIENT
Start: 2019-06-11 | End: 2020-02-19

## 2019-06-12 DIAGNOSIS — I10 ESSENTIAL HYPERTENSION: ICD-10-CM

## 2019-06-12 RX ORDER — LISINOPRIL 40 MG/1
TABLET ORAL
Qty: 90 TAB | Refills: 1 | Status: SHIPPED | OUTPATIENT
Start: 2019-06-12 | End: 2019-06-13 | Stop reason: SDUPTHER

## 2019-06-13 DIAGNOSIS — I10 ESSENTIAL HYPERTENSION: ICD-10-CM

## 2019-06-13 RX ORDER — LISINOPRIL 40 MG/1
TABLET ORAL
Qty: 90 TAB | Refills: 1 | Status: SHIPPED | OUTPATIENT
Start: 2019-06-13 | End: 2019-11-11 | Stop reason: SDUPTHER

## 2019-07-01 DIAGNOSIS — E11.65 UNCONTROLLED TYPE 2 DIABETES MELLITUS WITH HYPERGLYCEMIA (HCC): ICD-10-CM

## 2019-07-01 DIAGNOSIS — E11.9 DIABETES MELLITUS TYPE 2 IN NONOBESE (HCC): ICD-10-CM

## 2019-07-01 RX ORDER — PEN NEEDLE, DIABETIC 32GX 5/32"
NEEDLE, DISPOSABLE MISCELLANEOUS
Qty: 100 EACH | Refills: 3 | Status: SHIPPED | OUTPATIENT
Start: 2019-07-01 | End: 2019-07-02 | Stop reason: SDUPTHER

## 2019-07-01 NOTE — TELEPHONE ENCOUNTER
Was the patient seen in the last year in this department? Yes    Does patient have an active prescription for medications requested? No     Received Request Via: Pharmacy     Patient requesting freestyle lite strips

## 2019-07-02 DIAGNOSIS — Z76.0 MEDICATION REFILL: ICD-10-CM

## 2019-07-02 DIAGNOSIS — E11.65 UNCONTROLLED TYPE 2 DIABETES MELLITUS WITH HYPERGLYCEMIA (HCC): ICD-10-CM

## 2019-07-02 NOTE — TELEPHONE ENCOUNTER
Problem: PHYSICAL THERAPY ADULT  Goal: Performs mobility at highest level of function for planned discharge setting  See evaluation for individualized goals  Description  Treatment/Interventions: Functional transfer training, LE strengthening/ROM, Elevations, Therapeutic exercise, Endurance training, Gait training, Spoke to nursing, OT  Equipment Recommended: (none anticipated)       See flowsheet documentation for full assessment, interventions and recommendations  Note:   Prognosis: Good  Problem List: Decreased strength, Decreased endurance, Impaired balance, Decreased mobility, Decreased safety awareness  Assessment: Pt is 81 y o  male seen for PT re-evaluation on 2/21/2019 s/p admit to [facility] on 2/18/2019 w/ Cerebrovascular accident (CVA) due to occlusion of cerebral artery ([facility] [address] )  Re-eval performed 2/2 worsening symptoms  Repeat CT head on 2/20 (for worsening dysarthria) noted interval increase/evolution of L pontine/medullary infarction  PT consulted to assess pt's functional mobility and d/c needs  Performed at least 2 patient identifiers during session: Name and wristband  PTA, pt was independent w/ all functional mobility w/ no AD/DME, ambulates unrestricted distances and all terrain, has 0 ERICK, lives w/ wife and family in 2 level [facility] and retired  Personal factors affecting pt at time of IE include: inaccessible home environment, lives in 2 story house, ambulating w/ assistive device, inability to navigate level surfaces w/o external assistance, unable to perform dynamic tasks in community, decreased initiation and engagement and difficulty w/ ADLs/IADLs   Please find objective findings from PT assessment regarding body systems outlined above with impairments and limitations including weakness, impaired balance, decreased endurance, gait deviations, decreased activity tolerance, decreased functional mobility tolerance, decreased safety awareness and fall risk, as well as mobility Was the patient seen in the last year in this department? Yes    Does patient have an active prescription for medications requested? Yes    Received Request Via: Patient   assessment (need for minimal assist w/ all phases of mobility when usually ambulating independently)  The following objective measures performed on re-eval also reveal limitations: Barthel Index: 50/100 and Modified Dario: 4 (moderate/severe disability)  Pt to benefit from continued PT tx to address deficits as defined above and maximize level of functional independent mobility and consistency  From PT/mobility standpoint, recommendation at time of d/c would be IP rehab pending progress in order to facilitate return to PLOF  Barriers to Discharge: None     Recommendation: (S) Rehab consult, Post acute IP rehab     PT - OK to Discharge: Yes(when medically cleared, if to STR)    See flowsheet documentation for full assessment

## 2019-07-09 ENCOUNTER — TELEPHONE (OUTPATIENT)
Dept: MEDICAL GROUP | Facility: MEDICAL CENTER | Age: 74
End: 2019-07-09

## 2019-07-09 DIAGNOSIS — E11.9 DIABETES MELLITUS TYPE 2 IN NONOBESE (HCC): ICD-10-CM

## 2019-07-09 RX ORDER — LANCETS 30 GAUGE
EACH MISCELLANEOUS
Qty: 100 EACH | Refills: 3 | Status: SHIPPED | OUTPATIENT
Start: 2019-07-09 | End: 2021-03-29

## 2019-08-21 ENCOUNTER — OFFICE VISIT (OUTPATIENT)
Dept: MEDICAL GROUP | Facility: MEDICAL CENTER | Age: 74
End: 2019-08-21
Payer: COMMERCIAL

## 2019-08-21 VITALS
HEART RATE: 71 BPM | SYSTOLIC BLOOD PRESSURE: 160 MMHG | OXYGEN SATURATION: 93 % | RESPIRATION RATE: 16 BRPM | TEMPERATURE: 98 F | DIASTOLIC BLOOD PRESSURE: 90 MMHG | WEIGHT: 175 LBS | BODY MASS INDEX: 25.92 KG/M2 | HEIGHT: 69 IN

## 2019-08-21 DIAGNOSIS — E11.65 UNCONTROLLED TYPE 2 DIABETES MELLITUS WITH HYPERGLYCEMIA (HCC): ICD-10-CM

## 2019-08-21 DIAGNOSIS — E78.2 MIXED HYPERLIPIDEMIA: ICD-10-CM

## 2019-08-21 DIAGNOSIS — I10 ESSENTIAL HYPERTENSION: ICD-10-CM

## 2019-08-21 LAB
HBA1C MFR BLD: 8.3 % (ref 0–5.6)
INT CON NEG: NEGATIVE
INT CON POS: POSITIVE

## 2019-08-21 PROCEDURE — 83036 HEMOGLOBIN GLYCOSYLATED A1C: CPT | Performed by: FAMILY MEDICINE

## 2019-08-21 PROCEDURE — 99214 OFFICE O/P EST MOD 30 MIN: CPT | Performed by: FAMILY MEDICINE

## 2019-08-21 NOTE — PROGRESS NOTES
CC: Diabetes, hypertension, hyperlipidemia    HPI:   Howard presents today discuss the following medical issues:    1. Uncontrolled type 2 diabetes mellitus with hyperglycemia ([facility])  Patient showed improved glycemic control.  He is A1C today is 8.3, it was 10.3 last visit.  Patient stated that Januvia has helped his blood sugar a lot.  Has been checking his blood pressure at home has always been mostly less than 150.  Is currently on Lantus 15 units nightly, and Humalog 10 units twice a day, he also has been on Januvia 25 mg daily.  Denies any polyuria polydipsia, hypoglycemic episodes.    2. Essential hypertension  Is blood pressure today is high.  However he has been asymptomatic.  Denies headache, chest pain, shortness of breath patient stated that sometimes he forgets taking his blood pressure medication.  He is currently on lisinopril 40 mg, and hydrochlorothiazide 25 mg.     3. Mixed hyperlipidemia  He has been tolerating the statin. Denies muscle pain LFTs has been normal, has been on atorvastatin 40 mg daily.  Last lipid panel was checked in May 2019:    Cholesterol,Tot 100 - 199 mg/dL 124    Triglycerides 0 - 149 mg/dL 175High     HDL >=40 mg/dL 46    LDL <100 mg/dL 43        Patient Active Problem List    Diagnosis Date Noted   • Pneumonia 06/30/2018   • Uncontrolled type 2 diabetes mellitus with complication, without long-term current use of insulin ([facility]) 02/07/2017   • Microcytosis 05/26/2016   • Adenomatous polyp of colon 10/02/2015   • Hyperlipidemia 11/20/2014   • Retinal vein occlusion, branch 11/20/2014   • Macular edema 11/20/2014   • Hypertension    • Diabetes mellitus type 2, uncontrolled ([facility])        Current Outpatient Medications   Medication Sig Dispense Refill   • Lancets Lancets order: Lancets for free style meter. Sig: use 3 times daily, 100 Each 3   • glucose blood strip Test strips order: Test strips for Free style meter. Use TID and in times of high or low blood sugars. 100 Strip 0   •  "Insulin Pen Needle 32 G x 4 mm (RELION PEN NEEDLES) USE 1 PEN NEEDLE 4 TIMES DAILY  BEFORE MEAL(S) AND AT BEDTIME (E11.65) 100 Each 3   • Blood Glucose Test Strips Test strips order: Test strips for Freestyle lite meter. Sig: use 3 times a day . 100 Each 3   • lisinopril (PRINIVIL, ZESTRIL) 40 MG tablet TAKE 1 TABLET BY MOUTH ONCE DAILY 90 Tab 1   • amLODIPine (NORVASC) 10 MG Tab Take 1 Tab by mouth every day. 90 Tab 1   • atorvastatin (LIPITOR) 40 MG Tab TAKE ONE TABLET BY MOUTH ONCE DAILY AT BEDTIME 90 Tab 3   • SITagliptin (JANUVIA) 25 MG Tab Take 1 Tab by mouth every day. 30 Tab 3   • insulin glargine (LANTUS) 100 UNIT/ML Solution Pen-injector injection Inject 10 Units as instructed every evening. (Patient taking differently: Inject 15 Units as instructed every evening.) 2 PEN 1   • metformin (GLUCOPHAGE) 1000 MG tablet Take 1 Tab by mouth 2 times a day, with meals. 180 Tab 3   • insulin lispro, Human, (HUMALOG KWIKPEN) 100 UNIT/ML Solution Pen-injector injection Take it as directed based on sliding scale. (Patient taking differently: 2-12 Units. Take it as directed based on sliding scale.) 10 mL 3   • meclizine (ANTIVERT) 25 MG Tab Take 1 Tab by mouth 3 times a day as needed. 30 Tab 0   • ondansetron (ZOFRAN ODT) 4 MG TABLET DISPERSIBLE Take 1 Tab by mouth every 8 hours as needed. 10 Tab 0   • hydroCHLOROthiazide (HYDRODIURIL) 12.5 MG tablet TAKE ONE TABLET BY MOUTH ONCE DAILY 90 Tab 1     No current facility-administered medications for this visit.          Allergies as of 08/21/2019   • (No Known Allergies)        ROS: Denies any chest pain, Shortness of breath, Changes bowel or bladder, Lower extremity edema.    Physical Exam:  /90 (BP Location: Right arm, Patient Position: Sitting, BP Cuff Size: Adult)   Pulse 71   Temp 36.7 °C (98 °F) (Temporal)   Resp 16   Ht 1.753 m (5' 9\")   Wt 79.4 kg (175 lb)   SpO2 93%   BMI 25.84 kg/m²   Gen.: Well-developed, well-nourished, no apparent " distress,pleasant and cooperative with the examination  Skin:  Warm and dry with good turgor. No rashes or suspicious lesions in visible areas  HEENT:Sinuses nontender with palpation, TMs clear, nares patent with pink mucosa and clear rhinorrhea,no septal deviation ,polyps or lesions. lips without lesions, oropharynx clear.  Neck: Trachea midline,no masses or adenopathy. No JVD.  Cor: Regular rate and rhythm without murmur, gallop or rub.  Lungs: Respirations unlabored.Clear to auscultation with equal breath sounds bilaterally. No wheezes, rhonchi.  Extremities: No cyanosis, clubbing or edema.      Assessment and Plan.   74 y.o. male     1. Uncontrolled type 2 diabetes mellitus with hyperglycemia (HCC)  Improved glycemic control.  A1C today is 8.3, was 10.3 last visit  Has been on Lantus 10 units nightly, and Humalog 10 units twice a day.  Will increase Lantus to 15 units at night, and Humalog 6 units before breakfast and dinner.  Will add Januvia 25 mg daily.    2. Essential hypertension  Blood pressure today is high.  However he has been asymptomatic.  Patient stated that sometimes he forgets taking his blood pressure medication.  Patient advised to take his medication regularly.  For now continue on lisinopril 40 mg, and hydrochlorothiazide 25 mg.     3. Mixed hyperlipidemia  He has been tolerating the statin. Denies muscle pain LFTs has been normal,   Continue  on atorvastatin 40 mg daily.

## 2019-09-23 RX ORDER — INSULIN GLARGINE 100 [IU]/ML
INJECTION, SOLUTION SUBCUTANEOUS
Qty: 10 ML | Refills: 1 | Status: SHIPPED | OUTPATIENT
Start: 2019-09-23 | End: 2020-03-31

## 2019-10-08 DIAGNOSIS — E11.65 UNCONTROLLED TYPE 2 DIABETES MELLITUS WITH HYPERGLYCEMIA (HCC): ICD-10-CM

## 2019-10-08 RX ORDER — SITAGLIPTIN 25 MG/1
TABLET, FILM COATED ORAL
Qty: 90 TAB | Refills: 3 | Status: SHIPPED | OUTPATIENT
Start: 2019-10-08 | End: 2021-04-12

## 2019-11-11 ENCOUNTER — OFFICE VISIT (OUTPATIENT)
Dept: MEDICAL GROUP | Facility: MEDICAL CENTER | Age: 74
End: 2019-11-11
Payer: COMMERCIAL

## 2019-11-11 VITALS
DIASTOLIC BLOOD PRESSURE: 88 MMHG | SYSTOLIC BLOOD PRESSURE: 168 MMHG | TEMPERATURE: 97.8 F | HEART RATE: 66 BPM | HEIGHT: 69 IN | BODY MASS INDEX: 26.45 KG/M2 | OXYGEN SATURATION: 93 % | WEIGHT: 178.6 LBS

## 2019-11-11 DIAGNOSIS — Z12.11 COLON CANCER SCREENING: ICD-10-CM

## 2019-11-11 DIAGNOSIS — E78.2 MIXED HYPERLIPIDEMIA: ICD-10-CM

## 2019-11-11 DIAGNOSIS — I10 ESSENTIAL HYPERTENSION: ICD-10-CM

## 2019-11-11 LAB
HBA1C MFR BLD: 7.8 % (ref 0–5.6)
INT CON NEG: NEGATIVE
INT CON POS: POSITIVE

## 2019-11-11 PROCEDURE — 83036 HEMOGLOBIN GLYCOSYLATED A1C: CPT | Performed by: FAMILY MEDICINE

## 2019-11-11 PROCEDURE — 99214 OFFICE O/P EST MOD 30 MIN: CPT | Performed by: FAMILY MEDICINE

## 2019-11-11 RX ORDER — AMLODIPINE BESYLATE 10 MG/1
10 TABLET ORAL DAILY
Qty: 90 TAB | Refills: 1 | Status: SHIPPED | OUTPATIENT
Start: 2019-11-11 | End: 2021-02-03 | Stop reason: SDUPTHER

## 2019-11-11 RX ORDER — ATORVASTATIN CALCIUM 20 MG/1
40 TABLET, FILM COATED ORAL
Refills: 1 | COMMUNITY
Start: 2019-10-08 | End: 2020-09-01

## 2019-11-11 RX ORDER — LISINOPRIL 40 MG/1
TABLET ORAL
Qty: 90 TAB | Refills: 1 | Status: SHIPPED | OUTPATIENT
Start: 2019-11-11 | End: 2020-01-10

## 2019-11-11 NOTE — PROGRESS NOTES
CC: Hypertension, uncontrolled diabetes, hyperlipidemia.    HPI:   Howard presents today to discuss the following medical issues:    Essential hypertension  Blood pressure today is high.  Patient stated that has been checking her blood pressure at home has also been above 160/90.  However patient has been asymptomatic.  Denies any headache, chest pain, shortness of breath.  He is currently on lisinopril 40 mg daily.    Uncontrolled type 2 diabetes mellitus with complication, without long-term current use of insulin ([facility])  A1c went down gradually from 10.3 to 8.3, and today it is 7.8.  Denies polyuria and polydipsia.  Patient is currently on Lantus 15 units at night and Humalog 10 units before breakfast and dinner, and Januvia 25 mg daily was added last visit.    Mixed hyperlipidemia  He has been tolerating the statin. Denies muscle pain LFTs has been normal,  patient is currently on atorvastatin 40 mg daily.        Patient Active Problem List    Diagnosis Date Noted   • Pneumonia 06/30/2018   • Uncontrolled type 2 diabetes mellitus with complication, without long-term current use of insulin ([facility]) 02/07/2017   • Microcytosis 05/26/2016   • Adenomatous polyp of colon 10/02/2015   • Hyperlipidemia 11/20/2014   • Retinal vein occlusion, branch 11/20/2014   • Macular edema 11/20/2014   • Hypertension    • Diabetes mellitus type 2, uncontrolled ([facility])        Current Outpatient Medications   Medication Sig Dispense Refill   • atorvastatin (LIPITOR) 20 MG Tab Take 40 mg by mouth.  1   • lisinopril (PRINIVIL) 40 MG tablet TAKE 1 TABLET BY MOUTH ONCE DAILY 90 Tab 1   • amLODIPine (NORVASC) 10 MG Tab Take 1 Tab by mouth every day. 90 Tab 1   • JANUVIA 25 MG Tab TAKE 1 TABLET BY MOUTH ONCE DAILY 90 Tab 3   • metformin (GLUCOPHAGE) 1000 MG tablet TAKE 1 TABLET BY MOUTH TWICE DAILY WITH MEALS 180 Tab 3   • LANTUS SOLOSTAR 100 UNIT/ML Solution Pen-injector injection INJECT 10 UNITS SUBCUTANEOUSLY ONCE DAILY IN THE EVENING 10 mL 1  "  • Lancets Lancets order: Lancets for free style meter. Sig: use 3 times daily, 100 Each 3   • glucose blood strip Test strips order: Test strips for Free style meter. Use TID and in times of high or low blood sugars. 100 Strip 0   • Insulin Pen Needle 32 G x 4 mm (RELION PEN NEEDLES) USE 1 PEN NEEDLE 4 TIMES DAILY  BEFORE MEAL(S) AND AT BEDTIME (E11.65) 100 Each 3   • Blood Glucose Test Strips Test strips order: Test strips for Freestyle lite meter. Sig: use 3 times a day . 100 Each 3   • atorvastatin (LIPITOR) 40 MG Tab TAKE ONE TABLET BY MOUTH ONCE DAILY AT BEDTIME 90 Tab 3   • insulin lispro, Human, (HUMALOG KWIKPEN) 100 UNIT/ML Solution Pen-injector injection Take it as directed based on sliding scale. (Patient taking differently: 2-12 Units. Take it as directed based on sliding scale.) 10 mL 3   • meclizine (ANTIVERT) 25 MG Tab Take 1 Tab by mouth 3 times a day as needed. 30 Tab 0   • ondansetron (ZOFRAN ODT) 4 MG TABLET DISPERSIBLE Take 1 Tab by mouth every 8 hours as needed. 10 Tab 0   • hydroCHLOROthiazide (HYDRODIURIL) 12.5 MG tablet TAKE ONE TABLET BY MOUTH ONCE DAILY 90 Tab 1     No current facility-administered medications for this visit.          Allergies as of 11/11/2019   • (No Known Allergies)        ROS: Denies any chest pain, Shortness of breath, Changes bowel or bladder, Lower extremity edema.    Physical Exam:  BP (!) 168/88 (BP Location: Left arm, Patient Position: Sitting, BP Cuff Size: Adult)   Pulse 66   Temp 36.6 °C (97.8 °F) (Temporal)   Ht 1.753 m (5' 9\")   Wt 81 kg (178 lb 9.6 oz)   SpO2 93%   BMI 26.37 kg/m²   Gen.: Well-developed, well-nourished, no apparent distress,pleasant and cooperative with the examination  Skin:  Warm and dry with good turgor. No rashes or suspicious lesions in visible areas  HEENT:Sinuses nontender with palpation, TMs clear, nares patent with pink mucosa and clear rhinorrhea,no septal deviation ,polyps or lesions. lips without lesions, oropharynx " clear.  Neck: Trachea midline,no masses or adenopathy. No JVD.  Cor: Regular rate and rhythm without murmur, gallop or rub.  Lungs: Respirations unlabored.Clear to auscultation with equal breath sounds bilaterally. No wheezes, rhonchi.  Extremities: No cyanosis, clubbing or edema        Assessment and Plan.   74 y.o. male     1. Essential hypertension  Has high BP today.  Will continue lisinopril 40 mg daily, needs refill.  Will add amlodipine 10 mg daily.  Patient advised to check his blood pressure twice a day for a week and send it to me for reevaluation.    - lisinopril (PRINIVIL) 40 MG tablet; TAKE 1 TABLET BY MOUTH ONCE DAILY  Dispense: 90 Tab; Refill: 1  - amLODIPine (NORVASC) 10 MG Tab; Take 1 Tab by mouth every day.  Dispense: 90 Tab; Refill: 1    2. Uncontrolled type 2 diabetes mellitus with complication, without long-term current use of insulin (HCC)  Has improved.  A1c went down gradually from 10.3 to 8.3, and today it is 7.8.  Continue on Lantus 15 units at night, and Humalog 10 units before breakfast and dinner.  Continue on Januvia 25 mg daily.    - POCT  A1C    3. Mixed hyperlipidemia  He has been tolerating the statin. Denies muscle pain LFTs has been normal,   Continue  on atorvastatin 40 mg daily.

## 2019-11-25 RX ORDER — INSULIN GLARGINE 100 [IU]/ML
INJECTION, SOLUTION SUBCUTANEOUS
Qty: 6 PEN | Refills: 6 | Status: SHIPPED | OUTPATIENT
Start: 2019-11-25 | End: 2020-03-31 | Stop reason: SDUPTHER

## 2019-12-09 RX ORDER — ATORVASTATIN CALCIUM 20 MG/1
TABLET, FILM COATED ORAL
Qty: 180 TAB | Refills: 2 | Status: SHIPPED | OUTPATIENT
Start: 2019-12-09 | End: 2020-02-19

## 2020-01-09 DIAGNOSIS — I10 ESSENTIAL HYPERTENSION: ICD-10-CM

## 2020-01-10 DIAGNOSIS — E11.65 UNCONTROLLED TYPE 2 DIABETES MELLITUS WITH HYPERGLYCEMIA (HCC): ICD-10-CM

## 2020-01-10 DIAGNOSIS — E11.9 DIABETES MELLITUS TYPE 2 IN NONOBESE (HCC): ICD-10-CM

## 2020-01-10 RX ORDER — LISINOPRIL 40 MG/1
TABLET ORAL
Qty: 90 TAB | Refills: 3 | Status: SHIPPED | OUTPATIENT
Start: 2020-01-10 | End: 2020-12-01 | Stop reason: SDUPTHER

## 2020-01-12 RX ORDER — PEN NEEDLE, DIABETIC 32GX 5/32"
NEEDLE, DISPOSABLE MISCELLANEOUS
Qty: 100 EACH | Refills: 0 | Status: SHIPPED | OUTPATIENT
Start: 2020-01-12 | End: 2020-02-20

## 2020-01-13 DIAGNOSIS — E11.9 DIABETES MELLITUS TYPE 2 IN NONOBESE (HCC): ICD-10-CM

## 2020-01-20 ENCOUNTER — TELEPHONE (OUTPATIENT)
Dept: MEDICAL GROUP | Facility: MEDICAL CENTER | Age: 75
End: 2020-01-20

## 2020-01-20 DIAGNOSIS — E11.9 DIABETES MELLITUS TYPE 2 IN NONOBESE (HCC): ICD-10-CM

## 2020-01-21 ENCOUNTER — TELEPHONE (OUTPATIENT)
Dept: MEDICAL GROUP | Facility: MEDICAL CENTER | Age: 75
End: 2020-01-21

## 2020-01-21 NOTE — TELEPHONE ENCOUNTER
Pharmacy needs sliding scale listed in the sig. They cannot fill medication because it says based on sliding scale. Please add it to

## 2020-01-21 NOTE — TELEPHONE ENCOUNTER
I will not be able to print the prescription if I put on the sliding scale.  So patient probably need to come in and get the prescription printed

## 2020-02-19 ENCOUNTER — OFFICE VISIT (OUTPATIENT)
Dept: MEDICAL GROUP | Facility: MEDICAL CENTER | Age: 75
End: 2020-02-19
Payer: COMMERCIAL

## 2020-02-19 VITALS
HEART RATE: 68 BPM | OXYGEN SATURATION: 97 % | TEMPERATURE: 97.5 F | RESPIRATION RATE: 16 BRPM | DIASTOLIC BLOOD PRESSURE: 100 MMHG | HEIGHT: 69 IN | SYSTOLIC BLOOD PRESSURE: 184 MMHG | BODY MASS INDEX: 26.37 KG/M2

## 2020-02-19 DIAGNOSIS — E78.2 MIXED HYPERLIPIDEMIA: ICD-10-CM

## 2020-02-19 DIAGNOSIS — I10 ESSENTIAL HYPERTENSION: ICD-10-CM

## 2020-02-19 DIAGNOSIS — Z23 NEED FOR VACCINATION: ICD-10-CM

## 2020-02-19 LAB
HBA1C MFR BLD: 8.8 % (ref 0–5.6)
INT CON NEG: NEGATIVE
INT CON POS: POSITIVE
RETINAL SCREEN: POSITIVE

## 2020-02-19 PROCEDURE — 90471 IMMUNIZATION ADMIN: CPT | Performed by: FAMILY MEDICINE

## 2020-02-19 PROCEDURE — 83036 HEMOGLOBIN GLYCOSYLATED A1C: CPT | Performed by: FAMILY MEDICINE

## 2020-02-19 PROCEDURE — 92250 FUNDUS PHOTOGRAPHY W/I&R: CPT | Mod: TC | Performed by: FAMILY MEDICINE

## 2020-02-19 PROCEDURE — 99214 OFFICE O/P EST MOD 30 MIN: CPT | Mod: 25 | Performed by: FAMILY MEDICINE

## 2020-02-19 PROCEDURE — 90662 IIV NO PRSV INCREASED AG IM: CPT | Performed by: FAMILY MEDICINE

## 2020-02-19 ASSESSMENT — PATIENT HEALTH QUESTIONNAIRE - PHQ9: CLINICAL INTERPRETATION OF PHQ2 SCORE: 0

## 2020-02-19 NOTE — PROGRESS NOTES
CC: High blood pressure, uncontrolled diabetes, hyperlipidemia    HPI:   Howard presents today to discuss the following medical issues:    Essential hypertension  His blood pressure today in the clinic is high.  However patient has been asymptomatic, denies any headache, dizziness, chest pain, shortness of breath, or leg swelling.  Patient has been on lisinopril 40 mg daily.  I added amlodipine 10 mg daily last visit, however patient has not been taking it.    Uncontrolled type 2 diabetes mellitus with complication, without long-term current use of insulin ([facility])  His A1c today is 8.8, was 7.8 last visit. Patient denies any changes in vision or hypoglycemic episodes. Patient admits that he has been noncompliant with taking Januvia but continues to take Lantus 15 units daily as well as Humalog 10 units at breakfast and dinner. Patient is due for a retinal exam.    Mixed hyperlipidemia  Most recent labs show elevated triglycerides.   Cholesterol,Tot 100 - 199 mg/dL 124    Triglycerides 0 - 149 mg/dL 175High     HDL >=40 mg/dL 46    LDL <100 mg/dL 43      He has has been taking atorvastatin 20 mg daily and denies muscle pain. Most recent LFTs have been normal.    AST(SGOT) 12 - 45 U/L 20    ALT(SGPT) 2 - 50 U/L 22    Alkaline Phosphatase 30 - 99 U/L 61              Patient Active Problem List    Diagnosis Date Noted   • Pneumonia 06/30/2018   • Uncontrolled type 2 diabetes mellitus with complication, without long-term current use of insulin ([facility]) 02/07/2017   • Microcytosis 05/26/2016   • Adenomatous polyp of colon 10/02/2015   • Hyperlipidemia 11/20/2014   • Retinal vein occlusion, branch 11/20/2014   • Macular edema 11/20/2014   • Hypertension    • Diabetes mellitus type 2, uncontrolled ([facility])        Current Outpatient Medications   Medication Sig Dispense Refill   • RELION PEN NEEDLES USE 1 PEN NEEDLE WITH INSULIN 4 TIMES DAILY BEFORE MEAL(S) AND AT BEDTIME 100 Each 0   • Blood Glucose Test Strips USE 1 STRIP TO CHECK  "GLUCOSE THREE TIMES DAILY 100 Each 0   • lisinopril (PRINIVIL) 40 MG tablet TAKE 1 TABLET BY MOUTH ONCE DAILY 90 Tab 3   • LANTUS SOLOSTAR 100 UNIT/ML Solution Pen-injector injection INJECT 10 UNITS SUBCUTANEOUSLY ONCE DAILY IN THE EVENING 6 PEN 6   • atorvastatin (LIPITOR) 20 MG Tab Take 40 mg by mouth.  1   • amLODIPine (NORVASC) 10 MG Tab Take 1 Tab by mouth every day. 90 Tab 1   • JANUVIA 25 MG Tab TAKE 1 TABLET BY MOUTH ONCE DAILY 90 Tab 3   • metformin (GLUCOPHAGE) 1000 MG tablet TAKE 1 TABLET BY MOUTH TWICE DAILY WITH MEALS 180 Tab 3   • LANTUS SOLOSTAR 100 UNIT/ML Solution Pen-injector injection INJECT 10 UNITS SUBCUTANEOUSLY ONCE DAILY IN THE EVENING 10 mL 1   • Lancets Lancets order: Lancets for free style meter. Sig: use 3 times daily, 100 Each 3   • glucose blood strip Test strips order: Test strips for Free style meter. Use TID and in times of high or low blood sugars. 100 Strip 0   • meclizine (ANTIVERT) 25 MG Tab Take 1 Tab by mouth 3 times a day as needed. 30 Tab 0   • ondansetron (ZOFRAN ODT) 4 MG TABLET DISPERSIBLE Take 1 Tab by mouth every 8 hours as needed. 10 Tab 0   • hydroCHLOROthiazide (HYDRODIURIL) 12.5 MG tablet TAKE ONE TABLET BY MOUTH ONCE DAILY 90 Tab 1     No current facility-administered medications for this visit.          Allergies as of 02/19/2020   • (No Known Allergies)        ROS: Denies any chest pain, Shortness of breath, Changes bowel or bladder, Lower extremity edema.    Physical Exam:  BP (!) 184/100 (BP Location: Right arm, Patient Position: Sitting, BP Cuff Size: Adult)   Pulse 68   Temp 36.4 °C (97.5 °F) (Temporal)   Resp 16   Ht 1.753 m (5' 9\")   SpO2 97%   BMI 26.37 kg/m²   Gen.: Well-developed, well-nourished, no apparent distress,pleasant and cooperative with the examination  Skin:  Warm and dry with good turgor. No rashes or suspicious lesions in visible areas  HEENT:Sinuses nontender with palpation, TMs clear, nares patent with pink mucosa and clear " rhinorrhea,no septal deviation ,polyps or lesions. lips without lesions, oropharynx clear.  Neck: Trachea midline,no masses or adenopathy. No JVD.  Cor: Regular rate and rhythm without murmur, gallop or rub.  Lungs: Respirations unlabored.Clear to auscultation with equal breath sounds bilaterally. No wheezes, rhonchi.  Extremities: No cyanosis, clubbing or edema.          Assessment and Plan.   74 y.o. male     1. Essential hypertension  Elevated.  Patient has been on lisinopril 40 mg daily.  I added amlodipine 10 mg daily last visit, however patient has not been taking it.  Discussed with patient importance of adherence to medications.  He promised he will start taking the amlodipine.  Patient advised to check his blood pressure twice a day for a week and send me a log for reevaluation.    2. Uncontrolled type 2 diabetes mellitus with complication, without long-term current use of insulin ([facility])  A1c today is 8.8, was 7.8 last visit.  Patient has not been taking the Januvia.  So will increase Lantus from 15units to 20 units daily.  Continue Humalog based on sliding scale.  Patient is due for retinal exam.  Performed in the clinic.    - POCT Retinal Eye Exam    3. Mixed hyperlipidemia  He has been tolerating the statin. Denies muscle pain LFTs has been normal  Continue on atorvastatin 20 mg daily.    4. Need for vaccination  Flu shot is given today.    - INFLUENZA VACCINE, HIGH DOSE (65+ ONLY)

## 2020-02-20 DIAGNOSIS — E11.65 UNCONTROLLED TYPE 2 DIABETES MELLITUS WITH HYPERGLYCEMIA (HCC): ICD-10-CM

## 2020-02-20 RX ORDER — PEN NEEDLE, DIABETIC 32GX 5/32"
NEEDLE, DISPOSABLE MISCELLANEOUS
Qty: 100 EACH | Refills: 0 | Status: SHIPPED | OUTPATIENT
Start: 2020-02-20 | End: 2020-02-21 | Stop reason: SDUPTHER

## 2020-02-21 DIAGNOSIS — E11.65 UNCONTROLLED TYPE 2 DIABETES MELLITUS WITH HYPERGLYCEMIA (HCC): ICD-10-CM

## 2020-02-21 DIAGNOSIS — E11.9 DIABETES MELLITUS TYPE 2 IN NONOBESE (HCC): ICD-10-CM

## 2020-02-21 RX ORDER — PEN NEEDLE, DIABETIC 32GX 5/32"
NEEDLE, DISPOSABLE MISCELLANEOUS
Qty: 100 EACH | Refills: 11 | Status: SHIPPED | OUTPATIENT
Start: 2020-02-21 | End: 2021-04-16

## 2020-02-24 DIAGNOSIS — E11.9 DIABETES MELLITUS TYPE 2 IN NONOBESE (HCC): ICD-10-CM

## 2020-03-31 RX ORDER — INSULIN GLARGINE 100 [IU]/ML
INJECTION, SOLUTION SUBCUTANEOUS
Qty: 6 PEN | Refills: 6 | Status: SHIPPED | OUTPATIENT
Start: 2020-03-31 | End: 2021-04-12

## 2020-03-31 RX ORDER — INSULIN GLARGINE 100 [IU]/ML
INJECTION, SOLUTION SUBCUTANEOUS
Qty: 15 ML | Refills: 5 | Status: SHIPPED | OUTPATIENT
Start: 2020-03-31 | End: 2021-04-12

## 2020-06-01 ENCOUNTER — TELEPHONE (OUTPATIENT)
Dept: MEDICAL GROUP | Facility: MEDICAL CENTER | Age: 75
End: 2020-06-01

## 2020-08-12 ENCOUNTER — TELEPHONE (OUTPATIENT)
Dept: MEDICAL GROUP | Facility: MEDICAL CENTER | Age: 75
End: 2020-08-12

## 2020-08-12 DIAGNOSIS — E11.65 UNCONTROLLED TYPE 2 DIABETES MELLITUS WITH HYPERGLYCEMIA (HCC): ICD-10-CM

## 2020-08-12 RX ORDER — INSULIN LISPRO 100 [IU]/ML
10 INJECTION, SOLUTION INTRAVENOUS; SUBCUTANEOUS
Qty: 30 ML | Refills: 2 | Status: SHIPPED | OUTPATIENT
Start: 2020-08-12 | End: 2021-05-10 | Stop reason: SDUPTHER

## 2020-09-01 RX ORDER — ATORVASTATIN CALCIUM 20 MG/1
TABLET, FILM COATED ORAL
Qty: 180 TAB | Refills: 0 | Status: SHIPPED | OUTPATIENT
Start: 2020-09-01 | End: 2020-12-07

## 2020-10-20 ENCOUNTER — NON-PROVIDER VISIT (OUTPATIENT)
Dept: MEDICAL GROUP | Facility: MEDICAL CENTER | Age: 75
End: 2020-10-20
Payer: MEDICARE

## 2020-10-20 DIAGNOSIS — Z23 NEED FOR VACCINATION: ICD-10-CM

## 2020-10-20 PROCEDURE — G0008 ADMIN INFLUENZA VIRUS VAC: HCPCS | Performed by: FAMILY MEDICINE

## 2020-10-20 PROCEDURE — 90662 IIV NO PRSV INCREASED AG IM: CPT | Performed by: FAMILY MEDICINE

## 2020-10-20 NOTE — PROGRESS NOTES
"Howard Gamboa is a 75 y.o. male here for a non-provider visit for:   FLU    Reason for immunization: Annual Flu Vaccine  Immunization records indicate need for vaccine: Yes, confirmed with Epic  Minimum interval has been met for this vaccine: Yes  ABN completed: No    Order and dose verified by: Regino CASTILLO Dated  8/15/19  was given to patient: Yes  All IAC Questionnaire questions were answered \"No.\"    Patient tolerated injection and no adverse effects were observed or reported: Yes    Pt scheduled for next dose in series: No    "

## 2020-12-01 DIAGNOSIS — I10 ESSENTIAL HYPERTENSION: ICD-10-CM

## 2020-12-01 RX ORDER — LISINOPRIL 40 MG/1
40 TABLET ORAL DAILY
Qty: 100 TAB | Refills: 3 | Status: SHIPPED | OUTPATIENT
Start: 2020-12-01 | End: 2022-08-16 | Stop reason: SDUPTHER

## 2020-12-07 RX ORDER — ATORVASTATIN CALCIUM 20 MG/1
TABLET, FILM COATED ORAL
Qty: 200 TAB | Refills: 1 | Status: SHIPPED | OUTPATIENT
Start: 2020-12-07 | End: 2021-06-24

## 2021-01-15 DIAGNOSIS — Z23 NEED FOR VACCINATION: ICD-10-CM

## 2021-02-03 ENCOUNTER — OFFICE VISIT (OUTPATIENT)
Dept: MEDICAL GROUP | Facility: MEDICAL CENTER | Age: 76
End: 2021-02-03
Payer: MEDICARE

## 2021-02-03 VITALS
SYSTOLIC BLOOD PRESSURE: 160 MMHG | BODY MASS INDEX: 25.62 KG/M2 | TEMPERATURE: 98.8 F | RESPIRATION RATE: 16 BRPM | HEART RATE: 82 BPM | WEIGHT: 173 LBS | HEIGHT: 69 IN | DIASTOLIC BLOOD PRESSURE: 90 MMHG | OXYGEN SATURATION: 94 %

## 2021-02-03 DIAGNOSIS — Z00.00 HEALTHCARE MAINTENANCE: ICD-10-CM

## 2021-02-03 DIAGNOSIS — E78.2 MIXED HYPERLIPIDEMIA: ICD-10-CM

## 2021-02-03 DIAGNOSIS — E11.65 UNCONTROLLED TYPE 2 DIABETES MELLITUS WITH HYPERGLYCEMIA (HCC): ICD-10-CM

## 2021-02-03 DIAGNOSIS — H35.81 MACULAR EDEMA: ICD-10-CM

## 2021-02-03 DIAGNOSIS — I10 ESSENTIAL HYPERTENSION: ICD-10-CM

## 2021-02-03 DIAGNOSIS — Z00.00 MEDICARE ANNUAL WELLNESS VISIT, INITIAL: ICD-10-CM

## 2021-02-03 LAB
HBA1C MFR BLD: 11.7 % (ref 0–5.6)
INT CON NEG: NEGATIVE
INT CON POS: POSITIVE

## 2021-02-03 PROCEDURE — G0439 PPPS, SUBSEQ VISIT: HCPCS | Performed by: FAMILY MEDICINE

## 2021-02-03 PROCEDURE — 83036 HEMOGLOBIN GLYCOSYLATED A1C: CPT | Performed by: FAMILY MEDICINE

## 2021-02-03 RX ORDER — AMLODIPINE BESYLATE 10 MG/1
10 TABLET ORAL DAILY
Qty: 90 TAB | Refills: 1 | Status: SHIPPED | OUTPATIENT
Start: 2021-02-03 | End: 2021-10-12

## 2021-02-03 ASSESSMENT — ACTIVITIES OF DAILY LIVING (ADL): BATHING_REQUIRES_ASSISTANCE: 0

## 2021-02-03 ASSESSMENT — ENCOUNTER SYMPTOMS: GENERAL WELL-BEING: GOOD

## 2021-02-03 ASSESSMENT — PATIENT HEALTH QUESTIONNAIRE - PHQ9: CLINICAL INTERPRETATION OF PHQ2 SCORE: 0

## 2021-02-03 NOTE — PROGRESS NOTES
Chief Complaint   Patient presents with   • Annual Exam         HPI:  Howard Gamboa is a 75 y.o. here for Medicare Annual Wellness Visit   Patient speaks Mandarin, his daughter with him today to translate for him.    Medicare annual wellness visit, initial  Preventive measures and chronic medical issues reviewed.    Uncontrolled type 2 diabetes mellitus with hyperglycemia (HCC)  Blood glucose has is been uncontrolled, A1c today is 11.7.  Patient has not has not been compliant with his medications. Patient advised to take his insulin(long and short acting) in a regular basis, he claimed that he knows how to take it but he has not been taking in a regular basis.  I consider adding Ozempic.  But I will leave that to the endocrinologist.  I will refer patient to endocrinology for more evaluation.  Patient is due for monofilament foot exam microalbuminuria screening    Essential hypertension  Blood pressure today is high.  However he is asymptomatic.  Denies headache, chest pain, shortness of breath.  He is currently patient has been taking lisinopril 40 mg daily.  In the past he was on amlodipine 10 mg but he stopped it with no obvious reason    Mixed hyperlipidemia  He has been tolerating the statin. Denies muscle pain LFTs has been normal, has been on atorvastatin 20 mg daily    Macular edema  Patient had shots of vascular endothelial growth factor (VEGF)only once, he decided not to get it again    Patient advised to schedule to get his COVID-19 vaccine      Patient Active Problem List    Diagnosis Date Noted   • Pneumonia 06/30/2018   • Uncontrolled type 2 diabetes mellitus with complication, without long-term current use of insulin (HCC) 02/07/2017   • Microcytosis 05/26/2016   • Adenomatous polyp of colon 10/02/2015   • Hyperlipidemia 11/20/2014   • Retinal vein occlusion, branch 11/20/2014   • Macular edema 11/20/2014   • Hypertension    • Diabetes mellitus type 2, uncontrolled (HCC)        Current Outpatient  Medications   Medication Sig Dispense Refill   • atorvastatin (LIPITOR) 20 MG Tab Take 2 tablets by mouth once daily 200 Tab 1   • lisinopril (PRINIVIL) 40 MG tablet Take 1 Tab by mouth every day. 100 Tab 3   • metformin (GLUCOPHAGE) 1000 MG tablet TAKE 1 TABLET BY MOUTH TWICE DAILY WITH MEALS 180 Tab 1   • insulin lispro (HUMALOG) 100 UNIT/ML Solution Pen-injector injection PEN Inject 10 Units as instructed 3 times a day before meals. 30 mL 2   • insulin glargine (LANTUS SOLOSTAR) 100 UNIT/ML Solution Pen-injector injection TAKE AS DIRECTED BASED ON SLIDING SCALE 15 mL 5   • insulin glargine (LANTUS SOLOSTAR) 100 UNIT/ML Solution Pen-injector injection INJECT 10 UNITS SUBCUTANEOUSLY ONCE DAILY IN THE EVENING 6 PEN 6   • Blood Glucose Test Strips USE 1 STRIP TO CHECK GLUCOSE THREE TIMES DAILY 100 Each 11   • amLODIPine (NORVASC) 10 MG Tab Take 1 Tab by mouth every day. 90 Tab 1   • JANUVIA 25 MG Tab TAKE 1 TABLET BY MOUTH ONCE DAILY 90 Tab 3   • Lancets Lancets order: Lancets for free style meter. Sig: use 3 times daily, 100 Each 3   • glucose blood strip Test strips order: Test strips for Free style meter. Use TID and in times of high or low blood sugars. 100 Strip 0   • meclizine (ANTIVERT) 25 MG Tab Take 1 Tab by mouth 3 times a day as needed. 30 Tab 0   • ondansetron (ZOFRAN ODT) 4 MG TABLET DISPERSIBLE Take 1 Tab by mouth every 8 hours as needed. 10 Tab 0   • hydroCHLOROthiazide (HYDRODIURIL) 12.5 MG tablet TAKE ONE TABLET BY MOUTH ONCE DAILY 90 Tab 1     No current facility-administered medications for this visit.             Current supplements as per medication list.       Allergies: Patient has no known allergies.    Current social contact/activities:      He  reports that he quit smoking about 14 years ago. He has never used smokeless tobacco. He reports that he does not drink alcohol or use drugs.  Counseling given: Not Answered      DPA/Advanced Directive:  Patient does not have an Advanced Directive.  A  packet and workshop information was given on Advanced Directives.    ROS:    Gait: Uses no assistive device  Ostomy: No  Other tubes: No  Amputations: No  Chronic oxygen use: No  Last eye exam: 2020  Wears hearing aids: No   : Denies any urinary leakage during the last 6 months    Screening:    Depression Screening    Little interest or pleasure in doing things?  0 - not at all  Feeling down, depressed , or hopeless? 0 - not at all  Patient Health Questionnaire Score: 0     If depressive symptoms identified deferred to follow up visit unless specifically addressed in assessment and plan.    Interpretation of PHQ-9 Total Score   Score Severity   1-4 No Depression   5-9 Mild Depression   10-14 Moderate Depression   15-19 Moderately Severe Depression   20-27 Severe Depression    Screening for Cognitive Impairment    Three Minute Recall (river, nation, finger) 3/3    [place] clock face with all 12 numbers and set the hands to show 10 past 11.  No    Cognitive concerns identified deferred for follow up unless specifically addressed in assessment and plan.    Fall Risk Assessment    Has the patient had two or more falls in the last year or any fall with injury in the last year?  No    Safety Assessment    Throw rugs on floor.  No  Handrails on all stairs.  No  Good lighting in all hallways.  Yes  Difficulty hearing.  No  Patient counseled about all safety risks that were identified.    Functional Assessment ADLs    Are there any barriers preventing you from cooking for yourself or meeting nutritional needs?  No.    Are there any barriers preventing you from driving safely or obtaining transportation?  No.    Are there any barriers preventing you from using a telephone or calling for help?  No.    Are there any barriers preventing you from shopping?  No.    Are there any barriers preventing you from taking care of your own finances?  No.    Are there any barriers preventing you from managing your medications?  No.    Are  there any barriers preventing you from showering, bathing or dressing yourself?  No.    Are you currently engaging in any exercise or physical activity?  Yes.  walking  What is your perception of your health?  Good.      Health Maintenance Summary                Annual Wellness Visit Overdue 1945     HEPATITIS C SCREENING Overdue 1945     COVID-19 Vaccine Overdue 8/3/1963     IMM HEP B VACCINE Overdue 8/3/1966     IMM ZOSTER VACCINES Overdue 8/3/1997     DIABETES MONOFILAMENT / LE EXAM Overdue 2020      Done 2019 AMB DIABETIC MONOFILAMENT LOWER EXTREMITY EXAM     Patient has more history with this topic...    FASTING LIPID PROFILE Overdue 2020      Done 2019 LIPID PROFILE     Patient has more history with this topic...    URINE ACR / MICROALBUMIN Overdue 2020      Done 2019 MICROALBUMIN CREAT RATIO URINE     Patient has more history with this topic...    SERUM CREATININE Overdue 2020      Done 2019 COMP METABOLIC PANEL     Patient has more history with this topic...    A1C SCREENING Overdue 2020      Done 2020 POCT A1C     Patient has more history with this topic...    RETINAL SCREENING Next Due 2021      Done 2020 POCT RETINAL EYE EXAM     Patient has more history with this topic...    IMM DTaP/Tdap/Td Vaccine Next Due 2022      Done 2012 Imm Admin: Tdap Vaccine    COLONOSCOPY Next Due 2023      Done 2020 REFERRAL TO GI FOR COLONOSCOPY     Patient has more history with this topic...          Patient Care Team:  Marlo Watts M.D. as PCP - General (Geriatrics)        Social History     Tobacco Use   • Smoking status: Former Smoker     Quit date: 2007     Years since quittin.1   • Smokeless tobacco: Never Used   Substance Use Topics   • Alcohol use: No   • Drug use: No     Family History   Problem Relation Age of Onset   • Stroke Mother    • Diabetes Father    • Diabetes Brother      He  has a past medical history of  Diabetes (HCC), Diabetes mellitus out of control (HCC), High cholesterol, and Hypertension.   History reviewed. No pertinent surgical history.    Exam:   There were no vitals taken for this visit. There is no height or weight on file to calculate BMI.    Hearing excellent.    Dentition good  Alert, oriented in no acute distress.  Eye contact is good, speech goal directed, affect calm    Monofilament: done  Monofilament testing with a 10 gram force: sensation intact: intact bilaterally  Visual Inspection: Feet without maceration, ulcers, fissures.  Pedal pulses: intact bilaterally    Assessment and Plan. The following treatment and monitoring plan is recommended:    75 y.o. male     1. Uncontrolled type 2 diabetes mellitus with hyperglycemia (HCC)  Uncontrolled, A1c today is 11.7.  Patient has not has not been compliant with his medications  Patient advised to take his insulin(long and short acting) in a regular basis, he claimed that he knows how to take it but he has not been taking in a regular basis.  I consider adding Ozempic.  But I will leave that to the endocrinologist.  I will refer patient to endocrinology  Monofilament foot exam showed no sign of neuropathy    - POCT  A1C  - Comp Metabolic Panel; Future  - MICROALBUMIN CREAT RATIO URINE; Future  - Lipid Profile; Future  - Diabetic Monofilament LE Exam  - Initial Annual Wellness Visit - Includes PPPS ()    2. Essential hypertension  Uncontrolled.  Patient has been taking lisinopril 40 mg daily, will add amlodipine.    - CBC WITH DIFFERENTIAL; Future  - Comp Metabolic Panel; Future  - MICROALBUMIN CREAT RATIO URINE; Future  - TSH; Future  - Lipid Profile; Future  - Initial Annual Wellness Visit - Includes PPPS ()    3. Mixed hyperlipidemia  He has been tolerating the statin. Denies muscle pain LFTs has been normal  Continue on atorvastatin 20 mg daily  - TSH; Future  - Lipid Profile; Future  - Initial Annual Wellness Visit - Includes PPPS  ()    4. Macular edema  Patient had shots of vascular endothelial growth factor only once, he decided not to get it again    - Initial Annual Wellness Visit - Includes PPPS ()    5. Healthcare maintenance  Patient advised to schedule to get his COVID-19 vaccine  - Initial Annual Wellness Visit - Includes PPPS ()    6. Medicare annual wellness visit, initial  Preventive measures and chronic medical issues reviewed.  - Initial Annual Wellness Visit - Includes PPPS ()            Services suggested: No services needed at this time  Health Care Screening: Age-appropriate preventive services recommended by USPTF and ACIP covered by Medicare were discussed today. Services ordered if indicated and agreed upon by the patient.  Referrals offered: Community-based lifestyle interventions to reduce health risks and promote self-management and wellness, fall prevention, nutrition, physical activity, tobacco-use cessation, weight loss, and mental health services as per orders if indicated.    Discussion today about general wellness and lifestyle habits:    · Prevent falls and reduce trip hazards; Cautioned about securing or removing rugs.  · Have a working fire alarm and carbon monoxide detector;   · Engage in regular physical activity and social activities     Follow-up: No follow-ups on file.

## 2021-02-06 ENCOUNTER — HOSPITAL ENCOUNTER (OUTPATIENT)
Dept: LAB | Facility: MEDICAL CENTER | Age: 76
End: 2021-02-06
Attending: FAMILY MEDICINE
Payer: MEDICARE

## 2021-02-06 DIAGNOSIS — E11.65 UNCONTROLLED TYPE 2 DIABETES MELLITUS WITH HYPERGLYCEMIA (HCC): ICD-10-CM

## 2021-02-06 DIAGNOSIS — I10 ESSENTIAL HYPERTENSION: ICD-10-CM

## 2021-02-06 DIAGNOSIS — E78.2 MIXED HYPERLIPIDEMIA: ICD-10-CM

## 2021-02-06 LAB
ALBUMIN SERPL BCP-MCNC: 4.1 G/DL (ref 3.2–4.9)
ALBUMIN/GLOB SERPL: 1.2 G/DL
ALP SERPL-CCNC: 86 U/L (ref 30–99)
ALT SERPL-CCNC: 26 U/L (ref 2–50)
ANION GAP SERPL CALC-SCNC: 8 MMOL/L (ref 7–16)
AST SERPL-CCNC: 20 U/L (ref 12–45)
BASOPHILS # BLD AUTO: 0.4 % (ref 0–1.8)
BASOPHILS # BLD: 0.03 K/UL (ref 0–0.12)
BILIRUB SERPL-MCNC: 1.1 MG/DL (ref 0.1–1.5)
BUN SERPL-MCNC: 12 MG/DL (ref 8–22)
CALCIUM SERPL-MCNC: 9.4 MG/DL (ref 8.5–10.5)
CHLORIDE SERPL-SCNC: 99 MMOL/L (ref 96–112)
CHOLEST SERPL-MCNC: 98 MG/DL (ref 100–199)
CO2 SERPL-SCNC: 27 MMOL/L (ref 20–33)
CREAT SERPL-MCNC: 0.92 MG/DL (ref 0.5–1.4)
CREAT UR-MCNC: 65.59 MG/DL
EOSINOPHIL # BLD AUTO: 0.09 K/UL (ref 0–0.51)
EOSINOPHIL NFR BLD: 1.2 % (ref 0–6.9)
ERYTHROCYTE [DISTWIDTH] IN BLOOD BY AUTOMATED COUNT: 37.4 FL (ref 35.9–50)
FASTING STATUS PATIENT QL REPORTED: NORMAL
GLOBULIN SER CALC-MCNC: 3.4 G/DL (ref 1.9–3.5)
GLUCOSE SERPL-MCNC: 264 MG/DL (ref 65–99)
HCT VFR BLD AUTO: 46.4 % (ref 42–52)
HDLC SERPL-MCNC: 39 MG/DL
HGB BLD-MCNC: 15.1 G/DL (ref 14–18)
IMM GRANULOCYTES # BLD AUTO: 0.02 K/UL (ref 0–0.11)
IMM GRANULOCYTES NFR BLD AUTO: 0.3 % (ref 0–0.9)
LDLC SERPL CALC-MCNC: 27 MG/DL
LYMPHOCYTES # BLD AUTO: 2.25 K/UL (ref 1–4.8)
LYMPHOCYTES NFR BLD: 30 % (ref 22–41)
MCH RBC QN AUTO: 26 PG (ref 27–33)
MCHC RBC AUTO-ENTMCNC: 32.5 G/DL (ref 33.7–35.3)
MCV RBC AUTO: 79.9 FL (ref 81.4–97.8)
MICROALBUMIN UR-MCNC: 2.3 MG/DL
MICROALBUMIN/CREAT UR: 35 MG/G (ref 0–30)
MONOCYTES # BLD AUTO: 1 K/UL (ref 0–0.85)
MONOCYTES NFR BLD AUTO: 13.3 % (ref 0–13.4)
NEUTROPHILS # BLD AUTO: 4.11 K/UL (ref 1.82–7.42)
NEUTROPHILS NFR BLD: 54.8 % (ref 44–72)
NRBC # BLD AUTO: 0 K/UL
NRBC BLD-RTO: 0 /100 WBC
PLATELET # BLD AUTO: 358 K/UL (ref 164–446)
PMV BLD AUTO: 9 FL (ref 9–12.9)
POTASSIUM SERPL-SCNC: 3.7 MMOL/L (ref 3.6–5.5)
PROT SERPL-MCNC: 7.5 G/DL (ref 6–8.2)
RBC # BLD AUTO: 5.81 M/UL (ref 4.7–6.1)
SODIUM SERPL-SCNC: 134 MMOL/L (ref 135–145)
TRIGL SERPL-MCNC: 161 MG/DL (ref 0–149)
TSH SERPL DL<=0.005 MIU/L-ACNC: 0.92 UIU/ML (ref 0.38–5.33)
WBC # BLD AUTO: 7.5 K/UL (ref 4.8–10.8)

## 2021-02-06 PROCEDURE — 80061 LIPID PANEL: CPT

## 2021-02-06 PROCEDURE — 82570 ASSAY OF URINE CREATININE: CPT

## 2021-02-06 PROCEDURE — 85025 COMPLETE CBC W/AUTO DIFF WBC: CPT

## 2021-02-06 PROCEDURE — 84443 ASSAY THYROID STIM HORMONE: CPT

## 2021-02-06 PROCEDURE — 82043 UR ALBUMIN QUANTITATIVE: CPT

## 2021-02-06 PROCEDURE — 80053 COMPREHEN METABOLIC PANEL: CPT

## 2021-02-06 PROCEDURE — 36415 COLL VENOUS BLD VENIPUNCTURE: CPT

## 2021-02-16 ENCOUNTER — APPOINTMENT (OUTPATIENT)
Dept: FAMILY PLANNING/WOMEN'S HEALTH CLINIC | Facility: IMMUNIZATION CENTER | Age: 76
End: 2021-02-16
Payer: MEDICARE

## 2021-02-16 DIAGNOSIS — Z23 ENCOUNTER FOR VACCINATION: Primary | ICD-10-CM

## 2021-02-16 PROCEDURE — 0001A PFIZER SARS-COV-2 VACCINE: CPT

## 2021-02-16 PROCEDURE — 91300 PFIZER SARS-COV-2 VACCINE: CPT

## 2021-02-17 ENCOUNTER — NURSE TRIAGE (OUTPATIENT)
Dept: HEALTH INFORMATION MANAGEMENT | Facility: OTHER | Age: 76
End: 2021-02-17

## 2021-03-13 ENCOUNTER — IMMUNIZATION (OUTPATIENT)
Dept: FAMILY PLANNING/WOMEN'S HEALTH CLINIC | Facility: IMMUNIZATION CENTER | Age: 76
End: 2021-03-13
Attending: INTERNAL MEDICINE
Payer: MEDICARE

## 2021-03-13 DIAGNOSIS — Z23 ENCOUNTER FOR VACCINATION: Primary | ICD-10-CM

## 2021-03-13 PROCEDURE — 0002A PFIZER SARS-COV-2 VACCINE: CPT

## 2021-03-13 PROCEDURE — 91300 PFIZER SARS-COV-2 VACCINE: CPT

## 2021-03-24 ENCOUNTER — TELEPHONE (OUTPATIENT)
Dept: MEDICAL GROUP | Facility: MEDICAL CENTER | Age: 76
End: 2021-03-24

## 2021-03-24 DIAGNOSIS — H53.8 BLURRY VISION: ICD-10-CM

## 2021-03-24 NOTE — TELEPHONE ENCOUNTER
Patient needs a referral for [facility] for eye treatments. He has been struggling with seeing dark spots and they need a new referral as it expires every 3 months. Please advise

## 2021-03-25 ENCOUNTER — HOSPITAL ENCOUNTER (EMERGENCY)
Facility: MEDICAL CENTER | Age: 76
End: 2021-03-25
Attending: EMERGENCY MEDICINE
Payer: MEDICARE

## 2021-03-25 ENCOUNTER — APPOINTMENT (OUTPATIENT)
Dept: RADIOLOGY | Facility: MEDICAL CENTER | Age: 76
End: 2021-03-25
Attending: EMERGENCY MEDICINE
Payer: MEDICARE

## 2021-03-25 VITALS
SYSTOLIC BLOOD PRESSURE: 174 MMHG | WEIGHT: 168.43 LBS | DIASTOLIC BLOOD PRESSURE: 106 MMHG | RESPIRATION RATE: 22 BRPM | OXYGEN SATURATION: 93 % | TEMPERATURE: 97.1 F | HEART RATE: 71 BPM | BODY MASS INDEX: 24.95 KG/M2 | HEIGHT: 69 IN

## 2021-03-25 DIAGNOSIS — I10 HYPERTENSION, UNSPECIFIED TYPE: ICD-10-CM

## 2021-03-25 DIAGNOSIS — R73.9 HYPERGLYCEMIA: ICD-10-CM

## 2021-03-25 DIAGNOSIS — H53.9 VISUAL DISTURBANCE: ICD-10-CM

## 2021-03-25 DIAGNOSIS — R51.9 NONINTRACTABLE EPISODIC HEADACHE, UNSPECIFIED HEADACHE TYPE: ICD-10-CM

## 2021-03-25 LAB
ALBUMIN SERPL BCP-MCNC: 4.5 G/DL (ref 3.2–4.9)
ALBUMIN/GLOB SERPL: 1.3 G/DL
ALP SERPL-CCNC: 92 U/L (ref 30–99)
ALT SERPL-CCNC: 25 U/L (ref 2–50)
ANION GAP SERPL CALC-SCNC: 10 MMOL/L (ref 7–16)
AST SERPL-CCNC: 22 U/L (ref 12–45)
BASOPHILS # BLD AUTO: 0.4 % (ref 0–1.8)
BASOPHILS # BLD: 0.03 K/UL (ref 0–0.12)
BILIRUB SERPL-MCNC: 1.1 MG/DL (ref 0.1–1.5)
BUN SERPL-MCNC: 13 MG/DL (ref 8–22)
CALCIUM SERPL-MCNC: 9.7 MG/DL (ref 8.5–10.5)
CHLORIDE SERPL-SCNC: 97 MMOL/L (ref 96–112)
CO2 SERPL-SCNC: 25 MMOL/L (ref 20–33)
CREAT SERPL-MCNC: 0.77 MG/DL (ref 0.5–1.4)
EOSINOPHIL # BLD AUTO: 0.08 K/UL (ref 0–0.51)
EOSINOPHIL NFR BLD: 1.2 % (ref 0–6.9)
ERYTHROCYTE [DISTWIDTH] IN BLOOD BY AUTOMATED COUNT: 36.8 FL (ref 35.9–50)
ERYTHROCYTE [SEDIMENTATION RATE] IN BLOOD BY WESTERGREN METHOD: 7 MM/HOUR (ref 0–20)
GLOBULIN SER CALC-MCNC: 3.6 G/DL (ref 1.9–3.5)
GLUCOSE SERPL-MCNC: 286 MG/DL (ref 65–99)
HCT VFR BLD AUTO: 46.6 % (ref 42–52)
HGB BLD-MCNC: 15.5 G/DL (ref 14–18)
IMM GRANULOCYTES # BLD AUTO: 0.01 K/UL (ref 0–0.11)
IMM GRANULOCYTES NFR BLD AUTO: 0.1 % (ref 0–0.9)
LYMPHOCYTES # BLD AUTO: 1.72 K/UL (ref 1–4.8)
LYMPHOCYTES NFR BLD: 25 % (ref 22–41)
MCH RBC QN AUTO: 25.9 PG (ref 27–33)
MCHC RBC AUTO-ENTMCNC: 33.3 G/DL (ref 33.7–35.3)
MCV RBC AUTO: 77.8 FL (ref 81.4–97.8)
MONOCYTES # BLD AUTO: 0.74 K/UL (ref 0–0.85)
MONOCYTES NFR BLD AUTO: 10.8 % (ref 0–13.4)
NEUTROPHILS # BLD AUTO: 4.29 K/UL (ref 1.82–7.42)
NEUTROPHILS NFR BLD: 62.5 % (ref 44–72)
NRBC # BLD AUTO: 0 K/UL
NRBC BLD-RTO: 0 /100 WBC
PLATELET # BLD AUTO: 270 K/UL (ref 164–446)
PMV BLD AUTO: 8.6 FL (ref 9–12.9)
POTASSIUM SERPL-SCNC: 3.7 MMOL/L (ref 3.6–5.5)
PROT SERPL-MCNC: 8.1 G/DL (ref 6–8.2)
RBC # BLD AUTO: 5.99 M/UL (ref 4.7–6.1)
SODIUM SERPL-SCNC: 132 MMOL/L (ref 135–145)
WBC # BLD AUTO: 6.9 K/UL (ref 4.8–10.8)

## 2021-03-25 PROCEDURE — 70450 CT HEAD/BRAIN W/O DYE: CPT | Mod: ME

## 2021-03-25 PROCEDURE — 700101 HCHG RX REV CODE 250: Performed by: EMERGENCY MEDICINE

## 2021-03-25 PROCEDURE — 96376 TX/PRO/DX INJ SAME DRUG ADON: CPT

## 2021-03-25 PROCEDURE — 85652 RBC SED RATE AUTOMATED: CPT

## 2021-03-25 PROCEDURE — 85025 COMPLETE CBC W/AUTO DIFF WBC: CPT

## 2021-03-25 PROCEDURE — 99285 EMERGENCY DEPT VISIT HI MDM: CPT

## 2021-03-25 PROCEDURE — 80053 COMPREHEN METABOLIC PANEL: CPT

## 2021-03-25 PROCEDURE — 96374 THER/PROPH/DIAG INJ IV PUSH: CPT

## 2021-03-25 RX ORDER — LABETALOL HYDROCHLORIDE 5 MG/ML
10 INJECTION, SOLUTION INTRAVENOUS ONCE
Status: COMPLETED | OUTPATIENT
Start: 2021-03-25 | End: 2021-03-25

## 2021-03-25 RX ADMIN — LABETALOL HYDROCHLORIDE 10 MG: 5 INJECTION, SOLUTION INTRAVENOUS at 13:05

## 2021-03-25 RX ADMIN — LABETALOL HYDROCHLORIDE 10 MG: 5 INJECTION, SOLUTION INTRAVENOUS at 11:59

## 2021-03-25 ASSESSMENT — FIBROSIS 4 INDEX: FIB4 SCORE: 0.8

## 2021-03-25 ASSESSMENT — LIFESTYLE VARIABLES
DOES PATIENT WANT TO STOP DRINKING: NO
DO YOU DRINK ALCOHOL: NO

## 2021-03-25 NOTE — ED TRIAGE NOTES
"[facility] [place]  Chief Complaint   Patient presents with   • Vision Change     blurry vision, intermittent for \"weeks\"; worse to L eye     Pt BIB son. Unable to obtain clear history from patient or son. Son reports this has been on-going for weeks, nothing significantly changed this morning to bring patient to ER. 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 Hx HTN, diabetes, glu 160s this morning. Son reports pt daughter it trying to get pt into eye specialist.     Patient informed of triage process and to inform staff of any changes/worsening symptoms. Pt verbalized understanding. Pt denies concerns. Pt back to waiting room.     BP (!) 203/111   Pulse 79   Temp 36.1 °C (96.9 °F) (Temporal)   Resp 18   Ht 1.753 m (5' 9\")   Wt 76.4 kg (168 lb 6.9 oz)   SpO2 96%   "

## 2021-03-25 NOTE — ED PROVIDER NOTES
"ED Provider Note    CHIEF COMPLAINT  Chief Complaint   Patient presents with   • Vision Change     blurry vision, intermittent for \"weeks\"; worse to L eye       HPI  Howard Gamboa is a 75 y.o. male who presents with complaints of vision change left eye greatest over the last 1 week.  He states \"he can only see blurry lines.\"  Information obtained with the patient's son who is interpreting him, stating \"we speak many dialect\".  Patient's right eye seems to be doing well.  The problem for started 6 months ago, he states that his father lives with his sister and that she has been unable to secure him an appointment with an eye specialist.  No new change in vision today.  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  Patient has both diabetes as well as high blood pressure.  He presents here today hypertensive, with patient's son state he has been taking his medication as prescribed.  No fever.  No trauma.  When the vision changes for the worse, he develops headache left-sided, this tends to resolve intermittently when his vision improves.  No balance difficulty.  No difficulty with speech.  No numbness or weakness to the extremities.  Patient ambulates without difficulty.    Patient son states father did not take his blood pressure medication today    REVIEW OF SYSTEMS    Constitutional: Denies fever or dizziness  Respiratory: No shortness of breath  Cardiac: No chest pain or syncope  Gastrointestinal: Abdominal pain, no vomiting  Musculoskeletal: No acute neck or back pain  Neurologic: Headache  Eyes: Vision change left eye  Endocrine: Diabetes       All other systems are negative.       PAST MEDICAL HISTORY  Past Medical History:   Diagnosis Date   • Diabetes (HCC)    • Diabetes mellitus out of control (HCC)    • High cholesterol    • Hypertension        FAMILY HISTORY  Family History   Problem Relation Age of Onset   • Stroke Mother    • " "Diabetes Father    • Diabetes Brother        SOCIAL HISTORY  Social History     Socioeconomic History   • Marital status: Single     Spouse name: Not on file   • Number of children: Not on file   • Years of education: Not on file   • Highest education level: Not on file   Occupational History   • Not on file   Tobacco Use   • Smoking status: Former Smoker     Quit date: 2007     Years since quittin.2   • Smokeless tobacco: Never Used   Substance and Sexual Activity   • Alcohol use: No   • Drug use: No   • Sexual activity: Not on file   Other Topics Concern   • Not on file   Social History Narrative   • Not on file     Social Determinants of Health     Financial Resource Strain:    • Difficulty of Paying Living Expenses:    Food Insecurity:    • Worried About Running Out of Food in the Last Year:    • Ran Out of Food in the Last Year:    Transportation Needs:    • Lack of Transportation (Medical):    • Lack of Transportation (Non-Medical):    Physical Activity:    • Days of Exercise per Week:    • Minutes of Exercise per Session:    Stress:    • Feeling of Stress :    Social Connections:    • Frequency of Communication with Friends and Family:    • Frequency of Social Gatherings with Friends and Family:    • Attends Oriental orthodox Services:    • Active Member of Clubs or Organizations:    • Attends Club or Organization Meetings:    • Marital Status:    Intimate Partner Violence:    • Fear of Current or Ex-Partner:    • Emotionally Abused:    • Physically Abused:    • Sexually Abused:        SURGICAL HISTORY  History reviewed. No pertinent surgical history.    CURRENT MEDICATIONS  Home Medications    **Home medications have not yet been reviewed for this encounter**         ALLERGIES  No Known Allergies    PHYSICAL EXAM  VITAL SIGNS: BP (S) (!) 172/116   Pulse 79   Temp 36.1 °C (96.9 °F) (Temporal)   Resp 18   Ht 1.753 m (5' 9\")   Wt 76.4 kg (168 lb 6.9 oz)   SpO2 96%   BMI 24.87 kg/m²   Constitutional:  " Non-toxic appearance.   HENT: No facial swelling  Eyes: Anicteric, no conjunctivitis.  Pupils are 2 mm bilateral.  Extraocular motions intact.  Visual field cut however blurry vision left-sided compared to right  Cardiovascular: Normal heart rate, Normal rhythm  Pulmonary:  No wheezing, No rales.   Gastrointestinal: Soft, No tenderness, No masses  Skin: Warm, Dry, No cyanosis.   Neurologic: Speech is clear, follows commands, facial expression is symmetrical.  Sensation normal, strength normal.  Psychiatric: Affect normal,  Mood normal.  Patient is calm and cooperative  Musculoskeletal: Nontender neck    EKG/Labs  Results for orders placed or performed during the hospital encounter of 03/25/21   CBC WITH DIFFERENTIAL   Result Value Ref Range    WBC 6.9 4.8 - 10.8 K/uL    RBC 5.99 4.70 - 6.10 M/uL    Hemoglobin 15.5 14.0 - 18.0 g/dL    Hematocrit 46.6 42.0 - 52.0 %    MCV 77.8 (L) 81.4 - 97.8 fL    MCH 25.9 (L) 27.0 - 33.0 pg    MCHC 33.3 (L) 33.7 - 35.3 g/dL    RDW 36.8 35.9 - 50.0 fL    Platelet Count 270 164 - 446 K/uL    MPV 8.6 (L) 9.0 - 12.9 fL    Neutrophils-Polys 62.50 44.00 - 72.00 %    Lymphocytes 25.00 22.00 - 41.00 %    Monocytes 10.80 0.00 - 13.40 %    Eosinophils 1.20 0.00 - 6.90 %    Basophils 0.40 0.00 - 1.80 %    Immature Granulocytes 0.10 0.00 - 0.90 %    Nucleated RBC 0.00 /100 WBC    Neutrophils (Absolute) 4.29 1.82 - 7.42 K/uL    Lymphs (Absolute) 1.72 1.00 - 4.80 K/uL    Monos (Absolute) 0.74 0.00 - 0.85 K/uL    Eos (Absolute) 0.08 0.00 - 0.51 K/uL    Baso (Absolute) 0.03 0.00 - 0.12 K/uL    Immature Granulocytes (abs) 0.01 0.00 - 0.11 K/uL    NRBC (Absolute) 0.00 K/uL   COMP METABOLIC PANEL   Result Value Ref Range    Sodium 132 (L) 135 - 145 mmol/L    Potassium 3.7 3.6 - 5.5 mmol/L    Chloride 97 96 - 112 mmol/L    Co2 25 20 - 33 mmol/L    Anion Gap 10.0 7.0 - 16.0    Glucose 286 (H) 65 - 99 mg/dL    Bun 13 8 - 22 mg/dL    Creatinine 0.77 0.50 - 1.40 mg/dL    Calcium 9.7 8.5 - 10.5 mg/dL     AST(SGOT) 22 12 - 45 U/L    ALT(SGPT) 25 2 - 50 U/L    Alkaline Phosphatase 92 30 - 99 U/L    Total Bilirubin 1.1 0.1 - 1.5 mg/dL    Albumin 4.5 3.2 - 4.9 g/dL    Total Protein 8.1 6.0 - 8.2 g/dL    Globulin 3.6 (H) 1.9 - 3.5 g/dL    A-G Ratio 1.3 g/dL   Sed Rate   Result Value Ref Range    Sed Rate Westergren 7 0 - 20 mm/hour   ESTIMATED GFR   Result Value Ref Range    GFR If African American >60 >60 mL/min/1.73 m 2    GFR If Non African American >60 >60 mL/min/1.73 m 2         RADIOLOGY/PROCEDURES  CT-HEAD W/O   Final Result      No acute intracranial abnormality is identified.      There are periventricular and subcortical white matter changes present.  This finding is nonspecific and could be from previous small vessel ischemia, demyelination, or gliosis.      Mild cortical atrophy.            COURSE & MEDICAL DECISION MAKING  Pertinent Labs & Imaging studies reviewed. (See chart for details)  Frontal diagnosis includes hypertensive disease with headache, tension headache, migraine headache.  Temporal arteritis has been ruled out with negative sedimentation rate, no tenderness to the temporal.  CT scan of the head negative for acute hemorrhage.  Patient has multiple risk factors for vision change, hypertension, diabetes, high cholesterol.  Plan for outpatient follow-up with ophthalmology as the changes have now been present for 7 days.  He is advised take his blood pressure medication upon return home.  In the ER received 2 doses of IV labetalol with some improvement.  Currently no headache, no chest pain, he is well-appearing.      FINAL IMPRESSION     1. Visual disturbance     2. Hypertension, unspecified type     3. Nonintractable episodic headache, unspecified headache type     4. Hyperglycemia                     Electronically signed by: Miky Haque M.D., 3/25/2021 10:01 AM

## 2021-03-25 NOTE — DISCHARGE INSTRUCTIONS
Follow-up with the eye doctor.  Take your medication as prescribed  Follow-up with your primary doctor soon as possible    Return if worse or for any concerns

## 2021-03-25 NOTE — ED NOTES
Discharge instructions given to patient and son, a verbal understanding of all instructions was stated. IV removed, cathlon intact, site without s/s of infection. Pt preferred to walk out accompanied by son VSS, all belongings accounted for. Pt to follow up with ophthalmology today.

## 2021-03-27 DIAGNOSIS — Z76.0 MEDICATION REFILL: ICD-10-CM

## 2021-03-27 DIAGNOSIS — E11.9 DIABETES MELLITUS TYPE 2 IN NONOBESE (HCC): ICD-10-CM

## 2021-03-29 DIAGNOSIS — E11.9 DIABETES MELLITUS TYPE 2 IN NONOBESE (HCC): ICD-10-CM

## 2021-03-29 RX ORDER — LANCETS 28 GAUGE
EACH MISCELLANEOUS
Qty: 100 EACH | Refills: 0 | Status: SHIPPED | OUTPATIENT
Start: 2021-03-29 | End: 2021-03-29 | Stop reason: SDUPTHER

## 2021-03-29 RX ORDER — BLOOD-GLUCOSE METER
KIT MISCELLANEOUS
Qty: 300 STRIP | Refills: 3 | Status: SHIPPED | OUTPATIENT
Start: 2021-03-29 | End: 2021-10-12

## 2021-03-29 RX ORDER — LANCETS 28 GAUGE
EACH MISCELLANEOUS
Qty: 100 EACH | Refills: 0 | Status: SHIPPED | OUTPATIENT
Start: 2021-03-29 | End: 2021-06-24

## 2021-04-01 ENCOUNTER — PATIENT OUTREACH (OUTPATIENT)
Dept: HEALTH INFORMATION MANAGEMENT | Facility: OTHER | Age: 76
End: 2021-04-01

## 2021-04-01 NOTE — PROGRESS NOTES
Outcome: Left Message left voice mail ED [facility]     Please transfer to Patient Outreach Team at [phone] when patient returns call.    Attempt #1

## 2021-04-05 ENCOUNTER — NURSE TRIAGE (OUTPATIENT)
Dept: HEALTH INFORMATION MANAGEMENT | Facility: OTHER | Age: 76
End: 2021-04-05

## 2021-04-05 NOTE — TELEPHONE ENCOUNTER
1. Caller Name: Howard                 Call Back Number: [phone] (home)   2.   [facility] PCP or Specialty Provider: Yes Mac        2. Has the patient previously tested positive for COVID-19? No    3.  In the last two weeks, has the patient had any new or worsening symptoms (not explained by alternative diagnosis)? No.    4.  Does patient have any comoribidities? DM    5.  Has the patient had any known contact with someone who is suspected or confirmed to have COVID-19? No.    5. Disposition: Cleared by RN Triage as potential is low for COVID-19; OK to keep/schedule appointment    Note routed to [facility] Provider: SURJIT only.     Seen in ED for vision changes. Wanting to follow up with PCP before specialty appointment. PCP OOO, requesting to be seen by another provider. Appt scheduled.

## 2021-04-08 ENCOUNTER — TELEPHONE (OUTPATIENT)
Dept: MEDICAL GROUP | Facility: MEDICAL CENTER | Age: 76
End: 2021-04-08

## 2021-04-08 NOTE — TELEPHONE ENCOUNTER
ESTABLISHED PATIENT PRE-VISIT PLANNING     Patient was NOT contacted to complete PVP.     Note: Patient will not be contacted if there is no indication to call.     1.  Reviewed notes from the last few office visits within the medical group: Yes    2.  If any orders were placed at last visit or intended to be done for this visit (i.e. 6 mos follow-up), do we have Results/Consult Notes?         •  Labs - Labs were not ordered at last office visit.  Note: If patient appointment is for lab review and patient did not complete labs, check with provider if OK to reschedule patient until labs completed.       •  Imaging - Imaging was not ordered at last office visit.       •  Referrals - No referrals were ordered at last office visit.    3. Is this appointment scheduled as a Hospital Follow-Up? Yes, visit was at [facility].     4.  Immunizations were updated in Epic using Reconcile Outside Information activity? Yes    5.  Patient is due for the following Health Maintenance Topics:   Health Maintenance Due   Topic Date Due   • HEPATITIS C SCREENING  Never done   • IMM HEP B VACCINE (1 of 3 - Risk 3-dose series) Never done   • IMM ZOSTER VACCINES (1 of 2) Never done   • COVID-19 Vaccine (2 - Pfizer 2-dose series) 03/09/2021     6.  AHA (Pulse8) form printed for Provider? Yes

## 2021-04-12 ENCOUNTER — OFFICE VISIT (OUTPATIENT)
Dept: MEDICAL GROUP | Facility: MEDICAL CENTER | Age: 76
End: 2021-04-12
Payer: MEDICARE

## 2021-04-12 VITALS
HEART RATE: 64 BPM | BODY MASS INDEX: 23.99 KG/M2 | SYSTOLIC BLOOD PRESSURE: 148 MMHG | HEIGHT: 69 IN | TEMPERATURE: 97.6 F | DIASTOLIC BLOOD PRESSURE: 88 MMHG | WEIGHT: 162 LBS | OXYGEN SATURATION: 96 % | RESPIRATION RATE: 16 BRPM

## 2021-04-12 DIAGNOSIS — I10 ESSENTIAL HYPERTENSION: ICD-10-CM

## 2021-04-12 DIAGNOSIS — H54.62 VISION LOSS OF LEFT EYE: ICD-10-CM

## 2021-04-12 DIAGNOSIS — E78.2 MIXED HYPERLIPIDEMIA: ICD-10-CM

## 2021-04-12 PROCEDURE — 99214 OFFICE O/P EST MOD 30 MIN: CPT | Performed by: FAMILY MEDICINE

## 2021-04-12 ASSESSMENT — FIBROSIS 4 INDEX: FIB4 SCORE: 1.19

## 2021-04-12 NOTE — PROGRESS NOTES
"  Subjective:     Howard Gamboa is a 75 y.o. male presenting with his son who is interpreting to discuss vision changes.-13, he received his first Covid vaccine.  1 week later, he developed left eye vision loss.  No issues in the right eye.  He went to the emergency department on 3/25, CT scan, labs were normal, blood sugars were elevated however.  They have an appointment to see his eye doctor next week.  Denies any associated symptoms.  Denies any headaches, dizziness, fevers, cough, chest pain, shortness of breath, numbness, tingling, weakness.  They report that he was getting injections in his left a many years ago, but he stopped this a while ago.  His blood sugars at home have been elevated, he has not been taking Lantus.  He only uses his Humalog before meals.  He does continue on the Metformin.  He is currently taking lisinopril 40 mg daily for his blood pressure.  They are not sure if he is taking his amlodipine.      Current Outpatient Medications:   •  glucose blood (FREESTYLE LITE) strip, USE 1 STRIP TO CHECK GLUCOSE THREE TIMES DAILY, Disp: 300 Strip, Rfl: 3  •  FreeStyle Lancets Misc, USE 1  TO CHECK GLUCOSE THREE TIMES DAILY, Disp: 100 Each, Rfl: 0  •  amLODIPine (NORVASC) 10 MG Tab, Take 1 Tab by mouth every day., Disp: 90 Tab, Rfl: 1  •  atorvastatin (LIPITOR) 20 MG Tab, Take 2 tablets by mouth once daily, Disp: 200 Tab, Rfl: 1  •  lisinopril (PRINIVIL) 40 MG tablet, Take 1 Tab by mouth every day., Disp: 100 Tab, Rfl: 3  •  insulin lispro (HUMALOG) 100 UNIT/ML Solution Pen-injector injection PEN, Inject 10 Units as instructed 3 times a day before meals., Disp: 30 mL, Rfl: 2  •  Blood Glucose Test Strips, USE 1 STRIP TO CHECK GLUCOSE THREE TIMES DAILY, Disp: 100 Each, Rfl: 11  •  metformin (GLUCOPHAGE) 1000 MG tablet, TAKE 1 TABLET BY MOUTH TWICE DAILY WITH MEALS, Disp: 200 tablet, Rfl: 0    Objective:     Vitals: /88   Pulse 64   Temp 36.4 °C (97.6 °F)   Resp 16   Ht 1.753 m (5' 9\")   Wt " 73.5 kg (162 lb)   SpO2 96%   BMI 23.92 kg/m²   General: Alert  HEENT: Normocephalic. Neck supple.  No thyromegaly or masses palpated. No cervical or supraclavicular lymphadenopathy.  No carotid bruits    Heart: Regular rate and rhythm.  S1 and S2 normal.  No murmurs appreciated.  Respiratory: Normal respiratory effort.  Clear to auscultation bilaterally.  Abdomen: Non-distended, soft  Extremities: No leg edema.    Assessment/Plan:     Howard was seen today for hospital follow-up.    Diagnoses and all orders for this visit:    Vision loss of left eye  Acute uncomplicated issue.  Recommended seeing the eye doctor next week.  We discussed various etiologies.  His last A1c was 11%.  His last carotid ultrasound was in 2018, which showed minimal atherosclerosis.  ER notes, CT head, labs reviewed.    Uncontrolled type 2 diabetes mellitus with complication, without long-term current use of insulin (HCC)  Chronic, uncontrolled.  We discussed various medication options, could switch him to a NovoLog mix to make it easier for him to administer his insulin.  He could also switch to oral medications, but would likely need multiple meds.  His son plans to check his blood sugars more regularly and write them down.  They would like to hold off on any adjustments for now until they see his eye doctor.  They plan to follow-up with his PCP    Essential hypertension  Chronic, uncontrolled.  Discussed checking his medications at home and start the amlodipine if he is not taking that.    Mixed hyperlipidemia  Chronic, stable, continue atorvastatin, aspirin

## 2021-04-16 DIAGNOSIS — E11.65 UNCONTROLLED TYPE 2 DIABETES MELLITUS WITH HYPERGLYCEMIA (HCC): ICD-10-CM

## 2021-04-19 DIAGNOSIS — E11.65 UNCONTROLLED TYPE 2 DIABETES MELLITUS WITH HYPERGLYCEMIA (HCC): ICD-10-CM

## 2021-04-19 RX ORDER — PEN NEEDLE, DIABETIC 32GX 5/32"
NEEDLE, DISPOSABLE MISCELLANEOUS
Qty: 200 EACH | Refills: 5 | Status: SHIPPED | OUTPATIENT
Start: 2021-04-19 | End: 2021-10-12

## 2021-04-19 RX ORDER — INSULIN GLARGINE 100 [IU]/ML
INJECTION, SOLUTION SUBCUTANEOUS
Qty: 3 ML | Refills: 5 | Status: SHIPPED | OUTPATIENT
Start: 2021-04-19 | End: 2021-05-10 | Stop reason: SDUPTHER

## 2021-04-19 RX ORDER — PEN NEEDLE, DIABETIC 32GX 5/32"
NEEDLE, DISPOSABLE MISCELLANEOUS
Qty: 200 EACH | Refills: 5 | Status: SHIPPED
Start: 2021-04-19 | End: 2021-04-19 | Stop reason: SDUPTHER

## 2021-05-10 RX ORDER — INSULIN LISPRO 100 [IU]/ML
10 INJECTION, SOLUTION INTRAVENOUS; SUBCUTANEOUS
Qty: 30 ML | Refills: 2 | Status: SHIPPED | OUTPATIENT
Start: 2021-05-10 | End: 2021-05-10 | Stop reason: SDUPTHER

## 2021-05-10 RX ORDER — INSULIN GLARGINE 100 [IU]/ML
INJECTION, SOLUTION SUBCUTANEOUS
Qty: 3 ML | Refills: 5 | Status: SHIPPED | OUTPATIENT
Start: 2021-05-10 | End: 2022-06-15

## 2021-05-11 RX ORDER — INSULIN LISPRO 100 [IU]/ML
10 INJECTION, SOLUTION INTRAVENOUS; SUBCUTANEOUS
Qty: 30 ML | Refills: 2 | Status: SHIPPED | OUTPATIENT
Start: 2021-05-11 | End: 2022-07-12

## 2021-06-11 ENCOUNTER — TELEPHONE (OUTPATIENT)
Dept: MEDICAL GROUP | Facility: MEDICAL CENTER | Age: 76
End: 2021-06-11

## 2021-06-11 NOTE — TELEPHONE ENCOUNTER
ESTABLISHED PATIENT PRE-VISIT PLANNING     Patient was NOT contacted to complete PVP.     Note: Patient will not be contacted if there is no indication to call.     1.  Reviewed notes from the last few office visits within the medical group: Yes    2.  If any orders were placed at last visit or intended to be done for this visit (i.e. 6 mos follow-up), do we have Results/Consult Notes?         •  Labs - Labs were not ordered at last office visit.  Note: If patient appointment is for lab review and patient did not complete labs, check with provider if OK to reschedule patient until labs completed.       •  Imaging - Imaging was not ordered at last office visit.       •  Referrals - No referrals were ordered at last office visit.    3. Is this appointment scheduled as a Hospital Follow-Up? No    4.  Immunizations were updated in Epic using Reconcile Outside Information activity? Yes    5.  Patient is due for the following Health Maintenance Topics:   Health Maintenance Due   Topic Date Due   • HEPATITIS C SCREENING  Never done         6.  AHA (Pulse8) form printed for Provider? Yes

## 2021-06-16 ENCOUNTER — HOSPITAL ENCOUNTER (OUTPATIENT)
Dept: LAB | Facility: MEDICAL CENTER | Age: 76
End: 2021-06-16
Attending: FAMILY MEDICINE
Payer: MEDICARE

## 2021-06-16 ENCOUNTER — OFFICE VISIT (OUTPATIENT)
Dept: MEDICAL GROUP | Facility: MEDICAL CENTER | Age: 76
End: 2021-06-16
Payer: MEDICARE

## 2021-06-16 VITALS
SYSTOLIC BLOOD PRESSURE: 148 MMHG | HEART RATE: 75 BPM | WEIGHT: 169 LBS | HEIGHT: 69 IN | OXYGEN SATURATION: 97 % | DIASTOLIC BLOOD PRESSURE: 78 MMHG | BODY MASS INDEX: 25.03 KG/M2 | TEMPERATURE: 98.4 F | RESPIRATION RATE: 16 BRPM

## 2021-06-16 DIAGNOSIS — E11.65 UNCONTROLLED TYPE 2 DIABETES MELLITUS WITH HYPERGLYCEMIA (HCC): ICD-10-CM

## 2021-06-16 DIAGNOSIS — I10 ESSENTIAL HYPERTENSION: ICD-10-CM

## 2021-06-16 DIAGNOSIS — E78.2 MIXED HYPERLIPIDEMIA: ICD-10-CM

## 2021-06-16 LAB
EST. AVERAGE GLUCOSE BLD GHB EST-MCNC: 260 MG/DL
HBA1C MFR BLD: 10.7 % (ref 4–5.6)

## 2021-06-16 PROCEDURE — 99214 OFFICE O/P EST MOD 30 MIN: CPT | Performed by: FAMILY MEDICINE

## 2021-06-16 PROCEDURE — 83036 HEMOGLOBIN GLYCOSYLATED A1C: CPT

## 2021-06-16 PROCEDURE — 36415 COLL VENOUS BLD VENIPUNCTURE: CPT

## 2021-06-16 ASSESSMENT — FIBROSIS 4 INDEX: FIB4 SCORE: 1.19

## 2021-06-16 NOTE — PROGRESS NOTES
CC: Uncontrolled diabetes, hypertension, hyperlipidemia    HPI:   Howard presents today to discuss the following medical issues:    Uncontrolled type 2 diabetes mellitus with hyperglycemia (HCC)  Glucose has been always uncontrolled.  A1c today is 10.7, it was 11.7.  Patient has been noncompliant with his medications, came in today with his son who promised to try to make him more compliant with his medication.  Patient currently has an eye complication in the form of severe nonproliferative diabetic retinopathy as per ophthalmology's note.  Patient supposed to be on Lantus 10 units every night, and Humalog short acting insulin based on sliding scale, Metformin 1000 mg twice a day.     Essential hypertension  Blood pressure today is high.  Patient has been noncompliant with medication.  Discussed with patient and son complications of high blood pressure.  His son would make sure he is taking the lisinopril 40 mg and amlodipine 10 mg a regular basis.  Advised to check his blood pressure twice a day for a week and send it to me for evaluation.  Currently denies any headache, chest pain, shortness of breath    Mixed hyperlipidemia  He has been tolerating the statin. Denies muscle pain LFTs has been normal.  Patient has been on atorvastatin 20 mg daily.    Patient Active Problem List    Diagnosis Date Noted   • Pneumonia 06/30/2018   • Uncontrolled type 2 diabetes mellitus with complication, without long-term current use of insulin (HCC) 02/07/2017   • Microcytosis 05/26/2016   • Adenomatous polyp of colon 10/02/2015   • Hyperlipidemia 11/20/2014   • Retinal vein occlusion, branch 11/20/2014   • Macular edema 11/20/2014   • Hypertension        Current Outpatient Medications   Medication Sig Dispense Refill   • insulin lispro (HUMALOG KWIKPEN) 100 UNIT/ML Solution Pen-injector injection PEN Inject 10 Units under the skin 3 times a day before meals. 30 mL 2   • insulin glargine (LANTUS SOLOSTAR) 100 UNIT/ML Solution  "Pen-injector injection Inject 10 units subcutaneously once daily in the evening 3 mL 5   • Insulin Pen Needle 32 G x 4 mm (RELION PEN NEEDLES) USE 1 PEN NEEDLE 4 TIMES DAILY BEFORE MEALS AND AT BEDTIME 200 Each 5   • metformin (GLUCOPHAGE) 1000 MG tablet TAKE 1 TABLET BY MOUTH TWICE DAILY WITH MEALS 200 tablet 0   • glucose blood (FREESTYLE LITE) strip USE 1 STRIP TO CHECK GLUCOSE THREE TIMES DAILY 300 Strip 3   • FreeStyle Lancets Misc USE 1  TO CHECK GLUCOSE THREE TIMES DAILY 100 Each 0   • amLODIPine (NORVASC) 10 MG Tab Take 1 Tab by mouth every day. 90 Tab 1   • atorvastatin (LIPITOR) 20 MG Tab Take 2 tablets by mouth once daily 200 Tab 1   • lisinopril (PRINIVIL) 40 MG tablet Take 1 Tab by mouth every day. 100 Tab 3   • Blood Glucose Test Strips USE 1 STRIP TO CHECK GLUCOSE THREE TIMES DAILY 100 Each 11     No current facility-administered medications for this visit.         Allergies as of 06/16/2021   • (No Known Allergies)        ROS: Denies any chest pain, Shortness of breath, Changes bowel or bladder, Lower extremity edema.    Physical Exam:  /78   Pulse 75   Temp 36.9 °C (98.4 °F) (Temporal)   Resp 16   Ht 1.753 m (5' 9\")   Wt 76.7 kg (169 lb)   SpO2 97%   BMI 24.96 kg/m²   Gen.: Well-developed, well-nourished, no apparent distress,pleasant and cooperative with the examination  Skin:  Warm and dry with good turgor. No rashes or suspicious lesions in visible areas  HEENT:Sinuses nontender with palpation, TMs clear, nares patent with pink mucosa and clear rhinorrhea,no septal deviation ,polyps or lesions. lips without lesions, oropharynx clear.  Neck: Trachea midline,no masses or adenopathy. No JVD.  Cor: Regular rate and rhythm without murmur, gallop or rub.  Lungs: Respirations unlabored.Clear to auscultation with equal breath sounds bilaterally. No wheezes, rhonchi.  Extremities: No cyanosis, clubbing or edema.      Assessment and Plan.   75 y.o. male     1. Uncontrolled type 2 diabetes " mellitus with hyperglycemia (HCC)  A1c today is 10.7.   However patient has been noncompliant with his medications, came in today with his son who promised to try to make him more compliant with his medication.  For now continue on current regimen Lantus 10 units every night, and Humalog short acting insulin based on sliding scale.  Continue Metformin 1000 mg twice a day.  Discussed the importance of compliance with patient and son.  His son will try his best to make sure he takes his medication.  Repeat A1c next visit in 3 months.      2. Essential hypertension  Uncontrolled.  High for diabetic patient  Probably patient was not compliant with medication  Discussed with patient and son complications of high blood pressure.  His son would make sure he is taking the lisinopril 40 mg and amlodipine 10 mg a regular basis.  Advised to check his blood pressure twice a day for a week and send it to me for evaluation    3. Mixed hyperlipidemia  He has been tolerating the statin. Denies muscle pain LFTs has been normal  Continue atorvastatin 20 mg daily.

## 2021-06-24 DIAGNOSIS — E11.9 DIABETES MELLITUS TYPE 2 IN NONOBESE (HCC): ICD-10-CM

## 2021-06-24 RX ORDER — LANCETS 28 GAUGE
EACH MISCELLANEOUS
Qty: 100 EACH | Refills: 0 | Status: SHIPPED | OUTPATIENT
Start: 2021-06-24 | End: 2021-08-18

## 2021-06-24 RX ORDER — ATORVASTATIN CALCIUM 20 MG/1
TABLET, FILM COATED ORAL
Qty: 200 TABLET | Refills: 0 | Status: SHIPPED | OUTPATIENT
Start: 2021-06-24 | End: 2021-10-13 | Stop reason: SDUPTHER

## 2021-07-20 ENCOUNTER — PRE-ADMISSION TESTING (OUTPATIENT)
Dept: ADMISSIONS | Facility: MEDICAL CENTER | Age: 76
End: 2021-07-20
Attending: OPHTHALMOLOGY
Payer: MEDICARE

## 2021-07-20 DIAGNOSIS — Z01.810 PRE-OPERATIVE CARDIOVASCULAR EXAMINATION: ICD-10-CM

## 2021-07-20 DIAGNOSIS — Z01.812 PRE-OPERATIVE LABORATORY EXAMINATION: ICD-10-CM

## 2021-07-20 LAB
ALBUMIN SERPL BCP-MCNC: 4.3 G/DL (ref 3.2–4.9)
ALBUMIN/GLOB SERPL: 1.5 G/DL
ALP SERPL-CCNC: 68 U/L (ref 30–99)
ALT SERPL-CCNC: 16 U/L (ref 2–50)
ANION GAP SERPL CALC-SCNC: 9 MMOL/L (ref 7–16)
AST SERPL-CCNC: 18 U/L (ref 12–45)
BASOPHILS # BLD AUTO: 0.3 % (ref 0–1.8)
BASOPHILS # BLD: 0.02 K/UL (ref 0–0.12)
BILIRUB SERPL-MCNC: 1.4 MG/DL (ref 0.1–1.5)
BUN SERPL-MCNC: 13 MG/DL (ref 8–22)
CALCIUM SERPL-MCNC: 9.2 MG/DL (ref 8.5–10.5)
CHLORIDE SERPL-SCNC: 104 MMOL/L (ref 96–112)
CO2 SERPL-SCNC: 26 MMOL/L (ref 20–33)
CREAT SERPL-MCNC: 0.91 MG/DL (ref 0.5–1.4)
EKG IMPRESSION: NORMAL
EOSINOPHIL # BLD AUTO: 0.06 K/UL (ref 0–0.51)
EOSINOPHIL NFR BLD: 0.8 % (ref 0–6.9)
ERYTHROCYTE [DISTWIDTH] IN BLOOD BY AUTOMATED COUNT: 41.2 FL (ref 35.9–50)
GLOBULIN SER CALC-MCNC: 2.9 G/DL (ref 1.9–3.5)
GLUCOSE SERPL-MCNC: 128 MG/DL (ref 65–99)
HCT VFR BLD AUTO: 45.1 % (ref 42–52)
HGB BLD-MCNC: 14.8 G/DL (ref 14–18)
IMM GRANULOCYTES # BLD AUTO: 0.02 K/UL (ref 0–0.11)
IMM GRANULOCYTES NFR BLD AUTO: 0.3 % (ref 0–0.9)
LYMPHOCYTES # BLD AUTO: 2.06 K/UL (ref 1–4.8)
LYMPHOCYTES NFR BLD: 27.2 % (ref 22–41)
MCH RBC QN AUTO: 26.3 PG (ref 27–33)
MCHC RBC AUTO-ENTMCNC: 32.8 G/DL (ref 33.7–35.3)
MCV RBC AUTO: 80.2 FL (ref 81.4–97.8)
MONOCYTES # BLD AUTO: 0.96 K/UL (ref 0–0.85)
MONOCYTES NFR BLD AUTO: 12.7 % (ref 0–13.4)
NEUTROPHILS # BLD AUTO: 4.46 K/UL (ref 1.82–7.42)
NEUTROPHILS NFR BLD: 58.7 % (ref 44–72)
NRBC # BLD AUTO: 0 K/UL
NRBC BLD-RTO: 0 /100 WBC
PLATELET # BLD AUTO: 271 K/UL (ref 164–446)
PMV BLD AUTO: 8.4 FL (ref 9–12.9)
POTASSIUM SERPL-SCNC: 4.1 MMOL/L (ref 3.6–5.5)
PROT SERPL-MCNC: 7.2 G/DL (ref 6–8.2)
RBC # BLD AUTO: 5.62 M/UL (ref 4.7–6.1)
SODIUM SERPL-SCNC: 139 MMOL/L (ref 135–145)
WBC # BLD AUTO: 7.6 K/UL (ref 4.8–10.8)

## 2021-07-20 PROCEDURE — 85025 COMPLETE CBC W/AUTO DIFF WBC: CPT

## 2021-07-20 PROCEDURE — 93005 ELECTROCARDIOGRAM TRACING: CPT

## 2021-07-20 PROCEDURE — 80053 COMPREHEN METABOLIC PANEL: CPT

## 2021-07-20 PROCEDURE — 93010 ELECTROCARDIOGRAM REPORT: CPT | Performed by: INTERNAL MEDICINE

## 2021-07-20 PROCEDURE — 36415 COLL VENOUS BLD VENIPUNCTURE: CPT

## 2021-07-20 ASSESSMENT — FIBROSIS 4 INDEX: FIB4 SCORE: 1.19

## 2021-07-20 NOTE — OR NURSING
Patient & daughter Youdi instructed to consult with Dr Watts for how to take diabetic medications pre op.

## 2021-07-21 ENCOUNTER — TELEPHONE (OUTPATIENT)
Dept: MEDICAL GROUP | Facility: MEDICAL CENTER | Age: 76
End: 2021-07-21

## 2021-07-21 NOTE — TELEPHONE ENCOUNTER
Pts dtr called and lvm stating that pt will be having eye surgery on 7/26/2021 and would like to know if pt can continue taking his DM meds before and after his surgery or if there is a time frame of when to stop and continue back on the meds. Please advise.

## 2021-07-22 NOTE — TELEPHONE ENCOUNTER
He can continue all his medications.  He may want to hold the Humalog, short acting insulin, on the morning of surgery to avoid any hypoglycemic episodes.

## 2021-07-23 NOTE — OR NURSING
COVID-19 Pre-surgery screening:    Pt did not answer generic voicemail was left with call back number.           Used [employer]  [employer] Jose Martin- [id number]

## 2021-07-25 ENCOUNTER — ANESTHESIA EVENT (OUTPATIENT)
Dept: SURGERY | Facility: MEDICAL CENTER | Age: 76
End: 2021-07-25
Payer: MEDICARE

## 2021-07-26 ENCOUNTER — ANESTHESIA (OUTPATIENT)
Dept: SURGERY | Facility: MEDICAL CENTER | Age: 76
End: 2021-07-26
Payer: MEDICARE

## 2021-07-26 ENCOUNTER — HOSPITAL ENCOUNTER (OUTPATIENT)
Facility: MEDICAL CENTER | Age: 76
End: 2021-07-26
Attending: OPHTHALMOLOGY | Admitting: OPHTHALMOLOGY
Payer: MEDICARE

## 2021-07-26 VITALS
HEIGHT: 67 IN | DIASTOLIC BLOOD PRESSURE: 99 MMHG | HEART RATE: 76 BPM | OXYGEN SATURATION: 92 % | BODY MASS INDEX: 26.19 KG/M2 | RESPIRATION RATE: 20 BRPM | TEMPERATURE: 97.5 F | SYSTOLIC BLOOD PRESSURE: 162 MMHG | WEIGHT: 166.89 LBS

## 2021-07-26 LAB
GLUCOSE BLD-MCNC: 89 MG/DL (ref 65–99)
PATHOLOGY CONSULT NOTE: NORMAL

## 2021-07-26 PROCEDURE — 700101 HCHG RX REV CODE 250: Performed by: ANESTHESIOLOGY

## 2021-07-26 PROCEDURE — 160048 HCHG OR STATISTICAL LEVEL 1-5: Performed by: OPHTHALMOLOGY

## 2021-07-26 PROCEDURE — 700111 HCHG RX REV CODE 636 W/ 250 OVERRIDE (IP): Performed by: ANESTHESIOLOGY

## 2021-07-26 PROCEDURE — 160002 HCHG RECOVERY MINUTES (STAT): Performed by: OPHTHALMOLOGY

## 2021-07-26 PROCEDURE — 700105 HCHG RX REV CODE 258: Performed by: OPHTHALMOLOGY

## 2021-07-26 PROCEDURE — 160009 HCHG ANES TIME/MIN: Performed by: OPHTHALMOLOGY

## 2021-07-26 PROCEDURE — 160029 HCHG SURGERY MINUTES - 1ST 30 MINS LEVEL 4: Performed by: OPHTHALMOLOGY

## 2021-07-26 PROCEDURE — 160035 HCHG PACU - 1ST 60 MINS PHASE I: Performed by: OPHTHALMOLOGY

## 2021-07-26 PROCEDURE — 501836 HCHG SUTURE EYE: Performed by: OPHTHALMOLOGY

## 2021-07-26 PROCEDURE — 700101 HCHG RX REV CODE 250: Performed by: OPHTHALMOLOGY

## 2021-07-26 PROCEDURE — 160046 HCHG PACU - 1ST 60 MINS PHASE II: Performed by: OPHTHALMOLOGY

## 2021-07-26 PROCEDURE — 160025 RECOVERY II MINUTES (STATS): Performed by: OPHTHALMOLOGY

## 2021-07-26 PROCEDURE — 700101 HCHG RX REV CODE 250

## 2021-07-26 PROCEDURE — 160041 HCHG SURGERY MINUTES - EA ADDL 1 MIN LEVEL 4: Performed by: OPHTHALMOLOGY

## 2021-07-26 PROCEDURE — 82962 GLUCOSE BLOOD TEST: CPT

## 2021-07-26 PROCEDURE — 88112 CYTOPATH CELL ENHANCE TECH: CPT

## 2021-07-26 PROCEDURE — 88305 TISSUE EXAM BY PATHOLOGIST: CPT

## 2021-07-26 PROCEDURE — 700111 HCHG RX REV CODE 636 W/ 250 OVERRIDE (IP): Performed by: OPHTHALMOLOGY

## 2021-07-26 RX ORDER — DEXTROSE MONOHYDRATE 25 G/50ML
INJECTION, SOLUTION INTRAVENOUS
Status: DISCONTINUED | OUTPATIENT
Start: 2021-07-26 | End: 2021-07-26 | Stop reason: HOSPADM

## 2021-07-26 RX ORDER — OXYCODONE HCL 5 MG/5 ML
10 SOLUTION, ORAL ORAL
Status: DISCONTINUED | OUTPATIENT
Start: 2021-07-26 | End: 2021-07-26 | Stop reason: HOSPADM

## 2021-07-26 RX ORDER — ATROPINE SULFATE 10 MG/G
OINTMENT OPHTHALMIC
Status: DISCONTINUED | OUTPATIENT
Start: 2021-07-26 | End: 2021-07-26 | Stop reason: HOSPADM

## 2021-07-26 RX ORDER — LIDOCAINE HYDROCHLORIDE 20 MG/ML
INJECTION, SOLUTION EPIDURAL; INFILTRATION; INTRACAUDAL; PERINEURAL PRN
Status: DISCONTINUED | OUTPATIENT
Start: 2021-07-26 | End: 2021-07-26 | Stop reason: SURG

## 2021-07-26 RX ORDER — PHENYLEPHRINE HYDROCHLORIDE 10 MG/ML
INJECTION, SOLUTION INTRAMUSCULAR; INTRAVENOUS; SUBCUTANEOUS PRN
Status: DISCONTINUED | OUTPATIENT
Start: 2021-07-26 | End: 2021-07-26 | Stop reason: SURG

## 2021-07-26 RX ORDER — CYCLOPENTOLATE HYDROCHLORIDE 10 MG/ML
SOLUTION/ DROPS OPHTHALMIC
Status: COMPLETED
Start: 2021-07-26 | End: 2021-07-26

## 2021-07-26 RX ORDER — BALANCED SALT SOLUTION ENRICHED WITH BICARBONATE, DEXTROSE, AND GLUTATHIONE
KIT INTRAOCULAR
Status: DISCONTINUED | OUTPATIENT
Start: 2021-07-26 | End: 2021-07-26 | Stop reason: HOSPADM

## 2021-07-26 RX ORDER — SODIUM CHLORIDE, SODIUM LACTATE, POTASSIUM CHLORIDE, CALCIUM CHLORIDE 600; 310; 30; 20 MG/100ML; MG/100ML; MG/100ML; MG/100ML
INJECTION, SOLUTION INTRAVENOUS CONTINUOUS
Status: DISCONTINUED | OUTPATIENT
Start: 2021-07-26 | End: 2021-07-26 | Stop reason: HOSPADM

## 2021-07-26 RX ORDER — ONDANSETRON 2 MG/ML
INJECTION INTRAMUSCULAR; INTRAVENOUS PRN
Status: DISCONTINUED | OUTPATIENT
Start: 2021-07-26 | End: 2021-07-26 | Stop reason: SURG

## 2021-07-26 RX ORDER — NEOMYCIN SULFATE, POLYMYXIN B SULFATE, AND DEXAMETHASONE 3.5; 10000; 1 MG/G; [USP'U]/G; MG/G
OINTMENT OPHTHALMIC
Status: DISCONTINUED | OUTPATIENT
Start: 2021-07-26 | End: 2021-07-26 | Stop reason: HOSPADM

## 2021-07-26 RX ORDER — DEXAMETHASONE SODIUM PHOSPHATE 4 MG/ML
INJECTION, SOLUTION INTRA-ARTICULAR; INTRALESIONAL; INTRAMUSCULAR; INTRAVENOUS; SOFT TISSUE
Status: DISCONTINUED | OUTPATIENT
Start: 2021-07-26 | End: 2021-07-26 | Stop reason: HOSPADM

## 2021-07-26 RX ORDER — PHENYLEPHRINE HYDROCHLORIDE 25 MG/ML
SOLUTION/ DROPS OPHTHALMIC
Status: COMPLETED
Start: 2021-07-26 | End: 2021-07-26

## 2021-07-26 RX ORDER — DIPHENHYDRAMINE HYDROCHLORIDE 50 MG/ML
12.5 INJECTION INTRAMUSCULAR; INTRAVENOUS
Status: DISCONTINUED | OUTPATIENT
Start: 2021-07-26 | End: 2021-07-26 | Stop reason: HOSPADM

## 2021-07-26 RX ORDER — OXYCODONE HCL 5 MG/5 ML
5 SOLUTION, ORAL ORAL
Status: DISCONTINUED | OUTPATIENT
Start: 2021-07-26 | End: 2021-07-26 | Stop reason: HOSPADM

## 2021-07-26 RX ORDER — CEFAZOLIN SODIUM 1 G/3ML
INJECTION, POWDER, FOR SOLUTION INTRAMUSCULAR; INTRAVENOUS
Status: DISCONTINUED | OUTPATIENT
Start: 2021-07-26 | End: 2021-07-26 | Stop reason: HOSPADM

## 2021-07-26 RX ORDER — ONDANSETRON 2 MG/ML
4 INJECTION INTRAMUSCULAR; INTRAVENOUS
Status: DISCONTINUED | OUTPATIENT
Start: 2021-07-26 | End: 2021-07-26 | Stop reason: HOSPADM

## 2021-07-26 RX ORDER — HALOPERIDOL 5 MG/ML
1 INJECTION INTRAMUSCULAR
Status: DISCONTINUED | OUTPATIENT
Start: 2021-07-26 | End: 2021-07-26 | Stop reason: HOSPADM

## 2021-07-26 RX ADMIN — CYCLOPENTOLATE HYDROCHLORIDE 1 DROP: 10 SOLUTION/ DROPS OPHTHALMIC at 13:18

## 2021-07-26 RX ADMIN — CYCLOPENTOLATE HYDROCHLORIDE 1 DROP: 10 SOLUTION/ DROPS OPHTHALMIC at 13:05

## 2021-07-26 RX ADMIN — PHENYLEPHRINE HYDROCHLORIDE 1 DROP: 25 SOLUTION/ DROPS OPHTHALMIC at 13:05

## 2021-07-26 RX ADMIN — PHENYLEPHRINE HYDROCHLORIDE: 25 SOLUTION/ DROPS OPHTHALMIC at 13:28

## 2021-07-26 RX ADMIN — LIDOCAINE HYDROCHLORIDE 100 MG: 20 INJECTION, SOLUTION EPIDURAL; INFILTRATION; INTRACAUDAL at 14:25

## 2021-07-26 RX ADMIN — PHENYLEPHRINE HYDROCHLORIDE: 25 SOLUTION/ DROPS OPHTHALMIC at 13:18

## 2021-07-26 RX ADMIN — FENTANYL CITRATE 25 MCG: 50 INJECTION, SOLUTION INTRAMUSCULAR; INTRAVENOUS at 14:24

## 2021-07-26 RX ADMIN — PROPOFOL 100 MG: 10 INJECTION, EMULSION INTRAVENOUS at 14:25

## 2021-07-26 RX ADMIN — PHENYLEPHRINE HYDROCHLORIDE 50 MCG: 10 INJECTION INTRAVENOUS at 14:37

## 2021-07-26 RX ADMIN — ONDANSETRON 4 MG: 2 INJECTION INTRAMUSCULAR; INTRAVENOUS at 14:59

## 2021-07-26 RX ADMIN — SODIUM CHLORIDE, POTASSIUM CHLORIDE, SODIUM LACTATE AND CALCIUM CHLORIDE: 600; 310; 30; 20 INJECTION, SOLUTION INTRAVENOUS at 14:20

## 2021-07-26 RX ADMIN — CYCLOPENTOLATE HYDROCHLORIDE 1 DROP: 10 SOLUTION/ DROPS OPHTHALMIC at 13:28

## 2021-07-26 ASSESSMENT — PAIN DESCRIPTION - PAIN TYPE: TYPE: SURGICAL PAIN

## 2021-07-26 ASSESSMENT — PAIN SCALES - GENERAL: PAIN_LEVEL: 0

## 2021-07-26 ASSESSMENT — FIBROSIS 4 INDEX: FIB4 SCORE: 1.21

## 2021-07-26 NOTE — ANESTHESIA PROCEDURE NOTES
Airway    Date/Time: 7/26/2021 2:26 PM  Performed by: Eugenia Pagan M.D.  Authorized by: Eugenia Pagan M.D.     Location:  OR  Urgency:  Elective  Indications for Airway Management:  Anesthesia      Spontaneous Ventilation: absent    Sedation Level:  Deep  Preoxygenated: Yes    Mask Difficulty Assessment:  1 - vent by mask  Final Airway Type:  Supraglottic airway  Final Supraglottic Airway:  Standard LMA    SGA Size:  5  Number of Attempts at Approach:  1

## 2021-07-26 NOTE — ANESTHESIA TIME REPORT
Anesthesia Start and Stop Event Times     Date Time Event    7/26/2021 1404 Ready for Procedure     1420 Anesthesia Start     1509 Anesthesia Stop        Responsible Staff  07/26/21    Name Role Begin End    Eugenia Pagan M.D. Anesth 1420 1509        Preop Diagnosis (Free Text):  Pre-op Diagnosis     VITREOUS HEMORRHAGE        Preop Diagnosis (Codes):    Post op Diagnosis  Vitreous hemorrhage      Premium Reason  Non-Premium    Comments:     Exception Times  Start Time: 7/26/2021 3:00 PM  End Time: 7/26/2021 3:09 PM   #: [id number]   Name: Eugenia Pagan M.D.  Premium Reason: A

## 2021-07-26 NOTE — DISCHARGE INSTRUCTIONS
Vitrectomy, Care After    This sheet gives you information about how to care for yourself after your procedure. Your health care provider may also give you more specific instructions. If you have problems or questions, contact your health care provider.  What can I expect after the procedure?  After the procedure, it is common to have:  · A spot that looks like a bubble blocking your field of vision. This goes away over time.  · A feeling like there is something in your eye.  · Very blurry vision in the affected eye.  · A dilated pupil.  · Light sensitivity.  · Difficulty seeing things up close.    Follow these instructions at home:    Eye care  · Keep the area around your eye clean and dry.  · If you were given an eye patch or eye shield, wear it as told by your health care provider.  · If you were prescribed antibiotic eye drops, use them as told by your health care provider. Do not stop using the antibiotic even if your condition improves.  · Wear sunglasses if your eyes are sensitive to light.  · Do not use contact lenses until your health care provider approves.    Activity     · You may be told to stay in a certain position for a period of time, such as sitting up or lying on your back or on your stomach. Make sure you do this as told by your health care provider.  · Rest as told by your health care provider.  · Avoid strenuous physical activity for as long as told by your health care provider. This includes bending over, lifting anything that is heavier than 5 lb (2.3 kg) or the limit that you are told by your health care provider, and straining.  · Ask your health care provider when you may have sex.  · Do not drive until your health care provider approves.  · Do not ride in an airplane until your health care provider approves.    General instructions  · Take or apply over-the-counter and prescription medicines only as told by your health care provider. This includes any eye drops.  · Keep all follow-up  visits as told by your health care provider. This is important.    Contact a health care provider if:  · Your eye becomes very red and painful.  · You have any pus or discharge coming from your eye.  · You have chills.  · Your eyelid on either eye becomes swollen or stuck shut.    Get help right away if:  · You have a fever.  · You have severe pain.  · You see small, drifting specks in front of your vision.  · Part of your vision is covered by what looks like a black curtain that you cannot see through.  · You have a sudden loss of vision.    Summary  · After the procedure, it is common to have a spot that looks like a bubble blocking your field of vision. This goes away over time.  · If you were given an eye patch or eye shield, wear it as told by your health care provider.  · You may be told to stay in a certain position for a period of time, such as sitting up or lying on your back or on your stomach. Make sure you do this as told by your health care provider.  · Take or apply over-the-counter and prescription medicines only as told by your health care provider. This includes any eye drops.  This information is not intended to replace advice given to you by your health care provider. Make sure you discuss any questions you have with your health care provider.  Document Released: 09/04/2012 Document Revised: 01/03/2020 Document Reviewed: 01/06/2020  [employer] Patient Education © 2020 [employer]        ACTIVITY: Rest and take it easy for the first 24 hours.  A responsible adult is recommended to remain with you during that time.  It is normal to feel sleepy.  We encourage you to not do anything that requires balance, judgment or coordination.    MILD FLU-LIKE SYMPTOMS ARE NORMAL. YOU MAY EXPERIENCE GENERALIZED MUSCLE ACHES, THROAT IRRITATION, HEADACHE AND/OR SOME NAUSEA.    FOR 24 HOURS DO NOT:  Drive, operate machinery or run household appliances.  Drink beer or alcoholic beverages.   Make important decisions or  sign legal documents.    SPECIAL INSTRUCTIONS:     DIET: To avoid nausea, slowly advance diet as tolerated, avoiding spicy or greasy foods for the first day.  Add more substantial food to your diet according to your physician's instructions.  Babies can be fed formula or breast milk as soon as they are hungry.  INCREASE FLUIDS AND FIBER TO AVOID CONSTIPATION.    SURGICAL DRESSING/BATHING:     Keep the dressing clean and dry until surgeon instructs otherwise.   May change dressing as needed.   Apply eye ointment as instructed before surgery.    FOLLOW-UP APPOINTMENT:  A follow-up appointment should be arranged with your doctor in ; call to schedule.    You should CALL YOUR PHYSICIAN if you develop:  Fever greater than 101 degrees F.  Pain not relieved by medication, or persistent nausea or vomiting.  Excessive bleeding (blood soaking through dressing) or unexpected drainage from the wound.  Extreme redness or swelling around the incision site, drainage of pus or foul smelling drainage.  Inability to urinate or empty your bladder within 8 hours.  Problems with breathing or chest pain.    You should call 911 if you develop problems with breathing or chest pain.  If you are unable to contact your doctor or surgical center, you should go to the nearest emergency room or urgent care center.      Physician's telephone #: [phone] [facility]    If any questions arise, call your doctor.  If your doctor is not available, please feel free to call the Surgical Center at [phone]. The Contact Center is open Monday through Friday 7AM to 5PM and may speak to a nurse at [phone], or toll free at [phone].     A registered nurse may call you a few days after your surgery to see how you are doing after your procedure.    MEDICATIONS: Resume taking daily medication.  Take prescribed pain medication with food.  If no medication is prescribed, you may take non-aspirin pain medication if needed.  PAIN MEDICATION CAN BE VERY  CONSTIPATING.  Take a stool softener or laxative such as senokot, pericolace, or milk of magnesia if needed.      If your physician has prescribed pain medication that includes Acetaminophen (Tylenol), do not take additional Acetaminophen (Tylenol) while taking the prescribed medication.    Depression / Suicide Risk    As you are discharged from this [facility] facility, it is important to learn how to keep safe from harming yourself.    Recognize the warning signs:  · Abrupt changes in personality, positive or negative- including increase in energy   · Giving away possessions  · Change in eating patterns- significant weight changes-  positive or negative  · Change in sleeping patterns- unable to sleep or sleeping all the time   · Unwillingness or inability to communicate  · Depression  · Unusual sadness, discouragement and loneliness  · Talk of wanting to die  · Neglect of personal appearance   · Rebelliousness- reckless behavior  · Withdrawal from people/activities they love  · Confusion- inability to concentrate     If you or a loved one observes any of these behaviors or has concerns about self-harm, here's what you can do:  · Talk about it- your feelings and reasons for harming yourself  · Remove any means that you might use to hurt yourself (examples: pills, rope, extension cords, firearm)  · Get professional help from the community (Mental Health, Substance Abuse, psychological counseling)  · Do not be alone:Call your Safe Contact- someone whom you trust who will be there for you.  · Call your local CRISIS HOTLINE 455-7636 or 150-395-3697  · Call your local Children's Mobile Crisis Response Team [place] (575) 183-8968 or www.[employer]  · Call the toll free National Suicide Prevention Hotlines   · National Suicide Prevention Lifeline 831-454-EYKJ (9157)  · National Hope Line Network 800-SUICIDE (868-3805)

## 2021-07-26 NOTE — ANESTHESIA PREPROCEDURE EVALUATION
H&P done with daughter at bedside as  per pt request    Relevant Problems   PULMONARY   (positive) Pneumonia      CARDIAC   (positive) Hypertension   (positive) Retinal vein occlusion, branch      ENDO   (positive) Uncontrolled type 2 diabetes mellitus with complication, without long-term current use of insulin (HCC)       Physical Exam    Airway   Mallampati: III  TM distance: >3 FB  Neck ROM: full       Cardiovascular - normal exam  Rhythm: regular  Rate: normal  (-) murmur     Dental   (+) upper dentures, lower dentures           Pulmonary - normal exam  Breath sounds clear to auscultation     Abdominal    Neurological - normal exam                 Anesthesia Plan    ASA 3   ASA physical status 3 criteria: diabetes - poorly controlled    Plan - general       Airway plan will be LMA        Plan Factors:   Patient did not smoke on day of procedure.      Induction: intravenous          Informed Consent:    Anesthetic plan and risks discussed with patient.

## 2021-07-26 NOTE — OR NURSING
1505-Patient from OR unresponsive via gurney to PACU. 6 LNC to OPA. L eye dressing clean,dry and intact. L eye shield taped in place. Report from the anesthesiologist and RN received.     1508-Oral airway Dc'd. Patient's respiration spontaneous and non-labored. Patient is awake.     1525: Patient tolerated sips of water.     1533: Update daughter thru phone.     1545: Daughter at bedside as an . Patient denies pain or nausea. Patient is wide awake and ready to go home. DC instructions given to daughter. Questions answered. Daughter verbalizes understanding.     1600: L eye dressing intact. No c/o pain or nausea. Patient wide awake. VSS. Discussed diet, activity, medications, follow up care and worsening symptoms with daughter.  Patient met criteria for discharge.

## 2021-07-26 NOTE — ANESTHESIA POSTPROCEDURE EVALUATION
Patient: Howard Gamboa    Procedure Summary     Date: 07/26/21 Room / Location: [facility] ROOM 24 / SURGERY SAME DAY [facility]    Anesthesia Start: 1420 Anesthesia Stop: 1509    Procedure: VITRECTOMY, POSTERIOR PORTION - ENDO LASER, 23 GUAGE (Left Eye) Diagnosis: (VITREOUS HEMORRHAGE)    Surgeons: Eitan Helton M.D. Responsible Provider: Eugenia Pagan M.D.    Anesthesia Type: general ASA Status: 3          Final Anesthesia Type: general  Last vitals  BP   Blood Pressure : 160/91    Temp   36.3 °C (97.3 °F)    Pulse   65   Resp   18    SpO2   95 %      Anesthesia Post Evaluation    Patient location during evaluation: PACU  Patient participation: complete - patient participated  Level of consciousness: sleepy but conscious  Pain score: 0    Airway patency: patent  Anesthetic complications: no  Cardiovascular status: hemodynamically stable  Respiratory status: acceptable  Hydration status: euvolemic    PONV: none          No complications documented.     Nurse Pain Score: 0 (NPRS)

## 2021-07-26 NOTE — OP REPORT
DATE OF SERVICE:  07/26/2021     PREOPERATIVE DIAGNOSIS:  Vitreous hemorrhage, left eye.     POSTOPERATIVE DIAGNOSES:  Vitreous hemorrhage, left eye; subretinal   hemorrhage, left eye and subretinal scarring, left eye.     SURGEON:  Eitan Helton MD     ASSISTANT:  None.     ANESTHESIA:  General.     BLOOD LOSS:  None.     SPECIMENS REMOVED:  Vitreous cassette sample was sent to pathology for   cytology studies.     INDICATIONS FOR SURGERY:  The patient presented with nonclearing vitreous   hemorrhage.  We discussed in detail the risks, benefits and alternatives   regarding surgery on 2 separate occasions through the use of a Mandarin   . The risks included but were not limited to the following:    Worsening of a preexisting cataract, high eye pressure, low eye pressure,   bleeding, infection, retinal tear, retinal detachment, need for further   surgery, pain, induced refractive error, complete and irreversible loss of all   vision, loss of the eye.  The patient understood that there were no   structural or visual guarantees.  He consented to the procedure.     PROCEDURE IN DETAIL:  The patient was prepped and draped in the usual sterile   manner.  Attention was then directed toward the left eye.  The 23-gauge   trocars were placed 4 mm posterior to the limbus at 9:30, 2:30 and 3:30   o'clock.  The infusion cannula was placed in the inferotemporal sclerotomy.    Central vitrectomy was then performed.  It should be noted that initially it   was difficult to ascertain the correct placement of the infusion due to the   diffuse vitreous hemorrhage, thus we did a little bit of a vitrectomy without   the infusion on so that we could push in the cannula and verify that the tip   was in fact through the eye wall.  The infusion was then turned on.  As we   completed the vitrectomy, we could see that the patient had old subretinal   hemorrhage in the inferior equator to the post-periphery and then nasally we    could see that there was subretinal scarring what looked to be possibly fresh   subretinal hemorrhage in association with old subretinal hemorrhage.  We   looked for peripheral breaks, none were seen.  The trocars were removed.    There was a possible leak seen in the superotemporal sclerotomy, thus this was   closed with 6-0 gut.  Subconjunctival Kefzol and Decadron were injected.  The   patient tolerated the procedure well and was taken to the recovery room in   good and stable condition.        ______________________________  MD NU Pinedo/LESLEI    DD:  07/26/2021 15:00  DT:  07/26/2021 16:20    Job#:  [id number]

## 2021-08-18 DIAGNOSIS — E11.9 DIABETES MELLITUS TYPE 2 IN NONOBESE (HCC): ICD-10-CM

## 2021-08-18 RX ORDER — LANCETS 28 GAUGE
EACH MISCELLANEOUS
Qty: 100 EACH | Refills: 0 | Status: SHIPPED | OUTPATIENT
Start: 2021-08-18 | End: 2021-10-12

## 2021-08-27 ENCOUNTER — PATIENT MESSAGE (OUTPATIENT)
Dept: HEALTH INFORMATION MANAGEMENT | Facility: OTHER | Age: 76
End: 2021-08-27

## 2021-10-12 ENCOUNTER — APPOINTMENT (OUTPATIENT)
Dept: RADIOLOGY | Facility: MEDICAL CENTER | Age: 76
DRG: 149 | End: 2021-10-12
Attending: EMERGENCY MEDICINE
Payer: MEDICARE

## 2021-10-12 ENCOUNTER — APPOINTMENT (OUTPATIENT)
Dept: RADIOLOGY | Facility: MEDICAL CENTER | Age: 76
DRG: 149 | End: 2021-10-12
Attending: STUDENT IN AN ORGANIZED HEALTH CARE EDUCATION/TRAINING PROGRAM
Payer: MEDICARE

## 2021-10-12 ENCOUNTER — HOSPITAL ENCOUNTER (INPATIENT)
Facility: MEDICAL CENTER | Age: 76
LOS: 1 days | DRG: 149 | End: 2021-10-13
Attending: EMERGENCY MEDICINE | Admitting: HOSPITALIST
Payer: MEDICARE

## 2021-10-12 DIAGNOSIS — R27.0 ATAXIA: ICD-10-CM

## 2021-10-12 DIAGNOSIS — I16.9 HYPERTENSIVE CRISIS: ICD-10-CM

## 2021-10-12 DIAGNOSIS — E78.00 PURE HYPERCHOLESTEROLEMIA: ICD-10-CM

## 2021-10-12 PROBLEM — I16.1 HYPERTENSIVE EMERGENCY: Status: ACTIVE | Noted: 2021-10-12

## 2021-10-12 PROBLEM — I63.9 STROKE (HCC): Status: ACTIVE | Noted: 2021-10-12

## 2021-10-12 PROBLEM — R11.2 NAUSEA & VOMITING: Status: ACTIVE | Noted: 2021-10-12

## 2021-10-12 LAB
ABO + RH BLD: NORMAL
ABO GROUP BLD: NORMAL
ALBUMIN SERPL BCP-MCNC: 4.7 G/DL (ref 3.2–4.9)
ALBUMIN/GLOB SERPL: 1.5 G/DL
ALP SERPL-CCNC: 83 U/L (ref 30–99)
ALT SERPL-CCNC: 26 U/L (ref 2–50)
ANION GAP SERPL CALC-SCNC: 12 MMOL/L (ref 7–16)
APTT PPP: 31 SEC (ref 24.7–36)
AST SERPL-CCNC: 27 U/L (ref 12–45)
BASOPHILS # BLD AUTO: 0.2 % (ref 0–1.8)
BASOPHILS # BLD: 0.02 K/UL (ref 0–0.12)
BILIRUB SERPL-MCNC: 1.9 MG/DL (ref 0.1–1.5)
BLD GP AB SCN SERPL QL: NORMAL
BUN SERPL-MCNC: 23 MG/DL (ref 8–22)
CALCIUM SERPL-MCNC: 9.4 MG/DL (ref 8.5–10.5)
CHLORIDE SERPL-SCNC: 97 MMOL/L (ref 96–112)
CO2 SERPL-SCNC: 28 MMOL/L (ref 20–33)
CREAT SERPL-MCNC: 1 MG/DL (ref 0.5–1.4)
EKG IMPRESSION: NORMAL
EOSINOPHIL # BLD AUTO: 0.01 K/UL (ref 0–0.51)
EOSINOPHIL NFR BLD: 0.1 % (ref 0–6.9)
ERYTHROCYTE [DISTWIDTH] IN BLOOD BY AUTOMATED COUNT: 38.2 FL (ref 35.9–50)
GLOBULIN SER CALC-MCNC: 3.2 G/DL (ref 1.9–3.5)
GLUCOSE BLD-MCNC: 182 MG/DL (ref 65–99)
GLUCOSE BLD-MCNC: 211 MG/DL (ref 65–99)
GLUCOSE SERPL-MCNC: 198 MG/DL (ref 65–99)
HCT VFR BLD AUTO: 48.6 % (ref 42–52)
HGB BLD-MCNC: 16.4 G/DL (ref 14–18)
IMM GRANULOCYTES # BLD AUTO: 0.03 K/UL (ref 0–0.11)
IMM GRANULOCYTES NFR BLD AUTO: 0.3 % (ref 0–0.9)
INR PPP: 1.05 (ref 0.87–1.13)
LYMPHOCYTES # BLD AUTO: 1.36 K/UL (ref 1–4.8)
LYMPHOCYTES NFR BLD: 13.9 % (ref 22–41)
MAGNESIUM SERPL-MCNC: 2.2 MG/DL (ref 1.5–2.5)
MCH RBC QN AUTO: 26 PG (ref 27–33)
MCHC RBC AUTO-ENTMCNC: 33.7 G/DL (ref 33.7–35.3)
MCV RBC AUTO: 77.1 FL (ref 81.4–97.8)
MONOCYTES # BLD AUTO: 0.85 K/UL (ref 0–0.85)
MONOCYTES NFR BLD AUTO: 8.7 % (ref 0–13.4)
NEUTROPHILS # BLD AUTO: 7.51 K/UL (ref 1.82–7.42)
NEUTROPHILS NFR BLD: 76.8 % (ref 44–72)
NRBC # BLD AUTO: 0 K/UL
NRBC BLD-RTO: 0 /100 WBC
PLATELET # BLD AUTO: 290 K/UL (ref 164–446)
PMV BLD AUTO: 8.9 FL (ref 9–12.9)
POTASSIUM SERPL-SCNC: 3.6 MMOL/L (ref 3.6–5.5)
PROT SERPL-MCNC: 7.9 G/DL (ref 6–8.2)
PROTHROMBIN TIME: 13.4 SEC (ref 12–14.6)
RBC # BLD AUTO: 6.3 M/UL (ref 4.7–6.1)
RH BLD: NORMAL
SODIUM SERPL-SCNC: 137 MMOL/L (ref 135–145)
TROPONIN T SERPL-MCNC: 18 NG/L (ref 6–19)
TSH SERPL DL<=0.005 MIU/L-ACNC: 0.81 UIU/ML (ref 0.38–5.33)
VIT B12 SERPL-MCNC: 789 PG/ML (ref 211–911)
WBC # BLD AUTO: 9.8 K/UL (ref 4.8–10.8)

## 2021-10-12 PROCEDURE — 85610 PROTHROMBIN TIME: CPT

## 2021-10-12 PROCEDURE — 86850 RBC ANTIBODY SCREEN: CPT

## 2021-10-12 PROCEDURE — 70496 CT ANGIOGRAPHY HEAD: CPT | Mod: MG

## 2021-10-12 PROCEDURE — 700101 HCHG RX REV CODE 250: Performed by: EMERGENCY MEDICINE

## 2021-10-12 PROCEDURE — 306637 HCHG MISC ORTHO ITEM RC 0274

## 2021-10-12 PROCEDURE — 86901 BLOOD TYPING SEROLOGIC RH(D): CPT

## 2021-10-12 PROCEDURE — 84443 ASSAY THYROID STIM HORMONE: CPT

## 2021-10-12 PROCEDURE — 86900 BLOOD TYPING SEROLOGIC ABO: CPT

## 2021-10-12 PROCEDURE — 96375 TX/PRO/DX INJ NEW DRUG ADDON: CPT

## 2021-10-12 PROCEDURE — 82962 GLUCOSE BLOOD TEST: CPT

## 2021-10-12 PROCEDURE — 96376 TX/PRO/DX INJ SAME DRUG ADON: CPT

## 2021-10-12 PROCEDURE — 80053 COMPREHEN METABOLIC PANEL: CPT

## 2021-10-12 PROCEDURE — 82607 VITAMIN B-12: CPT

## 2021-10-12 PROCEDURE — 700111 HCHG RX REV CODE 636 W/ 250 OVERRIDE (IP): Performed by: EMERGENCY MEDICINE

## 2021-10-12 PROCEDURE — 700102 HCHG RX REV CODE 250 W/ 637 OVERRIDE(OP): Performed by: STUDENT IN AN ORGANIZED HEALTH CARE EDUCATION/TRAINING PROGRAM

## 2021-10-12 PROCEDURE — 93005 ELECTROCARDIOGRAM TRACING: CPT | Performed by: EMERGENCY MEDICINE

## 2021-10-12 PROCEDURE — 84484 ASSAY OF TROPONIN QUANT: CPT

## 2021-10-12 PROCEDURE — 99285 EMERGENCY DEPT VISIT HI MDM: CPT

## 2021-10-12 PROCEDURE — 70551 MRI BRAIN STEM W/O DYE: CPT | Mod: ME

## 2021-10-12 PROCEDURE — 99223 1ST HOSP IP/OBS HIGH 75: CPT | Performed by: PSYCHIATRY & NEUROLOGY

## 2021-10-12 PROCEDURE — 85730 THROMBOPLASTIN TIME PARTIAL: CPT

## 2021-10-12 PROCEDURE — 96374 THER/PROPH/DIAG INJ IV PUSH: CPT

## 2021-10-12 PROCEDURE — 700117 HCHG RX CONTRAST REV CODE 255: Performed by: EMERGENCY MEDICINE

## 2021-10-12 PROCEDURE — 99223 1ST HOSP IP/OBS HIGH 75: CPT | Mod: AI,GC | Performed by: HOSPITALIST

## 2021-10-12 PROCEDURE — 70498 CT ANGIOGRAPHY NECK: CPT | Mod: MG

## 2021-10-12 PROCEDURE — 0042T CT-CEREBRAL PERFUSION ANALYSIS: CPT

## 2021-10-12 PROCEDURE — 85025 COMPLETE CBC W/AUTO DIFF WBC: CPT

## 2021-10-12 PROCEDURE — 83735 ASSAY OF MAGNESIUM: CPT

## 2021-10-12 PROCEDURE — 70450 CT HEAD/BRAIN W/O DYE: CPT | Mod: MG

## 2021-10-12 PROCEDURE — 71045 X-RAY EXAM CHEST 1 VIEW: CPT

## 2021-10-12 RX ORDER — BISACODYL 10 MG
10 SUPPOSITORY, RECTAL RECTAL
Status: DISCONTINUED | OUTPATIENT
Start: 2021-10-12 | End: 2021-10-13 | Stop reason: HOSPADM

## 2021-10-12 RX ORDER — LISINOPRIL 20 MG/1
40 TABLET ORAL
Status: DISCONTINUED | OUTPATIENT
Start: 2021-10-12 | End: 2021-10-13 | Stop reason: HOSPADM

## 2021-10-12 RX ORDER — HYDRALAZINE HYDROCHLORIDE 20 MG/ML
10 INJECTION INTRAMUSCULAR; INTRAVENOUS ONCE
Status: COMPLETED | OUTPATIENT
Start: 2021-10-12 | End: 2021-10-12

## 2021-10-12 RX ORDER — DEXTROSE MONOHYDRATE 25 G/50ML
50 INJECTION, SOLUTION INTRAVENOUS
Status: DISCONTINUED | OUTPATIENT
Start: 2021-10-12 | End: 2021-10-13 | Stop reason: HOSPADM

## 2021-10-12 RX ORDER — ASPIRIN 300 MG/1
300 SUPPOSITORY RECTAL DAILY
Status: DISCONTINUED | OUTPATIENT
Start: 2021-10-12 | End: 2021-10-13

## 2021-10-12 RX ORDER — LABETALOL HYDROCHLORIDE 5 MG/ML
10 INJECTION, SOLUTION INTRAVENOUS EVERY 4 HOURS PRN
Status: CANCELLED | OUTPATIENT
Start: 2021-10-12

## 2021-10-12 RX ORDER — SODIUM CHLORIDE, SODIUM LACTATE, POTASSIUM CHLORIDE, CALCIUM CHLORIDE 600; 310; 30; 20 MG/100ML; MG/100ML; MG/100ML; MG/100ML
INJECTION, SOLUTION INTRAVENOUS CONTINUOUS
Status: DISCONTINUED | OUTPATIENT
Start: 2021-10-12 | End: 2021-10-13 | Stop reason: HOSPADM

## 2021-10-12 RX ORDER — HEPARIN SODIUM 5000 [USP'U]/ML
5000 INJECTION, SOLUTION INTRAVENOUS; SUBCUTANEOUS EVERY 8 HOURS
Status: DISCONTINUED | OUTPATIENT
Start: 2021-10-12 | End: 2021-10-13 | Stop reason: HOSPADM

## 2021-10-12 RX ORDER — ACETAMINOPHEN 325 MG/1
650 TABLET ORAL EVERY 6 HOURS PRN
Status: DISCONTINUED | OUTPATIENT
Start: 2021-10-12 | End: 2021-10-13 | Stop reason: HOSPADM

## 2021-10-12 RX ORDER — PREDNISOLONE ACETATE 10 MG/ML
1 SUSPENSION/ DROPS OPHTHALMIC 4 TIMES DAILY
Status: SHIPPED | COMMUNITY
End: 2021-10-12

## 2021-10-12 RX ORDER — AMOXICILLIN 250 MG
2 CAPSULE ORAL 2 TIMES DAILY
Status: DISCONTINUED | OUTPATIENT
Start: 2021-10-13 | End: 2021-10-13 | Stop reason: HOSPADM

## 2021-10-12 RX ORDER — ASPIRIN 325 MG
325 TABLET ORAL DAILY
Status: DISCONTINUED | OUTPATIENT
Start: 2021-10-12 | End: 2021-10-13

## 2021-10-12 RX ORDER — ASPIRIN 81 MG/1
324 TABLET, CHEWABLE ORAL DAILY
Status: DISCONTINUED | OUTPATIENT
Start: 2021-10-12 | End: 2021-10-13

## 2021-10-12 RX ORDER — POLYETHYLENE GLYCOL 3350 17 G/17G
1 POWDER, FOR SOLUTION ORAL
Status: DISCONTINUED | OUTPATIENT
Start: 2021-10-12 | End: 2021-10-13 | Stop reason: HOSPADM

## 2021-10-12 RX ORDER — ENALAPRILAT 1.25 MG/ML
1.25 INJECTION INTRAVENOUS EVERY 6 HOURS PRN
Status: CANCELLED | OUTPATIENT
Start: 2021-10-12

## 2021-10-12 RX ORDER — LABETALOL HYDROCHLORIDE 5 MG/ML
10 INJECTION, SOLUTION INTRAVENOUS ONCE
Status: COMPLETED | OUTPATIENT
Start: 2021-10-12 | End: 2021-10-12

## 2021-10-12 RX ORDER — ENALAPRILAT 1.25 MG/ML
1.25 INJECTION INTRAVENOUS EVERY 6 HOURS PRN
Status: DISCONTINUED | OUTPATIENT
Start: 2021-10-12 | End: 2021-10-13 | Stop reason: HOSPADM

## 2021-10-12 RX ORDER — AMLODIPINE BESYLATE 5 MG/1
10 TABLET ORAL
Status: DISCONTINUED | OUTPATIENT
Start: 2021-10-13 | End: 2021-10-13 | Stop reason: HOSPADM

## 2021-10-12 RX ORDER — LABETALOL HYDROCHLORIDE 5 MG/ML
10 INJECTION, SOLUTION INTRAVENOUS EVERY 4 HOURS PRN
Status: DISCONTINUED | OUTPATIENT
Start: 2021-10-12 | End: 2021-10-13 | Stop reason: HOSPADM

## 2021-10-12 RX ORDER — INSULIN LISPRO 100 [IU]/ML
5 INJECTION, SOLUTION INTRAVENOUS; SUBCUTANEOUS
Status: DISCONTINUED | OUTPATIENT
Start: 2021-10-12 | End: 2021-10-13 | Stop reason: HOSPADM

## 2021-10-12 RX ORDER — ATORVASTATIN CALCIUM 20 MG/1
80 TABLET, FILM COATED ORAL DAILY
Status: DISCONTINUED | OUTPATIENT
Start: 2021-10-12 | End: 2021-10-13

## 2021-10-12 RX ADMIN — HYDRALAZINE HYDROCHLORIDE 10 MG: 20 INJECTION INTRAMUSCULAR; INTRAVENOUS at 12:31

## 2021-10-12 RX ADMIN — LABETALOL HYDROCHLORIDE 10 MG: 5 INJECTION, SOLUTION INTRAVENOUS at 13:15

## 2021-10-12 RX ADMIN — HYDRALAZINE HYDROCHLORIDE 10 MG: 20 INJECTION INTRAMUSCULAR; INTRAVENOUS at 11:38

## 2021-10-12 RX ADMIN — IOHEXOL 100 ML: 350 INJECTION, SOLUTION INTRAVENOUS at 11:37

## 2021-10-12 RX ADMIN — IOHEXOL 40 ML: 350 INJECTION, SOLUTION INTRAVENOUS at 11:38

## 2021-10-12 ASSESSMENT — ENCOUNTER SYMPTOMS
CHILLS: 0
CONSTIPATION: 0
NERVOUS/ANXIOUS: 0
COUGH: 0
VOMITING: 1
HEADACHES: 0
FALLS: 0
WHEEZING: 0
SPEECH CHANGE: 0
TINGLING: 0
NAUSEA: 1
MYALGIAS: 0
SORE THROAT: 0
FEVER: 0
ORTHOPNEA: 0
HEARTBURN: 0
WEAKNESS: 0
EYE PAIN: 0
SINUS PAIN: 0
SHORTNESS OF BREATH: 0
NECK PAIN: 0
BRUISES/BLEEDS EASILY: 0
ABDOMINAL PAIN: 0
DIARRHEA: 0
FOCAL WEAKNESS: 0
SPUTUM PRODUCTION: 0
BLURRED VISION: 0
DOUBLE VISION: 0
HEMOPTYSIS: 0
SEIZURES: 0
DIZZINESS: 0
DEPRESSION: 0
PALPITATIONS: 0
BLOOD IN STOOL: 0
TREMORS: 0

## 2021-10-12 ASSESSMENT — FIBROSIS 4 INDEX: FIB4 SCORE: 1.23

## 2021-10-12 NOTE — ED NOTES
Med Rec completed per patient with  and home pharmacy ([facility])   Allergies reviewed  No ORAL antibiotics in last 30 days

## 2021-10-12 NOTE — CONSULTS
Neurology STROKE CODE H&P  Neurohospitalist Service, [facility]    Referring Physician: Bartolo Mendez M.D.    Reason for Referral: dizzyness    HPI: Howard Gamboa is a 76 y.o. male with history of hypertension, hyperlipidemia and insulin dependent diabetes who presented to ER today with complaint of dizziness for the past 1 day and for whom neurology has been consulted with concern for stroke. Interview conducted with aid of daughter at bedside who is serving as  at the patient's request. He reports that after waking up yesterday he felt off balance when he stood up, symptoms resolved completely when lying down. He denies ear pain, sinus congestion, recent illness. Symptoms are not exacerbated by head movements. He reports he was diagnosed with peripheral vertigo in the past and had improved symptoms with meclizine. He was hypertensive in the ER with BP as high as 208/111, treated with hydralazine and labetalol IV. He is denying vision change, speech difficulty, weakness, sensory changes. He has an occipital headache.    Review of systems: In addition to what is detailed in the HPI above, all other systems reviewed and are negative.    Past Medical History:    has a past medical history of Dental disorder, Diabetes (HCC), Diabetes mellitus out of control (HCC) (2010), High cholesterol (2010), and Hypertension (2010).    FHx:  family history includes Diabetes in his brother and father; Stroke in his mother.    SHx:   reports that he quit smoking about 20 years ago. He has never used smokeless tobacco. He reports that he does not drink alcohol and does not use drugs.    Allergies:  No Known Allergies    Medications:    Current Facility-Administered Medications:   •  atorvastatin (LIPITOR) tablet 80 mg, 80 mg, Oral, DAILY, Cheryle Sutherland M.D.  •  insulin glargine (Semglee) injection, 5 Units, Subcutaneous, Q EVENING, Cheryle Sutherland M.D.  •  insulin lispro (AdmeLOG) injection, 5  Units, Subcutaneous, TID AC, Cheryle Sutherland M.D.  •  [START ON 10/13/2021] senna-docusate (PERICOLACE or SENOKOT S) 8.6-50 MG per tablet 2 Tablet, 2 Tablet, Oral, BID **AND** polyethylene glycol/lytes (MIRALAX) PACKET 1 Packet, 1 Packet, Oral, QDAY PRN **AND** magnesium hydroxide (MILK OF MAGNESIA) suspension 30 mL, 30 mL, Oral, QDAY PRN **AND** bisacodyl (DULCOLAX) suppository 10 mg, 10 mg, Rectal, QDAY PRN, Cheryle Sutherland M.D.  •  lactated ringers infusion, , Intravenous, Continuous, Cheryle Sutherland M.D.  •  acetaminophen (Tylenol) tablet 650 mg, 650 mg, Oral, Q6HRS PRN, Cheryle Sutherland M.D.  •  enalaprilat (Vasotec) injection 1.25 mg 1 mL, 1.25 mg, Intravenous, Q6HRS PRN, Cheryle Sutherland M.D.  •  labetalol (NORMODYNE/TRANDATE) injection 10 mg, 10 mg, Intravenous, Q4HRS PRN, Cheryle Sutherland M.D.  •  aspirin (ASA) tablet 325 mg, 325 mg, Oral, DAILY **OR** aspirin (ASA) chewable tab 324 mg, 324 mg, Oral, DAILY **OR** aspirin (ASA) suppository 300 mg, 300 mg, Rectal, DAILY, Cheryle Sutherland M.D.  •  heparin injection 5,000 Units, 5,000 Units, Subcutaneous, Q8HRS, Cheryle Sutherland M.D.    Current Outpatient Medications:   •  metformin (GLUCOPHAGE) 1000 MG tablet, TAKE 1 TABLET BY MOUTH TWICE DAILY WITH MEALS (Patient taking differently: Take 1,000 mg by mouth 2 times a day with meals. TAKE 1 TABLET BY MOUTH TWICE DAILY WITH MEALS), Disp: 180 Tablet, Rfl: 3  •  atorvastatin (LIPITOR) 20 MG Tab, Take 2 tablets by mouth once daily, Disp: 200 tablet, Rfl: 0  •  insulin lispro (HUMALOG KWIKPEN) 100 UNIT/ML Solution Pen-injector injection PEN, Inject 10 Units under the skin 3 times a day before meals., Disp: 30 mL, Rfl: 2  •  insulin glargine (LANTUS SOLOSTAR) 100 UNIT/ML Solution Pen-injector injection, Inject 10 units subcutaneously once daily in the evening, Disp: 3 mL, Rfl: 5  •  lisinopril (PRINIVIL) 40 MG tablet, Take 1 Tab by mouth every day., Disp: 100 Tab, Rfl: 3    Physical Examination:    Vitals:     10/12/21 1345 10/12/21 1400 10/12/21 1417 10/12/21 1419   BP: 160/93 159/104 (!) 179/103    Pulse: 95 97 98 98   Resp:       Temp:       TempSrc:       SpO2: 94% 94%  95%   Weight:       Height:           General: Patient is awake and in no acute distress  Eyes: examination of optic disks not indicated at this time given acuity of consult  CV: RRR    NEUROLOGICAL EXAM:     Mental status: Awake, alert and fully oriented, follows commands  Speech and language: speech is not dysarthric. The patient is able to name and repeat.  Cranial nerve exam: Pupils are equal, round and reactive to light bilaterally. Visual fields are full. Extraocular muscles are intact, no evidence of nystagmus. Sensation in the face is intact to light touch. Face is symmetric. Hearing to finger rub equal. Palate elevates symmetrically. Tongue is midline.  Motor exam: Strength is 5/5 in all extremities both distally and proximally. Tone is normal. No abnormal movements were seen on exam.  Sensory exam: No sensory deficits identified   Deep tendon reflexes: 2+ and symmetric. Toes down-going bilaterally.  Coordination: no ataxia with finger to nose bilaterally, finger hang bilaterally, heel to shin bilaterally, rapid alternating movements.  Gait: deferred given fall risk    NIHSS: National Institutes of Health Stroke Scale     [0] 1a:Level of Consciousness            0-alert 1-drowsy   2-stupor   3-coma  [0] 1b:LOC Questions                         0-both  1-one      2-neither  [0] 1c:LOC Commands                       0-both  1-one      2-neither  [0] 2: Best Gaze                       0-nl    1-partial  2-forced  [0] 3: Visual Fields                              0-nl    1-partial  2-complete 3-bilat  [0] 4: Facial Paresis                 0-nl    1-minor    2-partial  3-full    MOTOR                                              0-nl  [0] 5: Right Arm                        1-drift  [0] 6: Left Arm                                     2-some  effort vs gravity  [0] 7: Right Leg                        3-no effort vs gravity  [0] 8: Left Leg                                     4-no movement                                                             x-untestable    [0] 9: Limb Ataxia                     0-abs   1-1_limb   2-2+_limbs   x-untestable  [0] 10:Sensory                         0-nl    1-partial  2-dense  [0] 11:Best Language/Aphasia           0-nl    1-mild/mod 2-severe   3-mute  [0] 12:Dysarthria                      0-nl    1-mild/mod 2-severe   x-untestable  [0] 13:Neglect/Inattention                   0-none  1-partial  2-complete  [0] TOTAL    Modified Essie Scale (MRS): 0 = No symptoms    Objective Data:    Labs:  Lab Results   Component Value Date/Time    PROTHROMBTM 13.4 10/12/2021 10:05 AM    INR 1.05 10/12/2021 10:05 AM      Lab Results   Component Value Date/Time    WBC 9.8 10/12/2021 10:05 AM    RBC 6.30 (H) 10/12/2021 10:05 AM    HEMOGLOBIN 16.4 10/12/2021 10:05 AM    HEMATOCRIT 48.6 10/12/2021 10:05 AM    MCV 77.1 (L) 10/12/2021 10:05 AM    MCH 26.0 (L) 10/12/2021 10:05 AM    MCHC 33.7 10/12/2021 10:05 AM    MPV 8.9 (L) 10/12/2021 10:05 AM    NEUTSPOLYS 76.80 (H) 10/12/2021 10:05 AM    LYMPHOCYTES 13.90 (L) 10/12/2021 10:05 AM    MONOCYTES 8.70 10/12/2021 10:05 AM    EOSINOPHILS 0.10 10/12/2021 10:05 AM    BASOPHILS 0.20 10/12/2021 10:05 AM      Lab Results   Component Value Date/Time    SODIUM 137 10/12/2021 10:05 AM    POTASSIUM 3.6 10/12/2021 10:05 AM    CHLORIDE 97 10/12/2021 10:05 AM    CO2 28 10/12/2021 10:05 AM    GLUCOSE 198 (H) 10/12/2021 10:05 AM    BUN 23 (H) 10/12/2021 10:05 AM    CREATININE 1.00 10/12/2021 10:05 AM    CREATININE 1.07 05/05/2009 12:00 AM      Lab Results   Component Value Date/Time    CHOLSTRLTOT 98 (L) 02/06/2021 08:48 AM    LDL 27 02/06/2021 08:48 AM    HDL 39 (A) 02/06/2021 08:48 AM    TRIGLYCERIDE 161 (H) 02/06/2021 08:48 AM       Lab Results   Component Value Date/Time    ALKPHOSPHAT 83 10/12/2021  10:05 AM    ASTSGOT 27 10/12/2021 10:05 AM    ALTSGPT 26 10/12/2021 10:05 AM    TBILIRUBIN 1.9 (H) 10/12/2021 10:05 AM        Imaging/Testing:    I interpreted and/or reviewed the patient's neuroimaging    CT-CEREBRAL PERFUSION ANALYSIS   Final Result      1.  Cerebral blood flow less than 30% likely representing completed infarct = 0 mL.      2.  T Max more than 6 seconds likely representing combination of completed infarct and ischemia = 8 mL.      3.  Mismatched volume likely representing ischemic brain/penumbra = 8 mL      4.  Please note that the cerebral perfusion was performed on the limited brain tissue around the basal ganglia region. Infarct/ischemia outside the CT perfusion sections can be missed in this study.      CT-CTA NECK WITH & W/O-POST PROCESSING   Final Result      1.  Atherosclerotic plaque within the carotid arteries bilaterally with less than 50% stenosis.   2.  Patent vertebral arteries bilaterally.   3.  1.1 cm left thyroid nodule. Further evaluation can be performed with thyroid ultrasound.      CT-CTA HEAD WITH & W/O-POST PROCESS   Final Result      No thrombosis is seen within the [place].      CT-HEAD W/O   Final Result      1.  No acute intracranial abnormality is identified.   2.  There are prominent periventricular and subcortical white matter changes present.  This finding is nonspecific and could be from previous small vessel ischemia, demyelination, or gliosis.   3.  Sclerosis and fluid in the mastoid air cells on the right.   4.  Mucosal thickening within the maxillary sinuses.      DX-CHEST-PORTABLE (1 VIEW)   Final Result      Hypoinflation with mild left basilar atelectasis.      MR-BRAIN-W/O    (Results Pending)       Assessment and Plan:    Howard Gamboa is a 76 y.o. male with history of hypertension, hyperlipidemia and insulin dependent diabetes who presented to ER today with complaint of dizziness for the past 1 day and for whom neurology has been consulted with  concern for stroke. Upon my exam he is still complaining of dizziness, worse when seated upright, but reports he is somewhat improved from earlier and has no symptoms of ataxia, no nystagmus, low suspicion for peripheral vertigo. He was hypertensive in the ER with BP as high as 208/111, treated with hydralazine and labetalol IV. It is possible his symptoms have improved with resolution of hypertension. /104 at time of my exam. Recommend MRI Brain to evaluate for cerebellar stroke. CT Head without acute abnormality. CT perfusion with 8 ml mismatch in the left posterior circulation. He was not considered a candidate for IV tPA due to delayed presentation. CTA head/neck shows atherosclerotic plaque within bilateral carotids, no LVO or flow limiting stenosis, thus not a candidate for IR intervention. With concern that his symptoms may be result of cerebellar stroke, recommend permissive hypertension until MRI can be obtained. If there is an infarct, recommend continuation of permissive hypertension for 24 hours from admission. If no stroke on MRI, BP goal to be 110-130/60-80. Hold blood thinners, including aspirin, until MRI obtained. Recommend atorvastatin 80 mg PO q HS with goal LDL <70. Obtain serum studies for stroke risk factors: hemoglobin A1C and lipid panel.    Plan:  - Q 4 hour neurology checks  - Obtain MRI brain  - BP Goal: With concern that his symptoms may be result of cerebellar stroke, recommend permissive hypertension until MRI can be obtained. If there is an infacrt, recommend continuation of permisive hypertension for 24 hours from admission. If no stroke on MRI, BP goal to be 110-130/60-80.  - Hold blood thinners, including aspirin, until MRI obtained.  - Atorvastatin 80 mg PO q HS for secondary stroke prevention.  - serum studies for stroke risk factors: lipid panel & hemoglobin A1C  - evaluate and treat with PT/OT/ST  - DVT prophylaxis, SCDs    The evaluation of the patient, and recommended  management, was discussed with attending neurologist, Dr. Gideon Hendricks.    Corinne Canavero, APRN  [facility] Neurohospitalist Service

## 2021-10-12 NOTE — ED NOTES
MRI screening obtained, urinal given.  Pt and family updated on POC, deny other needs at this time

## 2021-10-12 NOTE — ED PROVIDER NOTES
"ED Provider Note    CHIEF COMPLAINT  Chief Complaint   Patient presents with   • Dizziness     Onset yesterday, worsening this AM. Pt reports currently \" feeling better. \"     • N/V   • Diabetes     Blood sugar at home 218   • Hypertension       HPI  Howard Gamboa is a 76 y.o. male who presents to emergency part with dizziness.  This is characterized as an off balance sensation.  He is walking walks into the walls.  He has had some nausea and vomited yesterday.  Denies any abdominal pain.  Does not have a headache.  In the past he has had ringing in his ears but no current ringing in his ears.  No change in symptoms with moving his head.  No focal weakness or numbness.  No slurred speech or confusion.  He has not had a fever.    REVIEW OF SYSTEMS  As per HPI, otherwise a 10 point review of systems is negative    PAST MEDICAL HISTORY  Past Medical History:   Diagnosis Date   • Dental disorder     full dentures   • Diabetes (HCC)    • Diabetes mellitus out of control ([facility])    • High cholesterol    • Hypertension        Family history  No pertinent family medical history    SOCIAL HISTORY  Social History     Tobacco Use   • Smoking status: Former Smoker     Quit date: 2001     Years since quittin.7   • Smokeless tobacco: Never Used   Vaping Use   • Vaping Use: Never used   Substance Use Topics   • Alcohol use: No   • Drug use: No       SURGICAL HISTORY  Past Surgical History:   Procedure Laterality Date   • VITRECTOMY POSTERIOR Left 2021    Procedure: VITRECTOMY, POSTERIOR PORTION -23 GUAGE;  Surgeon: Eitan Helton M.D.;  Location: SURGERY SAME DAY [facility];  Service: Ophthalmology       CURRENT MEDICATIONS  Home Medications    **Home medications have not yet been reviewed for this encounter**         ALLERGIES  No Known Allergies    PHYSICAL EXAM  VITAL SIGNS: BP (!) 170/101   Pulse 93   Temp 36.1 °C (97 °F) (Temporal)   Resp 18   Ht 1.702 m (5' 7\")   Wt 77 kg (169 lb 12.1 oz)   SpO2 " 92%   BMI 26.59 kg/m²    Constitutional: Awake and alert  HENT: Normal inspection   Eyes: Normal inspection  Neck: Grossly normal range of motion.  Cardiovascular: Normal heart rate, Normal rhythm.  Symmetric peripheral pulses.   Thorax & Lungs: No respiratory distress, No wheezing, No rales, No rhonchi, No chest tenderness.   Abdomen: Bowel sounds normal, soft, non-distended, nontender, no mass  Skin: No obvious rash.  Back: No tenderness, No CVA tenderness.   Extremities: No clubbing, cyanosis, edema, no Homans or cords.  Neurologic: Awake alert oriented.  Cranial nerves are intact.  Cannot assess speech because of language barrier.  Normal finger-to-nose he has an unsteady gait drifting to the left.  Psychiatric: Normal for situation    RADIOLOGY/PROCEDURES  CT-CEREBRAL PERFUSION ANALYSIS   Final Result      1.  Cerebral blood flow less than 30% likely representing completed infarct = 0 mL.      2.  T Max more than 6 seconds likely representing combination of completed infarct and ischemia = 8 mL.      3.  Mismatched volume likely representing ischemic brain/penumbra = 8 mL      4.  Please note that the cerebral perfusion was performed on the limited brain tissue around the basal ganglia region. Infarct/ischemia outside the CT perfusion sections can be missed in this study.      CT-CTA NECK WITH & W/O-POST PROCESSING   Final Result      1.  Atherosclerotic plaque within the carotid arteries bilaterally with less than 50% stenosis.   2.  Patent vertebral arteries bilaterally.   3.  1.1 cm left thyroid nodule. Further evaluation can be performed with thyroid ultrasound.      CT-CTA HEAD WITH & W/O-POST PROCESS   Final Result      No thrombosis is seen within the [place].      CT-HEAD W/O   Final Result      1.  No acute intracranial abnormality is identified.   2.  There are prominent periventricular and subcortical white matter changes present.  This finding is nonspecific and could be from previous  small vessel ischemia, demyelination, or gliosis.   3.  Sclerosis and fluid in the mastoid air cells on the right.   4.  Mucosal thickening within the maxillary sinuses.      DX-CHEST-PORTABLE (1 VIEW)   Final Result      Hypoinflation with mild left basilar atelectasis.           Imaging is interpreted by radiologist    Labs:  Results for orders placed or performed during the hospital encounter of 10/12/21   CBC WITH DIFFERENTIAL   Result Value Ref Range    WBC 9.8 4.8 - 10.8 K/uL    RBC 6.30 (H) 4.70 - 6.10 M/uL    Hemoglobin 16.4 14.0 - 18.0 g/dL    Hematocrit 48.6 42.0 - 52.0 %    MCV 77.1 (L) 81.4 - 97.8 fL    MCH 26.0 (L) 27.0 - 33.0 pg    MCHC 33.7 33.7 - 35.3 g/dL    RDW 38.2 35.9 - 50.0 fL    Platelet Count 290 164 - 446 K/uL    MPV 8.9 (L) 9.0 - 12.9 fL    Neutrophils-Polys 76.80 (H) 44.00 - 72.00 %    Lymphocytes 13.90 (L) 22.00 - 41.00 %    Monocytes 8.70 0.00 - 13.40 %    Eosinophils 0.10 0.00 - 6.90 %    Basophils 0.20 0.00 - 1.80 %    Immature Granulocytes 0.30 0.00 - 0.90 %    Nucleated RBC 0.00 /100 WBC    Neutrophils (Absolute) 7.51 (H) 1.82 - 7.42 K/uL    Lymphs (Absolute) 1.36 1.00 - 4.80 K/uL    Monos (Absolute) 0.85 0.00 - 0.85 K/uL    Eos (Absolute) 0.01 0.00 - 0.51 K/uL    Baso (Absolute) 0.02 0.00 - 0.12 K/uL    Immature Granulocytes (abs) 0.03 0.00 - 0.11 K/uL    NRBC (Absolute) 0.00 K/uL   PROTHROMBIN TIME   Result Value Ref Range    PT 13.4 12.0 - 14.6 sec    INR 1.05 0.87 - 1.13   APTT   Result Value Ref Range    APTT 31.0 24.7 - 36.0 sec   COD (ADULT)   Result Value Ref Range    ABO Grouping Only B     Rh Grouping Only POS     Antibody Screen-Cod NEG    ABO Rh Confirm   Result Value Ref Range    ABO Rh Confirm B POS    EKG (NOW)   Result Value Ref Range    Report       [facility] Emergency Dept[facility]    Test Date:  2021-10-12  Pt Name:    JONATHAN KAUR                     Department: ER  MRN:        [id number]                      Room:        29  Gender:     Male                          Technician: [id number]  :        [id number]                   Requested By:RYAN ZHANG  Order #:    [id number]                    Reading MD: RYAN ZHANG MD    Measurements  Intervals                                Axis  Rate:       88                           P:          19  IL:         232                          QRS:        -36  QRSD:       104                          T:          -19  QT:         416  QTc:        504    Interpretive Statements  SINUS RHYTHM  FIRST DEGREE AV BLOCK  PROBABLE LEFT ATRIAL ABNORMALITY  LEFT AXIS DEVIATION  LATE PRECORDIAL R/S TRANSITION  LEFT VENTRICULAR HYPERTROPHY  NONSPECIFIC T ABNORMALITIES, INFERIOR LEADS  PROLONGED QT INTERVAL  BASELINE WANDER IN LEAD(S) I,II,aVR,aVL,V3  Compared to ECG 2021 10:13:50  Elec tronically Signed On 10- 12:05:40 PDT by RYAN ZHANG MD     POCT glucose device results   Result Value Ref Range    Glucose - Accu-Ck 211 (H) 65 - 99 mg/dL       Medications   hydrALAZINE (APRESOLINE) injection 10 mg (has no administration in time range)   hydrALAZINE (APRESOLINE) injection 10 mg (10 mg Intravenous Given 10/12/21 1138)   iohexol (OMNIPAQUE) 350 mg/mL (100 mL Intravenous Given 10/12/21 1137)   iohexol (OMNIPAQUE) 350 mg/mL (40 mL Intravenous Given 10/12/21 1138)         COURSE & MEDICAL DECISION MAKING  Patient presents to the ER with gait disturbance.  He is off balance with ataxia.  Stroke is a possibility.  Not a candidate for alteplase because of delayed presentation.  Obtained stroke IR images.  Obtain laboratory data.  He was remarkably hypertensive which could be secondary to a stroke or primary cause.  Treated with 10 mg of hydralazine.    Diagnostic imaging returned as noted. Abnormal perfusion scan.  CT scan of the head shows some fluid in the mastoid.  May have sinusitis.  I do not believe that this entirely would be responsible for his symptoms.  I would treat him with nasal steroids and antibiotics when  discharged.  We will hold on antibiotics for now given the situation.    Patient remains persistently hypertensive despite IV antihypertensive.  Ordered additional dose of hydralazine.    Persistent hypertension.  Ordered labetalol.    Quite some delay in chemistry results, but ultimately results as noted.    Discussed case with hospitalist.  Will admit for further work-up and to rule out CVA.  Follow-up blood pressure.    FINAL IMPRESSION  1.  Acute ataxia, suspected cerebrovascular accident  2.  Hypertensive crisis    This dictation was created using voice recognition software. The accuracy of the dictation is limited to the abilities of the software.  The nursing notes were reviewed and certain aspects of this information were incorporated into this note.      Electronically signed by: Bernardo Cruz M.D., 10/12/2021 12:21 PM

## 2021-10-12 NOTE — ED TRIAGE NOTES
"  Chief Complaint   Patient presents with   • Dizziness     Onset yesterday, worsening this AM. Pt reports currently \" feeling better. \"     • N/V   • Diabetes     Blood sugar at home 218   • Hypertension     Pt BIB family member from home.  Pt also reports increased frequency urinating.  No N/V.  No fever/chills. No chest pain.  No difficulty breathing.  FSBS 211 in triage.  Pt received COVID vaccine.     Pt educated on the triage process.  Pt was instructed to notify staff for any worsening symptoms.  Pt denies any recent travel, denies exposure to COVID positive individuals.  Pt also denies any respiratory or flu like symptoms at this time.  Mask in place.   "

## 2021-10-12 NOTE — H&P
"History & Physical Note    Date of Admission: 10/12/2021  Admission Status: Inpatient  [facility] Team: [facility]  Blue Team  Attending: Bartolo Mendez M.D.   Senior Resident: Dr. Mcneal  Intern: Dr. Sutherland  Contact Number: [phone]    Chief Complaint: Unable to to walk, nausea/vomiting for 2 days.    History of Present Illness (HPI):   Howard is a 76 y.o. 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  Patient was seen with the aid of a telephone based Elizabeth interprete.  R patient states that he was feeling fine in the several days leading up to this incident.  Patient states that he has been up into the park and to work in the day before.  Patient states that he awoke approximately 2 days ago in the a.m. and was unable to stand without assistance or walk without falling the walls.  Patient does endorse some lightheadedness, but no dizziness at that time.  Patient endorses no other focal deficits at that time stating no changes in vision, hearing, changes in sensation or changes in musculoskeletal strength.  Patient does not endorse any room spinning or headache either.  Patient states that he became nauseous and vomited several times throughout the next several days.  Patient states he was unable to eat any solid or liquid foods, stating that he had an epigastric pain that felt like he had food \"stuck in my chest \"patient denies coughing up any food or any shortness of breath.  Patient states that he is thrown up liquid vomitus multiple times including one time in the ED, vomitus has been mostly brown in color, not black, no hematemesis, and mostly the color of the food he is " try to eat.  Patient denies any bilious vomiting.  Patient endorses epigastric pain, motioning to the area above his sternum in the epigastric region of the upper quadrant.  Patient does not endorse nausea at this time, and did not endorse nausea when stood.      Patient seen in ED found to have blood pressures in the 208 systolic, blood glucose was 218 at home in the low 200s in the ED, ACS rule out with EKG and troponins not consistent with ACS.  Patient received medications to lower blood pressure was not 150s to 180s systolic when evaluated.  Patient received head CT with no acute abnormalities, but CT perfusion scan with 8 ml area of possible penumbra.  CTA head and neck without signs of bleeding, but without significance stenosis of thecarotid arteries.  Vertebral arteries were patent without significant stenosis.  Patient was also found to have an incidental 1.1 cm left thyroid nodule.    Review of Systems:   Review of Systems   Constitutional: Negative for chills, fever and malaise/fatigue.   HENT: Negative for congestion, ear pain, hearing loss, sinus pain and sore throat.    Eyes: Negative for blurred vision, double vision and pain.   Respiratory: Negative for cough, hemoptysis, sputum production, shortness of breath and wheezing.    Cardiovascular: Negative for chest pain, palpitations, orthopnea and leg swelling.   Gastrointestinal: Positive for nausea and vomiting. Negative for abdominal pain, blood in stool, constipation, diarrhea, heartburn and melena.   Genitourinary: Negative for dysuria, frequency and urgency.   Musculoskeletal: Negative for falls, joint pain, myalgias and neck pain.   Skin: Negative for rash.   Neurological: Negative for dizziness, tingling, tremors, speech change, focal weakness, seizures, weakness and headaches.   Endo/Heme/Allergies: Does not bruise/bleed easily.   Psychiatric/Behavioral: Negative for depression. The patient is not nervous/anxious.           Past Medical  History:   Past Medical History was reviewed with patient.   has a past medical history of Dental disorder, Diabetes (HCC), Diabetes mellitus out of control (HCC) (2010), High cholesterol (2010), and Hypertension (2010).    Past Surgical History: Past Surgical History was reviewed with patient.   has a past surgical history that includes vitrectomy posterior (Left, 7/26/2021).    Medications: Medications have been reviewed with patient.  Prior to Admission Medications   Prescriptions Last Dose Informant Patient Reported? Taking?   atorvastatin (LIPITOR) 20 MG Tab UNK at [facility] Patient's Home Pharmacy No No   Sig: Take 2 tablets by mouth once daily   insulin glargine (LANTUS SOLOSTAR) 100 UNIT/ML Solution Pen-injector injection UNK at [facility] Patient's Home Pharmacy No No   Sig: Inject 10 units subcutaneously once daily in the evening   insulin lispro (HUMALOG KWIKPEN) 100 UNIT/ML Solution Pen-injector injection PEN UNK at [facility] Patient's Home Pharmacy No No   Sig: Inject 10 Units under the skin 3 times a day before meals.   lisinopril (PRINIVIL) 40 MG tablet UNK at [facility] Patient's Home Pharmacy No No   Sig: Take 1 Tab by mouth every day.   metformin (GLUCOPHAGE) 1000 MG tablet UNK at [facility] Patient's Home Pharmacy No No   Sig: TAKE 1 TABLET BY MOUTH TWICE DAILY WITH MEALS   Patient taking differently: Take 1,000 mg by mouth 2 times a day with meals. TAKE 1 TABLET BY MOUTH TWICE DAILY WITH MEALS      Facility-Administered Medications: None        Allergies: Allergies have been reviewed with patient.  No Known Allergies    Family History:   family history includes Diabetes in his brother and father; Stroke in his mother. HTN in his mother     Social History:   Tobacco: >20 pack year history   Alcohol: Denies Use  Recreational drugs (illegal and prescription):  Denies use   Employment: States still working, but does not specify doing what  Activity Level: Active, walks around park more than a block   Recent travel:  Denies   Primary  Care Provider: reviewed Marlo Watts M.D.  Other (stressors, spirituality, exposures):  None, no sick contacks   Physical Exam:   Vitals:  Temp:  [36.1 °C (97 °F)] 36.1 °C (97 °F)  Pulse:  [] 98  Resp:  [15-32] 18  BP: (148-208)/() 179/103  SpO2:  [92 %-95 %] 95 %    Physical Exam    Labs:   CBC grossly within normal limits  CMP with potassium of 3.6, replete, glucose 198 elevated consistent with poorly controlled diabetes, total bilirubin slightly elevated 1.9, no transaminitis, no GI symptoms, magnesium 2.2 within normal limits  Troponin XVIII  PT/INR within normal limits      EKG: Per my read,  First-degree AV block, consistent with previous, left axis deviation and left ventricular hypertrophy consistent with previous EKGs, no ST elevations or depressions, QTC is prolonged at 504.    Imaging:   CT-CTA NECK WITH & W/O-POST PROCESSING [[id number]] Resulted: 10/12/21 1151             Impression:       1.  Atherosclerotic plaque within the carotid arteries bilaterally with less than 50% stenosis.   2.  Patent vertebral arteries bilaterally.   3.  1.1 cm left thyroid nodule. Further evaluation can be performed with thyroid ultrasound.   CT-CEREBRAL PERFUSION ANALYSIS [[id number]] Resulted: 10/12/21 1144   Order Status: Completed Updated: 10/12/21 1146         Impression:       1.  Cerebral blood flow less than 30% likely representing completed infarct = 0 mL.     2.  T Max more than 6 seconds likely representing combination of completed infarct and ischemia = 8 mL.     3.  Mismatched volume likely representing ischemic brain/penumbra = 8 mL     4.  Please note that the cerebral perfusion was performed on the limited brain tissue around the basal ganglia region. Infarct/ischemia outside the CT perfusion sections can be missed in this study.   CT-CTA HEAD WITH & W/O-POST PROCESS [[id number]] Resulted: 10/12/21 1140   Order Status: Completed Updated: 10/12/21 1143   Narrative:     FINDINGS:   Distal  left ICA is patent. Atherosclerotic plaque is seen in the cavernous and supraclinoid ICA with less than 50% stenosis. Left middle and anterior cerebral artery is patent. A1 segment on the left is diminutive. Anterior communicating artery is seen.     Distal right ICA is patent. Atherosclerotic plaque is seen in the cavernous and supraclinoid ICA without significant stenosis. Right middle and anterior cerebral artery is patent. A small outpouching from the supraclinoid segment of the ICA likely   represents a small infundibulum.     Distal vertebral arteries are patent bilaterally. There is atherosclerotic plaque of the intracranial left vertebral artery without significant stenosis.     Basilar artery, superior cerebellar and posterior cerebral arteries are patent bilaterally.     3D angiographic/MIP images of the vasculature confirm the vascular findings as described above.    Impression:       No thrombosis is seen within the [place].   CT-HEAD W/O [[id number]] Resulted: 10/12/21 1129   Order Status: Completed Updated: 10/12/21 1131   Narrative:       The calvarium is intact.    Impression:       1.  No acute intracranial abnormality is identified.   2.  There are prominent periventricular and subcortical white matter changes present.  This finding is nonspecific and could be from previous small vessel ischemia, demyelination, or gliosis.   3.  Sclerosis and fluid in the mastoid air cells on the right.   4.  Mucosal thickening within the maxillary sinuses.   DX-CHEST-PORTABLE (1 VIEW) [[id number]] Resulted: 10/12/21 1119   Order Status: Completed Updated: 10/12/21 1121   Narrative:     .     Low lung volumes with mild left basilar opacity which likely represents atelectasis. No significant consolidation, pleural effusion or pneumothorax.          Previous Data Review: reviewed    Problem Representation:   Howard is a 76 y.o. male with a past medical history significant for hypertension (/80  9/28 on  amlodipine and lisinopril max dose), diabetes mellitus type 2 insulin-dependent complicated by retinopathy (poorly controlled last hemoglobin A1c 10.7 6/21), Vitreous hemorrhage status post vitrectomy left eye in 7/2021  who presented 10/12/2021 with a 2-day history of gait abnormalities as well as nausea/vomiting being workud up from stroke.   Stroke ([facility])  Assessment & Plan  Patient admitted for CVA rule out, given patient symptoms of sudden onset ataxia approximately 36 hours before presentation, patient was not a candidate for TPA.  CT negative for any acute bleed, CTA with possible 8 mL area of decreased perfusion/penumbra.  Patient with NI H SS score of 0, cranial nerves II through XII intact, no focal deficits, strength intact, sensation intact bilaterally, but gait is grossly abnormal with patient not steady on feet, unable to walk more than a few steps even supported before attempting to fall to both the left and the right side.  Patient also has issues with dysphagia that started around the same time.  Patient also hypertensive into the 200s systolic, not consistent with baseline blood pressure around approximately 160 systolic at last office visit.  Given the acuity of these events patient will be assessed for CVA.  Other causes of ataxia could be vertigo, versus less likely diabetic neuropathy, versus brain bleed ruled out, given his symptoms likely cerebellar ataxia, of  more likely thrombo embolic origins given no laterality.  Patient denies any alcohol use, so less likely alcohol and variety, will obtain B12 and thiamine as well.  Labs: Lipid panel, repeat CBC and CMP in a.m.  Imaging: MRI without contrast to evaluate for stroke  Consults: Neurology for possible stroke  Therapeutics: Start on aspirin, high-dose statin, permissive hypertension will continue on ace once Permissive HTN window has passed 24-48 hours at approx. 36hours.   Hypertension: permissive   DVT: Heparin for DVT, SCDs  Telemetry  monitoring for atrial fibrillation, patient's rope score is 2 extremely low chance that stroke would be secondary to PFO, will not order echocardiogram.  PT OT speech  If no stroke and dysphagia does not resolve with nausea and vomiting, consider modified barium swallow for further work-up of upper GI pathologies.    Ataxia- (present on admission)  Assessment & Plan  Patient with a 2-day history of ataxia, patient does feel lightheaded when coming to feet, but unable to stand steady on feet, or walk with aid.  Patient states this happened before the initial onset of his nausea/vomiting, and denies any time previous.  Patient does state that he has had vertigo in the past consistent with chart review, but recognizes this is not like his previous bout of vertigo.  Differential to include cerebellar stroke, central lesion, hypertensive emergency, vertigo, or Warnicke's unlikely secondary to dehydration or orthostasis, orthostatics were attempted well in the room and patient's blood pressure drew from sitting to standing with no significant change in heart rate.  No history of aortic stenosis on echo in 2018, no history of any valvular heart disease with last EF of 70% at that time.  Patient has previous negative monofilament test unlikely from diabetic neuropathy given history and course. Given the patient's history of deafness, difficult to rule out vertigo either vestibular neuronitis, labyrinthitis viral, unlikely be PE PE V since seemingly nonpositional on examination, or Ménière's disease.   -see plan for Stroke.   -B12 and thiamine levels     Hypertensive emergency  Assessment & Plan  Patient with blood pressure of 208 systolic on admission, patient insetting of stroke hypertensive emergency, CNS only symptoms of endorgan damage versus hypertensive urgency.  Will need further evaluation to finalize diagnosis versus hypertensive urgency.  Possibly contributing to, patient states that he takes his medication but  has a history of noncompliance noted on primary care office visit notes.  Allow for permissive hypertension until stroke is ruled out.  CMP without signs of gross kidney dysfunction, troponin and physical exam without signs of pain, or cardiac dysfunction.  -Will allow permissive hypertension currently, but will add back on medications, and and as needed's to maintain 20% blood pressure drop across first 24 hours if MRI rules out stroke.  -Labetalol and enalaprilat as needed if blood pressure over 220 systolic  -We will continue to monitor daily labs    Hyperlipidemia- (present on admission)  Assessment & Plan  Previous lipid panel reviewed  Given concern of stroke started on high-dose statin    Uncontrolled type 2 diabetes mellitus with complication, without long-term current use of insulin (HCC)- (present on admission)  Assessment & Plan  Blood glucose in the low 200s on admission  With nausea and vomiting, but normal blood glucose no concern for DKA, though patient has had prior DKA  -Basal plus mealtime insulin, Accu-Cheks, hypoglycemia protocol  -We will continue to monitor        Fluids:  mL an hour  Electrolytes: Replete as necessary  Nutrition: N.p.o. given possible stroke, evaluation by speech language pathology, barium swallow first-line study for possible dysphagia to both solids and liquids new  GI: Bowel regimen, PPI not indicated  DVT: Heparin given possibility of stroke  Code STATUS: FULL CODE

## 2021-10-12 NOTE — ASSESSMENT & PLAN NOTE
Patient on insulin at home with A1c of 10.7, both basal and mealtime and oral, though compliance likely not the best given  Repeat hemoglobin A1c last in June  -Glargine, mealtime aspart, and sliding scale insulin  -Accu-Cheks, hypoglycemia protocol  -We will monitor blood glucose goal between 140 and 180.

## 2021-10-13 VITALS
RESPIRATION RATE: 16 BRPM | HEART RATE: 99 BPM | BODY MASS INDEX: 26.64 KG/M2 | DIASTOLIC BLOOD PRESSURE: 89 MMHG | HEIGHT: 67 IN | OXYGEN SATURATION: 93 % | WEIGHT: 169.75 LBS | SYSTOLIC BLOOD PRESSURE: 166 MMHG | TEMPERATURE: 98.7 F

## 2021-10-13 DIAGNOSIS — E11.9 DIABETES MELLITUS TYPE 2 IN NONOBESE (HCC): ICD-10-CM

## 2021-10-13 LAB
ALBUMIN SERPL BCP-MCNC: 4.4 G/DL (ref 3.2–4.9)
ALBUMIN/GLOB SERPL: 1.4 G/DL
ALP SERPL-CCNC: 79 U/L (ref 30–99)
ALT SERPL-CCNC: 23 U/L (ref 2–50)
ANION GAP SERPL CALC-SCNC: 14 MMOL/L (ref 7–16)
AST SERPL-CCNC: 23 U/L (ref 12–45)
BILIRUB SERPL-MCNC: 2.1 MG/DL (ref 0.1–1.5)
BUN SERPL-MCNC: 20 MG/DL (ref 8–22)
CALCIUM SERPL-MCNC: 9.3 MG/DL (ref 8.5–10.5)
CHLORIDE SERPL-SCNC: 101 MMOL/L (ref 96–112)
CHOLEST SERPL-MCNC: 113 MG/DL (ref 100–199)
CO2 SERPL-SCNC: 23 MMOL/L (ref 20–33)
CREAT SERPL-MCNC: 0.97 MG/DL (ref 0.5–1.4)
ERYTHROCYTE [DISTWIDTH] IN BLOOD BY AUTOMATED COUNT: 37.8 FL (ref 35.9–50)
EST. AVERAGE GLUCOSE BLD GHB EST-MCNC: 200 MG/DL
GLOBULIN SER CALC-MCNC: 3.1 G/DL (ref 1.9–3.5)
GLUCOSE BLD-MCNC: 216 MG/DL (ref 65–99)
GLUCOSE SERPL-MCNC: 219 MG/DL (ref 65–99)
HBA1C MFR BLD: 8.6 % (ref 4–5.6)
HCT VFR BLD AUTO: 47.7 % (ref 42–52)
HDLC SERPL-MCNC: 44 MG/DL
HGB BLD-MCNC: 16.4 G/DL (ref 14–18)
LDLC SERPL CALC-MCNC: 39 MG/DL
MCH RBC QN AUTO: 26.2 PG (ref 27–33)
MCHC RBC AUTO-ENTMCNC: 34.4 G/DL (ref 33.7–35.3)
MCV RBC AUTO: 76.2 FL (ref 81.4–97.8)
PLATELET # BLD AUTO: 303 K/UL (ref 164–446)
PMV BLD AUTO: 8.9 FL (ref 9–12.9)
POTASSIUM SERPL-SCNC: 3.3 MMOL/L (ref 3.6–5.5)
PROT SERPL-MCNC: 7.5 G/DL (ref 6–8.2)
RBC # BLD AUTO: 6.26 M/UL (ref 4.7–6.1)
SODIUM SERPL-SCNC: 138 MMOL/L (ref 135–145)
TRIGL SERPL-MCNC: 149 MG/DL (ref 0–149)
WBC # BLD AUTO: 11.5 K/UL (ref 4.8–10.8)

## 2021-10-13 PROCEDURE — 80061 LIPID PANEL: CPT

## 2021-10-13 PROCEDURE — 99232 SBSQ HOSP IP/OBS MODERATE 35: CPT | Performed by: NURSE PRACTITIONER

## 2021-10-13 PROCEDURE — 99239 HOSP IP/OBS DSCHRG MGMT >30: CPT | Performed by: INTERNAL MEDICINE

## 2021-10-13 PROCEDURE — 97161 PT EVAL LOW COMPLEX 20 MIN: CPT

## 2021-10-13 PROCEDURE — A9270 NON-COVERED ITEM OR SERVICE: HCPCS | Performed by: INTERNAL MEDICINE

## 2021-10-13 PROCEDURE — 84425 ASSAY OF VITAMIN B-1: CPT

## 2021-10-13 PROCEDURE — 83036 HEMOGLOBIN GLYCOSYLATED A1C: CPT

## 2021-10-13 PROCEDURE — 80053 COMPREHEN METABOLIC PANEL: CPT

## 2021-10-13 PROCEDURE — 700102 HCHG RX REV CODE 250 W/ 637 OVERRIDE(OP): Performed by: INTERNAL MEDICINE

## 2021-10-13 PROCEDURE — 85027 COMPLETE CBC AUTOMATED: CPT

## 2021-10-13 PROCEDURE — 96375 TX/PRO/DX INJ NEW DRUG ADDON: CPT

## 2021-10-13 PROCEDURE — 82962 GLUCOSE BLOOD TEST: CPT

## 2021-10-13 PROCEDURE — A9270 NON-COVERED ITEM OR SERVICE: HCPCS | Performed by: STUDENT IN AN ORGANIZED HEALTH CARE EDUCATION/TRAINING PROGRAM

## 2021-10-13 PROCEDURE — 700102 HCHG RX REV CODE 250 W/ 637 OVERRIDE(OP): Performed by: STUDENT IN AN ORGANIZED HEALTH CARE EDUCATION/TRAINING PROGRAM

## 2021-10-13 PROCEDURE — 96372 THER/PROPH/DIAG INJ SC/IM: CPT

## 2021-10-13 PROCEDURE — 700111 HCHG RX REV CODE 636 W/ 250 OVERRIDE (IP): Performed by: STUDENT IN AN ORGANIZED HEALTH CARE EDUCATION/TRAINING PROGRAM

## 2021-10-13 RX ORDER — POTASSIUM CHLORIDE 20 MEQ/1
40 TABLET, EXTENDED RELEASE ORAL ONCE
Status: COMPLETED | OUTPATIENT
Start: 2021-10-13 | End: 2021-10-13

## 2021-10-13 RX ORDER — MECLIZINE HYDROCHLORIDE 25 MG/1
25 TABLET ORAL 3 TIMES DAILY PRN
Qty: 30 TABLET | Refills: 0 | Status: SHIPPED | OUTPATIENT
Start: 2021-10-13 | End: 2022-07-12

## 2021-10-13 RX ORDER — ATORVASTATIN CALCIUM 20 MG/1
40 TABLET, FILM COATED ORAL DAILY
Qty: 200 TABLET | Refills: 2 | Status: SHIPPED | OUTPATIENT
Start: 2021-10-13 | End: 2022-06-29 | Stop reason: SDUPTHER

## 2021-10-13 RX ADMIN — AMLODIPINE BESYLATE 10 MG: 5 TABLET ORAL at 05:46

## 2021-10-13 RX ADMIN — ENALAPRILAT 1.25 MG: 1.25 INJECTION INTRAVENOUS at 03:49

## 2021-10-13 RX ADMIN — ASPIRIN 324 MG: 81 TABLET, CHEWABLE ORAL at 05:43

## 2021-10-13 RX ADMIN — HEPARIN SODIUM 5000 UNITS: 5000 INJECTION, SOLUTION INTRAVENOUS; SUBCUTANEOUS at 05:47

## 2021-10-13 RX ADMIN — ATORVASTATIN CALCIUM 80 MG: 20 TABLET, FILM COATED ORAL at 05:45

## 2021-10-13 RX ADMIN — POTASSIUM CHLORIDE 40 MEQ: 1500 TABLET, EXTENDED RELEASE ORAL at 08:35

## 2021-10-13 RX ADMIN — DOCUSATE SODIUM 50 MG AND SENNOSIDES 8.6 MG 2 TABLET: 8.6; 5 TABLET, FILM COATED ORAL at 05:44

## 2021-10-13 ASSESSMENT — GAIT ASSESSMENTS
GAIT LEVEL OF ASSIST: SUPERVISED
DEVIATION: DECREASED HEEL STRIKE;DECREASED TOE OFF
DISTANCE (FEET): 200

## 2021-10-13 ASSESSMENT — COGNITIVE AND FUNCTIONAL STATUS - GENERAL
MOBILITY SCORE: 21
SUGGESTED CMS G CODE MODIFIER MOBILITY: CJ
WALKING IN HOSPITAL ROOM: A LITTLE
MOVING FROM LYING ON BACK TO SITTING ON SIDE OF FLAT BED: A LITTLE
CLIMB 3 TO 5 STEPS WITH RAILING: A LITTLE

## 2021-10-13 NOTE — ASSESSMENT & PLAN NOTE
Patient with blood pressure of 208 systolic on admission, patient insetting of stroke hypertensive emergency, CNS only symptoms of endorgan damage versus hypertensive urgency.  Will need further evaluation to finalize diagnosis versus hypertensive urgency.  Possibly contributing to, patient states that he takes his medication but has a history of noncompliance noted on primary care office visit notes.  Allow for permissive hypertension until stroke is ruled out.  CMP without signs of gross kidney dysfunction, troponin and physical exam without signs of pain, or cardiac dysfunction.  -Will allow permissive hypertension currently, but will add back on medications, and and as needed's to maintain 20% blood pressure drop across first 24 hours if MRI rules out stroke.  -Labetalol and enalaprilat as needed if blood pressure over 220 systolic  -We will continue to monitor daily labs

## 2021-10-13 NOTE — ED NOTES
Swallow evaluation completed at bedside. No signs of coughing or aspiration noted. Dr. Carson notified for update and diet orders.

## 2021-10-13 NOTE — DISCHARGE PLANNING
Follow up for post acute services negative work up anticipate home with outpatient support when medically cleared. No physiatry consult ordered per protocol.

## 2021-10-13 NOTE — DISCHARGE PLANNING
[facility] Transitional Care Coordination    Referral from:  Dr. Sutherland    Insurance Provider on Facesheet: [facility]    Potential Rehab Diagnosis: CVA?    Chart review indicates patient may have on going medical management and may have therapy needs to possibly meet inpatient rehab facility criteria with the goal of returning to community.    D/C support: TBD     Physiatry consultation pended per protocol.     CVA?  W/U & TX pending.  Waiting on additional information to determine appropriateness for acute inpatient rehabilitation. Will continue to follow.      Thank you for the referral.

## 2021-10-13 NOTE — THERAPY
Speech Therapy     Patient Name: Howard Gamboa  Age:  76 y.o., Sex:  male  Medical Record #: [id number]  Today's Date: 10/13/2021       10/13/21 0910   Interdisciplinary Plan of Care Collaboration   IDT Collaboration with  Nursing   Collaboration Comments Spoke with RN, and per report, patient passed nursing screening and has been given a regular/CHO diet.  RN indicated that there is no difficulty noted with swallowing and that swallowing as well as cognitive evaluations are not currently indicated.  She reported patient is most likely being discharged home and orders will be cancelled. MRI completed 10/12/21 with no acute process noted.  Please reconsult SLP should there be a change in status warranting SLP assessment.

## 2021-10-13 NOTE — ED NOTES
Messaged [facility] Blue team for medication clarification regarding insulin administration.

## 2021-10-13 NOTE — ASSESSMENT & PLAN NOTE
Blood glucose in the low 200s on admission  With nausea and vomiting, but normal blood glucose no concern for DKA, though patient has had prior DKA  -Basal plus mealtime insulin, Accu-Cheks, hypoglycemia protocol  -We will continue to monitor

## 2021-10-13 NOTE — DISCHARGE INSTRUCTIONS
"BP Goal:  110-130/60-80    Hypertension, Adult  High blood pressure (hypertension) is when the force of blood pumping through the arteries is too strong. The arteries are the blood vessels that carry blood from the heart throughout the body. Hypertension forces the heart to work harder to pump blood and may cause arteries to become narrow or stiff. Untreated or uncontrolled hypertension can cause a heart attack, heart failure, a stroke, kidney disease, and other problems.  A blood pressure reading consists of a higher number over a lower number. Ideally, your blood pressure should be below 120/80. The first (\"top\") number is called the systolic pressure. It is a measure of the pressure in your arteries as your heart beats. The second (\"bottom\") number is called the diastolic pressure. It is a measure of the pressure in your arteries as the heart relaxes.  What are the causes?  The exact cause of this condition is not known. There are some conditions that result in or are related to high blood pressure.  What increases the risk?  Some risk factors for high blood pressure are under your control. The following factors may make you more likely to develop this condition:  · Smoking.  · Having type 2 diabetes mellitus, high cholesterol, or both.  · Not getting enough exercise or physical activity.  · Being overweight.  · Having too much fat, sugar, calories, or salt (sodium) in your diet.  · Drinking too much alcohol.  Some risk factors for high blood pressure may be difficult or impossible to change. Some of these factors include:  · Having chronic kidney disease.  · Having a family history of high blood pressure.  · Age. Risk increases with age.  · Race. You may be at higher risk if you are .  · Gender. Men are at higher risk than women before age 45. After age 65, women are at higher risk than men.  · Having obstructive sleep apnea.  · Stress.  What are the signs or symptoms?  High blood pressure may " not cause symptoms. Very high blood pressure (hypertensive crisis) may cause:  · Headache.  · Anxiety.  · Shortness of breath.  · Nosebleed.  · Nausea and vomiting.  · Vision changes.  · Severe chest pain.  · Seizures.  How is this diagnosed?  This condition is diagnosed by measuring your blood pressure while you are seated, with your arm resting on a flat surface, your legs uncrossed, and your feet flat on the floor. The cuff of the blood pressure monitor will be placed directly against the skin of your upper arm at the level of your heart. It should be measured at least twice using the same arm. Certain conditions can cause a difference in blood pressure between your right and left arms.  Certain factors can cause blood pressure readings to be lower or higher than normal for a short period of time:  · When your blood pressure is higher when you are in a health care provider's office than when you are at home, this is called white coat hypertension. Most people with this condition do not need medicines.  · When your blood pressure is higher at home than when you are in a health care provider's office, this is called masked hypertension. Most people with this condition may need medicines to control blood pressure.  If you have a high blood pressure reading during one visit or you have normal blood pressure with other risk factors, you may be asked to:  · Return on a different day to have your blood pressure checked again.  · Monitor your blood pressure at home for 1 week or longer.  If you are diagnosed with hypertension, you may have other blood or imaging tests to help your health care provider understand your overall risk for other conditions.  How is this treated?  This condition is treated by making healthy lifestyle changes, such as eating healthy foods, exercising more, and reducing your alcohol intake. Your health care provider may prescribe medicine if lifestyle changes are not enough to get your blood  pressure under control, and if:  · Your systolic blood pressure is above 130.  · Your diastolic blood pressure is above 80.  Your personal target blood pressure may vary depending on your medical conditions, your age, and other factors.  Follow these instructions at home:  Eating and drinking    · Eat a diet that is high in fiber and potassium, and low in sodium, added sugar, and fat. An example eating plan is called the DASH (Dietary Approaches to Stop Hypertension) diet. To eat this way:  ? Eat plenty of fresh fruits and vegetables. Try to fill one half of your plate at each meal with fruits and vegetables.  ? Eat whole grains, such as whole-wheat pasta, brown rice, or whole-grain bread. Fill about one fourth of your plate with whole grains.  ? Eat or drink low-fat dairy products, such as skim milk or low-fat yogurt.  ? Avoid fatty cuts of meat, processed or cured meats, and poultry with skin. Fill about one fourth of your plate with lean proteins, such as fish, chicken without skin, beans, eggs, or tofu.  ? Avoid pre-made and processed foods. These tend to be higher in sodium, added sugar, and fat.  · Reduce your daily sodium intake. Most people with hypertension should eat less than 1,500 mg of sodium a day.  · Do not drink alcohol if:  ? Your health care provider tells you not to drink.  ? You are pregnant, may be pregnant, or are planning to become pregnant.  · If you drink alcohol:  ? Limit how much you use to:  § 0-1 drink a day for women.  § 0-2 drinks a day for men.  ? Be aware of how much alcohol is in your drink. In the [place] one drink equals one 12 oz bottle of beer (355 mL), one 5 oz glass of wine (148 mL), or one 1½ oz glass of hard liquor (44 mL).  Lifestyle    · Work with your health care provider to maintain a healthy body weight or to lose weight. Ask what an ideal weight is for you.  · Get at least 30 minutes of exercise most days of the week. Activities may include walking, swimming, or  biking.  · Include exercise to strengthen your muscles (resistance exercise), such as Pilates or lifting weights, as part of your weekly exercise routine. Try to do these types of exercises for 30 minutes at least 3 days a week.  · Do not use any products that contain nicotine or tobacco, such as cigarettes, e-cigarettes, and chewing tobacco. If you need help quitting, ask your health care provider.  · Monitor your blood pressure at home as told by your health care provider.  · Keep all follow-up visits as told by your health care provider. This is important.  Medicines  · Take over-the-counter and prescription medicines only as told by your health care provider. Follow directions carefully. Blood pressure medicines must be taken as prescribed.  · Do not skip doses of blood pressure medicine. Doing this puts you at risk for problems and can make the medicine less effective.  · Ask your health care provider about side effects or reactions to medicines that you should watch for.  Contact a health care provider if you:  · Think you are having a reaction to a medicine you are taking.  · Have headaches that keep coming back (recurring).  · Feel dizzy.  · Have swelling in your ankles.  · Have trouble with your vision.  Get help right away if you:  · Develop a severe headache or confusion.  · Have unusual weakness or numbness.  · Feel faint.  · Have severe pain in your chest or abdomen.  · Vomit repeatedly.  · Have trouble breathing.  Summary  · Hypertension is when the force of blood pumping through your arteries is too strong. If this condition is not controlled, it may put you at risk for serious complications.  · Your personal target blood pressure may vary depending on your medical conditions, your age, and other factors. For most people, a normal blood pressure is less than 120/80.  · Hypertension is treated with lifestyle changes, medicines, or a combination of both. Lifestyle changes include losing weight, eating a  healthy, low-sodium diet, exercising more, and limiting alcohol.  This information is not intended to replace advice given to you by your health care provider. Make sure you discuss any questions you have with your health care provider.  Document Released: 12/18/2006 Document Revised: 08/28/2019 Document Reviewed: 08/28/2019  Elsevier Patient Education © 2020 [employer]

## 2021-10-13 NOTE — THERAPY
Physical Therapy   Initial Evaluation     Patient Name: Howard Gamboa  Age:  76 y.o., Sex:  male  Medical Record #: [id number]  Today's Date: 10/13/2021     Precautions: Fall Risk    Assessment  Patient is a 76 y.o. male admitted to ED with hypertensive crisis, ataxia, dizziness, nausea and vomiting. Possible CVA, however MRI negative for acute process. Hx of HTN and DM. PT eval completed. Pt at [facility] for all functional mobility; no difficulty with bed mobility, no AD for transfers or amb. Pt used wall briefly for balance at beginning of amb, however he did not have any assist remainder of session and had no LOB. Pt appears functional capable of DC home at this time from PT standpoint and he has no concerns with DC either. Patient will not be actively followed for physical therapy services at this time, however may be seen if requested by physician for 1 more visit within 30 days to address any discharge or equipment needs    Plan  Recommend Physical Therapy for Evaluation only  DC Equipment Recommendations: None  Discharge Recommendations: Anticipate that the patient will have no further physical therapy needs after discharge from the hospital          10/13/21 1241   Vitals   O2 Delivery Device None - Room Air   Pain 0 - 10 Group   Therapist Pain Assessment no c/o pain   Prior Living Situation   Prior Services None   Housing / Facility 1 Story House   Equipment Owned None   Lives with  Spouse   Comments was unable to clarify if home has stairs, [place] with intepreter   Prior Level of Functional Mobility   Bed Mobility Independent   Transfer Status Independent   Ambulation Independent   Distance Ambulation (Feet) community distances   Assistive Devices Used None   Comments unable to clarify full PLOF, [place] with    Cognition    Cognition / Consciousness X   Speech/ Communication Hard of Hearing   Comments pleasant and participatory, motivated to go home. iPad  used #[id number],  noted pt was [place]    Active ROM Lower Body    Active ROM Lower Body  WDL   Strength Lower Body   Lower Body Strength  WDL   Neurological Concerns   Neurological Concerns No   Balance Assessment   Sitting Balance (Static) Good   Sitting Balance (Dynamic) Fair +   Standing Balance (Static) Fair   Standing Balance (Dynamic) Fair    Weight Shift Sitting Good   Weight Shift Standing Fair   Comments no AD in standing   Gait Analysis   Gait Level Of Assist Supervised   Assistive Device None   Distance (Feet) 200   # of Times Distance was Traveled 1   Deviation Decreased Heel Strike;Decreased Toe Off   Weight Bearing Status no restrictions   Comments no LOB, pt used place hand on wall for balance initially during gait, however he did not need assist at all for rest of ambulation   Bed Mobility    Supine to Sit Supervised   Sit to Supine Supervised   Scooting Supervised   Comments HOB elevated   Functional Mobility   Sit to Stand Supervised   Comments no AD

## 2021-10-13 NOTE — ED NOTES
"Went in to provide patient with previously ordered medications, perform Rn bedside swallow eval, and take BG.  Utilized  services ([facility]).  Pt very upset upon entry into room, states \"I have not eaten in 2 days, you are starving me, I am a patient and have rights\".  Patient also requesting phone, and to see his daughter. Educated patient that daughter had left for the night, and time.  Patient stated it was too late to call daughter and he did not want the phone.  Educated patient on need to do swallow evaluation prior to eating food.  After extensive reiteration of instruction d/t pt's frustration with hospital environment patient took multiple sips of water with no difficulties, no coughing.  Provided patient with snack box as ok'd by MD.  Patient continued to be very upset, and would not eat food provided, allowed this RN to take BG, declined all medication administration.    "

## 2021-10-13 NOTE — DISCHARGE SUMMARY
"Discharge Summary    CHIEF COMPLAINT ON ADMISSION  Chief Complaint   Patient presents with   • Dizziness     Onset yesterday, worsening this AM. Pt reports currently \" feeling better. \"     • N/V   • Diabetes     Blood sugar at home 218   • Hypertension       Reason for Admission  N/V; Dizziness     Admission Date  10/12/2021    CODE STATUS  Full Code    HPI & HOSPITAL COURSE  Howard is a 76 y.o. 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  Patient was seen with the aid of a telephone based Elizabeth interprete.  R patient states that he was feeling fine in the several days leading up to this incident.  Patient states that he has been up into the park and to work in the day before.  Patient states that he awoke approximately 2 days ago in the a.m. and was unable to stand without assistance or walk without falling the walls.  Patient does endorse some lightheadedness, but no dizziness at that time.  Patient endorses no other focal deficits at that time stating no changes in vision, hearing, changes in sensation or changes in musculoskeletal strength.  Patient does not endorse any room spinning or headache either.  Patient states that he became nauseous and vomited several times throughout the next several days.  Patient states he was unable to eat any solid or liquid foods, stating that he had an epigastric pain that felt like he had food \"stuck in my chest \"patient denies coughing up any food or any shortness of breath.  Patient states that he is thrown up liquid vomitus multiple times including one time in the ED, vomitus has been mostly brown in color, not black, no " hematemesis, and mostly the color of the food he is try to eat.  Patient denies any bilious vomiting.  Patient endorses epigastric pain, motioning to the area above his sternum in the epigastric region of the upper quadrant.  Patient does not endorse nausea at this time, and did not endorse nausea when stood.       Patient seen in ED found to have blood pressures in the 208 systolic, blood glucose was 218 at home in the low 200s in the ED, ACS rule out with EKG and troponins not consistent with ACS.  Patient received medications to lower blood pressure was not 150s to 180s systolic when evaluated.  Patient received head CT with no acute abnormalities, but CT perfusion scan with 8 ml area of possible penumbra.  CTA head and neck without signs of bleeding, but without significance stenosis of thecarotid arteries.  Vertebral arteries were patent without significant stenosis.  Patient was also found to have an incidental 1.1 cm left thyroid nodule.    MRI of the brain was done and showed no acute stroke.  I saw and examined the patient in ER.  He stated that his symptom is a lot better now.  He described his dizziness as room spinning sensation triggered with certain head position.  I think his symptom is related to peripheral vertigo.  Physical therapy to see him with Epley's maneuver.  I also will start him on meclizine to take as needed for vertigo.  Neurology also saw him and recommended that the patient can be discharged home and follow-up as an outpatient.  In terms of his blood pressure, it improved significantly with systolic blood pressure around 140 at this point.  Patient was educated to check blood pressure on a regular basis with record and follow-up with primary care physician as an outpatient.  His A1c was 8.3 and he need to follow-up with PCP and possibly need medication adjustment as needed.  I explained and updated all of these finding to his granddaughter.    Please call [phone] to schedule PCP  appointment for patient.    Required specialty appointments include:     As below  Therefore, he is discharged in fair and stable condition to home with close outpatient follow-up.    The patient recovered much more quickly than anticipated on admission.    Discharge Date  10/13/21      FOLLOW UP ITEMS POST DISCHARGE      DISCHARGE DIAGNOSES  Active Problems:    Hyperlipidemia POA: Yes    Uncontrolled type 2 diabetes mellitus with complication, without long-term current use of insulin (HCC) POA: Yes    Ataxia POA: Yes    Stroke (HCC) POA: Unknown    Hypertensive emergency POA: Unknown    Nausea & vomiting POA: Unknown      Overview: 2-day history of nausea and vomiting, could be in the setting of       hypertension, central process, stroke, versus viral versus bacterial       gastritis, history of dysphagia is concerning, but difficult to discern at       this time, CBC without signs of significant dysfunction, will obtain CMP.      -Avoid QT prolonging agents given QTC of 504      -Continue to monitor      IV hydration as above        Resolved Problems:    * No resolved hospital problems. *      FOLLOW UP  No future appointments.  Marlo Watts M.D.  [address] [place] [id number]  [phone]    In 1 week        MEDICATIONS ON DISCHARGE     Medication List      START taking these medications      Instructions   meclizine 25 MG Tabs  Commonly known as: ANTIVERT   Take 1 Tablet by mouth 3 times a day as needed.  Dose: 25 mg        CHANGE how you take these medications      Instructions   metformin 1000 MG tablet  What changed:   · when to take this  · additional instructions  Commonly known as: GLUCOPHAGE   TAKE 1 TABLET BY MOUTH TWICE DAILY WITH MEALS        CONTINUE taking these medications      Instructions   atorvastatin 20 MG Tabs  Commonly known as: LIPITOR   Take 2 tablets by mouth once daily     insulin lispro 100 UNIT/ML Sopn injection PEN  Commonly known as: HumaLOG,AdmeLOG    Inject 10 Units under the skin 3 times a day before meals.  Dose: 10 Units     Lantus SoloStar 100 UNIT/ML Sopn injection  Generic drug: insulin glargine   Inject 10 units subcutaneously once daily in the evening     lisinopril 40 MG tablet  Commonly known as: PRINIVIL   Take 1 Tab by mouth every day.  Dose: 40 mg            Allergies  No Known Allergies    DIET  Orders Placed This Encounter   Procedures   • Diet Order Diet: Consistent CHO (Diabetic)     Standing Status:   Standing     Number of Occurrences:   1     Order Specific Question:   Diet:     Answer:   Consistent CHO (Diabetic) [4]       ACTIVITY  As tolerated.  Weight bearing as tolerated    CONSULTATIONS  neurology    PROCEDURES      LABORATORY  Lab Results   Component Value Date    SODIUM 138 10/13/2021    POTASSIUM 3.3 (L) 10/13/2021    CHLORIDE 101 10/13/2021    CO2 23 10/13/2021    GLUCOSE 219 (H) 10/13/2021    BUN 20 10/13/2021    CREATININE 0.97 10/13/2021    CREATININE 1.07 05/05/2009        Lab Results   Component Value Date    WBC 11.5 (H) 10/13/2021    HEMOGLOBIN 16.4 10/13/2021    HEMATOCRIT 47.7 10/13/2021    PLATELETCT 303 10/13/2021        Total time of the discharge process exceeds 38 minutes.

## 2021-10-13 NOTE — PROGRESS NOTES
"Neurology Progress Note  Neurohospitalist Service, [facility]    Referring Physician: Leeroy Carson M.D.    Chief Complaint   Patient presents with   • Dizziness     Onset yesterday, worsening this AM. Pt reports currently \" feeling better. \"     • N/V   • Diabetes     Blood sugar at home 218   • Hypertension       HPI: Refer to initial documented Neurology H&P, as detailed in the patient's chart.    Interval History: No acute events overnight. Patient reporting very mild dizzyness today. /111 at time of my exam.    Review of systems: In addition to what is detailed in the HPI and/or updated in the interval history, all other systems reviewed and are negative.    Past Medical History:    has a past medical history of Dental disorder, Diabetes (HCC), Diabetes mellitus out of control (HCC) (2010), High cholesterol (2010), and Hypertension (2010).    FHx:  family history includes Diabetes in his brother and father; Stroke in his mother.    SHx:   reports that he quit smoking about 20 years ago. He has never used smokeless tobacco. He reports that he does not drink alcohol and does not use drugs.    Medications:    Current Facility-Administered Medications:   •  potassium chloride SA (Kdur) tablet 40 mEq, 40 mEq, Oral, Once, Leeroy Carson M.D.  •  atorvastatin (LIPITOR) tablet 80 mg, 80 mg, Oral, DAILY, Cheryle Sutherland M.D., 80 mg at 10/13/21 0545  •  insulin glargine (Semglee) injection, 5 Units, Subcutaneous, Q EVENING, Cheryle Sutherland M.D.  •  [Held by provider] insulin lispro (AdmeLOG) injection, 5 Units, Subcutaneous, TID AC, Cheryle Sutherland M.D.  •  senna-docusate (PERICOLACE or SENOKOT S) 8.6-50 MG per tablet 2 Tablet, 2 Tablet, Oral, BID, 2 Tablet at 10/13/21 0544 **AND** polyethylene glycol/lytes (MIRALAX) PACKET 1 Packet, 1 Packet, Oral, QDAY PRN **AND** magnesium hydroxide (MILK OF MAGNESIA) suspension 30 mL, 30 mL, Oral, QDAY PRN **AND** bisacodyl (DULCOLAX) suppository 10 mg, " 10 mg, Rectal, QDAY PRN, Cheryle Sutherland M.D.  •  lactated ringers infusion, , Intravenous, Continuous, Cheryle Sutherland M.D.  •  acetaminophen (Tylenol) tablet 650 mg, 650 mg, Oral, Q6HRS PRN, Cheryle Sutherland M.D.  •  enalaprilat (Vasotec) injection 1.25 mg 1 mL, 1.25 mg, Intravenous, Q6HRS PRN, Cheryle Sutherland M.D., 1.25 mg at 10/13/21 0349  •  labetalol (NORMODYNE/TRANDATE) injection 10 mg, 10 mg, Intravenous, Q4HRS PRN, Cheryle Sutherland M.D.  •  aspirin (ASA) tablet 325 mg, 325 mg, Oral, DAILY **OR** aspirin (ASA) chewable tab 324 mg, 324 mg, Oral, DAILY, 324 mg at 10/13/21 0543 **OR** aspirin (ASA) suppository 300 mg, 300 mg, Rectal, DAILY, Cheryle Sutherland M.D.  •  heparin injection 5,000 Units, 5,000 Units, Subcutaneous, Q8HRS, Cheryle Sutherland M.D., 5,000 Units at 10/13/21 0547  •  [Held by provider] insulin regular (HumuLIN R,NovoLIN R) injection, 1-6 Units, Subcutaneous, Q6HRS **AND** POC blood glucose manual result, , , Q6H **AND** NOTIFY MD and PharmD, , , Once **AND** glucose 4 g chewable tablet 16 g, 16 g, Oral, Q15 MIN PRN **AND** dextrose 50% (D50W) injection 50 mL, 50 mL, Intravenous, Q15 MIN PRN, Cheryle Sutherland M.D.  •  lisinopril (PRINIVIL) tablet 40 mg, 40 mg, Oral, Q DAY, Cheryle Sutherland M.D.  •  amLODIPine (NORVASC) tablet 10 mg, 10 mg, Oral, Q DAY, Cheryle Sutherland M.D., 10 mg at 10/13/21 0546    Current Outpatient Medications:   •  metformin (GLUCOPHAGE) 1000 MG tablet, TAKE 1 TABLET BY MOUTH TWICE DAILY WITH MEALS (Patient taking differently: Take 1,000 mg by mouth 2 times a day with meals. TAKE 1 TABLET BY MOUTH TWICE DAILY WITH MEALS), Disp: 180 Tablet, Rfl: 3  •  atorvastatin (LIPITOR) 20 MG Tab, Take 2 tablets by mouth once daily, Disp: 200 tablet, Rfl: 0  •  insulin lispro (HUMALOG KWIKPEN) 100 UNIT/ML Solution Pen-injector injection PEN, Inject 10 Units under the skin 3 times a day before meals., Disp: 30 mL, Rfl: 2  •  insulin glargine (LANTUS SOLOSTAR) 100 UNIT/ML  Solution Pen-injector injection, Inject 10 units subcutaneously once daily in the evening, Disp: 3 mL, Rfl: 5  •  lisinopril (PRINIVIL) 40 MG tablet, Take 1 Tab by mouth every day., Disp: 100 Tab, Rfl: 3    Physical Examination:     Vitals:    10/13/21 0334 10/13/21 0404 10/13/21 0429 10/13/21 0459   BP: (!) 164/102 (!) 162/88 (!) 162/97 (!) 167/100   Pulse: 85 89 83 79   Resp:       Temp:       TempSrc:       SpO2: 90% 91% 90% 88%   Weight:       Height:           Mental status: Awake, alert and fully oriented, follows commands  Speech and language: speech is not dysarthric. The patient is able to name and repeat.  Cranial nerve exam: Pupils are equal, round and reactive to light bilaterally. Visual fields are full. Extraocular muscles are intact, no  nystagmus with lateral gaze. Sensation in the face is intact to light touch. Face is symmetric. Hearing to finger rub equal. Palate elevates symmetrically. Tongue is midline.  Motor exam: Strength is 5/5 in all extremities both distally and proximally. Tone is normal. No abnormal movements were seen on exam.  Sensory exam: No sensory deficits identified   Deep tendon reflexes: 2+ and symmetric. Toes down-going bilaterally.  Coordination: no ataxia with finger to nose bilaterally, finger hang bilaterally, heel to shin bilaterally, rapid alternating movements.  Gait: deferred given fall risk     NIHSS: National Institutes of Health Stroke Scale     [0] 1a:Level of Consciousness            0-alert 1-drowsy   2-stupor   3-coma  [0] 1b:LOC Questions                         0-both  1-one      2-neither  [0] 1c:LOC Commands                       0-both  1-one      2-neither  [0] 2: Best Gaze                               0-nl    1-partial  2-forced  [0] 3: Visual Fields                              0-nl    1-partial  2-complete 3-bilat  [0] 4: Facial Paresis                   0-nl    1-minor    2-partial  3-full     MOTOR                                               0-nl  [0] 5: Right Arm                                 1-drift  [0] 6: Left Arm                                     2-some effort vs gravity  [0] 7: Right Leg                                 3-no effort vs gravity  [0] 8: Left Leg                                     4-no movement                                                             x-untestable     [0] 9: Limb Ataxia                           0-abs   1-1_limb   2-2+_limbs   x-untestable  [0] 10:Sensory                                    0-nl    1-partial  2-dense  [0] 11:Best Language/Aphasia           0-nl    1-mild/mod 2-severe   3-mute  [0] 12:Dysarthria                             0-nl    1-mild/mod 2-severe   x-untestable  [0] 13:Neglect/Inattention                   0-none  1-partial  2-complete  [0] TOTAL    Objective Data:    Labs:  Lab Results   Component Value Date/Time    PROTHROMBTM 13.4 10/12/2021 10:05 AM    INR 1.05 10/12/2021 10:05 AM      Lab Results   Component Value Date/Time    WBC 11.5 (H) 10/13/2021 03:50 AM    RBC 6.26 (H) 10/13/2021 03:50 AM    HEMOGLOBIN 16.4 10/13/2021 03:50 AM    HEMATOCRIT 47.7 10/13/2021 03:50 AM    MCV 76.2 (L) 10/13/2021 03:50 AM    MCH 26.2 (L) 10/13/2021 03:50 AM    MCHC 34.4 10/13/2021 03:50 AM    MPV 8.9 (L) 10/13/2021 03:50 AM    NEUTSPOLYS 76.80 (H) 10/12/2021 10:05 AM    LYMPHOCYTES 13.90 (L) 10/12/2021 10:05 AM    MONOCYTES 8.70 10/12/2021 10:05 AM    EOSINOPHILS 0.10 10/12/2021 10:05 AM    BASOPHILS 0.20 10/12/2021 10:05 AM      Lab Results   Component Value Date/Time    SODIUM 138 10/13/2021 03:50 AM    POTASSIUM 3.3 (L) 10/13/2021 03:50 AM    CHLORIDE 101 10/13/2021 03:50 AM    CO2 23 10/13/2021 03:50 AM    GLUCOSE 219 (H) 10/13/2021 03:50 AM    BUN 20 10/13/2021 03:50 AM    CREATININE 0.97 10/13/2021 03:50 AM    CREATININE 1.07 05/05/2009 12:00 AM      Lab Results   Component Value Date/Time    CHOLSTRLTOT 113 10/13/2021 03:50 AM    LDL 39 10/13/2021 03:50  AM    HDL 44 10/13/2021 03:50 AM    TRIGLYCERIDE 149 10/13/2021 03:50 AM       Lab Results   Component Value Date/Time    ALKPHOSPHAT 79 10/13/2021 03:50 AM    ASTSGOT 23 10/13/2021 03:50 AM    ALTSGPT 23 10/13/2021 03:50 AM    TBILIRUBIN 2.1 (H) 10/13/2021 03:50 AM        Imaging/Testing:    I interpreted and/or reviewed the patient's neuroimaging    MR-BRAIN-W/O   Final Result         No acute process.      Nonspecific deep white matter T2 hyperintensities related to chronic microvascular ischemia.      Inflammatory mucosal thickening involving the paranasal sinuses as described above.      Right lamina papyracea fracture defect with herniation of orbital fat into the defect, likely remote.      CT-CEREBRAL PERFUSION ANALYSIS   Final Result      1.  Cerebral blood flow less than 30% likely representing completed infarct = 0 mL.      2.  T Max more than 6 seconds likely representing combination of completed infarct and ischemia = 8 mL.      3.  Mismatched volume likely representing ischemic brain/penumbra = 8 mL      4.  Please note that the cerebral perfusion was performed on the limited brain tissue around the basal ganglia region. Infarct/ischemia outside the CT perfusion sections can be missed in this study.      CT-CTA NECK WITH & W/O-POST PROCESSING   Final Result      1.  Atherosclerotic plaque within the carotid arteries bilaterally with less than 50% stenosis.   2.  Patent vertebral arteries bilaterally.   3.  1.1 cm left thyroid nodule. Further evaluation can be performed with thyroid ultrasound.      CT-CTA HEAD WITH & W/O-POST PROCESS   Final Result      No thrombosis is seen within the [place].      CT-HEAD W/O   Final Result      1.  No acute intracranial abnormality is identified.   2.  There are prominent periventricular and subcortical white matter changes present.  This finding is nonspecific and could be from previous small vessel ischemia, demyelination, or gliosis.   3.  Sclerosis and  fluid in the mastoid air cells on the right.   4.  Mucosal thickening within the maxillary sinuses.      DX-CHEST-PORTABLE (1 VIEW)   Final Result      Hypoinflation with mild left basilar atelectasis.          Assessment and Plan:    Howard Gamboa is a 76 y.o. male with history of hypertension, hyperlipidemia and insulin dependent diabetes who presented to ER 10/12 with complaint of dizziness xt 1 day and for whom neurology was consulted with concern for cerebellar stroke. CT Head without acute abnormality. CT perfusion with 8 ml mismatch in the left posterior circulation. He was not considered a candidate for IV tPA due to delayed presentation. CTA head/neck shows atherosclerotic plaque within bilateral carotids, no LVO or flow limiting stenosis, thus not a candidate for IR intervention. MRI Brain without evidence of cerebellar stroke. BP goal is 110-130/60-80. LDL is 39, recommend holding statin and follow up with primary care for repeat labs in 3 months. No need for aspirin therapy given no evidence of stroke. Hemoglobin A1C is 8.6, goal for stroke prevention is <7. Recommend ongoing diabetes management with PCP. Evaluate and treat with PT/OT/ST; patient lives alone. On my exam today, the patient is reporting improvement in complaint of dizziness. It is possible dizziness was relative to hypertensive crisis. If it recurs, please assess and treat BP. If it occurs in setting of normotension, may trial meclizine for symptom improvement.  No further recommendations or further studies from an acute neurological standpoint at this time. Please re-consult if you have further questions or there is a change in status.     Plan:  - BP Goal:  110-130/60-80.  - WOULD NOT recommend statin given patient LDL is 39.  Recommend follow up with primary care for repeat labs in 3 months.  - No need for aspirin therapy given no evidence of stroke.  - Hemoglobin A1C is 8.6, goal for stroke prevention is <7. Recommend ongoing diabetes  management with primary  - evaluate and treat with PT/OT/ST  - DVT prophylaxis, SCDs, heparin Ok.     The evaluation of the patient, and recommended management, was discussed with attending neurologist, Dr. Gideon Hendricks.    Corinne E. Canavero, APRN  Neurohospitalist [facility], Acute Care Services

## 2021-10-13 NOTE — ASSESSMENT & PLAN NOTE
Patient with a 2-day history of ataxia, patient does feel lightheaded when coming to feet, but unable to stand steady on feet, or walk with aid.  Patient states this happened before the initial onset of his nausea/vomiting, and denies any time previous.  Patient does state that he has had vertigo in the past consistent with chart review, but recognizes this is not like his previous bout of vertigo.  Differential to include cerebellar stroke, central lesion, hypertensive emergency, vertigo, or Warnicke's unlikely secondary to dehydration or orthostasis, orthostatics were attempted well in the room and patient's blood pressure drew from sitting to standing with no significant change in heart rate.  No history of aortic stenosis on echo in 2018, no history of any valvular heart disease with last EF of 70% at that time.  Patient has previous negative monofilament test unlikely from diabetic neuropathy given history and course. Given the patient's history of deafness, difficult to rule out vertigo either vestibular neuronitis, labyrinthitis viral, unlikely be PE PE V since seemingly nonpositional on examination, or Ménière's disease.   -see plan for Stroke.   -B12 and thiamine levels

## 2021-10-13 NOTE — ASSESSMENT & PLAN NOTE
Patient admitted for CVA rule out, given patient symptoms of sudden onset ataxia approximately 36 hours before presentation, patient was not a candidate for TPA.  CT negative for any acute bleed, CTA with possible 8 mL area of decreased perfusion/penumbra.  Patient with NI H SS score of 0, cranial nerves II through XII intact, no focal deficits, strength intact, sensation intact bilaterally, but gait is grossly abnormal with patient not steady on feet, unable to walk more than a few steps even supported before attempting to fall to both the left and the right side.  Patient also has issues with dysphagia that started around the same time.  Patient also hypertensive into the 200s systolic, not consistent with baseline blood pressure around approximately 160 systolic at last office visit.  Given the acuity of these events patient will be assessed for CVA.  Other causes of ataxia could be vertigo, versus less likely diabetic neuropathy, versus brain bleed ruled out, given his symptoms likely cerebellar ataxia, of  more likely thrombo embolic origins given no laterality.  Patient denies any alcohol use, so less likely alcohol and variety, will obtain B12 and thiamine as well.  Labs: Lipid panel, repeat CBC and CMP in a.m.  Imaging: MRI without contrast to evaluate for stroke  Consults: Neurology for possible stroke  Therapeutics: Start on aspirin, high-dose statin, permissive hypertension will continue on ace once Permissive HTN window has passed 24-48 hours at approx. 36hours.   Hypertension: permissive   DVT: Heparin for DVT, SCDs  Telemetry monitoring for atrial fibrillation, patient's rope score is 2 extremely low chance that stroke would be secondary to PFO, will not order echocardiogram.  PT OT speech  If no stroke and dysphagia does not resolve with nausea and vomiting, consider modified barium swallow for further work-up of upper GI pathologies.

## 2021-10-13 NOTE — ED NOTES
DC home with daughter. All DC instructions discussed including worsening s/s. Follow up recommendations. Home BP monitoring discussed. Pt and family verbalized understanding.

## 2021-10-14 RX ORDER — LANCETS 28 GAUGE
EACH MISCELLANEOUS
Qty: 100 EACH | Refills: 0 | Status: SHIPPED | OUTPATIENT
Start: 2021-10-14 | End: 2021-12-06

## 2021-10-17 LAB — VIT B1 BLD-MCNC: 131 NMOL/L (ref 70–180)

## 2021-11-08 RX ORDER — BLOOD-GLUCOSE METER
KIT MISCELLANEOUS
Qty: 100 STRIP | Refills: 2 | Status: SHIPPED | OUTPATIENT
Start: 2021-11-08 | End: 2022-03-10

## 2021-11-22 ENCOUNTER — TELEPHONE (OUTPATIENT)
Dept: MEDICAL GROUP | Facility: MEDICAL CENTER | Age: 76
End: 2021-11-22

## 2021-11-22 NOTE — TELEPHONE ENCOUNTER
ESTABLISHED PATIENT PRE-VISIT PLANNING     Patient was NOT contacted to complete PVP.     Note: Patient will not be contacted if there is no indication to call.     1.  Reviewed notes from the last few office visits within the medical group: Yes    2.  If any orders were placed at last visit or intended to be done for this visit (i.e. 6 mos follow-up), do we have Results/Consult Notes?         •  Labs - Labs ordered, completed on 06/16/2021 and results are in chart. However, recent imaging ordered and final resulted by [facility], Emergency department during patient's admit: 10/12/2021-10/13/2021    Note: If patient appointment is for lab review and patient did not complete labs, check with provider if OK to reschedule patient until labs completed.         •  Imaging - Imaging was not ordered at last office visit.  However, recent imaging ordered and final resulted by [facility], Emergency department during patient's admit: 10/12/2021-10/13/2021.         •  Referrals - No referrals were ordered at last office visit. Last office visit with  was on 06/16/2021.     3. Is this appointment scheduled as a Hospital Follow-Up? Yes, visit was at [facility].     4.  Immunizations were updated in Epic using Reconcile Outside Information activity? Yes    5.  Patient is due for the following Health Maintenance Topics:   Health Maintenance Due   Topic Date Due   • HEPATITIS C SCREENING  Never done   • IMM ZOSTER VACCINES (1 of 2) Never done   • IMM INFLUENZA (1) 09/01/2021   • COVID-19 Vaccine (3 - Booster for Pfizer series) 09/13/2021     6.  AHA (Pulse8) form printed for Provider? Yes

## 2021-11-23 ENCOUNTER — OFFICE VISIT (OUTPATIENT)
Dept: MEDICAL GROUP | Facility: MEDICAL CENTER | Age: 76
End: 2021-11-23
Payer: MEDICARE

## 2021-11-23 VITALS
DIASTOLIC BLOOD PRESSURE: 90 MMHG | OXYGEN SATURATION: 95 % | RESPIRATION RATE: 16 BRPM | TEMPERATURE: 98.5 F | HEIGHT: 69 IN | WEIGHT: 177.91 LBS | HEART RATE: 76 BPM | BODY MASS INDEX: 26.35 KG/M2 | SYSTOLIC BLOOD PRESSURE: 180 MMHG

## 2021-11-23 DIAGNOSIS — E78.2 MIXED HYPERLIPIDEMIA: ICD-10-CM

## 2021-11-23 DIAGNOSIS — Z23 NEED FOR VACCINATION: ICD-10-CM

## 2021-11-23 DIAGNOSIS — I10 PRIMARY HYPERTENSION: ICD-10-CM

## 2021-11-23 LAB
HBA1C MFR BLD: 9.5 % (ref 0–5.6)
INT CON NEG: NEGATIVE
INT CON POS: POSITIVE

## 2021-11-23 PROCEDURE — 83036 HEMOGLOBIN GLYCOSYLATED A1C: CPT | Performed by: FAMILY MEDICINE

## 2021-11-23 PROCEDURE — G0008 ADMIN INFLUENZA VIRUS VAC: HCPCS | Performed by: FAMILY MEDICINE

## 2021-11-23 PROCEDURE — 99214 OFFICE O/P EST MOD 30 MIN: CPT | Performed by: FAMILY MEDICINE

## 2021-11-23 PROCEDURE — 90662 IIV NO PRSV INCREASED AG IM: CPT | Performed by: FAMILY MEDICINE

## 2021-11-23 RX ORDER — AMLODIPINE BESYLATE 10 MG/1
10 TABLET ORAL DAILY
Qty: 90 TABLET | Refills: 2 | Status: SHIPPED | OUTPATIENT
Start: 2021-11-23 | End: 2022-08-23 | Stop reason: SDUPTHER

## 2021-11-23 ASSESSMENT — FIBROSIS 4 INDEX: FIB4 SCORE: 1.17

## 2021-11-23 NOTE — PROGRESS NOTES
CC: Uncontrolled diabetes, hypertension, hyperlipidemia, dizziness    HPI:   Howard presents today to discuss the following:     Uncontrolled type 2 diabetes mellitus with complication, without long-term current use of insulin (HCC)  Patient has been having uncontrolled diabetes, A1c today is 9.4. Is currently on Lantus 10 units daily, NovoLog based on sliding scale, and Metformin 1000 mg twice a day    Primary hypertension  Patient's blood pressure today is high. However he denies any headache, dizziness, chest pain, shortness of breath elevated blood pressure. He has been on lisinopril 40 mg daily. No side effects    Mixed hyperlipidemia  He has been tolerating the statin. Denies muscle pain LFTs has been normal, patient has been on atorvastatin 20 mg daily.    Dizziness  The episode of dizziness for which she was in and the ER on 10/13. in ED found to have blood pressures in the 208 systolic, blood glucose was 218 at home in the low 200s in the ED, ACS rule out with EKG and troponins not consistent with ACS.  Patient received medications to lower blood pressure was not 150s to 180s systolic when evaluated.  Patient received head CT with no acute abnormalities, but CT perfusion scan with 8 ml area of possible penumbra.  CTA head and neck without signs of bleeding, but without significance stenosis of thecarotid arteries.  Vertebral arteries were patent without significant stenosis. MRI of the brain was done and showed no acute stroke. He described his dizziness as room spinning sensation triggered with certain head position.  I think his symptom is related to peripheral vertigo.    Neurology also saw him and recommended that the patient can be discharged home and follow-up as an outpatient. The patient's condition has improved and he was discharged on the same day.        Patient Active Problem List    Diagnosis Date Noted   • Ataxia 10/12/2021   • Stroke (HCC) 10/12/2021   • Hypertensive emergency 10/12/2021   •  "Nausea & vomiting 10/12/2021   • Pneumonia 06/30/2018   • Uncontrolled type 2 diabetes mellitus with complication, without long-term current use of insulin (HCC) 02/07/2017   • Microcytosis 05/26/2016   • Adenomatous polyp of colon 10/02/2015   • Hyperlipidemia 11/20/2014   • Retinal vein occlusion, branch 11/20/2014   • Macular edema 11/20/2014   • Hypertension        Current Outpatient Medications   Medication Sig Dispense Refill   • amLODIPine (NORVASC) 10 MG Tab Take 1 Tablet by mouth every day. 90 Tablet 2   • FREESTYLE LITE strip USE 1 STRIP TO CHECK GLUCOSE THREE TIMES DAILY 100 Strip 2   • FreeStyle Lancets Misc USE 1  TO CHECK GLUCOSE THREE TIMES DAILY 100 Each 0   • meclizine (ANTIVERT) 25 MG Tab Take 1 Tablet by mouth 3 times a day as needed. 30 Tablet 0   • atorvastatin (LIPITOR) 20 MG Tab Take 2 Tablets by mouth every day. 200 Tablet 2   • metformin (GLUCOPHAGE) 1000 MG tablet TAKE 1 TABLET BY MOUTH TWICE DAILY WITH MEALS (Patient taking differently: Take 1,000 mg by mouth 2 times a day with meals. TAKE 1 TABLET BY MOUTH TWICE DAILY WITH MEALS) 180 Tablet 3   • insulin lispro (HUMALOG KWIKPEN) 100 UNIT/ML Solution Pen-injector injection PEN Inject 10 Units under the skin 3 times a day before meals. 30 mL 2   • insulin glargine (LANTUS SOLOSTAR) 100 UNIT/ML Solution Pen-injector injection Inject 10 units subcutaneously once daily in the evening 3 mL 5   • lisinopril (PRINIVIL) 40 MG tablet Take 1 Tab by mouth every day. 100 Tab 3     No current facility-administered medications for this visit.         Allergies as of 11/23/2021   • (No Known Allergies)        ROS: Denies any chest pain, Shortness of breath, Changes bowel or bladder, Lower extremity edema.    Physical Exam:  BP (!) 180/90 (BP Location: Right arm, Patient Position: Sitting, BP Cuff Size: Adult)   Pulse 76   Temp 36.9 °C (98.5 °F) (Temporal)   Resp 16   Ht 1.753 m (5' 9\")   Wt 80.7 kg (177 lb 14.6 oz)   SpO2 95%   BMI 26.27 kg/m² "   Gen.: Well-developed, well-nourished, no apparent distress,pleasant and cooperative with the examination  Skin:  Warm and dry with good turgor. No rashes or suspicious lesions in visible areas  HEENT:Sinuses nontender with palpation, TMs clear, nares patent with pink mucosa and clear rhinorrhea,no septal deviation ,polyps or lesions. lips without lesions, oropharynx clear.  Neck: Trachea midline,no masses or adenopathy. No JVD.  Cor: Regular rate and rhythm without murmur, gallop or rub.  Lungs: Respirations unlabored.Clear to auscultation with equal breath sounds bilaterally. No wheezes, rhonchi.  Extremities: No cyanosis, clubbing or edema.        Assessment and Plan.   76 y.o. male     1. Uncontrolled type 2 diabetes mellitus with complication, without long-term current use of insulin ([facility])  A1c today is 9.4.  Will increase Lantus to 20 units daily, continuing NovoLog based on sliding scale.  Continue Metformin 1000 mg twice a day    - POCT A1C    2. Primary hypertension  Elevated blood pressure. Currently asymptomatic.  We will add amlodipine 10 mg daily  Continue lisinopril 40 mg daily    - amLODIPine (NORVASC) 10 MG Tab; Take 1 Tablet by mouth every day.  Dispense: 90 Tablet; Refill: 2    3. Mixed hyperlipidemia  He has been tolerating the statin. Denies muscle pain LFTs has been normal  Continue atorvastatin 20 mg daily.    4. Dizziness  Currently asymptomatic. Patient has been asymptomatic since he was discharged from the hospital on 10/13.  Possibly benign paroxysmal positional vertigo continue meclizine as needed  Patient was referred to neurology for more evaluation, follow-up with neurology

## 2021-12-02 NOTE — ED NOTES
Blood sent to lab, pt medicated per MAR   Island Pedicle Flap Text: The defect edges were debeveled with a #15 scalpel blade.  Given the location of the defect, shape of the defect and the proximity to free margins an island pedicle advancement flap was deemed most appropriate.  Using a sterile surgical marker, an appropriate advancement flap was drawn incorporating the defect, outlining the appropriate donor tissue and placing the expected incisions within the relaxed skin tension lines where possible.    The area thus outlined was incised deep to adipose tissue with a #15 scalpel blade.  The skin margins were undermined to an appropriate distance in all directions around the primary defect and laterally outward around the island pedicle utilizing iris scissors.  There was minimal undermining beneath the pedicle flap.

## 2021-12-05 DIAGNOSIS — E11.9 DIABETES MELLITUS TYPE 2 IN NONOBESE (HCC): ICD-10-CM

## 2021-12-06 RX ORDER — LANCETS 28 GAUGE
EACH MISCELLANEOUS
Qty: 100 EACH | Refills: 0 | Status: SHIPPED | OUTPATIENT
Start: 2021-12-06 | End: 2022-01-10

## 2022-01-08 DIAGNOSIS — E11.9 DIABETES MELLITUS TYPE 2 IN NONOBESE (HCC): ICD-10-CM

## 2022-01-10 RX ORDER — LANCETS 28 GAUGE
EACH MISCELLANEOUS
Qty: 100 EACH | Refills: 0 | Status: SHIPPED | OUTPATIENT
Start: 2022-01-10 | End: 2022-03-10

## 2022-03-10 DIAGNOSIS — E11.9 DIABETES MELLITUS TYPE 2 IN NONOBESE (HCC): ICD-10-CM

## 2022-03-10 RX ORDER — BLOOD-GLUCOSE METER
KIT MISCELLANEOUS
Qty: 100 STRIP | Refills: 0 | Status: SHIPPED | OUTPATIENT
Start: 2022-03-10 | End: 2022-04-25

## 2022-03-10 RX ORDER — LANCETS 28 GAUGE
EACH MISCELLANEOUS
Qty: 100 EACH | Refills: 0 | Status: SHIPPED | OUTPATIENT
Start: 2022-03-10 | End: 2022-04-25

## 2022-03-25 ENCOUNTER — PATIENT MESSAGE (OUTPATIENT)
Dept: HEALTH INFORMATION MANAGEMENT | Facility: OTHER | Age: 77
End: 2022-03-25

## 2022-04-24 DIAGNOSIS — E11.9 DIABETES MELLITUS TYPE 2 IN NONOBESE (HCC): ICD-10-CM

## 2022-04-25 RX ORDER — LANCETS 28 GAUGE
EACH MISCELLANEOUS
Qty: 100 EACH | Refills: 0 | Status: SHIPPED | OUTPATIENT
Start: 2022-04-25 | End: 2022-06-01

## 2022-04-25 RX ORDER — PEN NEEDLE, DIABETIC 32GX 5/32"
NEEDLE, DISPOSABLE MISCELLANEOUS
Qty: 200 EACH | Refills: 0 | Status: SHIPPED | OUTPATIENT
Start: 2022-04-25 | End: 2022-06-15

## 2022-04-25 RX ORDER — BLOOD-GLUCOSE METER
KIT MISCELLANEOUS
Qty: 100 STRIP | Refills: 2 | Status: SHIPPED | OUTPATIENT
Start: 2022-04-25 | End: 2022-08-20 | Stop reason: SDUPTHER

## 2022-05-31 DIAGNOSIS — E11.9 DIABETES MELLITUS TYPE 2 IN NONOBESE (HCC): ICD-10-CM

## 2022-06-01 RX ORDER — LANCETS 28 GAUGE
EACH MISCELLANEOUS
Qty: 100 EACH | Refills: 0 | Status: SHIPPED | OUTPATIENT
Start: 2022-06-01 | End: 2022-08-20 | Stop reason: SDUPTHER

## 2022-06-03 ENCOUNTER — TELEPHONE (OUTPATIENT)
Dept: HEALTH INFORMATION MANAGEMENT | Facility: OTHER | Age: 77
End: 2022-06-03

## 2022-06-15 RX ORDER — PEN NEEDLE, DIABETIC 32GX 5/32"
NEEDLE, DISPOSABLE MISCELLANEOUS
Qty: 200 EACH | Refills: 0 | Status: SHIPPED | OUTPATIENT
Start: 2022-06-15 | End: 2022-08-20 | Stop reason: SDUPTHER

## 2022-06-15 RX ORDER — INSULIN GLARGINE 100 [IU]/ML
INJECTION, SOLUTION SUBCUTANEOUS
Qty: 3 ML | Refills: 0 | Status: SHIPPED | OUTPATIENT
Start: 2022-06-15 | End: 2022-07-10 | Stop reason: SDUPTHER

## 2022-06-24 ENCOUNTER — OFFICE VISIT (OUTPATIENT)
Dept: MEDICAL GROUP | Facility: MEDICAL CENTER | Age: 77
End: 2022-06-24
Payer: MEDICARE

## 2022-06-24 VITALS
DIASTOLIC BLOOD PRESSURE: 72 MMHG | OXYGEN SATURATION: 95 % | BODY MASS INDEX: 25.48 KG/M2 | HEART RATE: 68 BPM | WEIGHT: 172 LBS | SYSTOLIC BLOOD PRESSURE: 142 MMHG | RESPIRATION RATE: 16 BRPM | TEMPERATURE: 97.6 F | HEIGHT: 69 IN

## 2022-06-24 DIAGNOSIS — E11.65 UNCONTROLLED TYPE 2 DIABETES MELLITUS WITH HYPERGLYCEMIA (HCC): ICD-10-CM

## 2022-06-24 DIAGNOSIS — E11.69 MIXED HYPERLIPIDEMIA DUE TO TYPE 2 DIABETES MELLITUS (HCC): ICD-10-CM

## 2022-06-24 DIAGNOSIS — E78.2 MIXED HYPERLIPIDEMIA DUE TO TYPE 2 DIABETES MELLITUS (HCC): ICD-10-CM

## 2022-06-24 DIAGNOSIS — I10 ESSENTIAL HYPERTENSION: ICD-10-CM

## 2022-06-24 LAB
HBA1C MFR BLD: 9.4 % (ref 0–5.6)
INT CON NEG: NEGATIVE
INT CON POS: POSITIVE

## 2022-06-24 PROCEDURE — 99214 OFFICE O/P EST MOD 30 MIN: CPT | Performed by: FAMILY MEDICINE

## 2022-06-24 PROCEDURE — 83036 HEMOGLOBIN GLYCOSYLATED A1C: CPT | Performed by: FAMILY MEDICINE

## 2022-06-24 ASSESSMENT — PATIENT HEALTH QUESTIONNAIRE - PHQ9: CLINICAL INTERPRETATION OF PHQ2 SCORE: 0

## 2022-06-24 ASSESSMENT — FIBROSIS 4 INDEX: FIB4 SCORE: 1.17

## 2022-06-24 NOTE — PROGRESS NOTES
CC: Uncontrolled diabetes, hypertension, hyperlipidemia    HPI:   Howard presents today to discuss the following:    Uncontrolled type 2 diabetes mellitus with complication, without long-term current use of insulin ([facility])  Patient's blood glucose has always been having uncontrolled.  A1c today is 9.4, it was 9.5 last visit.  He was advised to increase Lantus to 20 units last vist, but he has not increased it, he has been using Lantus 10 units when he remembers it.  Was referred before to endocrinology but he did not follow-up.Patient advised to see any endocrinology for any appointment with me in the future.Patient with proliferative diabetic retinopathy has been following up with ophthalmologist.  However monofilament foot exam showed no sign of neuropathy.  He also has been on NovoLog based on sliding scale, and Metformin 1000 mg twice a day, using it only when he remembers them.     Primary hypertension  Patient's blood pressure today is slightly high. However he denies any headache, dizziness, chest pain, shortness of breath . He has been on lisinopril 40 mg daily, amlodipine 10 mg daily.      Mixed hyperlipidemia  He has been tolerating the statin. Denies muscle pain LFTs has been normal, patient has been on atorvastatin 20 mg daily    Patient Active Problem List    Diagnosis Date Noted   • Ataxia 10/12/2021   • Stroke (HCC) 10/12/2021   • Hypertensive emergency 10/12/2021   • Nausea & vomiting 10/12/2021   • Pneumonia 06/30/2018   • Uncontrolled type 2 diabetes mellitus with complication, without long-term current use of insulin ([facility]) 02/07/2017   • Microcytosis 05/26/2016   • Adenomatous polyp of colon 10/02/2015   • Hyperlipidemia 11/20/2014   • Retinal vein occlusion, branch 11/20/2014   • Macular edema 11/20/2014   • Hypertension        Current Outpatient Medications   Medication Sig Dispense Refill   • LANTUS SOLOSTAR 100 UNIT/ML Solution Pen-injector injection INJECT 10 UNITS SUBCUTANEOUSLY ONCE DAILY IN  "THE EVENING 3 mL 0   • BD PEN NEEDLE SHERWIN 2ND GEN USE 1 PEN NEEDLE 4 TIMES DAILY 200 Each 0   • FreeStyle Lancets Misc USE TO CHECK GLUCOSE THREE TIMES DAILY 100 Each 0   • FREESTYLE LITE strip USE 1 STRIP TO CHECK GLUCOSE THREE TIMES DAILY 100 Strip 2   • amLODIPine (NORVASC) 10 MG Tab Take 1 Tablet by mouth every day. 90 Tablet 2   • meclizine (ANTIVERT) 25 MG Tab Take 1 Tablet by mouth 3 times a day as needed. 30 Tablet 0   • atorvastatin (LIPITOR) 20 MG Tab Take 2 Tablets by mouth every day. 200 Tablet 2   • metformin (GLUCOPHAGE) 1000 MG tablet TAKE 1 TABLET BY MOUTH TWICE DAILY WITH MEALS (Patient taking differently: Take 1,000 mg by mouth 2 times a day with meals. TAKE 1 TABLET BY MOUTH TWICE DAILY WITH MEALS) 180 Tablet 3   • insulin lispro (HUMALOG KWIKPEN) 100 UNIT/ML Solution Pen-injector injection PEN Inject 10 Units under the skin 3 times a day before meals. 30 mL 2   • lisinopril (PRINIVIL) 40 MG tablet Take 1 Tab by mouth every day. 100 Tab 3     No current facility-administered medications for this visit.         Allergies as of 06/24/2022   • (No Known Allergies)        ROS: Denies any chest pain, Shortness of breath, Changes bowel or bladder, Lower extremity edema.    Physical Exam:  BP (!) 142/72   Pulse 68   Temp 36.4 °C (97.6 °F)   Resp 16   Ht 1.753 m (5' 9\")   Wt 78 kg (172 lb)   SpO2 95%   BMI 25.40 kg/m²   Gen.: Well-developed, well-nourished, no apparent distress,pleasant and cooperative with the examination  Skin:  Warm and dry with good turgor. No rashes or suspicious lesions in visible areas  HEENT:Sinuses nontender with palpation, TMs clear, nares patent with pink mucosa and clear rhinorrhea,no septal deviation ,polyps or lesions. lips without lesions, oropharynx clear.  Neck: Trachea midline,no masses or adenopathy. No JVD.  Cor: Regular rate and rhythm without murmur, gallop or rub.  Lungs: Respirations unlabored.Clear to auscultation with equal breath sounds bilaterally. No " wheezes, rhonchi.  Extremities: No cyanosis, clubbing or edema.    Monofilament foot exam: normal sensation bilaterally, no macerations or ulcers, normal peripheral pulses.    Assessment and Plan.   76 y.o. male     1. Uncontrolled type 2 diabetes mellitus with hyperglycemia (HCC)  Has always been uncontrolled.  Patient is not compliant with his medication.  A1c today is 9.4. Advised to increase Lantus to 20 units that he has not increased it, he has been using Lantus 10 units when he remembers it.  Was referred before to endocrinology but he did not follow-up.  Patient advised to see any endocrinology for any appointment with me in the future.  Patient with proliferative diabetic retinopathy has been following up with ophthalmologist.  However monofilament foot exam showed no sign of neuropathy  Continue on NovoLog based on sliding scale, and Metformin 1000 mg twice a day    - POCT  A1C  - Diabetic Monofilament LE Exam  - Referral to Endocrinology    2. Essential hypertension  Slightly high blood pressure, however it has improved.  Continue on lisinopril 40 mg daily, and amlodipine 10 mg daily.    3. Mixed hyperlipidemia due to type 2 diabetes mellitus (HCC)  He has been tolerating the statin. Denies muscle pain LFTs has been normal  Continue on atorvastatin 20 mg daily.

## 2022-06-28 ENCOUNTER — PATIENT MESSAGE (OUTPATIENT)
Dept: MEDICAL GROUP | Facility: MEDICAL CENTER | Age: 77
End: 2022-06-28
Payer: MEDICARE

## 2022-06-28 DIAGNOSIS — E78.00 PURE HYPERCHOLESTEROLEMIA: ICD-10-CM

## 2022-06-29 DIAGNOSIS — E78.00 PURE HYPERCHOLESTEROLEMIA: ICD-10-CM

## 2022-06-29 RX ORDER — ATORVASTATIN CALCIUM 20 MG/1
40 TABLET, FILM COATED ORAL DAILY
Qty: 200 TABLET | Refills: 2 | Status: SHIPPED | OUTPATIENT
Start: 2022-06-29 | End: 2023-03-30 | Stop reason: SDUPTHER

## 2022-06-29 RX ORDER — ATORVASTATIN CALCIUM 20 MG/1
40 TABLET, FILM COATED ORAL DAILY
Qty: 200 TABLET | Refills: 2 | Status: SHIPPED | OUTPATIENT
Start: 2022-06-29 | End: 2022-07-12

## 2022-07-11 ENCOUNTER — TELEPHONE (OUTPATIENT)
Dept: MEDICAL GROUP | Facility: MEDICAL CENTER | Age: 77
End: 2022-07-11
Payer: MEDICARE

## 2022-07-11 RX ORDER — INSULIN GLARGINE 100 [IU]/ML
INJECTION, SOLUTION SUBCUTANEOUS
Qty: 3 ML | Refills: 0 | OUTPATIENT
Start: 2022-07-11

## 2022-07-11 NOTE — TELEPHONE ENCOUNTER
ESTABLISHED PATIENT PRE-VISIT PLANNING     Annual Wellness Visit Over Due    Patient was NOT contacted to complete PVP.     Note: Patient will not be contacted if there is no indication to call.     1.  Reviewed notes from the last few office visits within the medical group: Yes    2.  If any orders were placed at last visit or intended to be done for this visit (i.e. 6 mos follow-up), do we have Results/Consult Notes?         •  Labs - Labs ordered, completed on 06/24/2022 and results are in chart.  Note: If patient appointment is for lab review and patient did not complete labs, check with provider if OK to reschedule patient until labs completed.       •  Imaging - Imaging was not ordered at last office visit.       •  Referrals - Referral ordered, patient has NOT been seen.    -Endocrinology:  No scheduled appointments located under appointment desk tab.    3. Is this appointment scheduled as a Hospital Follow-Up? No    4.  Immunizations were updated in Epic using Reconcile Outside Information activity? Yes    5.  Patient is due for the following Health Maintenance Topics:   Health Maintenance Due   Topic Date Due   • HEPATITIS C SCREENING  Never done   • IMM ZOSTER VACCINES (1 of 2) Never done   • COVID-19 Vaccine (3 - Booster for Pfizer series) 08/13/2021   • Annual Wellness Visit  02/04/2022   • URINE ACR / MICROALBUMIN  02/06/2022   • IMM DTaP/Tdap/Td Vaccine (2 - Td or Tdap) 05/09/2022       6.  AHA (Pulse8) form printed for Provider? No, patient does not have any open alerts COMPLIANT

## 2022-07-12 ENCOUNTER — OFFICE VISIT (OUTPATIENT)
Dept: MEDICAL GROUP | Facility: MEDICAL CENTER | Age: 77
End: 2022-07-12
Payer: MEDICARE

## 2022-07-12 VITALS
SYSTOLIC BLOOD PRESSURE: 142 MMHG | RESPIRATION RATE: 16 BRPM | OXYGEN SATURATION: 97 % | BODY MASS INDEX: 25.48 KG/M2 | HEIGHT: 69 IN | DIASTOLIC BLOOD PRESSURE: 80 MMHG | TEMPERATURE: 97.5 F | HEART RATE: 74 BPM | WEIGHT: 172 LBS

## 2022-07-12 DIAGNOSIS — M79.89 FOOT SWELLING: ICD-10-CM

## 2022-07-12 DIAGNOSIS — E78.2 MIXED HYPERLIPIDEMIA: ICD-10-CM

## 2022-07-12 DIAGNOSIS — Z11.59 NEED FOR HEPATITIS C SCREENING TEST: ICD-10-CM

## 2022-07-12 DIAGNOSIS — I10 PRIMARY HYPERTENSION: ICD-10-CM

## 2022-07-12 PROBLEM — J18.9 PNEUMONIA: Status: RESOLVED | Noted: 2018-06-30 | Resolved: 2022-07-12

## 2022-07-12 PROBLEM — R11.2 NAUSEA & VOMITING: Status: RESOLVED | Noted: 2021-10-12 | Resolved: 2022-07-12

## 2022-07-12 PROBLEM — I16.1 HYPERTENSIVE EMERGENCY: Status: RESOLVED | Noted: 2021-10-12 | Resolved: 2022-07-12

## 2022-07-12 PROCEDURE — 99214 OFFICE O/P EST MOD 30 MIN: CPT | Performed by: FAMILY MEDICINE

## 2022-07-12 RX ORDER — INSULIN LISPRO 100 [IU]/ML
INJECTION, SOLUTION INTRAVENOUS; SUBCUTANEOUS
Qty: 27 ML | Refills: 0 | Status: SHIPPED | OUTPATIENT
Start: 2022-07-12 | End: 2022-07-28

## 2022-07-12 RX ORDER — HYDROCHLOROTHIAZIDE 25 MG/1
25 TABLET ORAL DAILY
Qty: 100 TABLET | Refills: 1 | Status: ON HOLD | OUTPATIENT
Start: 2022-07-12 | End: 2023-06-19 | Stop reason: SDUPTHER

## 2022-07-12 ASSESSMENT — FIBROSIS 4 INDEX: FIB4 SCORE: 1.17

## 2022-07-12 NOTE — PROGRESS NOTES
"  Subjective:     Howard Gamboa is a 76 y.o. male presenting with his daughter to discuss foot swelling.  They declined an , she is interpreting into [employer].  They report he was having right foot and ankle swelling for about 2 weeks.  Has mostly resolved by now.  Denies any redness, pain, numbness, tingling, chest pain, other associated symptoms.  He does report eating a fair amount of salty/processed foods.  He has been taking his medications regularly.  Denies any side effects.        Current Outpatient Medications:   •  hydroCHLOROthiazide (HYDRODIURIL) 25 MG Tab, Take 1 Tablet by mouth every day., Disp: 100 Tablet, Rfl: 1  •  insulin glargine (LANTUS SOLOSTAR) 100 UNIT/ML Solution Pen-injector injection, INJECT 10 UNITS SUBCUTANEOUSLY ONCE DAILY IN THE EVENING, Disp: 3 mL, Rfl: 0  •  metformin (GLUCOPHAGE) 1000 MG tablet, Take 1 Tablet by mouth 2 times a day with meals., Disp: 180 Tablet, Rfl: 0  •  atorvastatin (LIPITOR) 20 MG Tab, Take 2 Tablets by mouth every day., Disp: 200 Tablet, Rfl: 2  •  BD PEN NEEDLE SHERWIN 2ND GEN, USE 1 PEN NEEDLE 4 TIMES DAILY, Disp: 200 Each, Rfl: 0  •  FreeStyle Lancets Misc, USE TO CHECK GLUCOSE THREE TIMES DAILY, Disp: 100 Each, Rfl: 0  •  FREESTYLE LITE strip, USE 1 STRIP TO CHECK GLUCOSE THREE TIMES DAILY, Disp: 100 Strip, Rfl: 2  •  amLODIPine (NORVASC) 10 MG Tab, Take 1 Tablet by mouth every day., Disp: 90 Tablet, Rfl: 2  •  insulin lispro (HUMALOG KWIKPEN) 100 UNIT/ML Solution Pen-injector injection PEN, Inject 10 Units under the skin 3 times a day before meals., Disp: 30 mL, Rfl: 2  •  lisinopril (PRINIVIL) 40 MG tablet, Take 1 Tab by mouth every day., Disp: 100 Tab, Rfl: 3    Objective:     Vitals: BP (!) 142/80   Pulse 74   Temp 36.4 °C (97.5 °F)   Resp 16   Ht 1.753 m (5' 9\")   Wt 78 kg (172 lb)   SpO2 97%   BMI 25.40 kg/m²   General: Alert  HEENT: Normocephalic.   Heart: Regular rate and rhythm.  S1 and S2 normal.  No murmurs appreciated.  Respiratory: " Normal respiratory effort.  Clear to auscultation bilaterally.  Abdomen: Non-distended, soft  Extremities: No leg edema, feet normal. dp 2+ symmetric    Assessment/Plan:     Howard was seen today for other.    Diagnoses and all orders for this visit:    Foot swelling  Self-limited issue.  It appears to have resolved.  We discussed a healthy diet, low salt intake, regular exercise.  We will check the following labs, echocardiogram as he does have uncontrolled hypertension and diabetes.  -     CBC WITHOUT DIFFERENTIAL; Future  -     Comp Metabolic Panel; Future  -     TSH WITH REFLEX TO FT4; Future  -     EC-ECHOCARDIOGRAM COMPLETE W/O CONT; Future    Primary hypertension  Chronic, uncontrolled.  We discussed adding hydrochlorothiazide for his blood pressure.  This may also help with preventing swelling in the future.  May also need to decrease his amlodipine if he continues to have swelling.  We discussed checking labs 3 to 5 days after he starts hydrochlorothiazide.  Follow-up in 2 weeks  -     Comp Metabolic Panel; Future  -     hydroCHLOROthiazide (HYDRODIURIL) 25 MG Tab; Take 1 Tablet by mouth every day.    Uncontrolled type 2 diabetes mellitus with complication, without long-term current use of insulin (HCC)  Chronic, uncontrolled.  We will discuss adjusting his medications in 2 weeks.  -     Comp Metabolic Panel; Future  -     MICROALBUMIN CREAT RATIO URINE; Future    Mixed hyperlipidemia  Chronic, stable, continue atorvastatin    Need for hepatitis C screening test  -     HEP C VIRUS ANTIBODY; Future        Return in about 2 weeks (around 7/26/2022) for Med check.

## 2022-07-16 ENCOUNTER — HOSPITAL ENCOUNTER (OUTPATIENT)
Dept: LAB | Facility: MEDICAL CENTER | Age: 77
End: 2022-07-16
Attending: FAMILY MEDICINE
Payer: MEDICARE

## 2022-07-16 DIAGNOSIS — I10 PRIMARY HYPERTENSION: ICD-10-CM

## 2022-07-16 DIAGNOSIS — Z11.59 NEED FOR HEPATITIS C SCREENING TEST: ICD-10-CM

## 2022-07-16 DIAGNOSIS — M79.89 FOOT SWELLING: ICD-10-CM

## 2022-07-16 LAB
ALBUMIN SERPL BCP-MCNC: 4.5 G/DL (ref 3.2–4.9)
ALBUMIN/GLOB SERPL: 1.5 G/DL
ALP SERPL-CCNC: 75 U/L (ref 30–99)
ALT SERPL-CCNC: 17 U/L (ref 2–50)
ANION GAP SERPL CALC-SCNC: 13 MMOL/L (ref 7–16)
AST SERPL-CCNC: 22 U/L (ref 12–45)
BILIRUB SERPL-MCNC: 1.3 MG/DL (ref 0.1–1.5)
BUN SERPL-MCNC: 12 MG/DL (ref 8–22)
CALCIUM SERPL-MCNC: 9.4 MG/DL (ref 8.5–10.5)
CHLORIDE SERPL-SCNC: 101 MMOL/L (ref 96–112)
CO2 SERPL-SCNC: 25 MMOL/L (ref 20–33)
CREAT SERPL-MCNC: 0.97 MG/DL (ref 0.5–1.4)
CREAT UR-MCNC: 12.85 MG/DL
ERYTHROCYTE [DISTWIDTH] IN BLOOD BY AUTOMATED COUNT: 42.2 FL (ref 35.9–50)
GFR SERPLBLD CREATININE-BSD FMLA CKD-EPI: 81 ML/MIN/1.73 M 2
GLOBULIN SER CALC-MCNC: 3 G/DL (ref 1.9–3.5)
GLUCOSE SERPL-MCNC: 129 MG/DL (ref 65–99)
HCT VFR BLD AUTO: 44.7 % (ref 42–52)
HCV AB SER QL: NORMAL
HGB BLD-MCNC: 14.4 G/DL (ref 14–18)
MCH RBC QN AUTO: 25.3 PG (ref 27–33)
MCHC RBC AUTO-ENTMCNC: 32.2 G/DL (ref 33.7–35.3)
MCV RBC AUTO: 78.4 FL (ref 81.4–97.8)
MICROALBUMIN UR-MCNC: <1.2 MG/DL
MICROALBUMIN/CREAT UR: NORMAL MG/G (ref 0–30)
PLATELET # BLD AUTO: 290 K/UL (ref 164–446)
PMV BLD AUTO: 8.6 FL (ref 9–12.9)
POTASSIUM SERPL-SCNC: 3.5 MMOL/L (ref 3.6–5.5)
PROT SERPL-MCNC: 7.5 G/DL (ref 6–8.2)
RBC # BLD AUTO: 5.7 M/UL (ref 4.7–6.1)
SODIUM SERPL-SCNC: 139 MMOL/L (ref 135–145)
TSH SERPL DL<=0.005 MIU/L-ACNC: 1.29 UIU/ML (ref 0.38–5.33)
WBC # BLD AUTO: 7.5 K/UL (ref 4.8–10.8)

## 2022-07-16 PROCEDURE — 36415 COLL VENOUS BLD VENIPUNCTURE: CPT

## 2022-07-16 PROCEDURE — 84443 ASSAY THYROID STIM HORMONE: CPT

## 2022-07-16 PROCEDURE — 80053 COMPREHEN METABOLIC PANEL: CPT

## 2022-07-16 PROCEDURE — 85027 COMPLETE CBC AUTOMATED: CPT

## 2022-07-16 PROCEDURE — 82043 UR ALBUMIN QUANTITATIVE: CPT

## 2022-07-16 PROCEDURE — 86803 HEPATITIS C AB TEST: CPT

## 2022-07-16 PROCEDURE — 82570 ASSAY OF URINE CREATININE: CPT

## 2022-07-28 ENCOUNTER — OFFICE VISIT (OUTPATIENT)
Dept: MEDICAL GROUP | Facility: MEDICAL CENTER | Age: 77
End: 2022-07-28
Payer: MEDICARE

## 2022-07-28 VITALS
DIASTOLIC BLOOD PRESSURE: 78 MMHG | HEIGHT: 69 IN | RESPIRATION RATE: 16 BRPM | TEMPERATURE: 97.6 F | OXYGEN SATURATION: 96 % | BODY MASS INDEX: 25.18 KG/M2 | SYSTOLIC BLOOD PRESSURE: 132 MMHG | WEIGHT: 170 LBS | HEART RATE: 74 BPM

## 2022-07-28 DIAGNOSIS — I10 PRIMARY HYPERTENSION: ICD-10-CM

## 2022-07-28 DIAGNOSIS — E78.2 MIXED HYPERLIPIDEMIA: ICD-10-CM

## 2022-07-28 PROCEDURE — 99214 OFFICE O/P EST MOD 30 MIN: CPT | Performed by: FAMILY MEDICINE

## 2022-07-28 RX ORDER — ORAL SEMAGLUTIDE 3 MG/1
3 TABLET ORAL DAILY
Qty: 30 TABLET | Refills: 0 | Status: SHIPPED | OUTPATIENT
Start: 2022-07-28 | End: 2022-08-24

## 2022-07-28 ASSESSMENT — FIBROSIS 4 INDEX: FIB4 SCORE: 1.36

## 2022-07-28 NOTE — PROGRESS NOTES
"  Subjective:     Howard Gamboa is a 76 y.o. male presenting with his daughter, who is interpreting, for follow-up.  His blood pressures have improved with the addition of hydrochlorothiazide.  Leg swelling is also normal.  Denies any side effects.  Regarding his diabetes, he is taking metformin 1000 mg twice a day, Lantus 10 units at night, lispro on a sliding scale, does not know the sliding scale offhand.  His fasting blood sugars range in the 130s to 160s.  In the evenings, they range in the 200-300s.  He checks his blood sugars twice a day, first thing in the morning and before dinner.        Current Outpatient Medications:   •  Semaglutide (RYBELSUS) 3 MG Tab, Take 3 mg by mouth every day for 30 days., Disp: 30 Tablet, Rfl: 0  •  hydroCHLOROthiazide (HYDRODIURIL) 25 MG Tab, Take 1 Tablet by mouth every day., Disp: 100 Tablet, Rfl: 1  •  insulin glargine (LANTUS SOLOSTAR) 100 UNIT/ML Solution Pen-injector injection, INJECT 10 UNITS SUBCUTANEOUSLY ONCE DAILY IN THE EVENING, Disp: 3 mL, Rfl: 0  •  metformin (GLUCOPHAGE) 1000 MG tablet, Take 1 Tablet by mouth 2 times a day with meals., Disp: 180 Tablet, Rfl: 0  •  atorvastatin (LIPITOR) 20 MG Tab, Take 2 Tablets by mouth every day., Disp: 200 Tablet, Rfl: 2  •  BD PEN NEEDLE SHERWIN 2ND GEN, USE 1 PEN NEEDLE 4 TIMES DAILY, Disp: 200 Each, Rfl: 0  •  FreeStyle Lancets Misc, USE TO CHECK GLUCOSE THREE TIMES DAILY, Disp: 100 Each, Rfl: 0  •  FREESTYLE LITE strip, USE 1 STRIP TO CHECK GLUCOSE THREE TIMES DAILY, Disp: 100 Strip, Rfl: 2  •  amLODIPine (NORVASC) 10 MG Tab, Take 1 Tablet by mouth every day., Disp: 90 Tablet, Rfl: 2  •  lisinopril (PRINIVIL) 40 MG tablet, Take 1 Tab by mouth every day., Disp: 100 Tab, Rfl: 3    Objective:     Vitals: /78   Pulse 74   Temp 36.4 °C (97.6 °F)   Resp 16   Ht 1.753 m (5' 9\")   Wt 77.1 kg (170 lb)   SpO2 96%   BMI 25.10 kg/m²   General: Alert  HEENT: Normocephalic    Assessment/Plan:     Howard was seen today for " follow-up.    Diagnoses and all orders for this visit:    Primary hypertension  Chronic, stable, continue amlodipine, hydrochlorothiazide, lisinopril.  He has his echo scheduled for end of August.    Uncontrolled type 2 diabetes mellitus with complication, without long-term current use of insulin (HCC)  Chronic, uncontrolled.  Since he has difficulty checking his blood sugars 3 times a day, we discussed stopping the lispro/Humalog and start Rybelsus 3 mg daily.  Continue with metformin 1000 mg twice a day, Lantus 10 units nightly.  Follow-up in a month, will increase his Rybelsus to 7 mg if tolerated.  -     Semaglutide (RYBELSUS) 3 MG Tab; Take 3 mg by mouth every day for 30 days.    Mixed hyperlipidemia  Chronic, stable, continue atorvastatin.        Return in about 4 weeks (around 8/25/2022) for routine follow up.

## 2022-08-16 DIAGNOSIS — I10 ESSENTIAL HYPERTENSION: ICD-10-CM

## 2022-08-17 RX ORDER — LISINOPRIL 40 MG/1
40 TABLET ORAL DAILY
Qty: 100 TABLET | Refills: 3 | Status: SHIPPED | OUTPATIENT
Start: 2022-08-17 | End: 2022-12-22 | Stop reason: SINTOL

## 2022-08-17 RX ORDER — LISINOPRIL 40 MG/1
TABLET ORAL
Qty: 100 TABLET | Refills: 0 | Status: SHIPPED | OUTPATIENT
Start: 2022-08-17 | End: 2022-08-29

## 2022-08-20 DIAGNOSIS — E11.9 DIABETES MELLITUS TYPE 2 IN NONOBESE (HCC): ICD-10-CM

## 2022-08-22 RX ORDER — INSULIN GLARGINE 100 [IU]/ML
INJECTION, SOLUTION SUBCUTANEOUS
Qty: 3 ML | Refills: 0 | Status: SHIPPED | OUTPATIENT
Start: 2022-08-22 | End: 2022-09-12

## 2022-08-22 RX ORDER — LANCETS 28 GAUGE
EACH MISCELLANEOUS
Qty: 100 EACH | Refills: 0 | Status: SHIPPED | OUTPATIENT
Start: 2022-08-22 | End: 2022-09-12

## 2022-08-22 RX ORDER — BLOOD-GLUCOSE METER
KIT MISCELLANEOUS
Qty: 100 STRIP | Refills: 2 | Status: ON HOLD | OUTPATIENT
Start: 2022-08-22 | End: 2023-01-19 | Stop reason: SDUPTHER

## 2022-08-22 RX ORDER — PEN NEEDLE, DIABETIC 32GX 5/32"
NEEDLE, DISPOSABLE MISCELLANEOUS
Qty: 200 EACH | Refills: 2 | Status: SHIPPED | OUTPATIENT
Start: 2022-08-22 | End: 2023-03-02

## 2022-08-23 DIAGNOSIS — I10 PRIMARY HYPERTENSION: ICD-10-CM

## 2022-08-24 RX ORDER — ORAL SEMAGLUTIDE 3 MG/1
TABLET ORAL
Qty: 30 TABLET | Refills: 0 | Status: SHIPPED | OUTPATIENT
Start: 2022-08-24 | End: 2022-08-29

## 2022-08-24 RX ORDER — AMLODIPINE BESYLATE 10 MG/1
TABLET ORAL
Qty: 90 TABLET | Refills: 3 | Status: SHIPPED | OUTPATIENT
Start: 2022-08-24 | End: 2022-08-29

## 2022-08-24 RX ORDER — AMLODIPINE BESYLATE 10 MG/1
10 TABLET ORAL DAILY
Qty: 90 TABLET | Refills: 2 | Status: ON HOLD | OUTPATIENT
Start: 2022-08-24 | End: 2023-06-19 | Stop reason: SDUPTHER

## 2022-08-29 ENCOUNTER — OFFICE VISIT (OUTPATIENT)
Dept: MEDICAL GROUP | Facility: MEDICAL CENTER | Age: 77
End: 2022-08-29
Payer: MEDICARE

## 2022-08-29 VITALS
TEMPERATURE: 97.6 F | BODY MASS INDEX: 24.88 KG/M2 | OXYGEN SATURATION: 94 % | HEIGHT: 69 IN | DIASTOLIC BLOOD PRESSURE: 78 MMHG | WEIGHT: 168 LBS | RESPIRATION RATE: 16 BRPM | SYSTOLIC BLOOD PRESSURE: 126 MMHG | HEART RATE: 74 BPM

## 2022-08-29 DIAGNOSIS — E78.2 MIXED HYPERLIPIDEMIA: ICD-10-CM

## 2022-08-29 DIAGNOSIS — I10 PRIMARY HYPERTENSION: ICD-10-CM

## 2022-08-29 PROCEDURE — 99214 OFFICE O/P EST MOD 30 MIN: CPT | Performed by: FAMILY MEDICINE

## 2022-08-29 RX ORDER — ORAL SEMAGLUTIDE 7 MG/1
7 TABLET ORAL DAILY
Qty: 30 TABLET | Refills: 0 | Status: SHIPPED | OUTPATIENT
Start: 2022-08-29 | End: 2022-10-24

## 2022-08-29 ASSESSMENT — FIBROSIS 4 INDEX: FIB4 SCORE: 1.38

## 2022-08-29 NOTE — PROGRESS NOTES
"  Subjective:     Howard On Ng is a 77 y.o. male presenting with his daughter for a follow-up.  He has stopped the lispro sliding scale and has started Rybelsus 3 mg daily.  He notices occasional nausea.  His fasting blood sugars range 1 50-1 60s.  Daytime blood sugars range 1 80-2 90s.  They have improved somewhat.          Current Outpatient Medications:     Semaglutide (RYBELSUS) 7 MG Tab, Take 7 mg by mouth every day., Disp: 30 Tablet, Rfl: 0    amLODIPine (NORVASC) 10 MG Tab, Take 1 Tablet by mouth every day., Disp: 90 Tablet, Rfl: 2    glucose blood (FREESTYLE LITE) strip, Use 3 times a day and as needed, Disp: 100 Strip, Rfl: 2    FreeStyle Lancets Misc, USE TO CHECK GLUCOSE THREE TIMES DAILY, Disp: 100 Each, Rfl: 0    Insulin Pen Needle 32 G x 4 mm (BD PEN NEEDLE SHERWIN 2ND GEN), Use 3 times a day and as needed, Disp: 200 Each, Rfl: 2    insulin glargine (LANTUS SOLOSTAR) 100 UNIT/ML Solution Pen-injector injection, INJECT 10 UNITS SUBCUTANEOUSLY ONCE DAILY IN THE EVENING, Disp: 3 mL, Rfl: 0    lisinopril (PRINIVIL) 40 MG tablet, Take 1 Tablet by mouth every day., Disp: 100 Tablet, Rfl: 3    hydroCHLOROthiazide (HYDRODIURIL) 25 MG Tab, Take 1 Tablet by mouth every day., Disp: 100 Tablet, Rfl: 1    metformin (GLUCOPHAGE) 1000 MG tablet, Take 1 Tablet by mouth 2 times a day with meals., Disp: 180 Tablet, Rfl: 0    atorvastatin (LIPITOR) 20 MG Tab, Take 2 Tablets by mouth every day., Disp: 200 Tablet, Rfl: 2    Objective:     Vitals: /78   Pulse 74   Temp 36.4 °C (97.6 °F)   Resp 16   Ht 1.753 m (5' 9\")   Wt 76.2 kg (168 lb)   SpO2 94%   BMI 24.81 kg/m²   General: Alert  HEENT: Normocephalic.     Assessment/Plan:     Howard was seen today for follow-up.    Diagnoses and all orders for this visit:    Uncontrolled type 2 diabetes mellitus with complication, without long-term current use of insulin (HCC)  Chronic, uncontrolled.  We discussed continue with the metformin, Lantus.  We will increase his " Rybelsus to 7 mg daily.  We discussed eating smaller portions.  Follow-up in a month  -     Semaglutide (RYBELSUS) 7 MG Tab; Take 7 mg by mouth every day.    Primary hypertension  Chronic, stable, continue amlodipine, hydrochlorothiazide, lisinopril    Mixed hyperlipidemia  Chronic, stable, continue atorvastatin      Return in about 4 weeks (around 9/26/2022) for Med check.

## 2022-08-31 ENCOUNTER — HOSPITAL ENCOUNTER (OUTPATIENT)
Dept: CARDIOLOGY | Facility: MEDICAL CENTER | Age: 77
End: 2022-08-31
Attending: FAMILY MEDICINE
Payer: MEDICARE

## 2022-08-31 DIAGNOSIS — M79.89 FOOT SWELLING: ICD-10-CM

## 2022-08-31 LAB
LV EJECT FRACT  99904: 70
LV EJECT FRACT MOD 2C 99903: 67.35
LV EJECT FRACT MOD 4C 99902: 60.08
LV EJECT FRACT MOD BP 99901: 63

## 2022-08-31 PROCEDURE — 93306 TTE W/DOPPLER COMPLETE: CPT | Mod: 26 | Performed by: INTERNAL MEDICINE

## 2022-08-31 PROCEDURE — 93306 TTE W/DOPPLER COMPLETE: CPT

## 2022-09-01 PROBLEM — I77.810 AORTIC ROOT DILATION (HCC): Status: ACTIVE | Noted: 2022-09-01

## 2022-09-11 DIAGNOSIS — E11.9 DIABETES MELLITUS TYPE 2 IN NONOBESE (HCC): ICD-10-CM

## 2022-09-12 RX ORDER — INSULIN GLARGINE 100 [IU]/ML
INJECTION, SOLUTION SUBCUTANEOUS
Qty: 3 ML | Refills: 0 | Status: SHIPPED | OUTPATIENT
Start: 2022-09-12 | End: 2022-10-24

## 2022-09-12 RX ORDER — LANCETS 28 GAUGE
EACH MISCELLANEOUS
Qty: 100 EACH | Refills: 0 | Status: SHIPPED | OUTPATIENT
Start: 2022-09-12 | End: 2022-11-21

## 2022-11-19 NOTE — TELEPHONE ENCOUNTER
Received request via: Pharmacy    Was the patient seen in the last year in this department? Yes    Does the patient have an active prescription (recently filled or refills available) for medication(s) requested? No    Does the patient have intermediate Plus and need 100 day supply (blood pressure, diabetes and cholesterol meds only)? Yes, quantity updated to 100 days

## 2022-11-20 DIAGNOSIS — E11.9 DIABETES MELLITUS TYPE 2 IN NONOBESE (HCC): ICD-10-CM

## 2022-11-21 RX ORDER — INSULIN GLARGINE 100 [IU]/ML
INJECTION, SOLUTION SUBCUTANEOUS
Qty: 3 ML | Refills: 0 | Status: SHIPPED | OUTPATIENT
Start: 2022-11-21 | End: 2023-03-03 | Stop reason: SDUPTHER

## 2022-11-21 RX ORDER — LANCETS 28 GAUGE
EACH MISCELLANEOUS
Qty: 100 EACH | Refills: 0 | Status: ON HOLD | OUTPATIENT
Start: 2022-11-21 | End: 2023-01-19 | Stop reason: SDUPTHER

## 2022-12-22 ENCOUNTER — OFFICE VISIT (OUTPATIENT)
Dept: MEDICAL GROUP | Facility: MEDICAL CENTER | Age: 77
End: 2022-12-22
Payer: MEDICARE

## 2022-12-22 VITALS
SYSTOLIC BLOOD PRESSURE: 128 MMHG | TEMPERATURE: 97.2 F | HEIGHT: 69 IN | RESPIRATION RATE: 16 BRPM | WEIGHT: 170 LBS | OXYGEN SATURATION: 97 % | HEART RATE: 68 BPM | DIASTOLIC BLOOD PRESSURE: 76 MMHG | BODY MASS INDEX: 25.18 KG/M2

## 2022-12-22 DIAGNOSIS — I10 ESSENTIAL HYPERTENSION: ICD-10-CM

## 2022-12-22 DIAGNOSIS — Z23 NEED FOR VACCINATION: ICD-10-CM

## 2022-12-22 PROCEDURE — 99213 OFFICE O/P EST LOW 20 MIN: CPT | Mod: 25 | Performed by: FAMILY MEDICINE

## 2022-12-22 PROCEDURE — G0008 ADMIN INFLUENZA VIRUS VAC: HCPCS | Performed by: FAMILY MEDICINE

## 2022-12-22 PROCEDURE — 90662 IIV NO PRSV INCREASED AG IM: CPT | Performed by: FAMILY MEDICINE

## 2022-12-22 RX ORDER — LISINOPRIL 40 MG/1
40 TABLET ORAL DAILY
Qty: 100 TABLET | Refills: 3 | Status: ON HOLD | OUTPATIENT
Start: 2022-12-22 | End: 2023-06-19 | Stop reason: SDUPTHER

## 2022-12-22 ASSESSMENT — FIBROSIS 4 INDEX: FIB4 SCORE: 1.38

## 2022-12-22 NOTE — PROGRESS NOTES
"  Subjective:     Howard Gamboa is a 77 y.o. male presenting with his daughter to discuss his blood pressure.  He reports that he thought lisinopril was causing him dizziness.  He stopped taking it for a while, but his blood pressure went up.  He then restarted it and his blood pressures have been normal.  He no longer has the dizziness.  He continues on amlodipine and hydrochlorothiazide as well.        Current Outpatient Medications:     lisinopril (PRINIVIL) 40 MG tablet, Take 1 Tablet by mouth every day., Disp: 100 Tablet, Rfl: 3    LANTUS SOLOSTAR 100 UNIT/ML Solution Pen-injector injection, INJECT 10 UNITS SUBCUTANEOUSLY ONCE DAILY IN THE EVENING, Disp: 3 mL, Rfl: 0    FreeStyle Lancets Misc, USE  TO CHECK GLUCOSE THREE TIMES DAILY, Disp: 100 Each, Rfl: 0    metformin (GLUCOPHAGE) 1000 MG tablet, TAKE 1 TABLET BY MOUTH TWICE DAILY WITH MEALS, Disp: 180 Tablet, Rfl: 0    RYBELSUS 7 MG Tab, Take 1 tablet by mouth once daily, Disp: 90 Tablet, Rfl: 2    amLODIPine (NORVASC) 10 MG Tab, Take 1 Tablet by mouth every day., Disp: 90 Tablet, Rfl: 2    glucose blood (FREESTYLE LITE) strip, Use 3 times a day and as needed, Disp: 100 Strip, Rfl: 2    Insulin Pen Needle 32 G x 4 mm (BD PEN NEEDLE SHERWIN 2ND GEN), Use 3 times a day and as needed, Disp: 200 Each, Rfl: 2    hydroCHLOROthiazide (HYDRODIURIL) 25 MG Tab, Take 1 Tablet by mouth every day., Disp: 100 Tablet, Rfl: 1    atorvastatin (LIPITOR) 20 MG Tab, Take 2 Tablets by mouth every day., Disp: 200 Tablet, Rfl: 2    Objective:     Vitals: /76   Pulse 68   Temp 36.2 °C (97.2 °F)   Resp 16   Ht 1.753 m (5' 9\")   Wt 77.1 kg (170 lb)   SpO2 97%   BMI 25.10 kg/m²   General: Alert  HEENT: Normocephalic  Heart: Regular rate and rhythm.  S1 and S2 normal.  No murmurs appreciated.    Assessment/Plan:     Howard was seen today for follow-up.    Diagnoses and all orders for this visit:    Essential hypertension  Chronic, stable, we discussed continuing with lisinopril, " amlodipine, hydrochlorothiazide.  If he starts to develop dizziness again, we discussed lowering his amlodipine dose to 5 mg and checking his blood pressures.  -     lisinopril (PRINIVIL) 40 MG tablet; Take 1 Tablet by mouth every day.    Need for vaccination  -     Influenza Vaccine, High Dose (65+ Only)

## 2023-01-17 NOTE — TELEPHONE ENCOUNTER
Received request via: Patient    Was the patient seen in the last year in this department? Yes    Does the patient have an active prescription (recently filled or refills available) for medication(s) requested? No    Does the patient have [employer]P Plus and need 100 day supply (blood pressure, diabetes and cholesterol meds only)? Yes, quantity updated to 100 days

## 2023-01-18 ENCOUNTER — PATIENT MESSAGE (OUTPATIENT)
Dept: MEDICAL GROUP | Facility: MEDICAL CENTER | Age: 78
End: 2023-01-18
Payer: MEDICARE

## 2023-01-18 DIAGNOSIS — E11.9 DIABETES MELLITUS TYPE 2 IN NONOBESE (HCC): ICD-10-CM

## 2023-01-18 PROCEDURE — 82962 GLUCOSE BLOOD TEST: CPT

## 2023-01-18 PROCEDURE — 36415 COLL VENOUS BLD VENIPUNCTURE: CPT

## 2023-01-18 PROCEDURE — 93005 ELECTROCARDIOGRAM TRACING: CPT

## 2023-01-18 PROCEDURE — 99285 EMERGENCY DEPT VISIT HI MDM: CPT

## 2023-01-18 PROCEDURE — 93005 ELECTROCARDIOGRAM TRACING: CPT | Performed by: STUDENT IN AN ORGANIZED HEALTH CARE EDUCATION/TRAINING PROGRAM

## 2023-01-18 ASSESSMENT — FIBROSIS 4 INDEX: FIB4 SCORE: 1.38

## 2023-01-19 ENCOUNTER — HOSPITAL ENCOUNTER (OUTPATIENT)
Facility: MEDICAL CENTER | Age: 78
End: 2023-01-20
Attending: STUDENT IN AN ORGANIZED HEALTH CARE EDUCATION/TRAINING PROGRAM | Admitting: STUDENT IN AN ORGANIZED HEALTH CARE EDUCATION/TRAINING PROGRAM
Payer: MEDICARE

## 2023-01-19 ENCOUNTER — APPOINTMENT (OUTPATIENT)
Dept: RADIOLOGY | Facility: MEDICAL CENTER | Age: 78
End: 2023-01-19
Attending: STUDENT IN AN ORGANIZED HEALTH CARE EDUCATION/TRAINING PROGRAM
Payer: MEDICARE

## 2023-01-19 DIAGNOSIS — R42 DIZZINESS: ICD-10-CM

## 2023-01-19 DIAGNOSIS — R42 DISEQUILIBRIUM: ICD-10-CM

## 2023-01-19 PROBLEM — I16.0 HYPERTENSIVE URGENCY: Status: ACTIVE | Noted: 2023-01-19

## 2023-01-19 LAB
ALBUMIN SERPL BCP-MCNC: 4.4 G/DL (ref 3.2–4.9)
ALBUMIN/GLOB SERPL: 1.3 G/DL
ALP SERPL-CCNC: 104 U/L (ref 30–99)
ALT SERPL-CCNC: 21 U/L (ref 2–50)
ANION GAP SERPL CALC-SCNC: 13 MMOL/L (ref 7–16)
APPEARANCE UR: ABNORMAL
AST SERPL-CCNC: 21 U/L (ref 12–45)
BACTERIA #/AREA URNS HPF: NEGATIVE /HPF
BASOPHILS # BLD AUTO: 0.2 % (ref 0–1.8)
BASOPHILS # BLD: 0.02 K/UL (ref 0–0.12)
BILIRUB SERPL-MCNC: 0.8 MG/DL (ref 0.1–1.5)
BILIRUB UR QL STRIP.AUTO: NEGATIVE
BUN SERPL-MCNC: 11 MG/DL (ref 8–22)
CALCIUM ALBUM COR SERPL-MCNC: 9.1 MG/DL (ref 8.5–10.5)
CALCIUM SERPL-MCNC: 9.4 MG/DL (ref 8.5–10.5)
CHLORIDE SERPL-SCNC: 100 MMOL/L (ref 96–112)
CO2 SERPL-SCNC: 25 MMOL/L (ref 20–33)
COLOR UR: YELLOW
CREAT SERPL-MCNC: 0.83 MG/DL (ref 0.5–1.4)
EKG IMPRESSION: NORMAL
EOSINOPHIL # BLD AUTO: 0.08 K/UL (ref 0–0.51)
EOSINOPHIL NFR BLD: 0.8 % (ref 0–6.9)
EPI CELLS #/AREA URNS HPF: NEGATIVE /HPF
ERYTHROCYTE [DISTWIDTH] IN BLOOD BY AUTOMATED COUNT: 38.1 FL (ref 35.9–50)
GFR SERPLBLD CREATININE-BSD FMLA CKD-EPI: 91 ML/MIN/1.73 M 2
GLOBULIN SER CALC-MCNC: 3.3 G/DL (ref 1.9–3.5)
GLUCOSE BLD STRIP.AUTO-MCNC: 128 MG/DL (ref 65–99)
GLUCOSE BLD STRIP.AUTO-MCNC: 155 MG/DL (ref 65–99)
GLUCOSE BLD STRIP.AUTO-MCNC: 186 MG/DL (ref 65–99)
GLUCOSE BLD STRIP.AUTO-MCNC: 256 MG/DL (ref 65–99)
GLUCOSE SERPL-MCNC: 219 MG/DL (ref 65–99)
GLUCOSE UR STRIP.AUTO-MCNC: 250 MG/DL
HCT VFR BLD AUTO: 45.1 % (ref 42–52)
HGB BLD-MCNC: 15.1 G/DL (ref 14–18)
HYALINE CASTS #/AREA URNS LPF: ABNORMAL /LPF
IMM GRANULOCYTES # BLD AUTO: 0.03 K/UL (ref 0–0.11)
IMM GRANULOCYTES NFR BLD AUTO: 0.3 % (ref 0–0.9)
KETONES UR STRIP.AUTO-MCNC: ABNORMAL MG/DL
LEUKOCYTE ESTERASE UR QL STRIP.AUTO: NEGATIVE
LYMPHOCYTES # BLD AUTO: 2.96 K/UL (ref 1–4.8)
LYMPHOCYTES NFR BLD: 28.8 % (ref 22–41)
MAGNESIUM SERPL-MCNC: 2 MG/DL (ref 1.5–2.5)
MCH RBC QN AUTO: 25.9 PG (ref 27–33)
MCHC RBC AUTO-ENTMCNC: 33.5 G/DL (ref 33.7–35.3)
MCV RBC AUTO: 77.2 FL (ref 81.4–97.8)
MICRO URNS: ABNORMAL
MONOCYTES # BLD AUTO: 1.08 K/UL (ref 0–0.85)
MONOCYTES NFR BLD AUTO: 10.5 % (ref 0–13.4)
NEUTROPHILS # BLD AUTO: 6.11 K/UL (ref 1.82–7.42)
NEUTROPHILS NFR BLD: 59.4 % (ref 44–72)
NITRITE UR QL STRIP.AUTO: NEGATIVE
NRBC # BLD AUTO: 0 K/UL
NRBC BLD-RTO: 0 /100 WBC
PH UR STRIP.AUTO: 7.5 [PH] (ref 5–8)
PLATELET # BLD AUTO: 329 K/UL (ref 164–446)
PMV BLD AUTO: 8.3 FL (ref 9–12.9)
POTASSIUM SERPL-SCNC: 3.3 MMOL/L (ref 3.6–5.5)
PROT SERPL-MCNC: 7.7 G/DL (ref 6–8.2)
PROT UR QL STRIP: NEGATIVE MG/DL
RBC # BLD AUTO: 5.84 M/UL (ref 4.7–6.1)
RBC # URNS HPF: ABNORMAL /HPF
RBC UR QL AUTO: NEGATIVE
SODIUM SERPL-SCNC: 138 MMOL/L (ref 135–145)
SP GR UR STRIP.AUTO: 1.01
TROPONIN T SERPL-MCNC: 9 NG/L (ref 6–19)
UROBILINOGEN UR STRIP.AUTO-MCNC: 0.2 MG/DL
VIT B12 SERPL-MCNC: 1190 PG/ML (ref 211–911)
WBC # BLD AUTO: 10.3 K/UL (ref 4.8–10.8)
WBC #/AREA URNS HPF: ABNORMAL /HPF

## 2023-01-19 PROCEDURE — A9270 NON-COVERED ITEM OR SERVICE: HCPCS | Performed by: HOSPITALIST

## 2023-01-19 PROCEDURE — 99223 1ST HOSP IP/OBS HIGH 75: CPT | Mod: AI | Performed by: STUDENT IN AN ORGANIZED HEALTH CARE EDUCATION/TRAINING PROGRAM

## 2023-01-19 PROCEDURE — A9270 NON-COVERED ITEM OR SERVICE: HCPCS | Performed by: STUDENT IN AN ORGANIZED HEALTH CARE EDUCATION/TRAINING PROGRAM

## 2023-01-19 PROCEDURE — 700117 HCHG RX CONTRAST REV CODE 255: Performed by: STUDENT IN AN ORGANIZED HEALTH CARE EDUCATION/TRAINING PROGRAM

## 2023-01-19 PROCEDURE — 85025 COMPLETE CBC W/AUTO DIFF WBC: CPT

## 2023-01-19 PROCEDURE — 80053 COMPREHEN METABOLIC PANEL: CPT

## 2023-01-19 PROCEDURE — 81001 URINALYSIS AUTO W/SCOPE: CPT

## 2023-01-19 PROCEDURE — 96374 THER/PROPH/DIAG INJ IV PUSH: CPT

## 2023-01-19 PROCEDURE — 700102 HCHG RX REV CODE 250 W/ 637 OVERRIDE(OP): Performed by: STUDENT IN AN ORGANIZED HEALTH CARE EDUCATION/TRAINING PROGRAM

## 2023-01-19 PROCEDURE — 93005 ELECTROCARDIOGRAM TRACING: CPT | Performed by: STUDENT IN AN ORGANIZED HEALTH CARE EDUCATION/TRAINING PROGRAM

## 2023-01-19 PROCEDURE — 700111 HCHG RX REV CODE 636 W/ 250 OVERRIDE (IP): Performed by: STUDENT IN AN ORGANIZED HEALTH CARE EDUCATION/TRAINING PROGRAM

## 2023-01-19 PROCEDURE — 70496 CT ANGIOGRAPHY HEAD: CPT

## 2023-01-19 PROCEDURE — 83735 ASSAY OF MAGNESIUM: CPT

## 2023-01-19 PROCEDURE — 70551 MRI BRAIN STEM W/O DYE: CPT

## 2023-01-19 PROCEDURE — 84484 ASSAY OF TROPONIN QUANT: CPT

## 2023-01-19 PROCEDURE — 700102 HCHG RX REV CODE 250 W/ 637 OVERRIDE(OP): Performed by: HOSPITALIST

## 2023-01-19 PROCEDURE — 82607 VITAMIN B-12: CPT

## 2023-01-19 PROCEDURE — 70498 CT ANGIOGRAPHY NECK: CPT

## 2023-01-19 PROCEDURE — 71045 X-RAY EXAM CHEST 1 VIEW: CPT

## 2023-01-19 PROCEDURE — 96372 THER/PROPH/DIAG INJ SC/IM: CPT

## 2023-01-19 PROCEDURE — G0378 HOSPITAL OBSERVATION PER HR: HCPCS

## 2023-01-19 PROCEDURE — 82962 GLUCOSE BLOOD TEST: CPT | Mod: 91

## 2023-01-19 RX ORDER — LISINOPRIL 20 MG/1
40 TABLET ORAL DAILY
Status: DISCONTINUED | OUTPATIENT
Start: 2023-01-19 | End: 2023-01-20 | Stop reason: HOSPADM

## 2023-01-19 RX ORDER — BISACODYL 10 MG
10 SUPPOSITORY, RECTAL RECTAL
Status: DISCONTINUED | OUTPATIENT
Start: 2023-01-19 | End: 2023-01-20 | Stop reason: HOSPADM

## 2023-01-19 RX ORDER — ACETAMINOPHEN 325 MG/1
650 TABLET ORAL EVERY 6 HOURS PRN
Status: DISCONTINUED | OUTPATIENT
Start: 2023-01-19 | End: 2023-01-20 | Stop reason: HOSPADM

## 2023-01-19 RX ORDER — POLYETHYLENE GLYCOL 3350 17 G/17G
1 POWDER, FOR SOLUTION ORAL
Status: DISCONTINUED | OUTPATIENT
Start: 2023-01-19 | End: 2023-01-20 | Stop reason: HOSPADM

## 2023-01-19 RX ORDER — HYDROCHLOROTHIAZIDE 25 MG/1
25 TABLET ORAL DAILY
Status: DISCONTINUED | OUTPATIENT
Start: 2023-01-19 | End: 2023-01-20 | Stop reason: HOSPADM

## 2023-01-19 RX ORDER — LABETALOL HYDROCHLORIDE 5 MG/ML
10 INJECTION, SOLUTION INTRAVENOUS EVERY 4 HOURS PRN
Status: DISCONTINUED | OUTPATIENT
Start: 2023-01-19 | End: 2023-01-20 | Stop reason: HOSPADM

## 2023-01-19 RX ORDER — ONDANSETRON 2 MG/ML
8 INJECTION INTRAMUSCULAR; INTRAVENOUS ONCE
Status: COMPLETED | OUTPATIENT
Start: 2023-01-19 | End: 2023-01-19

## 2023-01-19 RX ORDER — MECLIZINE HYDROCHLORIDE 25 MG/1
12.5 TABLET ORAL 3 TIMES DAILY
Status: DISCONTINUED | OUTPATIENT
Start: 2023-01-19 | End: 2023-01-20 | Stop reason: HOSPADM

## 2023-01-19 RX ORDER — POTASSIUM CHLORIDE 20 MEQ/1
40 TABLET, EXTENDED RELEASE ORAL ONCE
Status: COMPLETED | OUTPATIENT
Start: 2023-01-19 | End: 2023-01-19

## 2023-01-19 RX ORDER — ENOXAPARIN SODIUM 100 MG/ML
40 INJECTION SUBCUTANEOUS DAILY
Status: DISCONTINUED | OUTPATIENT
Start: 2023-01-19 | End: 2023-01-20 | Stop reason: HOSPADM

## 2023-01-19 RX ORDER — ATORVASTATIN CALCIUM 40 MG/1
40 TABLET, FILM COATED ORAL DAILY
Status: DISCONTINUED | OUTPATIENT
Start: 2023-01-19 | End: 2023-01-20 | Stop reason: HOSPADM

## 2023-01-19 RX ORDER — BLOOD-GLUCOSE METER
KIT MISCELLANEOUS
Qty: 100 STRIP | Refills: 2 | Status: SHIPPED | OUTPATIENT
Start: 2023-01-19 | End: 2023-04-17

## 2023-01-19 RX ORDER — AMLODIPINE BESYLATE 5 MG/1
10 TABLET ORAL DAILY
Status: DISCONTINUED | OUTPATIENT
Start: 2023-01-19 | End: 2023-01-20 | Stop reason: HOSPADM

## 2023-01-19 RX ORDER — MECLIZINE HYDROCHLORIDE 25 MG/1
25 TABLET ORAL ONCE
Status: COMPLETED | OUTPATIENT
Start: 2023-01-19 | End: 2023-01-19

## 2023-01-19 RX ORDER — MECLIZINE HYDROCHLORIDE 25 MG/1
25 TABLET ORAL 3 TIMES DAILY PRN
Status: DISCONTINUED | OUTPATIENT
Start: 2023-01-19 | End: 2023-01-19

## 2023-01-19 RX ORDER — AMOXICILLIN 250 MG
2 CAPSULE ORAL 2 TIMES DAILY
Status: DISCONTINUED | OUTPATIENT
Start: 2023-01-19 | End: 2023-01-20 | Stop reason: HOSPADM

## 2023-01-19 RX ORDER — LANCETS 28 GAUGE
EACH MISCELLANEOUS
Qty: 100 EACH | Refills: 0 | Status: SHIPPED | OUTPATIENT
Start: 2023-01-19 | End: 2023-03-06 | Stop reason: SDUPTHER

## 2023-01-19 RX ADMIN — HYDROCHLOROTHIAZIDE 25 MG: 25 TABLET ORAL at 14:45

## 2023-01-19 RX ADMIN — AMLODIPINE BESYLATE 10 MG: 5 TABLET ORAL at 14:46

## 2023-01-19 RX ADMIN — MECLIZINE HYDROCHLORIDE 12.5 MG: 25 TABLET ORAL at 14:57

## 2023-01-19 RX ADMIN — LISINOPRIL 40 MG: 20 TABLET ORAL at 14:46

## 2023-01-19 RX ADMIN — INSULIN HUMAN 3 UNITS: 100 INJECTION, SOLUTION PARENTERAL at 14:49

## 2023-01-19 RX ADMIN — ENOXAPARIN SODIUM 40 MG: 40 INJECTION SUBCUTANEOUS at 17:32

## 2023-01-19 RX ADMIN — MECLIZINE HYDROCHLORIDE 25 MG: 25 TABLET ORAL at 03:47

## 2023-01-19 RX ADMIN — ATORVASTATIN CALCIUM 40 MG: 40 TABLET, FILM COATED ORAL at 14:47

## 2023-01-19 RX ADMIN — POTASSIUM CHLORIDE 40 MEQ: 1500 TABLET, EXTENDED RELEASE ORAL at 14:48

## 2023-01-19 RX ADMIN — ONDANSETRON 8 MG: 2 INJECTION INTRAMUSCULAR; INTRAVENOUS at 04:04

## 2023-01-19 RX ADMIN — SENNOSIDES AND DOCUSATE SODIUM 2 TABLET: 50; 8.6 TABLET ORAL at 17:32

## 2023-01-19 RX ADMIN — IOHEXOL 80 ML: 350 INJECTION, SOLUTION INTRAVENOUS at 04:21

## 2023-01-19 RX ADMIN — MECLIZINE HYDROCHLORIDE 12.5 MG: 25 TABLET ORAL at 23:06

## 2023-01-19 ASSESSMENT — ENCOUNTER SYMPTOMS
ABDOMINAL PAIN: 0
SHORTNESS OF BREATH: 0
FEVER: 0
CHILLS: 0
VOMITING: 0
HEADACHES: 0
NAUSEA: 1
COUGH: 0
SORE THROAT: 0
DIARRHEA: 0
DIZZINESS: 1
NAUSEA: 0

## 2023-01-19 ASSESSMENT — PAIN DESCRIPTION - PAIN TYPE: TYPE: ACUTE PAIN

## 2023-01-19 NOTE — PROGRESS NOTES
[facility] Medicine Daily Progress Note    Date of Service  1/19/2023    Chief Complaint  Howard Gamboa is a 77 y.o. male admitted 1/19/2023 with dizziness    Hospital Course  Howard Gamboa is a 77 y.o. male who presented 1/19/2023 with dizziness and gait abnormalities.  PMH of HTN, DM2, dyslipidemia.  Comes in with dizziness and wide-based gait which is a chronic issue that has been happening for at least 6 months and even possibly up to a year.  He says the dizziness was slightly worse over the past couple days resulting in nausea/vomiting, that has improved at the time of my evaluation.  No recent illness.  He does have tinnitus and hearing loss which are chronic for many years now.  Denies any other acute complaints.     In the ED afebrile, BP elevated to systolic 200.  Labs showed potassium of 3.3.       Interval Problem Update  1/19 patient was evaluated with ipad live  # [id number] patient speaks cantonese, he follows commands, no focal weakness, no nausea but no vomiting, CT/CTA reviewed, will f/u MRI brain, might need vascular consult pending on MRI result. Pt/ot/st.      I have discussed this patient's plan of care and discharge plan at IDT rounds today with Case Management, Nursing, Nursing leadership, and other members of the IDT team.    Consultants/Specialty  na    Code Status  Full Code    Disposition  Patient is not medically cleared for discharge.   Anticipate discharge to to home with close outpatient follow-up.  I have placed the appropriate orders for post-discharge needs.    Review of Systems  Review of Systems   Gastrointestinal:  Positive for nausea.   Neurological:  Positive for dizziness.      Physical Exam  Temp:  [36.2 °C (97.2 °F)-37.6 °C (99.6 °F)] 37.6 °C (99.6 °F)  Pulse:  [] 107  Resp:  [17-18] 18  BP: (138-188)/() 161/102  SpO2:  [91 %-94 %] 91 %    Physical Exam  Neurological:      General: No focal deficit present.      Mental Status: He is oriented to  person, place, and time.      Cranial Nerves: No cranial nerve deficit.      Motor: No weakness.   Psychiatric:         Mood and Affect: Mood normal.         Behavior: Behavior normal.       Fluids  No intake or output data in the 24 hours ending 01/19/23 1424    Laboratory  Recent Labs     01/19/23  0018   WBC 10.3   RBC 5.84   HEMOGLOBIN 15.1   HEMATOCRIT 45.1   MCV 77.2*   MCH 25.9*   MCHC 33.5*   RDW 38.1   PLATELETCT 329   MPV 8.3*     Recent Labs     01/19/23  0018   SODIUM 138   POTASSIUM 3.3*   CHLORIDE 100   CO2 25   GLUCOSE 219*   BUN 11   CREATININE 0.83   CALCIUM 9.4                   Imaging  CT-CTA NECK WITH & W/O-POST PROCESSING   Final Result         1.  Mildly enlarged right cervical chain lymph nodes, consider causes of adenopathy with additional workup as clinically appropriate.   2.  Otherwise CT angiogram of the neck within normal limits.         CT-CTA HEAD WITH & W/O-POST PROCESS   Final Result         1.  50-70% narrowing of proximal right M2 segment.   2.  No large vessel occlusion or aneurysm identified      DX-CHEST-LIMITED (1 VIEW)   Final Result         1.  No acute cardiopulmonary disease.   2.  Atherosclerosis      MR-BRAIN-W/O    (Results Pending)        Assessment/Plan  * Dizziness- (present on admission)  Assessment & Plan  Dizziness and gait abnormalities that are chronic.  Dizziness slightly worsened over the past couple days but at baseline at the time of my evaluation  Described as room spinning  Bilateral nystagmus on hints exam.  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  As needed antihypertensives    Hypokalemia- (present on admission)  Assessment & Plan  3.3 on presentation, replete as needed and check magnesium    Mixed hyperlipidemia- (present on admission)  Assessment & Plan  Continue home statin    Type 2 diabetes mellitus  with hyperglycemia, without long-term current use of insulin (HCC)- (present on admission)  Assessment & Plan  Hold home orals.  Sliding scale in the hospital    Primary hypertension- (present on admission)  Assessment & Plan  There were problems with translation as he is very hard of hearing but seems to be only taking one of his antihypertensives even though there are 3 prescribed  He is in hypertensive urgency at this time continue all home meds on med list including lisinopril, hydrochlorothiazide, amlodipine  Day team to clarify with son about his antihypertensive regiment         VTE prophylaxis: SCDs/TEDs    I have performed a physical exam and reviewed and updated ROS and Plan today (1/19/2023). In review of yesterday's note (1/18/2023), there are no changes except as documented above.

## 2023-01-19 NOTE — ASSESSMENT & PLAN NOTE
Dizziness and gait abnormalities that are chronic.  Dizziness slightly worsened over the past couple days but at baseline at the time of my evaluation  Described as room spinning  Bilateral nystagmus on hints exam.  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

## 2023-01-19 NOTE — ED PROVIDER NOTES
ED Provider Note    CHIEF COMPLAINT  Chief Complaint   Patient presents with    N/V     For last hour.     Dizziness     And headache for last hour       EXTERNAL RECORDS REVIEWED  Outpatient Notes family medicine note on 2022 for blood pressure elevation    HPI/ROS  LIMITATION TO HISTORY   Speaks Fukienese dialect of Chinese, son translating, patient unable to understand  as  OUTSIDE HISTORIAN(S):  Family son    Howard Gamboa is a 77 y.o. male past medical history of diabetes, hypertension, hyperlipidemia who presents with 24 hours of gait instability, room spinning dizziness, nausea, vomiting.  Patient reportedly had similar symptoms last year when his blood pressure was elevated.  Patient denies new vision changes.  Patient denies chest pain, shortness of breath, dysuria or hematuria.  Patient denies unilateral weakness or numbness.  Patient denies ear pain or tinnitus.    PAST MEDICAL HISTORY   has a past medical history of Dental disorder, Diabetes (), Diabetes mellitus out of control (), High cholesterol (), and Hypertension ().    SURGICAL HISTORY   has a past surgical history that includes vitrectomy posterior (Left, 2021).    FAMILY HISTORY  Family History   Problem Relation Age of Onset    Stroke Mother     Diabetes Father     Diabetes Brother        SOCIAL HISTORY  Social History     Tobacco Use    Smoking status: Former     Types: Cigarettes     Quit date: 2001     Years since quittin.0    Smokeless tobacco: Never   Vaping Use    Vaping Use: Never used   Substance and Sexual Activity    Alcohol use: No    Drug use: No    Sexual activity: Not on file       CURRENT MEDICATIONS  Home Medications       Reviewed by María Baxter (Pharmacy Tech) on 23 at 0543  Med List Status: Unable to Obtain     Medication Last Dose Status   amLODIPine (NORVASC) 10 MG Tab  Active   atorvastatin (LIPITOR) 20 MG Tab  Active   FreeStyle Lancets Misc  Active   glucose  "blood (FREESTYLE LITE) strip  Active   hydroCHLOROthiazide (HYDRODIURIL) 25 MG Tab  Active   Insulin Pen Needle 32 G x 4 mm (BD PEN NEEDLE SHERWIN 2ND GEN)  Active   LANTUS SOLOSTAR 100 UNIT/ML Solution Pen-injector injection  Active   lisinopril (PRINIVIL) 40 MG tablet  Active   metformin (GLUCOPHAGE) 1000 MG tablet  Active   RYBELSUS 7 MG Tab  Active                    ALLERGIES  No Known Allergies    PHYSICAL EXAM  VITAL SIGNS: BP (!) 145/88   Pulse 95   Temp 36.2 °C (97.2 °F) (Temporal)   Resp 17   Ht 1.753 m (5' 9\")   Wt 76.8 kg (169 lb 5 oz)   SpO2 92%   BMI 25.00 kg/m²    Vitals and nursing note reviewed.   Constitutional:       Comments: Patient is lying in bed supine, pleasant, conversant, speaking in complete sentences   HENT:      Head: Normocephalic and atraumatic.   Eyes:      Extraocular Movements: Extraocular movements intact.      Conjunctiva/sclera: Conjunctivae normal.      Pupils: Pupils are equal, round, and reactive to light.   Cardiovascular:      Pulses: Normal pulses.      Comments: HR 79  Pulmonary:      Effort: Pulmonary effort is normal. No respiratory distress.   Musculoskeletal:         General: No swelling. Normal range of motion.      Cervical back: Normal range of motion. No rigidity.   Skin:     General: Skin is warm and dry.      Capillary Refill: Capillary refill takes less than 2 seconds.   Neurological:      Mental Status: Alert.   CN II-XII intact, speech normal, motor strength 5/5 in all four extremities, normal  strength bilaterally, sensation to light touch grossly intact in all extremities, no finger to nose dysmetria, patient is very unsteady on his feet, dizziness reactivated when lying down        DIAGNOSTIC STUDIES / PROCEDURES  EKG  I have independently interpreted this EKG  Intraventricular conduction delay    LABS  Hypokalemia    RADIOLOGY  I have independently interpreted the diagnostic imaging associated with this visit and am waiting the final reading from " the radiologist.   My preliminary interpretation is a follows: No acute cardiopulmonary process.    COURSE & MEDICAL DECISION MAKING      INITIAL ASSESSMENT AND PLAN  Care Narrative: Unclear etiology of dizziness.  Patient work-up more extensive than otherwise because his history so limited due to translation through his son who even has difficulty communicating with the patient.  For that reason broad work-up will be conducted, CTA head and neck to rule out acute intracranial abnormality.  Other than mild hyper glycemia and hypokalemia,CMP demonstrates no evidence of acute kidney injury, acute electrolyte abnormality, acute liver failure, CBC demonstrates no evidence of acute anemia or leukocytosis.  Urinalysis without evidence of UTI.  Troponin negative, ACS less likely at this time, EKG without acute ischemic changes or arrhythmia.  Patient given meclizine for symptom control for possible BPPV.  Patient denies ear pain or tinnitus, Ménière's disease less likely at this time, patient without recent history of URI, labyrinthitis versus vestibular neuritis less likely at this time as well.  CTA head and neck demonstrates 50 to 70% narrowing of the right M2 segment.  Disposition pending symptom control.    Electronically signed by: Cuco Hyatt M.D., 1/19/2023 4:57 AM    Patient still unsteady on his feet, complains of persistent dizziness, meclizine has not resolved the patient's symptoms.  Patient has been consulted to Dr. Keyes of [facility] medicine for admission who has graciously accepted the patient to his service for admission    This dictation has been created using voice recognition software. I am continuously working with the software to minimize the number of voice recognition errors and I have made every attempt to manually correct the errors within my dictation. However errors  related to this voice recognition software may still exist and should be interpreted within the appropriate context.      Electronically signed by: Cuco Hyatt M.D., 1/19/2023 5:49 AM        ADDITIONAL PROBLEM LIST AND DISPOSITION      Decision tools and prescription drugs considered including, but not limited to: HEART Score 4 .        FINAL DIAGNOSIS  1. Dizziness    2. Disequilibrium               Electronically signed by: Cuco Hyatt M.D., 1/19/2023 4:50 AM

## 2023-01-19 NOTE — PATIENT COMMUNICATION
Received request via: Patient    Was the patient seen in the last year in this department? Yes    Does the patient have an active prescription (recently filled or refills available) for medication(s) requested? No    Does the patient have [facility] and need 100 day supply (blood pressure, diabetes and cholesterol meds only)?  N/A    Requested Prescriptions     Pending Prescriptions Disp Refills    FreeStyle Lancets Misc 100 Each 0     Sig: USE  TO CHECK GLUCOSE THREE TIMES DAILY    glucose blood (FREESTYLE LITE) strip 100 Strip 2     Sig: Use 3 times a day and as neededUse 3 times a day and as needed

## 2023-01-19 NOTE — H&P
Hospital Medicine History & Physical Note    Date of Service  1/19/2023    Primary Care Physician  Marlo Watts M.D.    Code Status  Full Code    Chief Complaint  Chief Complaint   Patient presents with    N/V     For last hour.     Dizziness     And headache for last hour       History of Presenting Illness  Howard Gamboa is a 77 y.o. male who presented 1/19/2023 with dizziness and gait abnormalities.  PMH of HTN, DM2, dyslipidemia.  Comes in with dizziness and wide-based gait which is a chronic issue that has been happening for at least 6 months and even possibly up to a year.  He says the dizziness was slightly worse over the past couple days resulting in nausea/vomiting, that has improved at the time of my evaluation.  No recent illness.  He does have tinnitus and hearing loss which are chronic for many years now.  Denies any other acute complaints.    In the ED afebrile, BP elevated to systolic 200.  Labs showed potassium of 3.3.    I discussed the plan of care with patient, bedside RN, and edp .    Review of Systems  Review of Systems   Constitutional:  Negative for chills and fever.   HENT:  Negative for sore throat.    Respiratory:  Negative for cough and shortness of breath.    Cardiovascular:  Negative for chest pain.   Gastrointestinal:  Negative for abdominal pain, diarrhea, nausea and vomiting.   Genitourinary:  Negative for dysuria and urgency.   Neurological:  Positive for dizziness. Negative for headaches.   All other systems reviewed and are negative.    Past Medical History   has a past medical history of Dental disorder, Diabetes (HCC), Diabetes mellitus out of control (2010), High cholesterol (2010), and Hypertension (2010).    Surgical History   has a past surgical history that includes vitrectomy posterior (Left, 7/26/2021).     Family History  family history includes Diabetes in his brother and father; Stroke in his mother.   Family history reviewed with patient. There is no family  history that is pertinent to the chief complaint.     Social History   reports that he quit smoking about 22 years ago. He has never used smokeless tobacco. He reports that he does not drink alcohol and does not use drugs.    Allergies  No Known Allergies    Medications  Prior to Admission Medications   Prescriptions Last Dose Informant Patient Reported? Taking?   FreeStyle Lancets Misc   No No   Sig: USE  TO CHECK GLUCOSE THREE TIMES DAILY   Insulin Pen Needle 32 G x 4 mm (BD PEN NEEDLE SHERWIN 2ND GEN)   No No   Sig: Use 3 times a day and as needed   LANTUS SOLOSTAR 100 UNIT/ML Solution Pen-injector injection   No No   Sig: INJECT 10 UNITS SUBCUTANEOUSLY ONCE DAILY IN THE EVENING   RYBELSUS 7 MG Tab   No No   Sig: Take 1 tablet by mouth once daily   amLODIPine (NORVASC) 10 MG Tab   No No   Sig: Take 1 Tablet by mouth every day.   atorvastatin (LIPITOR) 20 MG Tab   No No   Sig: Take 2 Tablets by mouth every day.   glucose blood (FREESTYLE LITE) strip   No No   Sig: Use 3 times a day and as needed   hydroCHLOROthiazide (HYDRODIURIL) 25 MG Tab   No No   Sig: Take 1 Tablet by mouth every day.   lisinopril (PRINIVIL) 40 MG tablet   No No   Sig: Take 1 Tablet by mouth every day.   metformin (GLUCOPHAGE) 1000 MG tablet   No No   Sig: Take 1 Tablet by mouth 2 times a day with meals.      Facility-Administered Medications: None       Physical Exam  Temp:  [36.2 °C (97.2 °F)] 36.2 °C (97.2 °F)  Pulse:  [] 103  Resp:  [17-18] 18  BP: (145-188)/() 188/107  SpO2:  [92 %-94 %] 93 %  Blood Pressure : (!) 145/88   Temperature: 36.2 °C (97.2 °F)   Pulse: 95   Respiration: 17   Pulse Oximetry: 92 %       Physical Exam  Constitutional:       Appearance: Normal appearance.   HENT:      Head: Normocephalic and atraumatic.      Mouth/Throat:      Mouth: Mucous membranes are moist.      Pharynx: Oropharynx is clear. No oropharyngeal exudate or posterior oropharyngeal erythema.   Eyes:      General: No scleral  icterus.  Cardiovascular:      Rate and Rhythm: Normal rate and regular rhythm.      Pulses: Normal pulses.      Heart sounds: Normal heart sounds. No murmur heard.  Pulmonary:      Effort: Pulmonary effort is normal. No respiratory distress.      Breath sounds: Normal breath sounds. No wheezing.   Abdominal:      Palpations: Abdomen is soft.      Tenderness: There is no abdominal tenderness.   Musculoskeletal:         General: No swelling or tenderness. Normal range of motion.      Cervical back: Normal range of motion.   Skin:     General: Skin is warm and dry.   Neurological:      General: No focal deficit present.      Mental Status: He is alert and oriented to person, place, and time. Mental status is at baseline.      Comments: Bilateral nystagmus. Wide based gait    Psychiatric:         Mood and Affect: Mood normal.       Laboratory:  Recent Labs     01/19/23  0018   WBC 10.3   RBC 5.84   HEMOGLOBIN 15.1   HEMATOCRIT 45.1   MCV 77.2*   MCH 25.9*   MCHC 33.5*   RDW 38.1   PLATELETCT 329   MPV 8.3*     Recent Labs     01/19/23  0018   SODIUM 138   POTASSIUM 3.3*   CHLORIDE 100   CO2 25   GLUCOSE 219*   BUN 11   CREATININE 0.83   CALCIUM 9.4     Recent Labs     01/19/23  0018   ALTSGPT 21   ASTSGOT 21   ALKPHOSPHAT 104*   TBILIRUBIN 0.8   GLUCOSE 219*         No results for input(s): NTPROBNP in the last 72 hours.      Recent Labs     01/19/23  0018   TROPONINT 9       Imaging:  CT-CTA NECK WITH & W/O-POST PROCESSING   Final Result         1.  Mildly enlarged right cervical chain lymph nodes, consider causes of adenopathy with additional workup as clinically appropriate.   2.  Otherwise CT angiogram of the neck within normal limits.         CT-CTA HEAD WITH & W/O-POST PROCESS   Final Result         1.  50-70% narrowing of proximal right M2 segment.   2.  No large vessel occlusion or aneurysm identified      DX-CHEST-LIMITED (1 VIEW)   Final Result         1.  No acute cardiopulmonary disease.   2.   Atherosclerosis      MR-BRAIN-W/O    (Results Pending)       X-Ray:  I have personally reviewed the images and compared with prior images. and My impression is: no acute findings  EKG:  I have personally reviewed the images and compared with prior images. and My impression is: NSR, T wave changes inferiorly, similar to prior    Assessment/Plan:  Justification for Admission Status  I anticipate this patient is appropriate for observation status at this time because dizziness, pt/ot. HTN urgency     * Dizziness- (present on admission)  Assessment & Plan  Dizziness and gait abnormalities that are chronic.  Dizziness slightly worsened over the past couple days but at baseline at the time of my evaluation  Described as room spinning  Bilateral nystagmus on hints exam.  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  As needed antihypertensives    Type 2 diabetes mellitus with hyperglycemia, without long-term current use of insulin (HCC)- (present on admission)  Assessment & Plan  Hold home orals.  Sliding scale in the hospital    Primary hypertension- (present on admission)  Assessment & Plan  There were problems with translation as he is very hard of hearing but seems to be only taking one of his antihypertensives even though there are 3 prescribed  He is in hypertensive urgency at this time continue all home meds on med list including lisinopril, hydrochlorothiazide, amlodipine  Day team to clarify with son about his antihypertensive regiment    Hypokalemia- (present on admission)  Assessment & Plan  3.3 on presentation, replete as needed and check magnesium    Mixed hyperlipidemia- (present on admission)  Assessment & Plan  Continue home statin        VTE prophylaxis: enoxaparin ppx

## 2023-01-19 NOTE — ASSESSMENT & PLAN NOTE
There were problems with translation as he is very hard of hearing but seems to be only taking one of his antihypertensives even though there are 3 prescribed  He is in hypertensive urgency at this time continue all home meds on med list including lisinopril, hydrochlorothiazide, amlodipine  Day team to clarify with son about his antihypertensive regiment

## 2023-01-19 NOTE — ED TRIAGE NOTES
Chief Complaint   Patient presents with    N/V     For last hour.     Dizziness     And headache for last hour     Pt ambulatory to triage for above complaints accompanied by his son. PMH of diabetes. BG in triage 186. Hypertensive to 173/101, other VSS. In NAD

## 2023-01-19 NOTE — HOSPITAL COURSE
Mr. Howard Gamboa is a 77 y.o. male with a PMHxof HTN, DM2, dyslipidemia who presented 1/19/2023 with dizziness and gait abnormalities.       Patient comes in with dizziness and wide-based gait which is a chronic issue that has been happening for at least 6 months and even possibly up to a year.  He says the dizziness was slightly worse over the past couple days resulting in nausea/vomiting, that has improved at the time of my evaluation.  No recent illness.  He does have tinnitus and hearing loss which are chronic for many years now.  Denies any other acute complaints.     In the ED afebrile, BP elevated to systolic 200.  Notable lab findings include potassium 3.3, glucose 219, alk phos 104.  Urine analysis noted to be cloudy in character, elevated glucose level at 205, trace ketones, slightly elevated WBC and RBC at 0-2.  Patient denied any urinary symptoms on admission.  CTA head noted 50-70% narrowing of the proximal right M2 segment with no large vessel occlusion or aneurysm identified.  CTA neck noted mildly enlarged right cervical chain lymph nodes and consider work-up for adenopathy, otherwise CT angiogram of neck within normal limits.  Initial chest x-ray notes no acute cardiopulmonary disease, atherosclerosis also noted.  Initial EKG notes sinus rhythm with borderline intraventricular conduction delay, abnormal R wave progression with biphasic T waves in the inferior leads.  Patient admitted into the observation unit for further evaluation and treatment.    Patient was monitored overnight with no events noted.  Follow-up MRI of the brain was obtained noting no acute abnormality with chronic punctate microhemorrhage in the left thalamus and severe chronic microvascular ischemic disease.  Patient noted significant improvement overnight with his symptoms of dizziness in conjunction with use of meclizine.  Patient seen and examined prior to being discharged.  Patient's family at bedside during examination.   Patient educated again in regards to staying compliant with diet along with medication for diabetes and hypertension.  Discussed utilizing meclizine at home to help with dizziness symptoms.  Patient highly recommended to follow-up with PCP s/p hospitalization.  All questions and concerns answered prior to being discharged.  Patient discharged home.

## 2023-01-19 NOTE — DISCHARGE PLANNING
HTH/SCP TCN chart review completed. Collaborated with CLAUDIO Farah in ED and Bedside RN MICKEY. The most current review of medical record, knowledge of pt's PLOF and social support, LACE+ score of 67, 6 clicks scores of ( not entered) mobility were considered.      TCN noted patient has Language Barrier.  Electronic  services was used during this visit.  Language Used: MANDARIN/ Dialect Nithya  Daughter KAY Mohan was included in the audio call conversation. Verified HIPPA.      Pt seen at bedside. Introduced myself and my role as Transitional Care Navigator.Pt states he lives by himself, Son brought him to ED due to dizziness. Mbr states he can walk back home if he has no ride. Mbr was unable to give details regarding his baseline mobility and ADL functioning.  Mbr cannot give a straightforward answer regarding his baseline prior level of functioning. Furthermore , patient is currently not receptive to  education regarding post-acute level services at this time. As such, most information is directed to Patient's Daughter Kay.Introduced TCN program and available post-acute services to this patient's Daughter. Kay verbalized understanding. According to Daughter, pt lives with his Wife in a 2 neeta house in [place], independent with mobility as baseline without aide of an assistive device.  TCN informed patient's Daughter Kay of covered benefits and available post-acute services. Kay gave verbal permission to sign HomeHealth choice and DME choice.  OT PT eval pending Choice proactively obtained for HH and DME and given to CLAUDIO Farah. TCN will proactively [place] [facility] TCN will continue to follow and collaborate with discharge planning team as additional post acute needs arise. Thank you.     Completed today:  PT/OT  eval/recommendations pending 1/19/2023  Choice obtained: HH, DME 1/19/2023  [facility] referral proactively sent on 1/19/2023

## 2023-01-19 NOTE — ED NOTES
Pt to T2 hall/01 via [facility] w/ transport. Pt A&Ox4 on RA. All belongings are w/ pt.

## 2023-01-19 NOTE — ED NOTES
Unable to obtain med rec at this time  Patient is unable to communicate with , patient speaks an unknown dialect that we are unable to verify through  and Language Line  Son is no longer at bedside, and is not listed in contacts  Home pharmacy is currently closed

## 2023-01-20 ENCOUNTER — PHARMACY VISIT (OUTPATIENT)
Dept: PHARMACY | Facility: MEDICAL CENTER | Age: 78
End: 2023-01-20
Payer: COMMERCIAL

## 2023-01-20 VITALS
SYSTOLIC BLOOD PRESSURE: 128 MMHG | BODY MASS INDEX: 25.08 KG/M2 | HEIGHT: 69 IN | HEART RATE: 79 BPM | OXYGEN SATURATION: 91 % | WEIGHT: 169.31 LBS | TEMPERATURE: 98.9 F | DIASTOLIC BLOOD PRESSURE: 85 MMHG | RESPIRATION RATE: 17 BRPM

## 2023-01-20 PROBLEM — R42 DIZZINESS: Status: RESOLVED | Noted: 2023-01-19 | Resolved: 2023-01-20

## 2023-01-20 PROBLEM — I16.0 HYPERTENSIVE URGENCY: Status: RESOLVED | Noted: 2023-01-19 | Resolved: 2023-01-20

## 2023-01-20 PROBLEM — E87.6 HYPOKALEMIA: Status: RESOLVED | Noted: 2018-06-29 | Resolved: 2023-01-20

## 2023-01-20 LAB
ANION GAP SERPL CALC-SCNC: 11 MMOL/L (ref 7–16)
BUN SERPL-MCNC: 16 MG/DL (ref 8–22)
CALCIUM SERPL-MCNC: 9 MG/DL (ref 8.5–10.5)
CHLORIDE SERPL-SCNC: 100 MMOL/L (ref 96–112)
CO2 SERPL-SCNC: 27 MMOL/L (ref 20–33)
CREAT SERPL-MCNC: 1.24 MG/DL (ref 0.5–1.4)
ERYTHROCYTE [DISTWIDTH] IN BLOOD BY AUTOMATED COUNT: 39 FL (ref 35.9–50)
GFR SERPLBLD CREATININE-BSD FMLA CKD-EPI: 60 ML/MIN/1.73 M 2
GLUCOSE BLD STRIP.AUTO-MCNC: 165 MG/DL (ref 65–99)
GLUCOSE SERPL-MCNC: 167 MG/DL (ref 65–99)
HCT VFR BLD AUTO: 44.8 % (ref 42–52)
HGB BLD-MCNC: 14.9 G/DL (ref 14–18)
MCH RBC QN AUTO: 26 PG (ref 27–33)
MCHC RBC AUTO-ENTMCNC: 33.3 G/DL (ref 33.7–35.3)
MCV RBC AUTO: 78.2 FL (ref 81.4–97.8)
PLATELET # BLD AUTO: 348 K/UL (ref 164–446)
PMV BLD AUTO: 8.3 FL (ref 9–12.9)
POTASSIUM SERPL-SCNC: 3.4 MMOL/L (ref 3.6–5.5)
RBC # BLD AUTO: 5.73 M/UL (ref 4.7–6.1)
SODIUM SERPL-SCNC: 138 MMOL/L (ref 135–145)
WBC # BLD AUTO: 9 K/UL (ref 4.8–10.8)

## 2023-01-20 PROCEDURE — 97161 PT EVAL LOW COMPLEX 20 MIN: CPT

## 2023-01-20 PROCEDURE — G0378 HOSPITAL OBSERVATION PER HR: HCPCS

## 2023-01-20 PROCEDURE — 96372 THER/PROPH/DIAG INJ SC/IM: CPT

## 2023-01-20 PROCEDURE — 80048 BASIC METABOLIC PNL TOTAL CA: CPT

## 2023-01-20 PROCEDURE — A9270 NON-COVERED ITEM OR SERVICE: HCPCS | Performed by: STUDENT IN AN ORGANIZED HEALTH CARE EDUCATION/TRAINING PROGRAM

## 2023-01-20 PROCEDURE — 97165 OT EVAL LOW COMPLEX 30 MIN: CPT

## 2023-01-20 PROCEDURE — 82962 GLUCOSE BLOOD TEST: CPT

## 2023-01-20 PROCEDURE — RXMED WILLOW AMBULATORY MEDICATION CHARGE: Performed by: NURSE PRACTITIONER

## 2023-01-20 PROCEDURE — 700102 HCHG RX REV CODE 250 W/ 637 OVERRIDE(OP): Performed by: HOSPITALIST

## 2023-01-20 PROCEDURE — 36415 COLL VENOUS BLD VENIPUNCTURE: CPT

## 2023-01-20 PROCEDURE — 700102 HCHG RX REV CODE 250 W/ 637 OVERRIDE(OP): Performed by: STUDENT IN AN ORGANIZED HEALTH CARE EDUCATION/TRAINING PROGRAM

## 2023-01-20 PROCEDURE — 85027 COMPLETE CBC AUTOMATED: CPT

## 2023-01-20 PROCEDURE — A9270 NON-COVERED ITEM OR SERVICE: HCPCS | Performed by: HOSPITALIST

## 2023-01-20 PROCEDURE — 99239 HOSP IP/OBS DSCHRG MGMT >30: CPT | Performed by: NURSE PRACTITIONER

## 2023-01-20 RX ORDER — MECLIZINE HCL 12.5 MG/1
12.5 TABLET ORAL 3 TIMES DAILY
Qty: 30 TABLET | Refills: 0 | Status: SHIPPED | OUTPATIENT
Start: 2023-01-20 | End: 2023-01-30

## 2023-01-20 RX ADMIN — MECLIZINE HYDROCHLORIDE 12.5 MG: 25 TABLET ORAL at 10:12

## 2023-01-20 RX ADMIN — AMLODIPINE BESYLATE 10 MG: 5 TABLET ORAL at 05:44

## 2023-01-20 RX ADMIN — HYDROCHLOROTHIAZIDE 25 MG: 25 TABLET ORAL at 05:44

## 2023-01-20 RX ADMIN — ATORVASTATIN CALCIUM 40 MG: 40 TABLET, FILM COATED ORAL at 05:44

## 2023-01-20 RX ADMIN — LISINOPRIL 40 MG: 20 TABLET ORAL at 05:44

## 2023-01-20 RX ADMIN — INSULIN HUMAN 1 UNITS: 100 INJECTION, SOLUTION PARENTERAL at 06:23

## 2023-01-20 ASSESSMENT — PAIN DESCRIPTION - PAIN TYPE: TYPE: ACUTE PAIN

## 2023-01-20 ASSESSMENT — COGNITIVE AND FUNCTIONAL STATUS - GENERAL
MOBILITY SCORE: 18
MOVING FROM LYING ON BACK TO SITTING ON SIDE OF FLAT BED: A LITTLE
SUGGESTED CMS G CODE MODIFIER MOBILITY: CK
DAILY ACTIVITIY SCORE: 19
STANDING UP FROM CHAIR USING ARMS: A LITTLE
TOILETING: A LITTLE
TURNING FROM BACK TO SIDE WHILE IN FLAT BAD: A LITTLE
SUGGESTED CMS G CODE MODIFIER DAILY ACTIVITY: CK
CLIMB 3 TO 5 STEPS WITH RAILING: A LITTLE
HELP NEEDED FOR BATHING: A LITTLE
DRESSING REGULAR UPPER BODY CLOTHING: A LITTLE
WALKING IN HOSPITAL ROOM: A LITTLE
DRESSING REGULAR LOWER BODY CLOTHING: A LITTLE
PERSONAL GROOMING: A LITTLE
MOVING TO AND FROM BED TO CHAIR: A LITTLE

## 2023-01-20 ASSESSMENT — GAIT ASSESSMENTS
GAIT LEVEL OF ASSIST: SUPERVISED
DISTANCE (FEET): 200

## 2023-01-20 NOTE — PROGRESS NOTES
4 Eyes Skin Assessment Completed by NAI Carter and NAI Hood.    Head WDL  Ears WDL  Nose WDL  Mouth WDL  Neck WDL  Breast/Chest WDL  Shoulder Blades WDL  Spine WDL  (R) Arm/Elbow/Hand WDL  (L) Arm/Elbow/Hand WDL  Abdomen WDL  Groin WDL  Scrotum/Coccyx/Buttocks WDL  (R) Leg WDL  (L) Leg WDL  (R) Heel/Foot/Toe WDL  (L) Heel/Foot/Toe WDL          Devices In Places Pulse Ox      Interventions In Place Pillows and Pressure Redistribution Mattress    Possible Skin Injury No    Pictures Uploaded Into Epic N/A  Wound Consult Placed N/A  RN Wound Prevention Protocol Ordered No

## 2023-01-20 NOTE — DISCHARGE INSTRUCTIONS
FOLLOW UP ITEMS POST DISCHARGE  Please call [phone] to schedule PCP appointment for patient.    Required specialty appointments include:       Discharge Instructions per Forrest Cote, [email or web address].    -Follow-up with your PCP s/p hospitalization  -Stick with a diabetic and cardiac diet  -Resume all other home medications  -Start taking meclizine 12.5 mg 3 times daily for 10 days to help with dizziness    DIET: Diabetic and cardiac    ACTIVITY: As tolerated    DIAGNOSIS: Dizziness    Return to ER if symptoms persist, chest pain, palpitations, shortness of breath, numbness, tingling, weakness, and high fevers.

## 2023-01-20 NOTE — PROGRESS NOTES
Family at bedside and assisting with interpreting, will go to pharmacy to  medication and then take patient home per order.

## 2023-01-20 NOTE — PROGRESS NOTES
Language barrier and hearing deficit causing difficulty with communication, pointing and gestures being used to indicate care plan and pt doing the same to communicate needs.  Plan for discharge later today when family members arrive to transport home and assist with discharge teaching.  Pt unable to understand I-pad  due to hearing deficit.

## 2023-01-20 NOTE — PROGRESS NOTES
Assumed care from Deyanira TRIMBLE.  Assessment complete. Plan of care gone over with patient with ipad . Because patient is hard of hearing it is difficult for translation on the Ipad. Patient was resting in bed comfortably. Patient was A&O x 4. Safety precautions in place. Patient had no concerns at this time and educated on how to use the call light.  Was unable to complete admission profile due to the hearing and language barrier.

## 2023-01-20 NOTE — CARE PLAN
The patient is Stable - Low risk of patient condition declining or worsening    Shift Goals  Clinical Goals: control pain, dizziness, prevent falls  Patient Goals: `control pain    Progress made toward(s) clinical / shift goals:    Problem: Knowledge Deficit - Standard  Goal: Patient and family/care givers will demonstrate understanding of plan of care, disease process/condition, diagnostic tests and medications  Outcome: Progressing       Patient is not progressing towards the following goals:  Pt has no pain, clear liq, waiting on the MRI

## 2023-01-20 NOTE — THERAPY
Occupational Therapy   Initial Evaluation     Patient Name: Howard Gamboa  Age:  77 y.o., Sex:  male  Medical Record #: [id number]  Today's Date: 1/20/2023     Precautions  Precautions: Fall Risk    Assessment    Patient is 77 y.o. male admitted with nausea, vomiting and dizziness. Other pertinent medical history includes HTN, DM, and dyslipidemia. Pt seen for OT evaluation. Pt son in room at beginning of eval and initially agreed to provide translation, but shortly after stated he and the patient do not get along and left. Pt stood to brush teeth with supv and declined the need to use the restroom (per RN and CNA, pt able to use bathroom with supv). Pt UE strength and ROM appear WFL. Pt history was difficult to obtain due to language barrier despite use of .  reported the pt was not giving straight forward answers during the evaluation. Pt appears to be performing ADLs near functional baseline. No further OT needs anticipated at this time.     Plan     Pt seen for OT eval only.     DC Equipment Recommendations: None  Discharge Recommendations: Anticipate that the patient will have no further occupational therapy needs after discharge from the hospital      Objective     01/20/23 1142    Services   Is patient using  services for this encounter? Yes   Language Interpreted Mandarin    Name Heber [id number]    Mode iPad   Refusal signed by patient? No   Content of Interpretation (select all) Patient Education;Other;History/Visit information  (OT eval)   Prior Living Situation   Prior Services None   Housing / Facility 2 Story House  (stated he can stay downstairs)   Lives with - Patient's Self Care Capacity   (family)   Comments Attempted to obtain history. Difficult even with .  stated that pt was not giving straight forward answers. Pt mentioned a daughter, son, and wife. Son was present at beginning of eval and agreed initially to  interpret, but then stated that he and the pt do not get along well and left the unit.   Prior Level of ADL Function   Comments Unable to determine at this time. Suspect he was I with ADLs prior.   Prior Level of IADL Function   Comments Unable to determine at this time.   Cognition    Cognition / Consciousness X   Speech/ Communication   (used ,  reported the patient was not giving straight forward answers)   Level of Consciousness Alert   Ability To Follow Commands 1 Step   Attention Impaired   Comments [employer]operative, [place], likely limited by language barrier  (Pt attempting to call daughter at end of evaluation. Recalled to dial 9 first.)   Passive ROM Upper Body   Comments Not formally assessed, appears WFL   Active ROM Upper Body   Comments Not formally assessed, appears WFL   Strength Upper Body   Comments Not formally assessed, appears WFL   Upper Body Muscle Tone   Upper Body Muscle Tone  WDL   Coordination Upper Body   Comments Not formally assessed, appears WFL, not noted to have difficulty with fine motor tasks like replacing toothpaste lid   Balance Assessment   Sitting Balance (Static) Good   Sitting Balance (Dynamic) Good   Standing Balance (Static) Good   Standing Balance (Dynamic) Fair +   Weight Shift Sitting Good   Weight Shift Standing Good   Comments no AD, no LOB   Bed Mobility    Supine to Sit Supervised   Sit to Supine   (up to chair post)   Scooting Supervised   Rolling Supervised   Comments HOB flat, no use of rails   ADL Assessment   Grooming Supervision;Standing  (brushed teeth at sink)   Toileting   (declined the need, per CNA, pt to/from bathroom w/ supv)   Functional Mobility   Sit to Stand Supervised   Bed, Chair, Wheelchair Transfer Supervised   Transfer Method Stand Step   Mobility EOB>sink>hallway>chair   Comments w/ no AD   Visual Perception   Visual Perception  Not Tested   Activity Tolerance   Sitting in Chair up to chair post   Sitting Edge of Bed <1 min    Standing >10 min   Comments no report of pain or fatigue   Education Group   Education Provided Role of Occupational Therapist   Role of Occupational Therapist Patient Response Patient;Acceptance;Explanation;Verbal Demonstration;Reinforcement Needed

## 2023-01-20 NOTE — CARE PLAN
Problem: Knowledge Deficit - Standard  Goal: Patient and family/care givers will demonstrate understanding of plan of care, disease process/condition, diagnostic tests and medications  Outcome: Progressing     The patient is Stable - Low risk of patient condition declining or worsening    Shift Goals  Clinical Goals: monitor dizziness  Patient Goals: N/V control  Family Goals: no ffamily at bedside    Progress made toward(s) clinical / shift goals:  Patient understands the use of the call light and when to use it.       Patient is not progressing towards the following goals: Patient does not speak english and is hard of hearing making it very difficult to use the Ipad  services

## 2023-01-20 NOTE — PROGRESS NOTES
Pt discharged home per order, IV removed, all personal belongings taken with patient and son.  Reviewed discharge paperwork with son, he expressed understanding.  Patient and son to  discharge medications from pharmacy upon exiting unit.  See Epic for details.

## 2023-01-20 NOTE — DISCHARGE PLANNING
HTH/SCP TCN chart review completed. Collaborated with CLAUDIO Beck and CESIA. Current discharge considerations are to dc to home with possible HH or DME /ADs if needed. However, given pt's current mobility (up self with no AD) and discussion with CLAUDIO/CESIA, anticipating pt will be able to dc to home today with family and outpatient follow ups and/or [facility] services if indicated. Would note that pt does still have PT and OT consults in place and TCN will monitor for therapy recs for dc planning as well*. TCN will continue to follow and collaborate with discharge planning team as additional post acute needs arise. Thank you.    Completed:  PT/OT  eval/recommendations pending 1/19/2023  Choice obtained: JAVIER, KRISTEN 1/19/2023  [facility] referral proactively sent on 1/19/2023    *noted in PM PT and OT with recs for no further therapy needs post dc

## 2023-01-20 NOTE — THERAPY
Physical Therapy   Initial Evaluation     Patient Name: Howard Gamboa  Age:  77 y.o., Sex:  male  Medical Record #: [id number]  Today's Date: 1/20/2023      Assessment  Patient is 77 y.o. male admitted with nausea, vomiting, and dizziness.  PMH includes HTN, DM2, and dyslipidemia.  Today patient was seen for PT evaluation, PLOF and home setup difficult to obtain due to language barrier and extended time to reach .  Patient ambulated in hallway without AD with steady gait, declined stair negotiation and stated he does not need to go up stairs at home.  Patient stated he is mobilizing as he usually does, c/o slight dizziness.  Patient appears to be at/near functional baseline.  No further acute PT needs.     Plan    Physical Therapy Initial Treatment Plan   Duration: Evaluation only    DC Equipment Recommendations: None  Discharge Recommendations: Anticipate that the patient will have no further physical therapy needs after discharge from the hospital     Objective     01/20/23 1141    Services   Is patient using  services for this encounter? Yes   Language Interpreted Mandarin    Name Heber #[id number]    Mode iPad   Content of Interpretation (select all) History/Visit information;Patient Education   Prior Living Situation   Housing / Facility 2 Story House   Lives with - Patient's Self Care Capacity (family)   Comments Information difficult to obtain from pt due to language barrier,  reported he was not answering questions simply.  Pt mentioned his wife, daughter, and son, unclear who all lives at home.  Son was initially pleasant however quite irritable & quickly stated that he & pt do not get along and left unit.  Pt stated he does not need to go upstairs at home.   Prior Level of Functional Mobility   Comments Anticipate IND given current presentation   Cognition    Cognition / Consciousness X   Level of Consciousness Alert   Comments Cooperative, limited by  Please notify patient that her stool softener had been refilled. Thank you. language barrier.  [place]   Active ROM Lower Body    Active ROM Lower Body  WDL   Strength Lower Body   Lower Body Strength  WDL   Balance Assessment   Sitting Balance (Static) Good   Sitting Balance (Dynamic) Good   Standing Balance (Static) Good   Standing Balance (Dynamic) Fair +   Weight Shift Sitting Good   Weight Shift Standing Good   Bed Mobility    Supine to Sit Supervised   Sit to Supine (NT, left up in chair)   Gait Analysis   Gait Level Of Assist Supervised   Assistive Device None   Distance (Feet) 200   # of Times Distance was Traveled 1   Level of Assist with Stairs (declined)   Weight Bearing Status No restrictions   Functional Mobility   Sit to Stand Supervised   Bed, Chair, Wheelchair Transfer Supervised   Transfer Method Stand Step   Mobility bed mobility, ambulation   Activity Tolerance   Sitting in Chair Post session   Sitting Edge of Bed < 1 min   Standing 15 min   Physical Therapy Initial Treatment Plan    Duration Evaluation only   Anticipated Discharge Equipment and Recommendations   DC Equipment Recommendations None   Discharge Recommendations Anticipate that the patient will have no further physical therapy needs after discharge from the hospital

## 2023-01-20 NOTE — DISCHARGE SUMMARY
Discharge Summary    CHIEF COMPLAINT ON ADMISSION  Chief Complaint   Patient presents with    N/V     For last hour.     Dizziness     And headache for last hour       Reason for Admission  Vomiting     Admission Date  1/19/2023    CODE STATUS  Full Code    HPI & HOSPITAL COURSE    Mr. Howard Gamboa is a 77 y.o. male with a PMHxof HTN, DM2, dyslipidemia who presented 1/19/2023 with dizziness and gait abnormalities.       Patient comes in with dizziness and wide-based gait which is a chronic issue that has been happening for at least 6 months and even possibly up to a year.  He says the dizziness was slightly worse over the past couple days resulting in nausea/vomiting, that has improved at the time of my evaluation.  No recent illness.  He does have tinnitus and hearing loss which are chronic for many years now.  Denies any other acute complaints.     In the ED afebrile, BP elevated to systolic 200.  Notable lab findings include potassium 3.3, glucose 219, alk phos 104.  Urine analysis noted to be cloudy in character, elevated glucose level at 205, trace ketones, slightly elevated WBC and RBC at 0-2.  Patient denied any urinary symptoms on admission.  CTA head noted 50-70% narrowing of the proximal right M2 segment with no large vessel occlusion or aneurysm identified.  CTA neck noted mildly enlarged right cervical chain lymph nodes and consider work-up for adenopathy, otherwise CT angiogram of neck within normal limits.  Initial chest x-ray notes no acute cardiopulmonary disease, atherosclerosis also noted.  Initial EKG notes sinus rhythm with borderline intraventricular conduction delay, abnormal R wave progression with biphasic T waves in the inferior leads.  Patient admitted into the observation unit for further evaluation and treatment.    Patient was monitored overnight with no events noted.  Follow-up MRI of the brain was obtained noting no acute abnormality with chronic punctate microhemorrhage in the left  thalamus and severe chronic microvascular ischemic disease.  Patient noted significant improvement overnight with his symptoms of dizziness in conjunction with use of meclizine.  Patient seen and examined prior to being discharged.  Patient's family at bedside during examination.  Patient educated again in regards to staying compliant with diet along with medication for diabetes and hypertension.  Discussed utilizing meclizine at home to help with dizziness symptoms.  Patient highly recommended to follow-up with PCP s/p hospitalization.  All questions and concerns answered prior to being discharged.  Patient discharged home.    Therefore, he is discharged in good and stable condition to home with close outpatient follow-up.    The patient recovered much more quickly than anticipated on admission.    Discharge Date  01/20/23      FOLLOW UP ITEMS POST DISCHARGE  Please call [phone] to schedule PCP appointment for patient.    Required specialty appointments include:       Discharge Instructions per KERMIT HighRAlejandroNAlejandro    -Follow-up with your PCP s/p hospitalization  -Stick with a diabetic and cardiac diet  -Resume all other home medications  -Start taking meclizine 12.5 mg 3 times daily for 10 days to help with dizziness    DIET: Diabetic and cardiac    ACTIVITY: As tolerated    DIAGNOSIS: Dizziness    Return to ER if symptoms persist, chest pain, palpitations, shortness of breath, numbness, tingling, weakness, and high fevers.    DISCHARGE DIAGNOSES  Principal Problem (Resolved):    Dizziness POA: Yes  Active Problems:    Primary hypertension POA: Yes    Type 2 diabetes mellitus with hyperglycemia, without long-term current use of insulin (HCC) POA: Yes    Mixed hyperlipidemia POA: Yes  Resolved Problems:    Hypokalemia POA: Yes    Hypertensive urgency POA: Yes      FOLLOW UP    Marlo Watts M.D.  [address] [place] [id number] [phone]    Schedule an appointment as  soon as possible for a visit in 1 week(s)      Marlo Watts M.D.  [address] [facility] [address]  [place] [id number] [phone]            MEDICATIONS ON DISCHARGE     Medication List        START taking these medications        Instructions   meclizine 12.5 MG Tabs  Commonly known as: ANTIVERT   Take 1 Tablet by mouth in the morning, at noon, and at bedtime for 10 days.  Dose: 12.5 mg            CHANGE how you take these medications        Instructions   FREESTYLE LITE strip  What changed: additional instructions  Generic drug: glucose blood   Use 3 times a day and as neededUse 3 times a day and as needed     Lantus SoloStar 100 UNIT/ML Sopn injection  What changed: See the new instructions.  Generic drug: insulin glargine   INJECT 10 UNITS SUBCUTANEOUSLY ONCE DAILY IN THE EVENING            CONTINUE taking these medications        Instructions   amLODIPine 10 MG Tabs  Commonly known as: NORVASC   Take 1 Tablet by mouth every day.  Dose: 10 mg     atorvastatin 20 MG Tabs  Commonly known as: LIPITOR   Take 2 Tablets by mouth every day.  Dose: 40 mg     BD Pen Needle Esha 2nd Gen  Generic drug: Insulin Pen Needle 32 G x 4 mm   Use 3 times a day and as needed     FreeStyle Lancets Misc   USE  TO CHECK GLUCOSE THREE TIMES DAILY     hydroCHLOROthiazide 25 MG Tabs  Commonly known as: HYDRODIURIL   Take 1 Tablet by mouth every day.  Dose: 25 mg     lisinopril 40 MG tablet  Commonly known as: PRINIVIL   Take 1 Tablet by mouth every day.  Dose: 40 mg     metformin 1000 MG tablet  Commonly known as: GLUCOPHAGE   Take 1 Tablet by mouth 2 times a day with meals.  Dose: 1,000 mg     Rybelsus 7 MG Tabs  Generic drug: Semaglutide   Take 1 tablet by mouth once daily              Allergies  No Known Allergies    DIET  Orders Placed This Encounter   Procedures    Diet Order Diet: Clear Liquid     Standing Status:   Standing     Number of Occurrences:   1     Order Specific Question:   Diet:     Answer:   Clear Liquid [10]        ACTIVITY  As tolerated.  Weight bearing as tolerated    CONSULTATIONS  NONE    PROCEDURES  NONE    IMAGING    MR-BRAIN-W/O   Final Result      1.  No acute abnormality.   2.  A chronic punctate microhemorrhage in the left thalamus.   3.  Severe chronic microvascular ischemic disease.      CT-CTA NECK WITH & W/O-POST PROCESSING   Final Result         1.  Mildly enlarged right cervical chain lymph nodes, consider causes of adenopathy with additional workup as clinically appropriate.   2.  Otherwise CT angiogram of the neck within normal limits.         CT-CTA HEAD WITH & W/O-POST PROCESS   Final Result         1.  50-70% narrowing of proximal right M2 segment.   2.  No large vessel occlusion or aneurysm identified      DX-CHEST-LIMITED (1 VIEW)   Final Result         1.  No acute cardiopulmonary disease.   2.  Atherosclerosis            LABORATORY  Lab Results   Component Value Date    SODIUM 138 01/20/2023    POTASSIUM 3.4 (L) 01/20/2023    CHLORIDE 100 01/20/2023    CO2 27 01/20/2023    GLUCOSE 167 (H) 01/20/2023    BUN 16 01/20/2023    CREATININE 1.24 01/20/2023    CREATININE 1.07 05/05/2009        Lab Results   Component Value Date    WBC 9.0 01/20/2023    HEMOGLOBIN 14.9 01/20/2023    HEMATOCRIT 44.8 01/20/2023    PLATELETCT 348 01/20/2023        Total time of the discharge process took 36 minutes.    ================================================================================================================================================================================================================================================================================  Please note that this dictation was created using voice recognition software. I have made every reasonable attempt to correct obvious errors, but there may be errors of grammar and possibly content that I did not discover before finalizing the note.    Electronically signed by:  Dr. JOSÉ MIGUEL Cote, DNP, APRN, FNP-C  Hospitalist  Services  [facility]  [phone]  [email or web address]  01/20/23                  [id number]

## 2023-03-02 RX ORDER — PEN NEEDLE, DIABETIC 32GX 5/32"
NEEDLE, DISPOSABLE MISCELLANEOUS
Qty: 200 EACH | Refills: 0 | Status: SHIPPED | OUTPATIENT
Start: 2023-03-02 | End: 2023-06-18

## 2023-03-04 DIAGNOSIS — E11.9 DIABETES MELLITUS TYPE 2 IN NONOBESE (HCC): ICD-10-CM

## 2023-03-06 DIAGNOSIS — E11.9 DIABETES MELLITUS TYPE 2 IN NONOBESE (HCC): ICD-10-CM

## 2023-03-07 RX ORDER — LANCETS 28 GAUGE
EACH MISCELLANEOUS
Qty: 100 EACH | Refills: 0 | Status: SHIPPED | OUTPATIENT
Start: 2023-03-07 | End: 2023-05-01

## 2023-03-07 RX ORDER — LANCETS 28 GAUGE
EACH MISCELLANEOUS
Qty: 100 EACH | Refills: 0 | Status: SHIPPED | OUTPATIENT
Start: 2023-03-07 | End: 2023-06-18

## 2023-03-30 DIAGNOSIS — E78.00 PURE HYPERCHOLESTEROLEMIA: ICD-10-CM

## 2023-03-30 RX ORDER — ATORVASTATIN CALCIUM 20 MG/1
40 TABLET, FILM COATED ORAL DAILY
Qty: 200 TABLET | Refills: 2 | Status: SHIPPED | OUTPATIENT
Start: 2023-03-30 | End: 2023-06-18

## 2023-04-15 DIAGNOSIS — E11.9 DIABETES MELLITUS TYPE 2 IN NONOBESE (HCC): ICD-10-CM

## 2023-04-17 RX ORDER — BLOOD-GLUCOSE METER
KIT MISCELLANEOUS
Qty: 100 STRIP | Refills: 0 | Status: SHIPPED | OUTPATIENT
Start: 2023-04-17 | End: 2023-06-18

## 2023-04-29 DIAGNOSIS — E11.9 DIABETES MELLITUS TYPE 2 IN NONOBESE (HCC): ICD-10-CM

## 2023-05-01 RX ORDER — LANCETS 28 GAUGE
EACH MISCELLANEOUS
Qty: 100 EACH | Refills: 0 | Status: SHIPPED | OUTPATIENT
Start: 2023-05-01 | End: 2023-06-18

## 2023-05-04 ENCOUNTER — TELEPHONE (OUTPATIENT)
Dept: HEALTH INFORMATION MANAGEMENT | Facility: OTHER | Age: 78
End: 2023-05-04

## 2023-05-30 ENCOUNTER — DOCUMENTATION (OUTPATIENT)
Dept: HEALTH INFORMATION MANAGEMENT | Facility: OTHER | Age: 78
End: 2023-05-30
Payer: MEDICARE

## 2023-06-07 ENCOUNTER — OFFICE VISIT (OUTPATIENT)
Dept: MEDICAL GROUP | Facility: MEDICAL CENTER | Age: 78
End: 2023-06-07
Payer: MEDICARE

## 2023-06-07 VITALS
TEMPERATURE: 98.8 F | WEIGHT: 167 LBS | DIASTOLIC BLOOD PRESSURE: 78 MMHG | SYSTOLIC BLOOD PRESSURE: 166 MMHG | HEIGHT: 69 IN | OXYGEN SATURATION: 97 % | BODY MASS INDEX: 24.73 KG/M2 | HEART RATE: 72 BPM

## 2023-06-07 DIAGNOSIS — I10 ESSENTIAL HYPERTENSION: ICD-10-CM

## 2023-06-07 DIAGNOSIS — E78.2 MIXED HYPERLIPIDEMIA DUE TO TYPE 2 DIABETES MELLITUS (HCC): ICD-10-CM

## 2023-06-07 DIAGNOSIS — I77.810 AORTIC ROOT DILATION (HCC): ICD-10-CM

## 2023-06-07 DIAGNOSIS — E11.65 UNCONTROLLED TYPE 2 DIABETES MELLITUS WITH HYPERGLYCEMIA (HCC): ICD-10-CM

## 2023-06-07 DIAGNOSIS — E11.69 MIXED HYPERLIPIDEMIA DUE TO TYPE 2 DIABETES MELLITUS (HCC): ICD-10-CM

## 2023-06-07 DIAGNOSIS — H91.93 HEARING PROBLEM OF BOTH EARS: ICD-10-CM

## 2023-06-07 LAB
HBA1C MFR BLD: 9.5 % (ref ?–5.8)
POCT INT CON NEG: NEGATIVE
POCT INT CON POS: POSITIVE

## 2023-06-07 PROCEDURE — 83036 HEMOGLOBIN GLYCOSYLATED A1C: CPT | Performed by: FAMILY MEDICINE

## 2023-06-07 PROCEDURE — 3078F DIAST BP <80 MM HG: CPT | Performed by: FAMILY MEDICINE

## 2023-06-07 PROCEDURE — 3077F SYST BP >= 140 MM HG: CPT | Performed by: FAMILY MEDICINE

## 2023-06-07 PROCEDURE — 99214 OFFICE O/P EST MOD 30 MIN: CPT | Performed by: FAMILY MEDICINE

## 2023-06-07 ASSESSMENT — PATIENT HEALTH QUESTIONNAIRE - PHQ9: CLINICAL INTERPRETATION OF PHQ2 SCORE: 0

## 2023-06-07 ASSESSMENT — FIBROSIS 4 INDEX: FIB4 SCORE: 0.99

## 2023-06-07 NOTE — PROGRESS NOTES
CC: Uncontrolled diabetes, hypertension, hyperlipidemia hearing problem, dilatation of aortic root    HPI:   Howard presents today to discuss the following:    Uncontrolled type 2 diabetes mellitus with hyperglycemia (HCC)  Has been having uncontrolled diabetes.  A1c today is 9.5, it was 9.4 last visit.  Patient has been very stubborn, he has not been taking his medication in a regular basis, he takes his medications whenever he likes to.  Discussed with patient's and daughter that they cannot help the patient unless he helps himself.  Very noncompliant with his medications.  I again I discussed the importance of adherence to medications.  Patient referred before to endocrinologist but he did not follow-up.  Has been on metformin 1000 mg twice a day, glargine insulin 15 units nightly, Rybelsus 7 mg daily, daughter is not sure if he has been taking all his medications because he has been very stubborn    Mixed hyperlipidemia due to type 2 diabetes mellitus (HCC)  He has been tolerating the statin. Denies muscle pain LFTs has been normal, has been on atorvastatin 40 mg daily    Essential hypertension  His blood pressure today is high.  He stated that he has not been taking the amlodipine because when he takes amlodipine and lisinopril together in the morning they give him headache.  Patient patient advised to take his medication in a regular basis.  He can restart amlodipine, however he cannot take them at a different time.  Advised to take lisinopril 40 mg daily in the morning and amlodipine 10 mg in the evening.  He also has been on hydrochlorothiazide 25 mg daily    Hearing problem of both ears  As per daughter patient has been having hearing issues, requested referral to audiologist for an evaluation for possible need for hearing aid.    Aortic root dilation (HCC)  Patient has been asymptomatic.  Last echo showed: The aortic root is dilated with a diameter of 4 cm.    Patient Active Problem List    Diagnosis Date  Noted    Aortic root dilation ([facility]) 09/01/2022    Ataxia 10/12/2021    Stroke ([facility]) 10/12/2021    Uncontrolled type 2 diabetes mellitus with complication, without long-term current use of insulin ([facility]) 02/07/2017    Microcytosis 05/26/2016    Adenomatous polyp of colon 10/02/2015    Mixed hyperlipidemia 11/20/2014    Retinal vein occlusion, branch 11/20/2014    Macular edema 11/20/2014    Primary hypertension     Type 2 diabetes mellitus with hyperglycemia, without long-term current use of insulin ([facility])        Current Outpatient Medications   Medication Sig Dispense Refill    FreeStyle Lancets Misc USE 1  TO CHECK GLUCOSE THREE TIMES DAILY 100 Each 0    metformin (GLUCOPHAGE) 1000 MG tablet Take 1 Tablet by mouth 2 times a day with meals for 100 days. 200 Tablet 0    glucose blood (FREESTYLE LITE) strip USE 1 STRIP TO CHECK GLUCOSE THREE TIMES DAILY AND AS NEEDED 100 Strip 0    atorvastatin (LIPITOR) 40 MG Tab Take 1 Tablet by mouth every evening. 90 Tablet 2    atorvastatin (LIPITOR) 20 MG Tab Take 2 Tablets by mouth every day. 200 Tablet 2    Insulin Glargine, 2 Unit Dial, (TOUJEO MAX SOLOSTAR) 300 UNIT/ML Solution Pen-injector Inject 10 Units under the skin every day. 10 mL 2    FreeStyle Lancets Misc USE 1  TO CHECK GLUCOSE THREE TIMES DAILY 100 Each 0    LANTUS SOLOSTAR 100 UNIT/ML Solution Pen-injector injection Inject 10 Units under the skin every evening. INJECT 10 UNITS SUBCUTANEOUSLY ONCE DAILY IN THE EVENING 10 mL 2    Insulin Pen Needle 32 G x 4 mm (BD PEN NEEDLE SHERWIN 2ND GEN) USE 1 PEN NEEDLE  THREE TIMES DAILY AND  AS  NEEDED 200 Each 0    lisinopril (PRINIVIL) 40 MG tablet Take 1 Tablet by mouth every day. 100 Tablet 3    RYBELSUS 7 MG Tab Take 1 tablet by mouth once daily 90 Tablet 2    amLODIPine (NORVASC) 10 MG Tab Take 1 Tablet by mouth every day. 90 Tablet 2    hydroCHLOROthiazide (HYDRODIURIL) 25 MG Tab Take 1 Tablet by mouth every day. 100 Tablet 1     No current facility-administered  "medications for this visit.         Allergies as of 06/07/2023    (No Known Allergies)        ROS: Denies any chest pain, Shortness of breath, Changes bowel or bladder, Lower extremity edema.    Physical Exam:  BP (!) 166/78 (BP Location: Right arm, Patient Position: Sitting, BP Cuff Size: Adult)   Pulse 72   Temp 37.1 °C (98.8 °F) (Temporal)   Ht 1.753 m (5' 9\")   Wt 75.8 kg (167 lb)   SpO2 97%   BMI 24.66 kg/m²   Gen.: Well-developed, well-nourished, no apparent distress,pleasant and cooperative with the examination  Skin:  Warm and dry with good turgor. No rashes or suspicious lesions in visible areas  HEENT:Sinuses nontender with palpation, TMs clear, nares patent with pink mucosa and clear rhinorrhea,no septal deviation ,polyps or lesions. lips without lesions, oropharynx clear.  Neck: Trachea midline,no masses or adenopathy. No JVD.  Cor: Regular rate and rhythm without murmur, gallop or rub.  Lungs: Respirations unlabored.Clear to auscultation with equal breath sounds bilaterally. No wheezes, rhonchi.  Extremities: No cyanosis, clubbing or edema.          Assessment and Plan.   77 y.o. male     1. Uncontrolled type 2 diabetes mellitus with hyperglycemia (HCC)  Uncontrolled diabetes.  A1c today is 9.5.  Patient has been very stubborn, he has not been taking his medication in a regular basis, he takes his medications whenever he likes to.  Discussed with patient's and daughter that they cannot help the patient unless he helps himself.  Very noncompliant with his medications.  I again I discussed the importance of adherence to medications.  Patient referred before to endocrinologist but he did not follow-up.  Continue metformin 1000 mg twice a day.  Increase glargine insulin to 15 unit  Continue on Rybelsus 7 mg daily.  RTC in 3 month.      - POCT A1C    2. Mixed hyperlipidemia due to type 2 diabetes mellitus (HCC)  He has been tolerating the statin. Denies muscle pain LFTs has been normal  Continue on " atorvastatin 40 mg daily    - Lipid Profile; Future    3. Essential hypertension  His blood pressure today is high.  He stated that he has not been taking the amlodipine because when he takes amlodipine and lisinopril together in the morning they give him headache.  Patient patient advised to take his medication in a regular basis.  He can restart amlodipine, however he cannot take them at a different time.  Advised to take lisinopril 40 mg daily in the morning and amlodipine 10 mg in the evening.  Continue on hydrochlorothiazide 25 mg daily    4. Hearing problem of both ears  As per daughter patient has been having hearing issues, requested referral to audiologist for an evaluation for possible need for hearing aid.    - Referral to Audiology    5. Aortic root dilation (HCC)  Last echo showed: The aortic root is dilated with a diameter of 4 cm.However patient has been asymptomatic.

## 2023-06-18 ENCOUNTER — APPOINTMENT (OUTPATIENT)
Dept: RADIOLOGY | Facility: MEDICAL CENTER | Age: 78
End: 2023-06-18
Attending: EMERGENCY MEDICINE
Payer: MEDICARE

## 2023-06-18 ENCOUNTER — APPOINTMENT (OUTPATIENT)
Dept: RADIOLOGY | Facility: MEDICAL CENTER | Age: 78
End: 2023-06-18
Attending: HOSPITALIST
Payer: MEDICARE

## 2023-06-18 ENCOUNTER — HOSPITAL ENCOUNTER (OUTPATIENT)
Facility: MEDICAL CENTER | Age: 78
End: 2023-06-19
Attending: EMERGENCY MEDICINE | Admitting: HOSPITALIST
Payer: MEDICARE

## 2023-06-18 DIAGNOSIS — I10 PRIMARY HYPERTENSION: ICD-10-CM

## 2023-06-18 DIAGNOSIS — I16.0 HYPERTENSIVE URGENCY: ICD-10-CM

## 2023-06-18 DIAGNOSIS — E11.65 TYPE 2 DIABETES MELLITUS WITH HYPERGLYCEMIA, WITHOUT LONG-TERM CURRENT USE OF INSULIN (HCC): ICD-10-CM

## 2023-06-18 DIAGNOSIS — R11.2 NAUSEA AND VOMITING, UNSPECIFIED VOMITING TYPE: ICD-10-CM

## 2023-06-18 DIAGNOSIS — I10 ESSENTIAL HYPERTENSION: ICD-10-CM

## 2023-06-18 DIAGNOSIS — E78.2 MIXED HYPERLIPIDEMIA: ICD-10-CM

## 2023-06-18 DIAGNOSIS — R11.10 INTRACTABLE VOMITING: ICD-10-CM

## 2023-06-18 DIAGNOSIS — E11.9 DIABETES MELLITUS TYPE 2 IN NONOBESE (HCC): ICD-10-CM

## 2023-06-18 DIAGNOSIS — E11.10 DIABETIC KETOACIDOSIS WITHOUT COMA ASSOCIATED WITH TYPE 2 DIABETES MELLITUS (HCC): ICD-10-CM

## 2023-06-18 PROBLEM — R42 DIZZINESS: Status: ACTIVE | Noted: 2023-06-18

## 2023-06-18 LAB
ALBUMIN SERPL BCP-MCNC: 4.7 G/DL (ref 3.2–4.9)
ALBUMIN/GLOB SERPL: 1.7 G/DL
ALP SERPL-CCNC: 78 U/L (ref 30–99)
ALT SERPL-CCNC: 16 U/L (ref 2–50)
ANION GAP SERPL CALC-SCNC: 13 MMOL/L (ref 7–16)
AST SERPL-CCNC: 20 U/L (ref 12–45)
B-OH-BUTYR SERPL-MCNC: 0.4 MMOL/L (ref 0.02–0.27)
BASOPHILS # BLD AUTO: 0.2 % (ref 0–1.8)
BASOPHILS # BLD: 0.02 K/UL (ref 0–0.12)
BILIRUB SERPL-MCNC: 1.4 MG/DL (ref 0.1–1.5)
BUN SERPL-MCNC: 12 MG/DL (ref 8–22)
CALCIUM ALBUM COR SERPL-MCNC: 8.6 MG/DL (ref 8.5–10.5)
CALCIUM SERPL-MCNC: 9.2 MG/DL (ref 8.5–10.5)
CHLORIDE SERPL-SCNC: 99 MMOL/L (ref 96–112)
CO2 SERPL-SCNC: 24 MMOL/L (ref 20–33)
CREAT SERPL-MCNC: 0.73 MG/DL (ref 0.5–1.4)
EKG IMPRESSION: NORMAL
EKG IMPRESSION: NORMAL
EOSINOPHIL # BLD AUTO: 0 K/UL (ref 0–0.51)
EOSINOPHIL NFR BLD: 0 % (ref 0–6.9)
ERYTHROCYTE [DISTWIDTH] IN BLOOD BY AUTOMATED COUNT: 39.2 FL (ref 35.9–50)
GFR SERPLBLD CREATININE-BSD FMLA CKD-EPI: 94 ML/MIN/1.73 M 2
GLOBULIN SER CALC-MCNC: 2.8 G/DL (ref 1.9–3.5)
GLUCOSE BLD STRIP.AUTO-MCNC: 184 MG/DL (ref 65–99)
GLUCOSE BLD STRIP.AUTO-MCNC: 190 MG/DL (ref 65–99)
GLUCOSE SERPL-MCNC: 182 MG/DL (ref 65–99)
HCT VFR BLD AUTO: 45.9 % (ref 42–52)
HGB BLD-MCNC: 15.2 G/DL (ref 14–18)
IMM GRANULOCYTES # BLD AUTO: 0.02 K/UL (ref 0–0.11)
IMM GRANULOCYTES NFR BLD AUTO: 0.2 % (ref 0–0.9)
LYMPHOCYTES # BLD AUTO: 1.12 K/UL (ref 1–4.8)
LYMPHOCYTES NFR BLD: 12.1 % (ref 22–41)
MAGNESIUM SERPL-MCNC: 1.9 MG/DL (ref 1.5–2.5)
MCH RBC QN AUTO: 26 PG (ref 27–33)
MCHC RBC AUTO-ENTMCNC: 33.1 G/DL (ref 32.3–36.5)
MCV RBC AUTO: 78.6 FL (ref 81.4–97.8)
MONOCYTES # BLD AUTO: 0.56 K/UL (ref 0–0.85)
MONOCYTES NFR BLD AUTO: 6 % (ref 0–13.4)
NEUTROPHILS # BLD AUTO: 7.55 K/UL (ref 1.82–7.42)
NEUTROPHILS NFR BLD: 81.5 % (ref 44–72)
NRBC # BLD AUTO: 0 K/UL
NRBC BLD-RTO: 0 /100 WBC (ref 0–0.2)
PLATELET # BLD AUTO: 274 K/UL (ref 164–446)
PMV BLD AUTO: 8.3 FL (ref 9–12.9)
POTASSIUM SERPL-SCNC: 3.8 MMOL/L (ref 3.6–5.5)
PROT SERPL-MCNC: 7.5 G/DL (ref 6–8.2)
RBC # BLD AUTO: 5.84 M/UL (ref 4.7–6.1)
SODIUM SERPL-SCNC: 136 MMOL/L (ref 135–145)
TROPONIN T SERPL-MCNC: 8 NG/L (ref 6–19)
WBC # BLD AUTO: 9.3 K/UL (ref 4.8–10.8)

## 2023-06-18 PROCEDURE — 36415 COLL VENOUS BLD VENIPUNCTURE: CPT

## 2023-06-18 PROCEDURE — 93010 ELECTROCARDIOGRAM REPORT: CPT | Performed by: INTERNAL MEDICINE

## 2023-06-18 PROCEDURE — 93005 ELECTROCARDIOGRAM TRACING: CPT | Performed by: HOSPITALIST

## 2023-06-18 PROCEDURE — 74018 RADEX ABDOMEN 1 VIEW: CPT

## 2023-06-18 PROCEDURE — 700102 HCHG RX REV CODE 250 W/ 637 OVERRIDE(OP): Performed by: EMERGENCY MEDICINE

## 2023-06-18 PROCEDURE — 99223 1ST HOSP IP/OBS HIGH 75: CPT | Performed by: HOSPITALIST

## 2023-06-18 PROCEDURE — G0378 HOSPITAL OBSERVATION PER HR: HCPCS

## 2023-06-18 PROCEDURE — 96375 TX/PRO/DX INJ NEW DRUG ADDON: CPT

## 2023-06-18 PROCEDURE — 700111 HCHG RX REV CODE 636 W/ 250 OVERRIDE (IP): Performed by: EMERGENCY MEDICINE

## 2023-06-18 PROCEDURE — 700105 HCHG RX REV CODE 258: Performed by: EMERGENCY MEDICINE

## 2023-06-18 PROCEDURE — 700102 HCHG RX REV CODE 250 W/ 637 OVERRIDE(OP): Performed by: HOSPITALIST

## 2023-06-18 PROCEDURE — 84484 ASSAY OF TROPONIN QUANT: CPT

## 2023-06-18 PROCEDURE — 83735 ASSAY OF MAGNESIUM: CPT

## 2023-06-18 PROCEDURE — 80053 COMPREHEN METABOLIC PANEL: CPT

## 2023-06-18 PROCEDURE — 93005 ELECTROCARDIOGRAM TRACING: CPT | Performed by: EMERGENCY MEDICINE

## 2023-06-18 PROCEDURE — 96374 THER/PROPH/DIAG INJ IV PUSH: CPT

## 2023-06-18 PROCEDURE — A9270 NON-COVERED ITEM OR SERVICE: HCPCS | Performed by: HOSPITALIST

## 2023-06-18 PROCEDURE — 85025 COMPLETE CBC W/AUTO DIFF WBC: CPT

## 2023-06-18 PROCEDURE — 700111 HCHG RX REV CODE 636 W/ 250 OVERRIDE (IP)

## 2023-06-18 PROCEDURE — 700105 HCHG RX REV CODE 258: Performed by: HOSPITALIST

## 2023-06-18 PROCEDURE — 71045 X-RAY EXAM CHEST 1 VIEW: CPT

## 2023-06-18 PROCEDURE — 93005 ELECTROCARDIOGRAM TRACING: CPT

## 2023-06-18 PROCEDURE — 82962 GLUCOSE BLOOD TEST: CPT

## 2023-06-18 PROCEDURE — 96376 TX/PRO/DX INJ SAME DRUG ADON: CPT

## 2023-06-18 PROCEDURE — 70450 CT HEAD/BRAIN W/O DYE: CPT

## 2023-06-18 PROCEDURE — 99285 EMERGENCY DEPT VISIT HI MDM: CPT

## 2023-06-18 PROCEDURE — 96372 THER/PROPH/DIAG INJ SC/IM: CPT

## 2023-06-18 PROCEDURE — A9270 NON-COVERED ITEM OR SERVICE: HCPCS | Performed by: EMERGENCY MEDICINE

## 2023-06-18 PROCEDURE — 82010 KETONE BODYS QUAN: CPT

## 2023-06-18 PROCEDURE — 700111 HCHG RX REV CODE 636 W/ 250 OVERRIDE (IP): Performed by: HOSPITALIST

## 2023-06-18 RX ORDER — ONDANSETRON 2 MG/ML
4 INJECTION INTRAMUSCULAR; INTRAVENOUS EVERY 4 HOURS PRN
Status: DISCONTINUED | OUTPATIENT
Start: 2023-06-18 | End: 2023-06-19 | Stop reason: HOSPADM

## 2023-06-18 RX ORDER — SODIUM CHLORIDE 9 MG/ML
INJECTION, SOLUTION INTRAVENOUS CONTINUOUS
Status: DISCONTINUED | OUTPATIENT
Start: 2023-06-18 | End: 2023-06-19 | Stop reason: HOSPADM

## 2023-06-18 RX ORDER — AMLODIPINE BESYLATE 10 MG/1
10 TABLET ORAL DAILY
Status: DISCONTINUED | OUTPATIENT
Start: 2023-06-19 | End: 2023-06-19 | Stop reason: HOSPADM

## 2023-06-18 RX ORDER — ATORVASTATIN CALCIUM 40 MG/1
40 TABLET, FILM COATED ORAL NIGHTLY
Status: DISCONTINUED | OUTPATIENT
Start: 2023-06-18 | End: 2023-06-19 | Stop reason: HOSPADM

## 2023-06-18 RX ORDER — ONDANSETRON 4 MG/1
4 TABLET, ORALLY DISINTEGRATING ORAL EVERY 4 HOURS PRN
Status: DISCONTINUED | OUTPATIENT
Start: 2023-06-18 | End: 2023-06-19 | Stop reason: HOSPADM

## 2023-06-18 RX ORDER — LISINOPRIL 20 MG/1
40 TABLET ORAL
Status: DISCONTINUED | OUTPATIENT
Start: 2023-06-18 | End: 2023-06-18

## 2023-06-18 RX ORDER — LISINOPRIL 20 MG/1
40 TABLET ORAL DAILY
Status: DISCONTINUED | OUTPATIENT
Start: 2023-06-19 | End: 2023-06-19 | Stop reason: HOSPADM

## 2023-06-18 RX ORDER — METOCLOPRAMIDE HYDROCHLORIDE 5 MG/ML
10 INJECTION INTRAMUSCULAR; INTRAVENOUS ONCE
Status: COMPLETED | OUTPATIENT
Start: 2023-06-18 | End: 2023-06-18

## 2023-06-18 RX ORDER — ACETAMINOPHEN 325 MG/1
650 TABLET ORAL EVERY 6 HOURS PRN
Status: DISCONTINUED | OUTPATIENT
Start: 2023-06-18 | End: 2023-06-19 | Stop reason: HOSPADM

## 2023-06-18 RX ORDER — ONDANSETRON 2 MG/ML
INJECTION INTRAMUSCULAR; INTRAVENOUS
Status: COMPLETED
Start: 2023-06-18 | End: 2023-06-18

## 2023-06-18 RX ORDER — AMLODIPINE BESYLATE 5 MG/1
10 TABLET ORAL ONCE
Status: COMPLETED | OUTPATIENT
Start: 2023-06-18 | End: 2023-06-18

## 2023-06-18 RX ORDER — ONDANSETRON 4 MG/1
4 TABLET, ORALLY DISINTEGRATING ORAL ONCE
Status: DISCONTINUED | OUTPATIENT
Start: 2023-06-18 | End: 2023-06-18

## 2023-06-18 RX ORDER — ONDANSETRON 2 MG/ML
4 INJECTION INTRAMUSCULAR; INTRAVENOUS ONCE
Status: COMPLETED | OUTPATIENT
Start: 2023-06-18 | End: 2023-06-18

## 2023-06-18 RX ORDER — HYDROCHLOROTHIAZIDE 25 MG/1
25 TABLET ORAL DAILY
Status: DISCONTINUED | OUTPATIENT
Start: 2023-06-18 | End: 2023-06-19 | Stop reason: HOSPADM

## 2023-06-18 RX ORDER — METOCLOPRAMIDE HYDROCHLORIDE 5 MG/ML
10 INJECTION INTRAMUSCULAR; INTRAVENOUS EVERY 6 HOURS PRN
Status: DISCONTINUED | OUTPATIENT
Start: 2023-06-18 | End: 2023-06-19 | Stop reason: HOSPADM

## 2023-06-18 RX ORDER — HYDRALAZINE HYDROCHLORIDE 20 MG/ML
10 INJECTION INTRAMUSCULAR; INTRAVENOUS EVERY 6 HOURS PRN
Status: DISCONTINUED | OUTPATIENT
Start: 2023-06-18 | End: 2023-06-19 | Stop reason: HOSPADM

## 2023-06-18 RX ORDER — SODIUM CHLORIDE 9 MG/ML
1000 INJECTION, SOLUTION INTRAVENOUS ONCE
Status: COMPLETED | OUTPATIENT
Start: 2023-06-18 | End: 2023-06-18

## 2023-06-18 RX ADMIN — ONDANSETRON 4 MG: 2 INJECTION INTRAMUSCULAR; INTRAVENOUS at 17:42

## 2023-06-18 RX ADMIN — ATORVASTATIN CALCIUM 40 MG: 40 TABLET, FILM COATED ORAL at 20:32

## 2023-06-18 RX ADMIN — ONDANSETRON 4 MG: 2 INJECTION INTRAMUSCULAR; INTRAVENOUS at 12:51

## 2023-06-18 RX ADMIN — SODIUM CHLORIDE: 9 INJECTION, SOLUTION INTRAVENOUS at 17:54

## 2023-06-18 RX ADMIN — SODIUM CHLORIDE 1000 ML: 9 INJECTION, SOLUTION INTRAVENOUS at 12:54

## 2023-06-18 RX ADMIN — INSULIN HUMAN 1 UNITS: 100 INJECTION, SOLUTION PARENTERAL at 20:36

## 2023-06-18 RX ADMIN — AMLODIPINE BESYLATE 10 MG: 5 TABLET ORAL at 15:29

## 2023-06-18 RX ADMIN — FAMOTIDINE 20 MG: 10 INJECTION, SOLUTION INTRAVENOUS at 12:52

## 2023-06-18 RX ADMIN — INSULIN GLARGINE-YFGN 5 UNITS: 100 INJECTION, SOLUTION SUBCUTANEOUS at 17:47

## 2023-06-18 RX ADMIN — LISINOPRIL 40 MG: 20 TABLET ORAL at 15:29

## 2023-06-18 RX ADMIN — METOCLOPRAMIDE 10 MG: 5 INJECTION, SOLUTION INTRAMUSCULAR; INTRAVENOUS at 16:04

## 2023-06-18 RX ADMIN — HYDRALAZINE HYDROCHLORIDE 10 MG: 20 INJECTION, SOLUTION INTRAMUSCULAR; INTRAVENOUS at 19:40

## 2023-06-18 ASSESSMENT — ENCOUNTER SYMPTOMS
BLURRED VISION: 0
BRUISES/BLEEDS EASILY: 0
DIZZINESS: 0
FEVER: 0
MYALGIAS: 0
HEADACHES: 0
PND: 0
CLAUDICATION: 0
DOUBLE VISION: 0
CHILLS: 0
PALPITATIONS: 0
WHEEZING: 0
HEMOPTYSIS: 0
BACK PAIN: 0
COUGH: 0
HEARTBURN: 0
NAUSEA: 0
DEPRESSION: 0
VOMITING: 0

## 2023-06-18 ASSESSMENT — FIBROSIS 4 INDEX
FIB4 SCORE: 1.37
FIB4 SCORE: 0.99

## 2023-06-18 NOTE — ED NOTES
Med rec updated ad complete. Allergies reviewed.  Confirmed name and date of birth.  Placed call to home pharmacy and interviewed pt/family at bedside.     Home pharmacy   [facility] = [phone]

## 2023-06-18 NOTE — ED TRIAGE NOTES
.  Chief Complaint   Patient presents with    Dizziness     Onset 2200 worse when getting up    N/V     Ambulated to triage with son. Reports onset last night dizziness. Emesis x 1 this am.

## 2023-06-18 NOTE — ED NOTES
Patient ambulatory back to green pod with a steady gait. He is changing into gown. Chart up for ERP

## 2023-06-18 NOTE — H&P
Hospital Medicine History & Physical Note    Date of Service  6/18/2023    Primary Care Physician  Marlo Watts M.D.    Consultants      Code Status  Full Code    Chief Complaint  Chief Complaint   Patient presents with    Dizziness     Onset 2200 worse when getting up    N/V       History of Presenting Illness  Howard Gamboa is a 77 y.o. male who presented 6/18/2023 with past medical history of hypertension, diabetes, is coming today complaining of nausea and vomiting, patient speaks Mandarin and I discussed with patient with life  #[id number], patient complaining of headaches and dizziness, along with nausea vomiting, patient apparently has not been taking his blood pressure medications and diabetic medications, denies any chest pain palpitation shortness of breath fever chills, no focal weakness no numbness no tingling no urinary or bowel problems, he has last bowel movement today morning, patient denies any vision changes no new hearing problems, patient is very hard of hearing, patient denies any rash, patient was started on his blood pressure medications, patient at this time is going to be admitted due to hypertensive urgency and uncontrolled nausea and vomiting unable to tolerate diet, I have ordered a CT head and KUB, patient received Reglan in the emergency room 10 mg IV, I will continue with this medication, I discussed with patient regarding side effects and he expressed understanding and agree with continue with medication.  I have discussed case with patient, ER physician, nurse staff, all question have been answered..        Review of Systems  Review of Systems   Constitutional:  Negative for chills and fever.   HENT:  Negative for congestion and nosebleeds.    Eyes:  Negative for blurred vision and double vision.   Respiratory:  Negative for cough, hemoptysis and wheezing.    Cardiovascular:  Negative for chest pain, palpitations, claudication, leg swelling and PND.    Gastrointestinal:  Negative for heartburn, nausea and vomiting.   Genitourinary:  Negative for hematuria and urgency.   Musculoskeletal:  Negative for back pain and myalgias.   Skin:  Negative for rash.   Neurological:  Negative for dizziness and headaches.   Endo/Heme/Allergies:  Does not bruise/bleed easily.   Psychiatric/Behavioral:  Negative for depression and suicidal ideas.        Past Medical History   has a past medical history of Dental disorder, Diabetes (HCC), Diabetes mellitus out of control (2010), High cholesterol (2010), and Hypertension (2010).    Surgical History   has a past surgical history that includes vitrectomy posterior (Left, 7/26/2021).     Family History  family history includes Diabetes in his brother and father; Stroke in his mother.       Social History   reports that he quit smoking about 22 years ago. He has never used smokeless tobacco. He reports that he does not drink alcohol and does not use drugs.    Allergies  No Known Allergies    Medications  Prior to Admission Medications   Prescriptions Last Dose Informant Patient Reported? Taking?   Insulin Glargine, 2 Unit Dial, (TOUJEO MAX SOLOSTAR) 300 UNIT/ML Solution Pen-injector lima at unknown Patient's Home Pharmacy, Family Member No No   Sig: Inject 10 Units under the skin every day.   amLODIPine (NORVASC) 10 MG Tab unknown at unknown Patient's Home Pharmacy, Family Member No No   Sig: Take 1 Tablet by mouth every day.   atorvastatin (LIPITOR) 40 MG Tab unknown at unknown Patient's Home Pharmacy, Family Member No No   Sig: Take 1 Tablet by mouth every evening.   hydroCHLOROthiazide (HYDRODIURIL) 25 MG Tab unknown at unknown Patient's Home Pharmacy, Family Member No No   Sig: Take 1 Tablet by mouth every day.   lisinopril (PRINIVIL) 40 MG tablet unknown at unknown Patient's Home Pharmacy, Family Member No No   Sig: Take 1 Tablet by mouth every day.   metformin (GLUCOPHAGE) 1000 MG tablet unknown at unknown Patient's Home  Pharmacy, Family Member No No   Sig: Take 1 Tablet by mouth 2 times a day with meals for 100 days.      Facility-Administered Medications: None       Physical Exam  Temp:  [36.2 °C (97.1 °F)] 36.2 °C (97.1 °F)  Pulse:  [86-96] 96  Resp:  [12-21] 12  BP: (164-181)/() 168/106  SpO2:  [91 %-94 %] 94 %  Blood Pressure : (!) 168/106   Temperature: 36.2 °C (97.1 °F)   Pulse: 96   Respiration: 12   Pulse Oximetry: 94 %       Physical Exam  Vitals and nursing note reviewed.   Constitutional:       Appearance: Normal appearance. He is ill-appearing.   HENT:      Head: Normocephalic.      Ears:      Comments: Hard of hearing     Nose: Nose normal.   Eyes:      General: No scleral icterus.        Right eye: No discharge.         Left eye: No discharge.      Extraocular Movements: Extraocular movements intact.      Conjunctiva/sclera: Conjunctivae normal.      Pupils: Pupils are equal, round, and reactive to light.   Cardiovascular:      Rate and Rhythm: Normal rate and regular rhythm.      Pulses: Normal pulses.      Heart sounds: Normal heart sounds.   Pulmonary:      Effort: Pulmonary effort is normal. No respiratory distress.      Breath sounds: Normal breath sounds. No wheezing.   Abdominal:      General: Bowel sounds are normal. There is distension.      Tenderness: There is no abdominal tenderness. There is no guarding.   Musculoskeletal:         General: Normal range of motion.      Cervical back: Normal range of motion and neck supple.      Right lower leg: No edema.      Left lower leg: No edema.   Skin:     General: Skin is warm and dry.      Capillary Refill: Capillary refill takes less than 2 seconds.      Coloration: Skin is not jaundiced.   Neurological:      General: No focal deficit present.      Mental Status: He is alert and oriented to person, place, and time.      Cranial Nerves: No cranial nerve deficit.   Psychiatric:         Mood and Affect: Mood normal.         Behavior: Behavior normal.          Laboratory:  Recent Labs     06/18/23  1227   WBC 9.3   RBC 5.84   HEMOGLOBIN 15.2   HEMATOCRIT 45.9   MCV 78.6*   MCH 26.0*   MCHC 33.1   RDW 39.2   PLATELETCT 274   MPV 8.3*     Recent Labs     06/18/23  1227   SODIUM 136   POTASSIUM 3.8   CHLORIDE 99   CO2 24   GLUCOSE 182*   BUN 12   CREATININE 0.73   CALCIUM 9.2     Recent Labs     06/18/23  1227   ALTSGPT 16   ASTSGOT 20   ALKPHOSPHAT 78   TBILIRUBIN 1.4   GLUCOSE 182*         No results for input(s): NTPROBNP in the last 72 hours.      Recent Labs     06/18/23  1227   TROPONINT 8       Imaging:  DX-CHEST-PORTABLE (1 VIEW)   Final Result      No acute cardiopulmonary abnormality identified.          X-Ray:  My impression is: No infiltrates, no pleural effusion.  EKG:  My impression is: Sinus rhythm, probably atrial abnormality no T wave changes or ST segment changes    Assessment/Plan:  Justification for Admission Status  I anticipate this patient is appropriate for observation status at this time because needs to come continue monitoring telemetry, blood pressure adjustment, making sure as he is able to tolerate diet, follow-up CT head and KUB        * Hypertensive urgency- (present on admission)  Assessment & Plan  Restart amlodipine hydrochlorothiazide and lisinopril  I have ordered as needed hydralazine  Check echocardiogram    Nausea and vomiting- (present on admission)  Assessment & Plan  May be related to uncontrolled diabetes  Check A1c  Started on Reglan  I have ordered KUB  I have ordered CT head to rule out central cause due to his uncontrolled hypertension  Continue IV fluids for IV hydration      Dizziness- (present on admission)  Assessment & Plan  Likely due to uncontrolled hypertension/hypertensive urgency  I have ordered CT head  Continue telemetry  Adjusting blood pressure medications  I have ordered as needed hydralazine    Stroke (HCC)- (present on admission)  Assessment & Plan  History of  Check CT head today due to dizziness and  hypertension    Mixed hyperlipidemia- (present on admission)  Assessment & Plan  Continue statin  Lipid panel in a.m.    Type 2 diabetes mellitus with hyperglycemia, without long-term current use of insulin (HCC)- (present on admission)  Assessment & Plan  History of noncompliance  Will start Lantus 5 units nightly since patient is having nausea and vomiting and probably low oral intake  Sliding scale insulin  Check A1c    Primary hypertension- (present on admission)  Assessment & Plan  Started on home medications and as needed hydralazine        VTE prophylaxis: SCDs/TEDs    I have reviewed patient's CBC  Have reviewed patient's BMP  I have reviewed patient's troponin  I have reviewed and interpreted patient's EKG chest x-ray as above  I have discussed with ER physician  Patient is on IV Reglan continue close monitoring for side effects abnormal involuntary movements, this was discussed with patient with the help of the live  patient expressed understanding of side effects and agree with medication.  I have ordered CT head we will follow-up results  Have ordered KUB we will follow-up results

## 2023-06-18 NOTE — ASSESSMENT & PLAN NOTE
May be related to uncontrolled diabetes  Check A1c  Started on Reglan  I have ordered KUB  I have ordered CT head to rule out central cause due to his uncontrolled hypertension  Continue IV fluids for IV hydration

## 2023-06-19 VITALS
SYSTOLIC BLOOD PRESSURE: 185 MMHG | HEIGHT: 69 IN | RESPIRATION RATE: 16 BRPM | WEIGHT: 162.7 LBS | BODY MASS INDEX: 24.1 KG/M2 | OXYGEN SATURATION: 90 % | TEMPERATURE: 98.4 F | HEART RATE: 87 BPM | DIASTOLIC BLOOD PRESSURE: 92 MMHG

## 2023-06-19 PROBLEM — R42 DIZZINESS: Status: RESOLVED | Noted: 2023-06-18 | Resolved: 2023-06-19

## 2023-06-19 PROBLEM — R11.2 NAUSEA AND VOMITING: Status: RESOLVED | Noted: 2023-06-18 | Resolved: 2023-06-19

## 2023-06-19 PROBLEM — I16.0 HYPERTENSIVE URGENCY: Status: RESOLVED | Noted: 2023-06-18 | Resolved: 2023-06-19

## 2023-06-19 LAB
ALBUMIN SERPL BCP-MCNC: 4 G/DL (ref 3.2–4.9)
ALBUMIN/GLOB SERPL: 1.7 G/DL
ALP SERPL-CCNC: 67 U/L (ref 30–99)
ALT SERPL-CCNC: 14 U/L (ref 2–50)
ANION GAP SERPL CALC-SCNC: 11 MMOL/L (ref 7–16)
AST SERPL-CCNC: 17 U/L (ref 12–45)
BILIRUB SERPL-MCNC: 1.3 MG/DL (ref 0.1–1.5)
BUN SERPL-MCNC: 11 MG/DL (ref 8–22)
CALCIUM ALBUM COR SERPL-MCNC: 8.5 MG/DL (ref 8.5–10.5)
CALCIUM SERPL-MCNC: 8.5 MG/DL (ref 8.5–10.5)
CHLORIDE SERPL-SCNC: 103 MMOL/L (ref 96–112)
CHOLEST SERPL-MCNC: 110 MG/DL (ref 100–199)
CO2 SERPL-SCNC: 23 MMOL/L (ref 20–33)
CREAT SERPL-MCNC: 0.71 MG/DL (ref 0.5–1.4)
ERYTHROCYTE [DISTWIDTH] IN BLOOD BY AUTOMATED COUNT: 39.1 FL (ref 35.9–50)
FERRITIN SERPL-MCNC: 21.6 NG/ML (ref 22–322)
GFR SERPLBLD CREATININE-BSD FMLA CKD-EPI: 95 ML/MIN/1.73 M 2
GLOBULIN SER CALC-MCNC: 2.3 G/DL (ref 1.9–3.5)
GLUCOSE BLD STRIP.AUTO-MCNC: 124 MG/DL (ref 65–99)
GLUCOSE SERPL-MCNC: 136 MG/DL (ref 65–99)
HCT VFR BLD AUTO: 42.3 % (ref 42–52)
HDLC SERPL-MCNC: 49 MG/DL
HGB BLD-MCNC: 14.1 G/DL (ref 14–18)
IRON SATN MFR SERPL: 26 % (ref 15–55)
IRON SERPL-MCNC: 88 UG/DL (ref 50–180)
LDLC SERPL CALC-MCNC: 44 MG/DL
MCH RBC QN AUTO: 25.7 PG (ref 27–33)
MCHC RBC AUTO-ENTMCNC: 33.3 G/DL (ref 32.3–36.5)
MCV RBC AUTO: 77.2 FL (ref 81.4–97.8)
PLATELET # BLD AUTO: 271 K/UL (ref 164–446)
PMV BLD AUTO: 8.2 FL (ref 9–12.9)
POTASSIUM SERPL-SCNC: 3.2 MMOL/L (ref 3.6–5.5)
PROT SERPL-MCNC: 6.3 G/DL (ref 6–8.2)
RBC # BLD AUTO: 5.48 M/UL (ref 4.7–6.1)
SODIUM SERPL-SCNC: 137 MMOL/L (ref 135–145)
TIBC SERPL-MCNC: 340 UG/DL (ref 250–450)
TRIGL SERPL-MCNC: 85 MG/DL (ref 0–149)
UIBC SERPL-MCNC: 252 UG/DL (ref 110–370)
WBC # BLD AUTO: 9.4 K/UL (ref 4.8–10.8)

## 2023-06-19 PROCEDURE — G0378 HOSPITAL OBSERVATION PER HR: HCPCS

## 2023-06-19 PROCEDURE — 80061 LIPID PANEL: CPT

## 2023-06-19 PROCEDURE — 82962 GLUCOSE BLOOD TEST: CPT

## 2023-06-19 PROCEDURE — 99239 HOSP IP/OBS DSCHRG MGMT >30: CPT | Performed by: HOSPITALIST

## 2023-06-19 PROCEDURE — A9270 NON-COVERED ITEM OR SERVICE: HCPCS | Performed by: HOSPITALIST

## 2023-06-19 PROCEDURE — 83550 IRON BINDING TEST: CPT

## 2023-06-19 PROCEDURE — 82728 ASSAY OF FERRITIN: CPT

## 2023-06-19 PROCEDURE — 80053 COMPREHEN METABOLIC PANEL: CPT

## 2023-06-19 PROCEDURE — 85027 COMPLETE CBC AUTOMATED: CPT

## 2023-06-19 PROCEDURE — 700102 HCHG RX REV CODE 250 W/ 637 OVERRIDE(OP): Performed by: HOSPITALIST

## 2023-06-19 PROCEDURE — 83540 ASSAY OF IRON: CPT

## 2023-06-19 RX ORDER — ONDANSETRON 4 MG/1
4 TABLET, ORALLY DISINTEGRATING ORAL EVERY 6 HOURS PRN
Qty: 10 TABLET | Refills: 0 | Status: SHIPPED | OUTPATIENT
Start: 2023-06-19

## 2023-06-19 RX ORDER — ATORVASTATIN CALCIUM 40 MG/1
40 TABLET, FILM COATED ORAL NIGHTLY
Qty: 90 TABLET | Refills: 2 | Status: SHIPPED | OUTPATIENT
Start: 2023-06-19 | End: 2023-10-09 | Stop reason: SDUPTHER

## 2023-06-19 RX ORDER — HYDROCHLOROTHIAZIDE 25 MG/1
25 TABLET ORAL DAILY
Qty: 30 TABLET | Refills: 2 | Status: SHIPPED | OUTPATIENT
Start: 2023-06-19 | End: 2023-07-03 | Stop reason: SDUPTHER

## 2023-06-19 RX ORDER — AMLODIPINE BESYLATE 10 MG/1
10 TABLET ORAL DAILY
Qty: 90 TABLET | Refills: 2 | Status: SHIPPED | OUTPATIENT
Start: 2023-06-19 | End: 2023-07-03

## 2023-06-19 RX ORDER — INSULIN GLARGINE 300 U/ML
10 INJECTION, SOLUTION SUBCUTANEOUS DAILY
Qty: 10 ML | Refills: 2 | Status: SHIPPED | OUTPATIENT
Start: 2023-06-19 | End: 2023-10-09 | Stop reason: SDUPTHER

## 2023-06-19 RX ORDER — LISINOPRIL 40 MG/1
40 TABLET ORAL DAILY
Qty: 100 TABLET | Refills: 3 | Status: SHIPPED | OUTPATIENT
Start: 2023-06-19

## 2023-06-19 RX ADMIN — HYDROCHLOROTHIAZIDE 25 MG: 25 TABLET ORAL at 05:08

## 2023-06-19 ASSESSMENT — PAIN DESCRIPTION - PAIN TYPE: TYPE: ACUTE PAIN

## 2023-06-19 NOTE — ED PROVIDER NOTES
ED Provider Note    CHIEF COMPLAINT  Chief Complaint   Patient presents with    Dizziness     Onset 2200 worse when getting up    N/V       EXTERNAL RECORDS REVIEWED  Other see chart review below    HPI/ROS    Howard Gamboa is a 77 y.o. male who presents to the emergency department accompanied by his son.  The patient speaks Mandarin Chinese and his son speaks some limited Mandarin but the majority of our interactions took place using the [employer] line Mandarin .  Around 10 AM this morning the patient complained of the family that he is feeling dizzy and nauseous and he had 1 episode of vomiting prior to arrival.  The dizziness and nausea continue in the emergency department and the patient has had a lot of belching.  The patient's son tells me that the patient has had several episodes like this in the past and has been in the hospital with similar symptoms.  Apparently the patient is quite noncompliant with his medications at home.    Review of systems: The patient has mild but not severe headache, no neck pain no chest pain cough or difficulty breathing but he says that in the past he has had some episodes of chest discomfort but that does not seem to be a prominent feature today.  No abdominal pain but he is having a lot of belching.    Chart review: I reviewed an office note from June 7 of this year indicating the patient had a history of uncontrolled diabetes his recent hemoglobin A1c values have been 9.49.5.  The patient does not take his medications as directed and is noted to be quite noncompliant.  I also reviewed the discharge summary from January 31, 2023 the patient was admitted for what sounds like very similar symptoms to today including nausea vomiting dizziness and noted to be hypertensive and have a headache the patient underwent CT angiogram of the head and neck as well as MRI of the brain and these studies are essentially nondiagnostic according to the radiology reports.    PAST MEDICAL  "HISTORY   has a past medical history of Dental disorder, Diabetes (HCC), Diabetes mellitus out of control (2010), High cholesterol (2010), and Hypertension (2010).    SURGICAL HISTORY   has a past surgical history that includes vitrectomy posterior (Left, 2021).    FAMILY HISTORY  Family History   Problem Relation Age of Onset    Stroke Mother     Diabetes Father     Diabetes Brother        SOCIAL HISTORY  Social History     Tobacco Use    Smoking status: Former     Types: Cigarettes     Quit date: 2001     Years since quittin.4    Smokeless tobacco: Never   Vaping Use    Vaping Use: Never used   Substance and Sexual Activity    Alcohol use: No    Drug use: No    Sexual activity: Not on file       CURRENT MEDICATIONS  Home Medications       Reviewed by María Wood (Pharmacy Tech) on 23 at 1521  Med List Status: Complete     Medication Last Dose Status   amLODIPine (NORVASC) 10 MG Tab unknown Active   atorvastatin (LIPITOR) 40 MG Tab unknown Active   hydroCHLOROthiazide (HYDRODIURIL) 25 MG Tab unknown Active   Insulin Glargine, 2 Unit Dial, (TOUJEO MAX SOLOSTAR) 300 UNIT/ML Solution Pen-injector uknown Active   lisinopril (PRINIVIL) 40 MG tablet unknown Active   metformin (GLUCOPHAGE) 1000 MG tablet unknown Active                    ALLERGIES  No Known Allergies    PHYSICAL EXAM  VITAL SIGNS: BP (!) 187/112 Comment: Nurse notified  Pulse (!) 105   Temp 36.1 °C (97 °F) (Temporal)   Resp 17   Ht 1.753 m (5' 9\")   Wt 73.8 kg (162 lb 11.2 oz)   SpO2 91%   BMI 24.03 kg/m²    Constitutional: Awake lucid verbal he does not appear toxic or distressed but is frequently belching  HENT: No sign of trauma to the head, mucous membranes are dry  Eyes: No erythema discharge or jaundice  Neck: Trachea midline no JVD  Cardiovascular: Regular minimal tachycardia  Respiratory: Clear bilaterally with no apparent difficulty breathing  Abdomen: Abdomen is soft and nondistended there is no localized " tenderness rebound guarding or peritoneal findings bowel sounds are present  Skin: Warm and dry  Musculoskeletal: No leg edema or calf tenderness   Neurologic: Awake and verbal and moving all extremities        DIAGNOSTIC STUDIES / PROCEDURES  EKG  I have independently interpreted this EKG  A twelve-lead EKG shows sinus rhythm 95 bpm there is a left axis deviation no pathologic ST elevation depression or ectopy    LABS  CBC shows a normal white blood cell count of 9.3 hemoglobin is adequate at 15.3 basic metabolic panel shows slightly elevated blood sugar of 182 the bicarb is normal at 24 and the anion gap is normal at 13.  Beta hydroxybutyric acid level is slightly elevated at 0.4 LFTs are unremarkable troponin is normal at 8    RADIOLOGY  Radiologist interpretation:   [id number] VIEW   Final Result         No specific finding to suggest small bowel obstruction.      DX-CHEST-PORTABLE (1 VIEW)   Final Result      No acute cardiopulmonary abnormality identified.      CT-HEAD W/O    (Results Pending)         COURSE & MEDICAL DECISION MAKING    ED Observation Status? Yes; I am placing the patient in to an observation status due to a diagnostic uncertainty as well as therapeutic intensity. Patient placed in observation status at 1250 PM, 6/18/2023.     Observation plan is as follows: Medications, laboratory testing and reevaluation thereafter    Upon Reevaluation, the patient's condition has: not improved; and will be escalated to hospitalization.    Patient discharged from ED Observation status at 4 PM (Time) June 18, 2023 (Date).     DISPOSITION AND DISCUSSIONS  In the emergency department an IV is established the patient was initially given intravenous Zofran and Pepcid and these measures seem to be helpful for short period of time the patient stopped belching and he was able to tolerate a little bit of water and some crackers.  Unfortunately the patient's blood pressure remained elevated so I had the pharmacy  confirm his medications and he was given 10 mg of oral amlodipine and 40 mg of oral lisinopril and unfortunately he vomited shortly thereafter.  The patient was then given intravenous Reglan.  The patient also received intravenous fluids due to concerns regarding dehydration and his slightly elevated ketones.  At this point in time its not going to be possible to let the patient go home he does have a ketosis and this is going to get worse especially if he is not able to eat and drink without vomiting and take his medications.  I have reviewed all of this with the patient and his family and I have reviewed the case with the hospitalist and the patient is referred to the hospitalist service for further evaluation and treatment    FINAL DIAGNOSIS  1. Intractable vomiting    2. Diabetic ketoacidosis without coma associated with type 2 diabetes mellitus (HCC)    3. Hypertensive urgency           Electronically signed by: Vince Cisneros M.D., 6/18/2023 6:32 PM

## 2023-06-19 NOTE — PROGRESS NOTES
DC instructions reviewed w/ pt and daughter. Verbalized understanding. PIV dc'd, armband removed.

## 2023-06-19 NOTE — ASSESSMENT & PLAN NOTE
Restart amlodipine hydrochlorothiazide and lisinopril  I have ordered as needed hydralazine  Check echocardiogram

## 2023-06-19 NOTE — PROGRESS NOTES
Arrive to dcl. Utilizing Mandarin , pt stated that he wants to wait till his daughter arrives to review dc instructions. Ernesto #[id number]

## 2023-06-19 NOTE — ASSESSMENT & PLAN NOTE
History of noncompliance  Will start Lantus 5 units nightly since patient is having nausea and vomiting and probably low oral intake  Sliding scale insulin  Check A1c

## 2023-06-19 NOTE — DISCHARGE SUMMARY
Discharge Summary    CHIEF COMPLAINT ON ADMISSION  Chief Complaint   Patient presents with    Dizziness     Onset 2200 worse when getting up    N/V       Reason for Admission  Dizziness; Vomiting     Admission Date  6/18/2023    CODE STATUS  Full Code    HPI & HOSPITAL COURSE  As per dr timothy gonzalez    Howard Gamboa is a 77 y.o. male who presented 6/18/2023 with past medical history of hypertension, diabetes, is coming today complaining of nausea and vomiting, patient speaks Mandarin and I discussed with patient with life  #[id number], patient complaining of headaches and dizziness, along with nausea vomiting, patient apparently has not been taking his blood pressure medications and diabetic medications, denies any chest pain palpitation shortness of breath fever chills, no focal weakness no numbness no tingling no urinary or bowel problems, he has last bowel movement today morning, patient denies any vision changes no new hearing problems, patient is very hard of hearing, patient denies any rash, patient was started on his blood pressure medications, patient at this time is going to be admitted due to hypertensive urgency and uncontrolled nausea and vomiting unable to tolerate diet, I have ordered a CT head and KUB, patient received Reglan in the emergency room 10 mg IV, I will continue with this medication, I discussed with patient regarding side effects and he expressed understanding and agree with continue with medication.    =============================================    We resumed patient's home antihypertensive medications as well as his diabetic medications.  Patient's symptoms have resolved and blood pressure was normalized and patient stable for discharge home on 6/19/2023.  Patient advised about the importance of compliance with his medications and medical professional recommendations    Follow with PCP for further care and management    Therefore, he is discharged in good and stable condition to  home with close outpatient follow-up.    The patient recovered much more quickly than anticipated on admission.    Discharge Date  6/19/2023    FOLLOW UP ITEMS POST DISCHARGE      DISCHARGE DIAGNOSES  Principal Problem (Resolved):    Hypertensive urgency (POA: Yes)  Active Problems:    Primary hypertension (POA: Yes)    Type 2 diabetes mellitus with hyperglycemia, without long-term current use of insulin (HCC) (POA: Yes)    Mixed hyperlipidemia (POA: Yes)    Stroke (HCC) (POA: Yes)  Resolved Problems:    Dizziness (POA: Yes)    Nausea and vomiting (POA: Yes)      FOLLOW UP  Future Appointments   Date Time Provider Department Center   7/15/2023  7:00 AM LAB MILKA BERNY None   9/13/2023  8:00 AM Marlo Watts M.D. [facility]     Marlo Watts M.D.  [address] [facility] [address] [place] [id number]  [phone]    Schedule an appointment as soon as possible for a visit      Marlo Watts M.D.  [address] [facility] [address] [place] [phone]  [phone]            MEDICATIONS ON DISCHARGE     Medication List        START taking these medications        Instructions   ondansetron 4 MG Tbdp  Commonly known as: ZOFRAN ODT   Take 1 Tablet by mouth every 6 hours as needed for Nausea/Vomiting.  Dose: 4 mg            CONTINUE taking these medications        Instructions   amLODIPine 10 MG Tabs  Commonly known as: NORVASC   Take 1 Tablet by mouth every day.  Dose: 10 mg     atorvastatin 40 MG Tabs  Commonly known as: LIPITOR   Take 1 Tablet by mouth every evening.  Dose: 40 mg     hydroCHLOROthiazide 25 MG Tabs  Commonly known as: HYDRODIURIL   Take 1 Tablet by mouth every day.  Dose: 25 mg     lisinopril 40 MG tablet  Commonly known as: PRINIVIL   Take 1 Tablet by mouth every day.  Dose: 40 mg     metformin 1000 MG tablet  Commonly known as: GLUCOPHAGE   Doctor's comments: Increase to 100-day supply  Take 1 Tablet by mouth 2 times a day with meals for 100 days.  Dose: 1,000 mg     Toujeo Max  SoloStar 300 UNIT/ML Sopn  Generic drug: Insulin Glargine (2 Unit Dial)   Inject 10 Units under the skin every day.  Dose: 10 Units              Allergies  No Known Allergies    DIET  Orders Placed This Encounter   Procedures    Diet Order Diet: Consistent CHO (Diabetic)     Standing Status:   Standing     Number of Occurrences:   1     Order Specific Question:   Diet:     Answer:   Consistent CHO (Diabetic) [4]       ACTIVITY  As tolerated.  Weight bearing as tolerated    CONSULTATIONS      PROCEDURES      LABORATORY  Lab Results   Component Value Date    SODIUM 137 06/19/2023    POTASSIUM 3.2 (L) 06/19/2023    CHLORIDE 103 06/19/2023    CO2 23 06/19/2023    GLUCOSE 136 (H) 06/19/2023    BUN 11 06/19/2023    CREATININE 0.71 06/19/2023    CREATININE 1.07 05/05/2009        Lab Results   Component Value Date    WBC 9.4 06/19/2023    HEMOGLOBIN 14.1 06/19/2023    HEMATOCRIT 42.3 06/19/2023    PLATELETCT 271 06/19/2023        Total time of the discharge process exceeds 39  minutes.

## 2023-06-19 NOTE — DISCHARGE PLANNING
TCN following. HTH/SCP chart review completed.   Collaborated with CLAUDIO Beck (Susan SAN ).  Pt discharged from CDU without TCN intervention.

## 2023-06-19 NOTE — CARE PLAN
The patient is Stable - Low risk of patient condition declining or worsening         Progress made toward(s) clinical / shift goals:    Problem: Knowledge Deficit - Standard  Goal: Patient and family/care givers will demonstrate understanding of plan of care, disease process/condition, diagnostic tests and medications  Outcome: Progressing     Problem: Fall Risk  Goal: Patient will remain free from falls  Outcome: Progressing       Patient is not progressing towards the following goals:

## 2023-06-19 NOTE — CARE PLAN
The patient is Stable - Low risk of patient condition declining or worsening       Progress made toward(s) clinical / shift goals:      Problem: Knowledge Deficit - Standard  Goal: Patient and family/care givers will demonstrate understanding of plan of care, disease process/condition, diagnostic tests and medications  Outcome: Progressing     Problem: Fall Risk  Goal: Patient will remain free from falls  Outcome: Progressing

## 2023-06-19 NOTE — PROGRESS NOTES
Interpretor utilized to update pt on POC. Pt tolerated breakfast without n/v. Pt dressed and ready for discharge. Orders for discharge lounge placed. Pt called daughter who will be his ride home.

## 2023-06-19 NOTE — ASSESSMENT & PLAN NOTE
Likely due to uncontrolled hypertension/hypertensive urgency  I have ordered CT head  Continue telemetry  Adjusting blood pressure medications  I have ordered as needed hydralazine

## 2023-06-20 ENCOUNTER — PATIENT OUTREACH (OUTPATIENT)
Dept: MEDICAL GROUP | Facility: MEDICAL CENTER | Age: 78
End: 2023-06-20
Payer: MEDICARE

## 2023-06-20 NOTE — PROGRESS NOTES
Unable to complete entire phone call, daughter was busy. She will schedule HFV. Advised to call [phone].

## 2023-06-22 DIAGNOSIS — E11.9 DIABETES MELLITUS TYPE 2 IN NONOBESE (HCC): ICD-10-CM

## 2023-06-22 RX ORDER — LANCETS 28 GAUGE
EACH MISCELLANEOUS
Qty: 100 EACH | Refills: 0 | Status: SHIPPED | OUTPATIENT
Start: 2023-06-22 | End: 2023-07-17

## 2023-07-03 ENCOUNTER — OFFICE VISIT (OUTPATIENT)
Dept: MEDICAL GROUP | Facility: MEDICAL CENTER | Age: 78
End: 2023-07-03
Payer: MEDICARE

## 2023-07-03 ENCOUNTER — HOSPITAL ENCOUNTER (OUTPATIENT)
Dept: LAB | Facility: MEDICAL CENTER | Age: 78
End: 2023-07-03
Attending: FAMILY MEDICINE
Payer: MEDICARE

## 2023-07-03 VITALS
OXYGEN SATURATION: 93 % | WEIGHT: 164.6 LBS | DIASTOLIC BLOOD PRESSURE: 74 MMHG | HEIGHT: 69 IN | BODY MASS INDEX: 24.38 KG/M2 | HEART RATE: 66 BPM | TEMPERATURE: 98.5 F | SYSTOLIC BLOOD PRESSURE: 184 MMHG

## 2023-07-03 DIAGNOSIS — E87.6 HYPOKALEMIA: ICD-10-CM

## 2023-07-03 DIAGNOSIS — E11.9 DIABETES MELLITUS TYPE 2 IN NONOBESE (HCC): ICD-10-CM

## 2023-07-03 DIAGNOSIS — I10 ESSENTIAL HYPERTENSION: ICD-10-CM

## 2023-07-03 DIAGNOSIS — Z09 HOSPITAL DISCHARGE FOLLOW-UP: ICD-10-CM

## 2023-07-03 LAB — POTASSIUM SERPL-SCNC: 4 MMOL/L (ref 3.6–5.5)

## 2023-07-03 PROCEDURE — 3077F SYST BP >= 140 MM HG: CPT | Performed by: FAMILY MEDICINE

## 2023-07-03 PROCEDURE — 84132 ASSAY OF SERUM POTASSIUM: CPT

## 2023-07-03 PROCEDURE — 36415 COLL VENOUS BLD VENIPUNCTURE: CPT

## 2023-07-03 PROCEDURE — 3078F DIAST BP <80 MM HG: CPT | Performed by: FAMILY MEDICINE

## 2023-07-03 PROCEDURE — 99214 OFFICE O/P EST MOD 30 MIN: CPT | Performed by: FAMILY MEDICINE

## 2023-07-03 RX ORDER — HYDROCHLOROTHIAZIDE 25 MG/1
25 TABLET ORAL DAILY
Qty: 90 TABLET | Refills: 2 | Status: SHIPPED | OUTPATIENT
Start: 2023-07-03

## 2023-07-03 ASSESSMENT — FIBROSIS 4 INDEX: FIB4 SCORE: 1.26

## 2023-07-03 NOTE — PROGRESS NOTES
CC: Hospital discharge follow-up    HPI:   Howard presents today for hospital discharge follow-up.  Patient was seen at [facility] on 6/18/2023.  He was complaining of headaches and dizziness, along with nausea vomiting, patient apparently has not been taking his blood pressure medications and diabetic medications, denies any chest pain palpitation shortness of breath fever chills, no focal weakness no numbness no tingling no urinary or bowel problems, denies any vision changes no new hearing problems, patient is very hard of hearing, patient denies any rash, patient was started on his blood pressure medications.  Patient was admitted for hypertensive urgency and uncontrolled nausea and vomiting unable to tolerate diet.  Had a CT head, abdomen x-ray, and chest x-ray all were negative. Patient received Reglan in the emergency room 10 mg IV.  Patient's condition improved. The antihypertensive medications were resumed as well as his diabetic medications.  Patient's symptoms have resolved and blood pressure was normalized and patient stable for discharge home on 6/19/2023.      Came in today for follow-up.  His blood pressure today is high.  He stated that he has not been taking the amlodipine.  As per his daughter she is not sure if he has been taking the other medications but he said he has been taking it.  Currently on lisinopril 40 mg daily.  He used to take hydrochlorothiazide but he stopped it a while ago.  Discussed with patient the importance of compliance with medications.  Advised to check his blood pressure 3 times a day, return to the clinic in 2 weeks for reevaluation.    Patient Active Problem List    Diagnosis Date Noted    Aortic root dilation (HCC) 09/01/2022    Ataxia 10/12/2021    Stroke ([facility]) 10/12/2021    Uncontrolled type 2 diabetes mellitus with complication, without long-term current use of insulin (HCC) 02/07/2017    Microcytosis 05/26/2016    Adenomatous polyp of colon 10/02/2015    Mixed  "hyperlipidemia 11/20/2014    Retinal vein occlusion, branch 11/20/2014    Macular edema 11/20/2014    Primary hypertension     Type 2 diabetes mellitus with hyperglycemia, without long-term current use of insulin (HCC)        Current Outpatient Medications   Medication Sig Dispense Refill    hydroCHLOROthiazide (HYDRODIURIL) 25 MG Tab Take 1 Tablet by mouth every day. 90 Tablet 2    FreeStyle Lancets Misc USE 1  TO CHECK GLUCOSE THREE TIMES DAILY 100 Each 0    atorvastatin (LIPITOR) 40 MG Tab Take 1 Tablet by mouth every evening. 90 Tablet 2    Insulin Glargine, 2 Unit Dial, (TOUJEO MAX SOLOSTAR) 300 UNIT/ML Solution Pen-injector Inject 10 Units under the skin every day. 10 mL 2    lisinopril (PRINIVIL) 40 MG tablet Take 1 Tablet by mouth every day. 100 Tablet 3    metformin (GLUCOPHAGE) 1000 MG tablet Take 1 Tablet by mouth 2 times a day with meals for 100 days. 200 Tablet 0    ondansetron (ZOFRAN ODT) 4 MG TABLET DISPERSIBLE Take 1 Tablet by mouth every 6 hours as needed for Nausea/Vomiting. 10 Tablet 0     No current facility-administered medications for this visit.         Allergies as of 07/03/2023    (No Known Allergies)        ROS: Denies any chest pain, Shortness of breath, Changes bowel or bladder, Lower extremity edema.    Physical Exam:  BP (!) 184/74 (BP Location: Right arm, Patient Position: Sitting, BP Cuff Size: Small adult)   Pulse 66   Temp 36.9 °C (98.5 °F) (Temporal)   Ht 1.753 m (5' 9\")   Wt 74.7 kg (164 lb 9.6 oz)   SpO2 93%   BMI 24.31 kg/m²   Gen.: Well-developed, well-nourished, no apparent distress,pleasant and cooperative with the examination  Skin:  Warm and dry with good turgor. No rashes or suspicious lesions in visible areas  HEENT:Sinuses nontender with palpation, TMs clear, nares patent with pink mucosa and clear rhinorrhea,no septal deviation ,polyps or lesions. lips without lesions, oropharynx clear.  Neck: Trachea midline,no masses or adenopathy. No JVD.  Cor: Regular rate " and rhythm without murmur, gallop or rub.  Lungs: Respirations unlabored.Clear to auscultation with equal breath sounds bilaterally. No wheezes, rhonchi.  Extremities: No cyanosis, clubbing or edema.  Abdomen: Soft, no tenderness, no distention, normal bowel sounds.        Assessment and Plan.   77 y.o. male     1. Hospital discharge follow-up  Hospital discharge summary reviewed.    2. Essential hypertension  Blood pressure is uncontrolled.  He stated that he does not want to take amlodipine because it causes nausea.  So we stopped amlodipine.  We will add hydrochlorothiazide 25 mg instead.  Continue on lisinopril 40 mg daily.  Check the blood pressure 3 times a day, return to the clinic in 2 weeks for reevaluation    - hydroCHLOROthiazide (HYDRODIURIL) 25 MG Tab; Take 1 Tablet by mouth every day.  Dispense: 90 Tablet; Refill: 2    3. Diabetes mellitus type 2 in nonobese (HCC)  Uncontrolled because of noncompliance, was seen on 6/7, his A1c was 9.5.  Medication was adjusted.  Patient stated that he has been compliant with medication  Continue current regimen    4. Hypokalemia  Blood work in the hospital showed low potassium.  I recommend high potassium diet.    We will repeat potassium level  - POTASSIUM SERUM (K); Future

## 2023-07-17 DIAGNOSIS — E11.9 DIABETES MELLITUS TYPE 2 IN NONOBESE (HCC): ICD-10-CM

## 2023-07-17 RX ORDER — LANCETS 28 GAUGE
EACH MISCELLANEOUS
Qty: 100 EACH | Refills: 0 | Status: SHIPPED | OUTPATIENT
Start: 2023-07-17 | End: 2023-09-18

## 2023-07-19 ENCOUNTER — HOSPITAL ENCOUNTER (OUTPATIENT)
Facility: MEDICAL CENTER | Age: 78
End: 2023-07-19
Attending: FAMILY MEDICINE
Payer: MEDICARE

## 2023-07-19 ENCOUNTER — OFFICE VISIT (OUTPATIENT)
Dept: MEDICAL GROUP | Facility: MEDICAL CENTER | Age: 78
End: 2023-07-19
Payer: MEDICARE

## 2023-07-19 VITALS
SYSTOLIC BLOOD PRESSURE: 170 MMHG | HEIGHT: 69 IN | HEART RATE: 66 BPM | DIASTOLIC BLOOD PRESSURE: 80 MMHG | BODY MASS INDEX: 24.44 KG/M2 | RESPIRATION RATE: 16 BRPM | OXYGEN SATURATION: 95 % | WEIGHT: 165 LBS | TEMPERATURE: 98.7 F

## 2023-07-19 DIAGNOSIS — E11.9 DIABETES MELLITUS TYPE 2 IN NONOBESE (HCC): ICD-10-CM

## 2023-07-19 DIAGNOSIS — I10 ESSENTIAL HYPERTENSION: ICD-10-CM

## 2023-07-19 PROCEDURE — 3079F DIAST BP 80-89 MM HG: CPT | Performed by: FAMILY MEDICINE

## 2023-07-19 PROCEDURE — 82570 ASSAY OF URINE CREATININE: CPT

## 2023-07-19 PROCEDURE — 3077F SYST BP >= 140 MM HG: CPT | Performed by: FAMILY MEDICINE

## 2023-07-19 PROCEDURE — 99213 OFFICE O/P EST LOW 20 MIN: CPT | Performed by: FAMILY MEDICINE

## 2023-07-19 PROCEDURE — 82043 UR ALBUMIN QUANTITATIVE: CPT

## 2023-07-19 ASSESSMENT — FIBROSIS 4 INDEX: FIB4 SCORE: 1.26

## 2023-07-19 NOTE — PROGRESS NOTES
CC: Blood pressure follow-up, diabetes/monofilament foot exam and microalbuminuria screening    HPI:   Howard presents today to discuss the following    Essential hypertension  Patient came in today for blood pressure follow-up.  Came in today with his blood pressure log which showed that his blood pressure has been mostly controlled, less than 140/90.  She has been tolerating lisinopril 40 mg, and hydrochlorothiazide 25 mg daily without a problem. However patient advised to check his blood pressure again, twice a day for a week, one week before next visit, and bring the blood pressure log with him.    Diabetes mellitus type 2 in nonobese ([facility])  Blood glucose has been uncontrolled, last A1c was 9.5, so medication adjusted last visit.  Today patient is due for monofilament foot exam and microalbumin screening.    Patient Active Problem List    Diagnosis Date Noted    Aortic root dilation ([facility]) 09/01/2022    Ataxia 10/12/2021    Stroke ([facility]) 10/12/2021    Uncontrolled type 2 diabetes mellitus with complication, without long-term current use of insulin ([facility]) 02/07/2017    Microcytosis 05/26/2016    Adenomatous polyp of colon 10/02/2015    Mixed hyperlipidemia 11/20/2014    Retinal vein occlusion, branch 11/20/2014    Macular edema 11/20/2014    Primary hypertension     Type 2 diabetes mellitus with hyperglycemia, without long-term current use of insulin ([facility])        Current Outpatient Medications   Medication Sig Dispense Refill    FreeStyle Lancets Misc USE 1 LANCET TO CHECK GLUCOSE THREE TIMES DAILY 100 Each 0    hydroCHLOROthiazide (HYDRODIURIL) 25 MG Tab Take 1 Tablet by mouth every day. 90 Tablet 2    atorvastatin (LIPITOR) 40 MG Tab Take 1 Tablet by mouth every evening. 90 Tablet 2    Insulin Glargine, 2 Unit Dial, (TOUJEO MAX SOLOSTAR) 300 UNIT/ML Solution Pen-injector Inject 10 Units under the skin every day. 10 mL 2    lisinopril (PRINIVIL) 40 MG tablet Take 1 Tablet by mouth every day. 100 Tablet 3     "metformin (GLUCOPHAGE) 1000 MG tablet Take 1 Tablet by mouth 2 times a day with meals for 100 days. 200 Tablet 0    ondansetron (ZOFRAN ODT) 4 MG TABLET DISPERSIBLE Take 1 Tablet by mouth every 6 hours as needed for Nausea/Vomiting. 10 Tablet 0     No current facility-administered medications for this visit.         Allergies as of 07/19/2023    (No Known Allergies)        ROS: Denies any chest pain, Shortness of breath, Changes bowel or bladder, Lower extremity edema.    Physical Exam:  BP (!) 170/80 (BP Location: Right arm, Patient Position: Sitting, BP Cuff Size: Adult)   Pulse 66   Temp 37.1 °C (98.7 °F) (Temporal)   Resp 16   Ht 1.753 m (5' 9\")   Wt 74.8 kg (165 lb)   SpO2 95%   BMI 24.37 kg/m²   Gen.: Well-developed, well-nourished, no apparent distress,pleasant and cooperative with the examination    Monofilament foot exam: Normal sensation bilaterally, no macerations or ulcers, normal peripheral pulses.    Assessment and Plan.   77 y.o. male     1. Essential hypertension  Blood pressure log showed his blood pressure has been mostly controlled, less than 140/90  Continue lisinopril 40 mg, and hydrochlorothiazide 25 mg daily.  However patient advised to check his blood pressure twice a day for a week, 1 week before next visit, and bring the blood pressure log with him.    2. Diabetes mellitus type 2 in nonobese (HCC)  Uncontrolled, last A1c was 9.5, medication adjusted.    Patient is due for monofilament foot exam and microalbumin screening, both done in the clinic.       - POCT Microalbumin Creat Ratio Urine; Future  - Diabetic Monofilament LE Exam              "

## 2023-07-20 LAB
CREAT UR-MCNC: 15.8 MG/DL
MICROALBUMIN UR-MCNC: <1.2 MG/DL
MICROALBUMIN/CREAT UR: NORMAL MG/G (ref 0–30)

## 2023-07-30 ENCOUNTER — PATIENT MESSAGE (OUTPATIENT)
Dept: MEDICAL GROUP | Facility: MEDICAL CENTER | Age: 78
End: 2023-07-30
Payer: MEDICARE

## 2023-07-30 DIAGNOSIS — Z76.0 MEDICATION REFILL: ICD-10-CM

## 2023-07-31 NOTE — PATIENT COMMUNICATION
Received request via: Patient    Was the patient seen in the last year in this department? Yes    Does the patient have an active prescription (recently filled or refills available) for medication(s) requested? No    Does the patient have detention Plus and need 100 day supply (blood pressure, diabetes and cholesterol meds only)? Yes, quantity updated to 100 days

## 2023-08-25 DIAGNOSIS — Z76.0 MEDICATION REFILL: ICD-10-CM

## 2023-08-25 RX ORDER — BLOOD-GLUCOSE METER
KIT MISCELLANEOUS
Qty: 100 STRIP | Refills: 0 | Status: SHIPPED | OUTPATIENT
Start: 2023-08-25 | End: 2023-10-14 | Stop reason: SDUPTHER

## 2023-08-25 NOTE — TELEPHONE ENCOUNTER
Received request via: Pharmacy    Was the patient seen in the last year in this department? Yes  7/19/2023  Does the patient have an active prescription (recently filled or refills available) for medication(s) requested? No    Does the patient have longterm Plus and need 100 day supply (blood pressure, diabetes and cholesterol meds only)? Yes, quantity updated to 100 days

## 2023-09-13 ENCOUNTER — OFFICE VISIT (OUTPATIENT)
Dept: MEDICAL GROUP | Facility: MEDICAL CENTER | Age: 78
End: 2023-09-13
Payer: MEDICARE

## 2023-09-13 VITALS
BODY MASS INDEX: 24.07 KG/M2 | HEIGHT: 69 IN | DIASTOLIC BLOOD PRESSURE: 80 MMHG | OXYGEN SATURATION: 95 % | WEIGHT: 162.48 LBS | HEART RATE: 71 BPM | RESPIRATION RATE: 16 BRPM | SYSTOLIC BLOOD PRESSURE: 176 MMHG | TEMPERATURE: 98.5 F

## 2023-09-13 DIAGNOSIS — Z23 NEED FOR VACCINATION: ICD-10-CM

## 2023-09-13 DIAGNOSIS — E11.69 MIXED HYPERLIPIDEMIA DUE TO TYPE 2 DIABETES MELLITUS (HCC): ICD-10-CM

## 2023-09-13 DIAGNOSIS — I10 ESSENTIAL HYPERTENSION: ICD-10-CM

## 2023-09-13 DIAGNOSIS — E78.2 MIXED HYPERLIPIDEMIA DUE TO TYPE 2 DIABETES MELLITUS (HCC): ICD-10-CM

## 2023-09-13 DIAGNOSIS — E11.9 DIABETES MELLITUS TYPE 2 IN NONOBESE (HCC): ICD-10-CM

## 2023-09-13 LAB
HBA1C MFR BLD: 7.8 % (ref ?–5.8)
POCT INT CON NEG: NEGATIVE
POCT INT CON POS: POSITIVE

## 2023-09-13 PROCEDURE — 3077F SYST BP >= 140 MM HG: CPT | Performed by: FAMILY MEDICINE

## 2023-09-13 PROCEDURE — G0008 ADMIN INFLUENZA VIRUS VAC: HCPCS | Performed by: FAMILY MEDICINE

## 2023-09-13 PROCEDURE — 99214 OFFICE O/P EST MOD 30 MIN: CPT | Mod: 25 | Performed by: FAMILY MEDICINE

## 2023-09-13 PROCEDURE — 3079F DIAST BP 80-89 MM HG: CPT | Performed by: FAMILY MEDICINE

## 2023-09-13 PROCEDURE — 90662 IIV NO PRSV INCREASED AG IM: CPT | Performed by: FAMILY MEDICINE

## 2023-09-13 PROCEDURE — 83036 HEMOGLOBIN GLYCOSYLATED A1C: CPT | Performed by: FAMILY MEDICINE

## 2023-09-13 ASSESSMENT — FIBROSIS 4 INDEX: FIB4 SCORE: 1.27

## 2023-09-13 NOTE — PROGRESS NOTES
CC: Diabetes, hypertension, hyperlipidemia    HPI:   Howard presents today to discuss the following medical issues:    Diabetes mellitus type 2 in nonobese ([facility])  Improved glycemic control.  A1c went down from 9.5% to 7.8%.  Patient has been on glargine insulin 15 unit nightly and metformin 1000 mg twice a day.    Essential hypertension  Blood pressure is  high, asymptomatic.  Denies headache, chest pain, or shortness of breath.  Patient stated that he did not take his blood pressure medication today.  He came in today with blood pressure log which is mostly less than 140/90.  He has been on lisinopril 40 mg daily, and hydrochlorothiazide 25 mg daily.    Mixed hyperlipidemia due to type 2 diabetes mellitus ([facility])  He has been tolerating the statin. Denies muscle pain LFTs has been normal, he has been on atorvastatin 40 mg daily.    Patient is due for flu shot,      Patient Active Problem List    Diagnosis Date Noted    Aortic root dilation ([facility]) 09/01/2022    Ataxia 10/12/2021    Stroke ([facility]) 10/12/2021    Uncontrolled type 2 diabetes mellitus with complication, without long-term current use of insulin ([facility]) 02/07/2017    Microcytosis 05/26/2016    Adenomatous polyp of colon 10/02/2015    Mixed hyperlipidemia 11/20/2014    Retinal vein occlusion, branch 11/20/2014    Macular edema 11/20/2014    Primary hypertension     Type 2 diabetes mellitus with hyperglycemia, without long-term current use of insulin ([facility])        Current Outpatient Medications   Medication Sig Dispense Refill    glucose blood (FREESTYLE LITE) strip USE 1 STRIP TO CHECK GLUCOSE THREE TIMES DAILY AND IN HIGH OR LOW BLOOD SUGAR 100 Strip 0    FreeStyle Lancets Misc USE 1 LANCET TO CHECK GLUCOSE THREE TIMES DAILY 100 Each 0    hydroCHLOROthiazide (HYDRODIURIL) 25 MG Tab Take 1 Tablet by mouth every day. 90 Tablet 2    atorvastatin (LIPITOR) 40 MG Tab Take 1 Tablet by mouth every evening. 90 Tablet 2    Insulin Glargine, 2 Unit Dial, (TOUJEO MAX  "SOLOSTAR) 300 UNIT/ML Solution Pen-injector Inject 10 Units under the skin every day. 10 mL 2    lisinopril (PRINIVIL) 40 MG tablet Take 1 Tablet by mouth every day. 100 Tablet 3    metformin (GLUCOPHAGE) 1000 MG tablet Take 1 Tablet by mouth 2 times a day with meals for 100 days. 200 Tablet 0    ondansetron (ZOFRAN ODT) 4 MG TABLET DISPERSIBLE Take 1 Tablet by mouth every 6 hours as needed for Nausea/Vomiting. 10 Tablet 0     No current facility-administered medications for this visit.         Allergies as of 09/13/2023    (No Known Allergies)        ROS: Denies any chest pain, Shortness of breath, Changes bowel or bladder, Lower extremity edema.    Physical Exam:  BP (!) 176/80 (BP Location: Right arm, Patient Position: Sitting, BP Cuff Size: Adult)   Pulse 71   Temp 36.9 °C (98.5 °F) (Temporal)   Resp 16   Ht 1.753 m (5' 9\")   Wt 73.7 kg (162 lb 7.7 oz)   SpO2 95%   BMI 23.99 kg/m²   Gen.: Well-developed, well-nourished, no apparent distress,pleasant and cooperative with the examination  Skin:  Warm and dry with good turgor. No rashes or suspicious lesions in visible areas  HEENT:Sinuses nontender with palpation, TMs clear, nares patent with pink mucosa and clear rhinorrhea,no septal deviation ,polyps or lesions. lips without lesions, oropharynx clear.  Neck: Trachea midline,no masses or adenopathy. No JVD.  Cor: Regular rate and rhythm without murmur, gallop or rub.  Lungs: Respirations unlabored.Clear to auscultation with equal breath sounds bilaterally. No wheezes, rhonchi.  Extremities: No cyanosis, clubbing or edema.      Assessment and Plan.   78 y.o. male     1. Diabetes mellitus type 2 in nonobese (HCC)  Improved A1c from 9.5% to 7.8%  We will increase glargine insulin from 15 unit to 18 unit nightly  Continue metformin 1000 mg twice a day    - POCT Hemoglobin A1C  - Influenza Vaccine, High Dose (65+ Only)    2. Need for vaccination  Flu shot is given today.    - Influenza Vaccine, High Dose (65+ " Only)    3. Essential hypertension  Blood pressure is  high, asymptomatic.  Patient did not take his blood pressure medication today.  Patient came in today with blood pressure log which is mostly less than 140/90  Advised to check his blood pressure 3 times a day for a week and send it to me for reevaluation  For now continue lisinopril 40 mg daily, and hydrochlorothiazide 25 mg daily.    4. Mixed hyperlipidemia due to type 2 diabetes mellitus (HCC)  He has been tolerating the statin. Denies muscle pain LFTs has been normal  Continue atorvastatin 40 mg daily.

## 2023-09-18 DIAGNOSIS — E11.9 DIABETES MELLITUS TYPE 2 IN NONOBESE (HCC): ICD-10-CM

## 2023-09-18 RX ORDER — LANCETS 28 GAUGE
EACH MISCELLANEOUS
Qty: 100 EACH | Refills: 0 | Status: SHIPPED | OUTPATIENT
Start: 2023-09-18 | End: 2023-10-14 | Stop reason: SDUPTHER

## 2023-10-07 ENCOUNTER — PATIENT MESSAGE (OUTPATIENT)
Dept: MEDICAL GROUP | Facility: MEDICAL CENTER | Age: 78
End: 2023-10-07
Payer: MEDICARE

## 2023-10-07 DIAGNOSIS — E11.9 DIABETES MELLITUS TYPE 2 IN NONOBESE (HCC): ICD-10-CM

## 2023-10-08 ENCOUNTER — PATIENT MESSAGE (OUTPATIENT)
Dept: MEDICAL GROUP | Facility: MEDICAL CENTER | Age: 78
End: 2023-10-08
Payer: MEDICARE

## 2023-10-08 DIAGNOSIS — E11.9 DIABETES MELLITUS TYPE 2 IN NONOBESE (HCC): ICD-10-CM

## 2023-10-08 DIAGNOSIS — E78.2 MIXED HYPERLIPIDEMIA: ICD-10-CM

## 2023-10-09 RX ORDER — ORAL SEMAGLUTIDE 7 MG/1
1 TABLET ORAL DAILY
Qty: 90 TABLET | Refills: 2 | Status: SHIPPED | OUTPATIENT
Start: 2023-10-09

## 2023-10-09 RX ORDER — ATORVASTATIN CALCIUM 40 MG/1
40 TABLET, FILM COATED ORAL NIGHTLY
Qty: 90 TABLET | Refills: 2 | Status: SHIPPED | OUTPATIENT
Start: 2023-10-09

## 2023-10-09 RX ORDER — INSULIN GLARGINE 300 U/ML
10 INJECTION, SOLUTION SUBCUTANEOUS DAILY
Qty: 10 ML | Refills: 2 | Status: SHIPPED | OUTPATIENT
Start: 2023-10-09

## 2023-10-14 DIAGNOSIS — E11.9 DIABETES MELLITUS TYPE 2 IN NONOBESE (HCC): ICD-10-CM

## 2023-10-14 DIAGNOSIS — Z76.0 MEDICATION REFILL: ICD-10-CM

## 2023-10-16 RX ORDER — BLOOD-GLUCOSE METER
KIT MISCELLANEOUS
Qty: 100 STRIP | Refills: 0 | Status: SHIPPED | OUTPATIENT
Start: 2023-10-16 | End: 2023-11-30

## 2023-10-16 RX ORDER — LANCETS 28 GAUGE
EACH MISCELLANEOUS
Qty: 100 EACH | Refills: 0 | Status: SHIPPED | OUTPATIENT
Start: 2023-10-16 | End: 2023-11-30

## 2023-11-30 DIAGNOSIS — Z76.0 MEDICATION REFILL: ICD-10-CM

## 2023-11-30 DIAGNOSIS — E11.9 DIABETES MELLITUS TYPE 2 IN NONOBESE (HCC): ICD-10-CM

## 2023-11-30 RX ORDER — LANCETS 28 GAUGE
EACH MISCELLANEOUS
Qty: 100 EACH | Refills: 0 | Status: SHIPPED | OUTPATIENT
Start: 2023-11-30 | End: 2024-01-15

## 2023-11-30 RX ORDER — BLOOD-GLUCOSE METER
KIT MISCELLANEOUS
Qty: 100 STRIP | Refills: 0 | Status: SHIPPED | OUTPATIENT
Start: 2023-11-30 | End: 2024-01-15

## 2024-01-05 NOTE — ED AVS SNAPSHOT
Home Care Instructions                                                                                                                Howard Gamboa   MRN: [id number]    Department:  [facility], Emergency Dept   Date of Visit:  4/21/2017            [facility], Emergency Dept    [address]    [place] [address]    Phone:  [phone]      You were seen by     Tommy Owusu M.D.      Your Diagnosis Was     Nausea vomiting and diarrhea     R11.2, R19.7       These are the medications you received during your hospitalization from 04/21/2017 2341 to 04/22/2017 0448     Date/Time Order Dose Route Action    04/22/2017 0321 NS infusion 1,000 mL 1,000 mL Intravenous New Bag    04/22/2017 0319 ondansetron (ZOFRAN) syringe/vial injection 4 mg 4 mg Intravenous Given      Follow-up Information     1. Follow up with Sayda An M.D.. Schedule an appointment as soon as possible for a visit in 3 days.    Specialty:  Family Medicine    Contact information    [address] [address] [address] [phone]        Medication Information     Review all of your home medications and newly ordered medications with your primary doctor and/or pharmacist as soon as possible. Follow medication instructions as directed by your doctor and/or pharmacist.     Please keep your complete medication list with you and share with your physician. Update the information when medications are discontinued, doses are changed, or new medications (including over-the-counter products) are added; and carry medication information at all times in the event of emergency situations.               Medication List      START taking these medications        Instructions    Morning Afternoon Evening Bedtime    diphenoxylate-atropine 2.5-0.025 MG Tabs   Commonly known as:  LOMOTIL        Take 1 Tab by mouth 4 times a day as needed for Diarrhea.   Dose:  1 Tab                        ondansetron 4 MG Tbdp    Patient called back.  Patient is at work at this time, unable to do Covid test at this time.  Will do test tonight and leave message with service.         Commonly known as:  ZOFRAN ODT        Take 1 Tab by mouth every 8 hours as needed for Nausea/Vomiting.   Dose:  4 mg                          ASK your doctor about these medications        Instructions    Morning Afternoon Evening Bedtime    amlodipine 10 MG Tabs   Commonly known as:  NORVASC        Take 1 Tab by mouth every day.   Dose:  10 mg                        atorvastatin 40 MG Tabs   Commonly known as:  LIPITOR        Take 1 Tab by mouth every bedtime.   Dose:  40 mg                        glimepiride 2 MG Tabs   Commonly known as:  AMARYL        Take 1 Tab by mouth every morning.   Dose:  2 mg                        hydrochlorothiazide 12.5 MG tablet   Commonly known as:  HYDRODIURIL        Take 1 Tab by mouth every day.   Dose:  12.5 mg                        lisinopril 40 MG tablet   Commonly known as:  PRINIVIL, ZESTRIL        Take 1 Tab by mouth every day.   Dose:  40 mg                        metformin 1000 MG tablet   Commonly known as:  GLUCOPHAGE        Take 1 Tab by mouth 2 times a day, with meals.   Dose:  1000 mg                             Where to Get Your Medications      You can get these medications from any pharmacy     Bring a paper prescription for each of these medications    - diphenoxylate-atropine 2.5-0.025 MG Tabs  - ondansetron 4 MG Tbdp            Procedures and tests performed during your visit     CBC WITH DIFFERENTIAL    COMP METABOLIC PANEL    ESTIMATED GFR    IV Saline Lock    LIPASE    POC UA        Discharge Instructions       Return if you have abdominal pain, fever, severe vomiting, blood in vomit or blood in stool.   Nausea and Vomiting  Nausea means you feel sick to your stomach. Throwing up (vomiting) is a reflex where stomach contents come out of your mouth.  HOME CARE   · Take medicine as told by your doctor.  · Do not force yourself to eat. However, you do need to drink fluids.  · If you feel like eating, eat a normal diet as told by your doctor.  ¨ Eat rice,  wheat, potatoes, bread, lean meats, yogurt, fruits, and vegetables.  ¨ Avoid high-fat foods.  · Drink enough fluids to keep your pee (urine) clear or pale yellow.  · Ask your doctor how to replace body fluid losses (rehydrate). Signs of body fluid loss (dehydration) include:  ¨ Feeling very thirsty.  ¨ Dry lips and mouth.  ¨ Feeling dizzy.  ¨ Dark pee.  ¨ Peeing less than normal.  ¨ Feeling confused.  ¨ Fast breathing or heart rate.  GET HELP RIGHT AWAY IF:   · You have blood in your throw up.  · You have black or bloody poop (stool).  · You have a bad headache or stiff neck.  · You feel confused.  · You have bad belly (abdominal) pain.  · You have chest pain or trouble breathing.  · You do not pee at least once every 8 hours.  · You have cold, clammy skin.  · You keep throwing up after 24 to 48 hours.  · You have a fever.  MAKE SURE YOU:   · Understand these instructions.  · Will watch your condition.  · Will get help right away if you are not doing well or get worse.     This information is not intended to replace advice given to you by your health care provider. Make sure you discuss any questions you have with your health care provider.     Document Released: 06/05/2009 Document Revised: 03/11/2013 Document Reviewed: 05/18/2012  [employer] Interactive Patient Education ©2016 [employer]    Diarrhea  Diarrhea is watery poop (stool). It can make you feel weak, tired, thirsty, or give you a dry mouth (signs of dehydration). Watery poop is a sign of another problem, most often an infection. It often lasts 2-3 days. It can last longer if it is a sign of something serious. Take care of yourself as told by your doctor.  HOME CARE   · Drink 1 cup (8 ounces) of fluid each time you have watery poop.  · Do not drink the following fluids:  ¨ Those that contain simple sugars (fructose, glucose, galactose, lactose, sucrose, maltose).  ¨ Sports drinks.  ¨ Fruit juices.  ¨ Whole milk products.  ¨ Sodas.  ¨ Drinks with caffeine  (coffee, tea, soda) or alcohol.  · Oral rehydration solution may be used if the doctor says it is okay. You may make your own solution. Follow this recipe:  ¨  - teaspoon table salt.  ¨ ¾ teaspoon baking soda.  ¨  teaspoon salt substitute containing potassium chloride.  ¨ 1 tablespoons sugar.  ¨ 1 liter (34 ounces) of water.  · Avoid the following foods:  ¨ High fiber foods, such as raw fruits and vegetables.  ¨ Nuts, seeds, and whole grain breads and cereals.  ¨  Those that are sweetened with sugar alcohols (xylitol, sorbitol, mannitol).  · Try eating the following foods:  ¨ Starchy foods, such as rice, toast, pasta, low-sugar cereal, oatmeal, baked potatoes, crackers, and bagels.  ¨ Bananas.  ¨ Applesauce.  · Eat probiotic-rich foods, such as yogurt and milk products that are fermented.  · Wash your hands well after each time you have watery poop.  · Only take medicine as told by your doctor.  · Take a warm bath to help lessen burning or pain from having watery poop.  GET HELP RIGHT AWAY IF:   · You cannot drink fluids without throwing up (vomiting).  · You keep throwing up.  · You have blood in your poop, or your poop looks black and tarry.  · You do not pee (urinate) in 6-8 hours, or there is only a small amount of very dark pee.  · You have belly (abdominal) pain that gets worse or stays in the same spot (localizes).  · You are weak, dizzy, confused, or light-headed.  · You have a very bad headache.  · Your watery poop gets worse or does not get better.  · You have a fever or lasting symptoms for more than 2-3 days.  · You have a fever and your symptoms suddenly get worse.  MAKE SURE YOU:   · Understand these instructions.  · Will watch your condition.  · Will get help right away if you are not doing well or get worse.     This information is not intended to replace advice given to you by your health care provider. Make sure you discuss any questions you have with your health care provider.     Document  Released: 06/05/2009 Document Revised: 01/08/2016 Document Reviewed: 08/25/2013  Elsevier Interactive Patient Education ©2016 [employer]            Patient Information     Patient Information    Following emergency treatment: all patient requiring follow-up care must return either to a private physician or a clinic if your condition worsens before you are able to obtain further medical attention, please return to the emergency room.     Billing Information    At [facility], we work to make the billing process streamlined for our patients.  Our Representatives are here to answer any questions you may have regarding your hospital bill.  If you have insurance coverage and have supplied your insurance information to us, we will submit a claim to your insurer on your behalf.  Should you have any questions regarding your bill, we can be reached online or by phone as follows:  Online: You are able pay your bills online or live chat with our representatives about any billing questions you may have. We are here to help Monday - Friday from 8:00am to 7:30pm and 9:00am - 12:00pm on Saturdays.  Please visit [email or web address]  for more information.   Phone:  [phone] or [phone]    Please note that your emergency physician, surgeon, pathologist, radiologist, anesthesiologist, and other specialists are not employed by [facility] and will therefore bill separately for their services.  Please contact them directly for any questions concerning their bills at the numbers below:     Emergency Physician Services:  [phone]  [facility]:  [phone]  [facility]:  [phone]  [facility]:  [phone]    1. Your final bill may vary from the amount quoted upon discharge if all procedures are not complete at that time, or if your doctor has additional procedures of which we are not aware. You will receive an additional bill if you  return to the Emergency Department at [facility] for suture removal regardless of the facility of which the sutures were placed.     2. Please arrange for settlement of this account at the emergency registration.    3. All self-pay accounts are due in full at the time of treatment.  If you are unable to meet this obligation then payment is expected within 4-5 days.     4. If you have had radiology studies (CT, X-ray, Ultrasound, MRI), you have received a preliminary result during your emergency department visit. Please contact the radiology department [phone] to receive a copy of your final result. Please discuss the Final result with your primary physician or with the follow up physician provided.     Crisis Hotline:  [place] Crisis Hotline:  7-800-SSERAKG or 1-723.334.4894  [place] Crisis Hotline:    1-484.874.3921 or 240-169-7182         ED Discharge Follow Up Questions    1. In order to provide you with very good care, we would like to follow up with a phone call in the next few days.  May we have your permission to contact you?     YES /  NO    2. What is the best phone number to call you? (       )_____-__________    3. What is the best time to call you?      Morning  /  Afternoon  /  Evening                   Patient Signature:  ____________________________________________________________    Date:  ____________________________________________________________

## 2024-01-13 DIAGNOSIS — Z76.0 MEDICATION REFILL: ICD-10-CM

## 2024-01-13 DIAGNOSIS — E11.9 DIABETES MELLITUS TYPE 2 IN NONOBESE (HCC): ICD-10-CM

## 2024-01-15 RX ORDER — BLOOD-GLUCOSE METER
KIT MISCELLANEOUS
Qty: 100 STRIP | Refills: 2 | Status: SHIPPED | OUTPATIENT
Start: 2024-01-15

## 2024-01-15 RX ORDER — LANCETS 28 GAUGE
EACH MISCELLANEOUS
Qty: 100 EACH | Refills: 0 | Status: SHIPPED | OUTPATIENT
Start: 2024-01-15 | End: 2024-02-15

## 2024-02-14 DIAGNOSIS — E11.9 DIABETES MELLITUS TYPE 2 IN NONOBESE (HCC): ICD-10-CM

## 2024-02-15 RX ORDER — LANCETS 28 GAUGE
EACH MISCELLANEOUS
Qty: 100 EACH | Refills: 0 | Status: SHIPPED | OUTPATIENT
Start: 2024-02-15

## 2024-03-21 ENCOUNTER — TELEPHONE (OUTPATIENT)
Dept: HEALTH INFORMATION MANAGEMENT | Facility: OTHER | Age: 79
End: 2024-03-21

## 2024-04-08 RX ORDER — PEN NEEDLE, DIABETIC 32GX 5/32"
NEEDLE, DISPOSABLE MISCELLANEOUS
Qty: 200 EACH | Refills: 0 | Status: SHIPPED | OUTPATIENT
Start: 2024-04-08

## 2024-04-16 DIAGNOSIS — E11.9 DIABETES MELLITUS TYPE 2 IN NONOBESE (HCC): ICD-10-CM

## 2024-04-17 RX ORDER — LANCETS 28 GAUGE
EACH MISCELLANEOUS
Qty: 100 EACH | Refills: 0 | Status: SHIPPED | OUTPATIENT
Start: 2024-04-17

## 2024-05-15 DIAGNOSIS — Z76.0 MEDICATION REFILL: ICD-10-CM

## 2024-05-16 RX ORDER — BLOOD-GLUCOSE METER
KIT MISCELLANEOUS
Qty: 100 STRIP | Refills: 2 | Status: SHIPPED | OUTPATIENT
Start: 2024-05-16

## 2024-05-17 DIAGNOSIS — E11.9 DIABETES MELLITUS TYPE 2 IN NONOBESE (HCC): ICD-10-CM

## 2024-05-17 RX ORDER — LANCETS 28 GAUGE
EACH MISCELLANEOUS
Qty: 100 EACH | Refills: 0 | Status: SHIPPED | OUTPATIENT
Start: 2024-05-17

## 2024-05-17 NOTE — TELEPHONE ENCOUNTER
Received request via: Pharmacy    Was the patient seen in the last year in this department? Yes    Does the patient have an active prescription (recently filled or refills available) for medication(s) requested? No    Pharmacy Name: [id number] - [place], [place] - [address]     Does the patient have prison Plus and need 100 day supply (blood pressure, diabetes and cholesterol meds only)? Yes, quantity updated to 100 days

## 2024-06-12 RX ORDER — PEN NEEDLE, DIABETIC 32GX 5/32"
NEEDLE, DISPOSABLE MISCELLANEOUS
Qty: 200 EACH | Refills: 0 | Status: SHIPPED | OUTPATIENT
Start: 2024-06-12

## 2024-06-12 NOTE — TELEPHONE ENCOUNTER
Received request via: Pharmacy    Was the patient seen in the last year in this department? Yes    Does the patient have an active prescription (recently filled or refills available) for medication(s) requested? No    Pharmacy Name: [id number] - [place], [place] - [address]     Does the patient have nursing home Plus and need 100 day supply (blood pressure, diabetes and cholesterol meds only)? Yes, quantity updated to 100 days

## 2024-06-21 DIAGNOSIS — I10 ESSENTIAL HYPERTENSION: ICD-10-CM

## 2024-06-21 RX ORDER — HYDROCHLOROTHIAZIDE 25 MG/1
25 TABLET ORAL DAILY
Qty: 90 TABLET | Refills: 0 | Status: SHIPPED | OUTPATIENT
Start: 2024-06-21

## 2024-07-08 RX ORDER — ORAL SEMAGLUTIDE 7 MG/1
1 TABLET ORAL DAILY
Qty: 90 TABLET | Refills: 0 | Status: SHIPPED | OUTPATIENT
Start: 2024-07-08

## 2024-08-08 DIAGNOSIS — E11.9 DIABETES MELLITUS TYPE 2 IN NONOBESE (HCC): ICD-10-CM

## 2024-08-08 DIAGNOSIS — E78.2 MIXED HYPERLIPIDEMIA: ICD-10-CM

## 2024-08-09 NOTE — TELEPHONE ENCOUNTER
Received request via: Pharmacy    Was the patient seen in the last year in this department? Yes    Does the patient have an active prescription (recently filled or refills available) for medication(s) requested? No    Pharmacy Name: [facility]    Does the patient have [facility] Plus and need 100-day supply? (This applies to ALL medications) Yes, quantity updated to 100 days

## 2024-08-12 RX ORDER — ATORVASTATIN CALCIUM 40 MG/1
40 TABLET, FILM COATED ORAL NIGHTLY
Qty: 100 TABLET | Refills: 0 | Status: SHIPPED | OUTPATIENT
Start: 2024-08-12

## 2024-09-05 ENCOUNTER — TELEPHONE (OUTPATIENT)
Dept: HEALTH INFORMATION MANAGEMENT | Facility: OTHER | Age: 79
End: 2024-09-05

## 2024-09-12 DIAGNOSIS — I10 ESSENTIAL HYPERTENSION: ICD-10-CM

## 2024-09-12 RX ORDER — HYDROCHLOROTHIAZIDE 25 MG/1
25 TABLET ORAL DAILY
Qty: 90 TABLET | Refills: 0 | Status: SHIPPED | OUTPATIENT
Start: 2024-09-12

## 2024-10-03 NOTE — TELEPHONE ENCOUNTER
Was the patient seen in the last year in this department? Yes    Does patient have an active prescription for medications requested? Yes    Received Request Via: Patient   details…

## 2024-10-04 RX ORDER — ORAL SEMAGLUTIDE 7 MG/1
1 TABLET ORAL DAILY
Qty: 90 TABLET | Refills: 0 | Status: SHIPPED | OUTPATIENT
Start: 2024-10-04

## 2024-10-09 DIAGNOSIS — E11.9 DIABETES MELLITUS TYPE 2 IN NONOBESE (HCC): ICD-10-CM

## 2024-10-10 RX ORDER — INSULIN GLARGINE 300 U/ML
10 INJECTION, SOLUTION SUBCUTANEOUS DAILY
Qty: 10 ML | Refills: 2 | Status: SHIPPED | OUTPATIENT
Start: 2024-10-10

## 2024-10-11 DIAGNOSIS — E11.9 DIABETES MELLITUS TYPE 2 IN NONOBESE (HCC): ICD-10-CM

## 2024-10-11 DIAGNOSIS — E78.2 MIXED HYPERLIPIDEMIA: ICD-10-CM

## 2024-10-14 DIAGNOSIS — E11.9 DIABETES MELLITUS TYPE 2 IN NONOBESE (HCC): ICD-10-CM

## 2024-10-14 DIAGNOSIS — E78.2 MIXED HYPERLIPIDEMIA: ICD-10-CM

## 2024-10-14 RX ORDER — ATORVASTATIN CALCIUM 40 MG/1
40 TABLET, FILM COATED ORAL NIGHTLY
Qty: 100 TABLET | Refills: 0 | Status: SHIPPED | OUTPATIENT
Start: 2024-10-14

## 2024-10-14 RX ORDER — ATORVASTATIN CALCIUM 40 MG/1
40 TABLET, FILM COATED ORAL EVERY EVENING
Qty: 100 TABLET | Refills: 0 | Status: SHIPPED | OUTPATIENT
Start: 2024-10-14

## 2024-10-28 ENCOUNTER — TELEPHONE (OUTPATIENT)
Dept: MEDICAL GROUP | Facility: IMAGING CENTER | Age: 79
End: 2024-10-28
Payer: MEDICARE

## 2024-11-02 NOTE — PATIENT INSTRUCTIONS
Hearing Loss  Introduction  Hearing loss is a partial or total loss of the ability to hear. This can be temporary or permanent, and it can happen in one or both ears. Hearing loss may be referred to as deafness.  Medical care is necessary to treat hearing loss properly and to prevent the condition from getting worse. Your hearing may partially or completely come back, depending on what caused your hearing loss and how severe it is. In some cases, hearing loss is permanent.  What are the causes?  Common causes of hearing loss include:  · Too much wax in the ear canal.  · Infection of the ear canal or middle ear.  · Fluid in the middle ear.  · Injury to the ear or surrounding area.  · An object stuck in the ear.  · Prolonged exposure to loud sounds, such as music.  Less common causes of hearing loss include:  · Tumors in the ear.  · Viral or bacterial infections, such as meningitis.  · A hole in the eardrum (perforated eardrum).  · Problems with the hearing nerve that sends signals between the brain and the ear.  · Certain medicines.  What are the signs or symptoms?  Symptoms of this condition may include:  · Difficulty telling the difference between sounds.  · Difficulty following a conversation when there is background noise.  · Lack of response to sounds in your environment. This may be most noticeable when you do not respond to startling sounds.  · Needing to turn up the volume on the television, radio, etc.  · Ringing in the ears.  · Dizziness.  · Pain in the ears.  How is this diagnosed?  This condition is diagnosed based on a physical exam and a hearing test (audiometry). The audiometry test will be performed by a hearing specialist (audiologist). You may also be referred to an ear, nose, and throat (ENT) specialist (otolaryngologist).  How is this treated?  Treatment for recent onset of hearing loss may include:  · Ear wax removal.  · Being prescribed medicines to prevent infection (antibiotics).  · Being  prescribed medicines to reduce inflammation (corticosteroids).  Follow these instructions at home:  · If you were prescribed an antibiotic medicine, take it as told by your health care provider. Do not stop taking the antibiotic even if you start to feel better.  · Take over-the-counter and prescription medicines only as told by your health care provider.  · Avoid loud noises.  · Return to your normal activities as told by your health care provider. Ask your health care provider what activities are safe for you.  · Keep all follow-up visits as told by your health care provider. This is important.  Contact a health care provider if:  · You feel dizzy.  · You develop new symptoms.  · You vomit or feel nauseous.  · You have a fever.  Get help right away if:  · You develop sudden changes in your vision.  · You have severe ear pain.  · You have new or increased weakness.  · You have a severe headache.  This information is not intended to replace advice given to you by your health care provider. Make sure you discuss any questions you have with your health care provider.  Document Released: 12/18/2006 Document Revised: 05/25/2017 Document Reviewed: 05/04/2016  © 2017 Elsevier     No

## 2024-12-05 ENCOUNTER — APPOINTMENT (OUTPATIENT)
Dept: MEDICAL GROUP | Facility: IMAGING CENTER | Age: 79
End: 2024-12-05
Payer: MEDICARE

## 2024-12-05 VITALS
RESPIRATION RATE: 18 BRPM | HEART RATE: 72 BPM | BODY MASS INDEX: 26.09 KG/M2 | SYSTOLIC BLOOD PRESSURE: 170 MMHG | HEIGHT: 67 IN | DIASTOLIC BLOOD PRESSURE: 102 MMHG | OXYGEN SATURATION: 95 % | WEIGHT: 166.2 LBS | TEMPERATURE: 99.4 F

## 2024-12-05 DIAGNOSIS — Z12.5 PROSTATE CANCER SCREENING: ICD-10-CM

## 2024-12-05 DIAGNOSIS — E11.65 TYPE 2 DIABETES MELLITUS WITH HYPERGLYCEMIA, WITH LONG-TERM CURRENT USE OF INSULIN (HCC): ICD-10-CM

## 2024-12-05 DIAGNOSIS — I77.810 AORTIC ROOT DILATION (HCC): ICD-10-CM

## 2024-12-05 DIAGNOSIS — Z79.4 TYPE 2 DIABETES MELLITUS WITH HYPERGLYCEMIA, WITH LONG-TERM CURRENT USE OF INSULIN (HCC): ICD-10-CM

## 2024-12-05 DIAGNOSIS — Z23 NEED FOR VACCINATION: ICD-10-CM

## 2024-12-05 DIAGNOSIS — I10 ESSENTIAL HYPERTENSION: ICD-10-CM

## 2024-12-05 DIAGNOSIS — R71.8 MICROCYTOSIS: ICD-10-CM

## 2024-12-05 DIAGNOSIS — E78.2 MIXED HYPERLIPIDEMIA: ICD-10-CM

## 2024-12-05 LAB
HBA1C MFR BLD: 8.7 % (ref ?–5.8)
POCT INT CON NEG: NEGATIVE
POCT INT CON POS: POSITIVE

## 2024-12-05 PROCEDURE — 83036 HEMOGLOBIN GLYCOSYLATED A1C: CPT | Performed by: INTERNAL MEDICINE

## 2024-12-05 PROCEDURE — 99215 OFFICE O/P EST HI 40 MIN: CPT | Mod: 25 | Performed by: INTERNAL MEDICINE

## 2024-12-05 PROCEDURE — 3077F SYST BP >= 140 MM HG: CPT | Performed by: INTERNAL MEDICINE

## 2024-12-05 PROCEDURE — G0008 ADMIN INFLUENZA VIRUS VAC: HCPCS | Performed by: INTERNAL MEDICINE

## 2024-12-05 PROCEDURE — 90662 IIV NO PRSV INCREASED AG IM: CPT | Performed by: INTERNAL MEDICINE

## 2024-12-05 PROCEDURE — 3080F DIAST BP >= 90 MM HG: CPT | Performed by: INTERNAL MEDICINE

## 2024-12-05 RX ORDER — ORAL SEMAGLUTIDE 14 MG/1
14 TABLET ORAL DAILY
Qty: 100 TABLET | Refills: 3 | Status: SHIPPED | OUTPATIENT
Start: 2024-12-05 | End: 2024-12-05 | Stop reason: SDUPTHER

## 2024-12-05 RX ORDER — ATORVASTATIN CALCIUM 40 MG/1
40 TABLET, FILM COATED ORAL EVERY EVENING
Qty: 100 TABLET | Refills: 3 | Status: SHIPPED | OUTPATIENT
Start: 2024-12-05

## 2024-12-05 RX ORDER — LANCETS 30 GAUGE
EACH MISCELLANEOUS
Qty: 300 EACH | Refills: 11 | Status: SHIPPED | OUTPATIENT
Start: 2024-12-05 | End: 2024-12-05 | Stop reason: SDUPTHER

## 2024-12-05 RX ORDER — PEN NEEDLE, DIABETIC 32GX 5/32"
NEEDLE, DISPOSABLE MISCELLANEOUS
Qty: 200 EACH | Refills: 11 | Status: SHIPPED | OUTPATIENT
Start: 2024-12-05 | End: 2024-12-05 | Stop reason: SDUPTHER

## 2024-12-05 RX ORDER — HYDROCHLOROTHIAZIDE 25 MG/1
25 TABLET ORAL DAILY
Qty: 90 TABLET | Refills: 0 | Status: SHIPPED | OUTPATIENT
Start: 2024-12-05 | End: 2024-12-05

## 2024-12-05 RX ORDER — INSULIN GLARGINE 300 U/ML
10 INJECTION, SOLUTION SUBCUTANEOUS DAILY
Qty: 10 ML | Refills: 2 | Status: SHIPPED | OUTPATIENT
Start: 2024-12-05 | End: 2024-12-05 | Stop reason: SDUPTHER

## 2024-12-05 RX ORDER — ATORVASTATIN CALCIUM 40 MG/1
40 TABLET, FILM COATED ORAL EVERY EVENING
Qty: 100 TABLET | Refills: 3 | Status: SHIPPED | OUTPATIENT
Start: 2024-12-05 | End: 2024-12-05 | Stop reason: SDUPTHER

## 2024-12-05 RX ORDER — PEN NEEDLE, DIABETIC 32GX 5/32"
NEEDLE, DISPOSABLE MISCELLANEOUS
Qty: 200 EACH | Refills: 11 | Status: SHIPPED | OUTPATIENT
Start: 2024-12-05

## 2024-12-05 RX ORDER — LANCETS 30 GAUGE
EACH MISCELLANEOUS
Qty: 300 EACH | Refills: 11 | Status: SHIPPED | OUTPATIENT
Start: 2024-12-05

## 2024-12-05 RX ORDER — CHLORTHALIDONE 25 MG/1
25 TABLET ORAL DAILY
Qty: 100 TABLET | Refills: 1 | Status: SHIPPED | OUTPATIENT
Start: 2024-12-05

## 2024-12-05 RX ORDER — LISINOPRIL 40 MG/1
40 TABLET ORAL DAILY
Qty: 100 TABLET | Refills: 3 | Status: SHIPPED | OUTPATIENT
Start: 2024-12-05

## 2024-12-05 RX ORDER — INSULIN GLARGINE 300 U/ML
10 INJECTION, SOLUTION SUBCUTANEOUS DAILY
Qty: 10 ML | Refills: 2 | Status: SHIPPED | OUTPATIENT
Start: 2024-12-05

## 2024-12-05 RX ORDER — ORAL SEMAGLUTIDE 14 MG/1
14 TABLET ORAL DAILY
Qty: 100 TABLET | Refills: 3 | Status: SHIPPED | OUTPATIENT
Start: 2024-12-05

## 2024-12-05 ASSESSMENT — PATIENT HEALTH QUESTIONNAIRE - PHQ9: CLINICAL INTERPRETATION OF PHQ2 SCORE: 0

## 2024-12-05 ASSESSMENT — FIBROSIS 4 INDEX: FIB4 SCORE: 1.29

## 2024-12-05 NOTE — PROGRESS NOTES
New Patient to Establish    Reason to establish: New patient to establish    Howard Torrez Ng is a 79 y.o. male who presents today with the following:    CC:   Chief Complaint   Patient presents with    Establish Care     Previous PCP Dr Watts       HPI:         History of Present Illness    He is here to establish care  He is due for lab  T2DM uncontrolled    HTN uncontrolled, was not compliant with medication    DLD on statin    Microcytosis, iron deficiency?    Problem   Essential Hypertension   Type 2 Diabetes Mellitus With Hyperglycemia, With Long-Term Current Use of Insulin (Hcc)    Chronic, uncontrolled  12/5/24 A1c 8.7  Diabetic diet  Continue metformin, Toujeo, increase Rybelsus to 14 mg daily    Recommend patient to monitor home BG and bring it next visit  Referral to pharmacotherapy    12/5/24  Monofilament testing with a 10 gram force: sensation intact: intact bilaterally  Visual Inspection: Feet with thicken toenails, dry skin  Pedal pulses: intact bilaterally     Uncontrolled type 2 diabetes mellitus with complication, without long-term current use of insulin (HCC) (Resolved)           Current Outpatient Medications:     atorvastatin (LIPITOR) 40 MG Tab, Take 1 Tablet by mouth every evening., Disp: 100 Tablet, Rfl: 3    lisinopril (PRINIVIL) 40 MG tablet, Take 1 Tablet by mouth every day., Disp: 100 Tablet, Rfl: 3    metformin (GLUCOPHAGE) 1000 MG tablet, Take 1 Tablet by mouth 2 times a day with meals., Disp: 200 Tablet, Rfl: 2    Semaglutide (RYBELSUS) 14 MG Tab, Take 14 mg by mouth every day., Disp: 100 Tablet, Rfl: 3    chlorthalidone (HYGROTON) 25 MG Tab, Take 1 Tablet by mouth every day., Disp: 100 Tablet, Rfl: 1    Blood Glucose Test Strips, Test strips order: Test strips for Accu-Chek meter. Sig: use 3 times daily and prn ssx high or low sugar #100 RF x 3, Disp: 300 Strip, Rfl: 3    Insulin Pen Needle 32 G x 4 mm (BD PEN NEEDLE SHERWIN 2ND GEN), USE ONE PEN NEEDLE THREE TIMES DAILY AND AS NEEDED.,  "Disp: 200 Each, Rfl: 11    Insulin Glargine, 2 Unit Dial, (TOUJEO MAX SOLOSTAR) 300 UNIT/ML Solution Pen-injector, Inject 10 Units under the skin every day., Disp: 10 mL, Rfl: 2    Lancets, Lancets order: Lancets for Accu-Chek meter. Sig: use 3 times a day and prn ssx high or low sugar. #100 RF x 3, Disp: 300 Each, Rfl: 11    Blood Glucose Monitoring Suppl Device, Meter: Dispense Device of Insurance Preference(Accu-Chek meter). Sig. Use as directed for blood sugar monitoring. #1. NR., Disp: 1 Each, Rfl: 0    glucose blood (FREESTYLE LITE) strip, USE 1 STRIP TO CHECK GLUCOSE THREE TIMES DAILY AND IN HIGH OR LOW BLOOD SUGAR, Disp: 100 Strip, Rfl: 2    Allergies, past medical history, past surgical history, medications, family history, social history reviewed and updated.    ROS Please see HPI    Physical Exam  BP (!) 170/102 (BP Location: Left arm, Patient Position: Sitting, BP Cuff Size: Adult)   Pulse 72   Temp 37.4 °C (99.4 °F) (Temporal)   Resp 18   Ht 1.702 m (5' 7\")   Wt 75.4 kg (166 lb 3.2 oz)   SpO2 95%   BMI 26.03 kg/m²   Physical Exam  Constitutional:       General: He is not in acute distress.     Appearance: Normal appearance.   HENT:      Head: Normocephalic and atraumatic.      Nose: Nose normal.      Mouth/Throat:      Pharynx: Oropharynx is clear.   Eyes:      Extraocular Movements: Extraocular movements intact.      Conjunctiva/sclera: Conjunctivae normal.   Cardiovascular:      Rate and Rhythm: Normal rate and regular rhythm.   Pulmonary:      Effort: Pulmonary effort is normal.      Breath sounds: Normal breath sounds.   Abdominal:      General: Bowel sounds are normal.      Palpations: Abdomen is soft.      Tenderness: There is no abdominal tenderness.   Musculoskeletal:      Right lower leg: No edema.      Left lower leg: No edema.   Neurological:      Mental Status: He is alert. Mental status is at baseline.   Psychiatric:         Mood and Affect: Mood normal.         Behavior: Behavior " normal.         Thought Content: Thought content normal.         Judgment: Judgment normal.       Assessment and Plan    Results for orders placed or performed in visit on 12/05/24   POCT Hemoglobin A1C    Collection Time: 12/05/24  3:37 PM   Result Value Ref Range    Glycohemoglobin 8.7 (A) <=5.8 %    Internal Control Positive Positive     Internal Control Negative Negative        Assessment & Plan      1. Type 2 diabetes mellitus with hyperglycemia, with long-term current use of insulin (HCC)  - atorvastatin (LIPITOR) 40 MG Tab; Take 1 Tablet by mouth every evening.  Dispense: 100 Tablet; Refill: 3  - metformin (GLUCOPHAGE) 1000 MG tablet; Take 1 Tablet by mouth 2 times a day with meals.  Dispense: 200 Tablet; Refill: 2  - POCT Hemoglobin A1C  - Comp Metabolic Panel; Future  - MICROALBUMIN CREAT RATIO URINE; Future  - URINALYSIS,CULTURE IF INDICATED; Future  - Diabetic Monofilament LE Exam  - Referral to Pharmacotherapy Service  - Semaglutide (RYBELSUS) 14 MG Tab; Take 14 mg by mouth every day.  Dispense: 100 Tablet; Refill: 3  - Blood Glucose Test Strips; Test strips order: Test strips for Accu-Chek meter. Sig: use 3 times daily and prn ssx high or low sugar #100 RF x 3  Dispense: 300 Strip; Refill: 3  - Insulin Pen Needle 32 G x 4 mm (BD PEN NEEDLE SHERWIN 2ND GEN); USE ONE PEN NEEDLE THREE TIMES DAILY AND AS NEEDED.  Dispense: 200 Each; Refill: 11  - Insulin Glargine, 2 Unit Dial, (TOUJEO MAX SOLOSTAR) 300 UNIT/ML Solution Pen-injector; Inject 10 Units under the skin every day.  Dispense: 10 mL; Refill: 2  - Lancets; Lancets order: Lancets for Accu-Chek meter. Sig: use 3 times a day and prn ssx high or low sugar. #100 RF x 3  Dispense: 300 Each; Refill: 11    Chronic, uncontrolled  12/5/24 A1c 8.7  Diabetic diet  Continue metformin, Toujeo, increase Rybelsus to 14 mg daily    Recommend patient to monitor home BG and bring it next visit  Referral to pharmacotherapy    12/5/24  Monofilament testing with a 10 gram  force: sensation intact: intact bilaterally  Visual Inspection: Feet with thicken toenails, dry skin  Pedal pulses: intact bilaterally    2. Essential hypertension  - lisinopril (PRINIVIL) 40 MG tablet; Take 1 Tablet by mouth every day.  Dispense: 100 Tablet; Refill: 3  - CBC WITH DIFFERENTIAL; Future  - Comp Metabolic Panel; Future  - Lipid Profile; Future  - Referral to Pharmacotherapy Service  - chlorthalidone (HYGROTON) 25 MG Tab; Take 1 Tablet by mouth every day.  Dispense: 100 Tablet; Refill: 1    3. Mixed hyperlipidemia  - Lipid Profile; Future  - TSH WITH REFLEX TO FT4; Future  - atorvastatin (LIPITOR) 40 MG Tab; Take 1 Tablet by mouth every evening.  Dispense: 100 Tablet; Refill: 3    4. Microcytosis  - CBC WITH DIFFERENTIAL; Future  - FERRITIN; Future  - IRON/TOTAL IRON BIND; Future    5. Aortic root dilation (HCC)  - EC-ECHOCARDIOGRAM COMPLETE W/O CONT; Future    6. Prostate cancer screening  - PROSTATE SPECIFIC AG SCREENING; Future    7. Need for vaccination  - INFLUENZA VACCINE, HIGH DOSE (65+ ONLY)    HCC Gap Form    Diagnosis to address: I77.810 - Aortic root dilation (HCC)  Assessment and plan: Chronic, stable. Continue with current defined treatment plan. Follow-up at least annually.  Last edited 12/05/24 18:15 PST by Casey Walden M.D.       My total time spent caring for the patient on the day of the encounter was 40 minutes.   This does not include time spent on separately billable procedures/tests.      Follow-up:Return in about 1 month (around 1/5/2025).    This note was created using voice recognition software. There may be unintended errors in spelling, grammar or content.

## 2024-12-05 NOTE — LETTER
[facility]  Casey Walden M.D.  [address] [place] [address]  Fax: [phone]   Authorization for Release/Disclosure of   Protected Health Information   Name: HOWARD GAMBOA : 1945 SSN: [id number]   Address: [facility] [address] [place] [address] Phone:    [phone] (home)    I authorize the entity listed below to release/disclose the PHI below to:   [facility]/Casey Walden M.D. and Casey Walden M.D.   Provider or Entity Name:     Address   City, State, Zip   Phone:      Fax:     Reason for request: continuity of care   Information to be released:    [  ] LAST COLONOSCOPY,  including any PATH REPORT and follow-up  [  ] LAST FIT/COLOGUARD RESULT [  ] LAST DEXA  [  ] LAST MAMMOGRAM  [  ] LAST PAP  [  ] LAST LABS [  ] RETINA EXAM REPORT  [  ] IMMUNIZATION RECORDS  [  ] Release all info      [  ] Check here and initial the line next to each item to release ALL health information INCLUDING  _____ Care and treatment for drug and / or alcohol abuse  _____ HIV testing, infection status, or AIDS  _____ Genetic Testing    DATES OF SERVICE OR TIME PERIOD TO BE DISCLOSED: _____________  I understand and acknowledge that:  * This Authorization may be revoked at any time by you in writing, except if your health information has already been used or disclosed.  * Your health information that will be used or disclosed as a result of you signing this authorization could be re-disclosed by the recipient. If this occurs, your re-disclosed health information may no longer be protected by State or Federal laws.  * You may refuse to sign this Authorization. Your refusal will not affect your ability to obtain treatment.  * This Authorization becomes effective upon signing and will  on (date) __________.      If no date is indicated, this Authorization will  one (1) year from the signature date.    Name: Howard Gamboa  Signature: Date:   2024     PLEASE FAX REQUESTED RECORDS  BACK TO: [phone]

## 2024-12-06 NOTE — ASSESSMENT & PLAN NOTE
Chronic, uncontrolled  12/5/24 A1c 8.7  Diabetic diet  Continue metformin, Toujeo, increase Rybelsus to 14 mg daily    Recommend patient to monitor home BG and bring it next visit  Referral to pharmacotherapy    12/5/24  Monofilament testing with a 10 gram force: sensation intact: intact bilaterally  Visual Inspection: Feet with thicken toenails, dry skin  Pedal pulses: intact bilaterally

## 2024-12-11 ENCOUNTER — TELEPHONE (OUTPATIENT)
Dept: HEALTH INFORMATION MANAGEMENT | Facility: OTHER | Age: 79
End: 2024-12-11
Payer: MEDICARE

## 2024-12-26 ENCOUNTER — APPOINTMENT (OUTPATIENT)
Dept: MEDICAL GROUP | Facility: IMAGING CENTER | Age: 79
End: 2024-12-26
Payer: MEDICARE

## 2024-12-31 ENCOUNTER — APPOINTMENT (OUTPATIENT)
Dept: MEDICAL GROUP | Facility: IMAGING CENTER | Age: 79
End: 2024-12-31
Payer: MEDICARE

## 2025-01-02 ENCOUNTER — HOSPITAL ENCOUNTER (OUTPATIENT)
Dept: CARDIOLOGY | Facility: MEDICAL CENTER | Age: 80
End: 2025-01-02
Attending: INTERNAL MEDICINE
Payer: MEDICARE

## 2025-01-02 DIAGNOSIS — I77.810 AORTIC ROOT DILATION (HCC): ICD-10-CM

## 2025-01-02 DIAGNOSIS — Z79.4 TYPE 2 DIABETES MELLITUS WITH HYPERGLYCEMIA, WITH LONG-TERM CURRENT USE OF INSULIN (HCC): ICD-10-CM

## 2025-01-02 DIAGNOSIS — E11.65 TYPE 2 DIABETES MELLITUS WITH HYPERGLYCEMIA, WITH LONG-TERM CURRENT USE OF INSULIN (HCC): ICD-10-CM

## 2025-01-02 LAB
LV EJECT FRACT MOD 2C 99903: 80.47
LV EJECT FRACT MOD 4C 99902: 74.86
LV EJECT FRACT MOD BP 99901: 76.73

## 2025-01-02 PROCEDURE — 93306 TTE W/DOPPLER COMPLETE: CPT | Mod: 26 | Performed by: STUDENT IN AN ORGANIZED HEALTH CARE EDUCATION/TRAINING PROGRAM

## 2025-01-02 PROCEDURE — 93306 TTE W/DOPPLER COMPLETE: CPT

## 2025-01-03 RX ORDER — LANCETS 30 GAUGE
EACH MISCELLANEOUS
Qty: 300 EACH | Refills: 11 | Status: SHIPPED | OUTPATIENT
Start: 2025-01-03

## 2025-01-03 NOTE — TELEPHONE ENCOUNTER
Received request via: Patient    Was the patient seen in the last year in this department? Yes    Does the patient have an active prescription (recently filled or refills available) for medication(s) requested? No    Pharmacy Name: [id number]    Does the patient have skilled nursing Plus and need 100-day supply? (This applies to ALL medications) Yes, quantity updated to 100 days

## 2025-01-07 ENCOUNTER — APPOINTMENT (OUTPATIENT)
Dept: MEDICAL GROUP | Facility: IMAGING CENTER | Age: 80
End: 2025-01-07
Payer: MEDICARE

## 2025-01-25 ENCOUNTER — HOSPITAL ENCOUNTER (OUTPATIENT)
Dept: LAB | Facility: MEDICAL CENTER | Age: 80
End: 2025-01-25
Attending: INTERNAL MEDICINE
Payer: MEDICARE

## 2025-01-25 DIAGNOSIS — E78.2 MIXED HYPERLIPIDEMIA: ICD-10-CM

## 2025-01-25 DIAGNOSIS — Z12.5 PROSTATE CANCER SCREENING: ICD-10-CM

## 2025-01-25 DIAGNOSIS — E11.65 TYPE 2 DIABETES MELLITUS WITH HYPERGLYCEMIA, WITH LONG-TERM CURRENT USE OF INSULIN (HCC): ICD-10-CM

## 2025-01-25 DIAGNOSIS — Z79.4 TYPE 2 DIABETES MELLITUS WITH HYPERGLYCEMIA, WITH LONG-TERM CURRENT USE OF INSULIN (HCC): ICD-10-CM

## 2025-01-25 DIAGNOSIS — R71.8 MICROCYTOSIS: ICD-10-CM

## 2025-01-25 DIAGNOSIS — I10 ESSENTIAL HYPERTENSION: ICD-10-CM

## 2025-01-25 LAB
ALBUMIN SERPL BCP-MCNC: 4.3 G/DL (ref 3.2–4.9)
ALBUMIN/GLOB SERPL: 1.4 G/DL
ALP SERPL-CCNC: 64 U/L (ref 30–99)
ALT SERPL-CCNC: 17 U/L (ref 2–50)
ANION GAP SERPL CALC-SCNC: 9 MMOL/L (ref 7–16)
APPEARANCE UR: CLEAR
AST SERPL-CCNC: 30 U/L (ref 12–45)
BASOPHILS # BLD AUTO: 0.4 % (ref 0–1.8)
BASOPHILS # BLD: 0.03 K/UL (ref 0–0.12)
BILIRUB SERPL-MCNC: 1.2 MG/DL (ref 0.1–1.5)
BILIRUB UR QL STRIP.AUTO: NEGATIVE
BUN SERPL-MCNC: 13 MG/DL (ref 8–22)
CALCIUM ALBUM COR SERPL-MCNC: 9.4 MG/DL (ref 8.5–10.5)
CALCIUM SERPL-MCNC: 9.6 MG/DL (ref 8.5–10.5)
CHLORIDE SERPL-SCNC: 96 MMOL/L (ref 96–112)
CHOLEST SERPL-MCNC: 105 MG/DL (ref 100–199)
CO2 SERPL-SCNC: 30 MMOL/L (ref 20–33)
COLOR UR: YELLOW
CREAT SERPL-MCNC: 1.04 MG/DL (ref 0.5–1.4)
CREAT UR-MCNC: 68.3 MG/DL
EOSINOPHIL # BLD AUTO: 0.04 K/UL (ref 0–0.51)
EOSINOPHIL NFR BLD: 0.5 % (ref 0–6.9)
ERYTHROCYTE [DISTWIDTH] IN BLOOD BY AUTOMATED COUNT: 41.4 FL (ref 35.9–50)
FERRITIN SERPL-MCNC: 31.2 NG/ML (ref 22–322)
GFR SERPLBLD CREATININE-BSD FMLA CKD-EPI: 74 ML/MIN/1.73 M 2
GLOBULIN SER CALC-MCNC: 3 G/DL (ref 1.9–3.5)
GLUCOSE SERPL-MCNC: 119 MG/DL (ref 65–99)
GLUCOSE UR STRIP.AUTO-MCNC: NEGATIVE MG/DL
HCT VFR BLD AUTO: 46.3 % (ref 42–52)
HDLC SERPL-MCNC: 49 MG/DL
HGB BLD-MCNC: 14.9 G/DL (ref 14–18)
IMM GRANULOCYTES # BLD AUTO: 0.02 K/UL (ref 0–0.11)
IMM GRANULOCYTES NFR BLD AUTO: 0.3 % (ref 0–0.9)
IRON SATN MFR SERPL: 16 % (ref 15–55)
IRON SERPL-MCNC: 70 UG/DL (ref 50–180)
KETONES UR STRIP.AUTO-MCNC: NEGATIVE MG/DL
LDLC SERPL CALC-MCNC: 34 MG/DL
LEUKOCYTE ESTERASE UR QL STRIP.AUTO: NEGATIVE
LYMPHOCYTES # BLD AUTO: 2.25 K/UL (ref 1–4.8)
LYMPHOCYTES NFR BLD: 30.3 % (ref 22–41)
MCH RBC QN AUTO: 25.2 PG (ref 27–33)
MCHC RBC AUTO-ENTMCNC: 32.2 G/DL (ref 32.3–36.5)
MCV RBC AUTO: 78.3 FL (ref 81.4–97.8)
MICRO URNS: NORMAL
MICROALBUMIN UR-MCNC: 5.7 MG/DL
MICROALBUMIN/CREAT UR: 83 MG/G (ref 0–30)
MONOCYTES # BLD AUTO: 0.83 K/UL (ref 0–0.85)
MONOCYTES NFR BLD AUTO: 11.2 % (ref 0–13.4)
NEUTROPHILS # BLD AUTO: 4.26 K/UL (ref 1.82–7.42)
NEUTROPHILS NFR BLD: 57.3 % (ref 44–72)
NITRITE UR QL STRIP.AUTO: NEGATIVE
NRBC # BLD AUTO: 0 K/UL
NRBC BLD-RTO: 0 /100 WBC (ref 0–0.2)
PH UR STRIP.AUTO: 7.5 [PH] (ref 5–8)
PLATELET # BLD AUTO: 329 K/UL (ref 164–446)
PMV BLD AUTO: 8.5 FL (ref 9–12.9)
POTASSIUM SERPL-SCNC: 3.8 MMOL/L (ref 3.6–5.5)
PROT SERPL-MCNC: 7.3 G/DL (ref 6–8.2)
PROT UR QL STRIP: NEGATIVE MG/DL
PSA SERPL DL<=0.01 NG/ML-MCNC: 6.2 NG/ML (ref 0–4)
RBC # BLD AUTO: 5.91 M/UL (ref 4.7–6.1)
RBC UR QL AUTO: NEGATIVE
SODIUM SERPL-SCNC: 135 MMOL/L (ref 135–145)
SP GR UR STRIP.AUTO: 1.01
TIBC SERPL-MCNC: 429 UG/DL (ref 250–450)
TRIGL SERPL-MCNC: 111 MG/DL (ref 0–149)
TSH SERPL DL<=0.005 MIU/L-ACNC: 0.94 UIU/ML (ref 0.38–5.33)
UIBC SERPL-MCNC: 359 UG/DL (ref 110–370)
UROBILINOGEN UR STRIP.AUTO-MCNC: 1 EU/DL
WBC # BLD AUTO: 7.4 K/UL (ref 4.8–10.8)

## 2025-01-25 PROCEDURE — 83550 IRON BINDING TEST: CPT

## 2025-01-25 PROCEDURE — 82570 ASSAY OF URINE CREATININE: CPT

## 2025-01-25 PROCEDURE — 85025 COMPLETE CBC W/AUTO DIFF WBC: CPT

## 2025-01-25 PROCEDURE — 83540 ASSAY OF IRON: CPT

## 2025-01-25 PROCEDURE — 36415 COLL VENOUS BLD VENIPUNCTURE: CPT

## 2025-01-25 PROCEDURE — 82728 ASSAY OF FERRITIN: CPT

## 2025-01-25 PROCEDURE — 80053 COMPREHEN METABOLIC PANEL: CPT

## 2025-01-25 PROCEDURE — 84153 ASSAY OF PSA TOTAL: CPT

## 2025-01-25 PROCEDURE — 82043 UR ALBUMIN QUANTITATIVE: CPT

## 2025-01-25 PROCEDURE — 80061 LIPID PANEL: CPT

## 2025-01-25 PROCEDURE — 84443 ASSAY THYROID STIM HORMONE: CPT

## 2025-01-25 PROCEDURE — 81003 URINALYSIS AUTO W/O SCOPE: CPT

## 2025-01-27 DIAGNOSIS — R97.20 ELEVATED PSA: ICD-10-CM

## 2025-01-31 ENCOUNTER — OFFICE VISIT (OUTPATIENT)
Dept: MEDICAL GROUP | Facility: IMAGING CENTER | Age: 80
End: 2025-01-31
Payer: MEDICARE

## 2025-01-31 VITALS
HEART RATE: 69 BPM | DIASTOLIC BLOOD PRESSURE: 82 MMHG | BODY MASS INDEX: 26.65 KG/M2 | RESPIRATION RATE: 16 BRPM | SYSTOLIC BLOOD PRESSURE: 122 MMHG | WEIGHT: 169.8 LBS | OXYGEN SATURATION: 95 % | TEMPERATURE: 98.7 F | HEIGHT: 67 IN

## 2025-01-31 DIAGNOSIS — I77.819 AORTIC DILATATION (HCC): ICD-10-CM

## 2025-01-31 DIAGNOSIS — E11.3553 TYPE 2 DIABETES MELLITUS WITH STABLE PROLIFERATIVE RETINOPATHY OF BOTH EYES, WITH LONG-TERM CURRENT USE OF INSULIN (HCC): ICD-10-CM

## 2025-01-31 DIAGNOSIS — R71.8 MICROCYTOSIS: ICD-10-CM

## 2025-01-31 DIAGNOSIS — Z79.4 TYPE 2 DIABETES MELLITUS WITH STABLE PROLIFERATIVE RETINOPATHY OF BOTH EYES, WITH LONG-TERM CURRENT USE OF INSULIN (HCC): ICD-10-CM

## 2025-01-31 DIAGNOSIS — I51.7 MODERATE CONCENTRIC LEFT VENTRICULAR HYPERTROPHY: ICD-10-CM

## 2025-01-31 DIAGNOSIS — I10 ESSENTIAL HYPERTENSION: ICD-10-CM

## 2025-01-31 DIAGNOSIS — Z86.0100 HISTORY OF COLON POLYPS: ICD-10-CM

## 2025-01-31 DIAGNOSIS — E61.1 IRON DEFICIENCY: ICD-10-CM

## 2025-01-31 ASSESSMENT — FIBROSIS 4 INDEX: FIB4 SCORE: 1.7

## 2025-01-31 ASSESSMENT — PATIENT HEALTH QUESTIONNAIRE - PHQ9: CLINICAL INTERPRETATION OF PHQ2 SCORE: 0

## 2025-01-31 NOTE — ASSESSMENT & PLAN NOTE
Chronic, uncontrolled  12/5/24 A1c 8.7  Diabetic diet  Continue metformin, Toujeo, Rybelsus 14 mg daily  Diabetic Retinopathy follow [facility] Dr[facility] Het[facility]  Recommend patient to monitor home BG and bring it next visit  Referral to pharmacotherapy    12/5/24  Monofilament testing with a 10 gram force: sensation intact: intact bilaterally  Visual Inspection: Feet with thicken toenails, dry skin  Pedal pulses: intact bilaterally

## 2025-01-31 NOTE — PROGRESS NOTES
Established Patient    Howard Gamboa is a 79 y.o. 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 Per family member, blood pressure is improving with current regimen.  - Character: Blood pressure is improving.  - Alleviating/Aggravating Factors: Current regimen.    Elevated PSA 6.2, has LUTS    1/25/25  Ferritin 31.2, iron saturation 16%. UA normal  History of colon polyps, 2 removed 5 years ago.  Denies overt bleeding    Problem   Iron Deficiency    1/25/25  Ferritin 31.2, iron saturation 16%. UA normal  History of colon polyps, 2 removed 5 years ago.  Denies overt bleeding     Moderate Concentric Left Ventricular Hypertrophy   Aortic Dilatation (Hcc)   History of Colon Polyps   Type 2 Diabetes Mellitus With Hyperglycemia, With Long-Term Current Use of Insulin (Hcc)    Chronic, uncontrolled  12/5/24 A1c 8.7  Diabetic diet  Continue metformin, Toujeo Rybelsus 14 mg daily  Diabetic Retinopathy follow with Dr. Helton,  [facility]  Recommend patient to monitor home BG and bring it next visit  Referral to pharmacotherapy    12/5/24  Monofilament testing with a 10 gram force: sensation intact: intact bilaterally  Visual Inspection: Feet with thicken toenails, dry skin  Pedal pulses: intact bilaterally          Current Outpatient Medications   Medication Sig    Blood Glucose Test Strips Test strips order: Test strips for Accu-Chek meter. Sig: use 3 times daily and prn ssx high or low sugar #100 RF x 3    Lancets Lancets order: Lancets  "for Accu-Chek meter. Sig: use 3 times a day and prn ssx high or low sugar. #100 RF x 3    lisinopril (PRINIVIL) 40 MG tablet Take 1 Tablet by mouth every day.    metformin (GLUCOPHAGE) 1000 MG tablet Take 1 Tablet by mouth 2 times a day with meals.    chlorthalidone (HYGROTON) 25 MG Tab Take 1 Tablet by mouth every day.    Insulin Glargine, 2 Unit Dial, (TOUJEO MAX SOLOSTAR) 300 UNIT/ML Solution Pen-injector Inject 10 Units under the skin every day.    Insulin Pen Needle 32 G x 4 mm (BD PEN NEEDLE SHERWIN 2ND GEN) USE ONE PEN NEEDLE ONE TIME DAILY AND AS NEEDED.    Semaglutide (RYBELSUS) 14 MG Tab Take 14 mg by mouth every day.    atorvastatin (LIPITOR) 40 MG Tab Take 1 Tablet by mouth every evening.    Blood Glucose Monitoring Suppl Device Meter: Dispense Device of Insurance Preference(Accu-Chek meter). Sig. Use as directed for blood sugar monitoring. #1. NR.     No Known Allergies    Allergies, past medical history, past surgical history, medications, family history, social history reviewed and updated.    ROS Please see HPI    Physical Exam  Vitals: /82 (BP Location: Right arm, Patient Position: Sitting, BP Cuff Size: Adult)   Pulse 69   Temp 37.1 °C (98.7 °F) (Temporal)   Resp 16   Ht 1.702 m (5' 7\")   Wt 77 kg (169 lb 12.8 oz)   SpO2 95%   BMI 26.59 kg/m²   Physical Exam  Constitutional:       General: He is not in acute distress.     Appearance: Normal appearance.   HENT:      Head: Normocephalic and atraumatic.      Nose: Nose normal.      Mouth/Throat:      Pharynx: Oropharynx is clear.   Eyes:      Extraocular Movements: Extraocular movements intact.      Conjunctiva/sclera: Conjunctivae normal.   Cardiovascular:      Rate and Rhythm: Normal rate and regular rhythm.   Pulmonary:      Effort: Pulmonary effort is normal.      Breath sounds: Normal breath sounds.   Abdominal:      General: Bowel sounds are normal.      Palpations: Abdomen is soft.      Tenderness: There is no abdominal tenderness. "   Musculoskeletal:      Right lower leg: No edema.      Left lower leg: No edema.   Neurological:      Mental Status: He is alert. Mental status is at baseline.   Psychiatric:         Mood and Affect: Mood normal.         Behavior: Behavior normal.         Thought Content: Thought content normal.         Judgment: Judgment normal.         Labs (1/25/25) were reviewed and discussed with patients.  Imaging (1/2/25) was reviewed.  All questions were answered.      Assessment and Plan    Assessment & Plan      1. Essential hypertension  - REFERRAL TO CARDIOLOGY  - Family member reports improvement with current regimen.  On lisinopril 40 mg daily, chlorthalidone 25 mg daily    2. Moderate concentric left ventricular hypertrophy  - REFERRAL TO CARDIOLOGY    3. 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  Recommend patient to monitor home BG and bring it next visit  Referral to pharmacotherapy    12/5/24  Monofilament testing with a 10 gram force: sensation intact: intact bilaterally  Visual Inspection: Feet with thicken toenails, dry skin  Pedal pulses: intact bilaterally    5. Iron deficiency  1/25/25  Ferritin 31.2, iron saturation 16%. UA normal  History of colon polyps, 2 removed 5 years ago.  Denies overt bleeding  - Referral to Gastroenterology    6. Microcytosis  - Referral to Gastroenterology    7. History of colon polyps  - Referral to Gastroenterology    8. Elevated PSA  PSA 6.2.  - pending appointment with urology for further evaluation    [facility] Form    Diagnosis: E11.319 - Type 2 diabetes  mellitus with unspecified diabetic retinopathy without macular edema (HCC)  Assessment and plan: Chronic, stable. Continue with current defined treatment plan. Follow-up with ophthalmology  Last edited 01/31/25 12:02 PST by Casey Walden M.D.           Follow-up:Return in about 2 months (around 3/31/2025), or if symptoms worsen or fail to improve.    This note was created using voice recognition software. There may be unintended errors in spelling, grammar or content.

## 2025-02-03 ENCOUNTER — TELEPHONE (OUTPATIENT)
Dept: HEALTH INFORMATION MANAGEMENT | Facility: OTHER | Age: 80
End: 2025-02-03
Payer: MEDICARE

## 2025-02-05 ENCOUNTER — PATIENT MESSAGE (OUTPATIENT)
Dept: SCHEDULING | Facility: IMAGING CENTER | Age: 80
End: 2025-02-05
Payer: MEDICARE

## 2025-02-07 ENCOUNTER — OFFICE VISIT (OUTPATIENT)
Dept: UROLOGY | Facility: MEDICAL CENTER | Age: 80
End: 2025-02-07
Payer: MEDICARE

## 2025-02-07 ENCOUNTER — PATIENT MESSAGE (OUTPATIENT)
Dept: UROLOGY | Facility: MEDICAL CENTER | Age: 80
End: 2025-02-07

## 2025-02-07 DIAGNOSIS — R97.20 ELEVATED PSA: ICD-10-CM

## 2025-02-07 DIAGNOSIS — N40.1 BPH WITH OBSTRUCTION/LOWER URINARY TRACT SYMPTOMS: ICD-10-CM

## 2025-02-07 DIAGNOSIS — N13.8 BPH WITH OBSTRUCTION/LOWER URINARY TRACT SYMPTOMS: ICD-10-CM

## 2025-02-07 PROCEDURE — 99204 OFFICE O/P NEW MOD 45 MIN: CPT | Performed by: UROLOGY

## 2025-02-07 NOTE — PROGRESS NOTES
Chief Complaint: elevated PSA    The patient was referred by Casey Walden M.D. for evaluation of the above chief complaint    History of Present Illness:    Howard Gamboa is a 79 y.o. male who presents today for elevated PSA.  - 6.2 ng/mL in 2025  - Baseline unknown   - No prior prostate biopsy  - No family history of prostate/breast cancer  - Some urinary symptoms (incomplete emptying, 4x nocturia  - No hematuria or UTI symptoms    - Chinese immigrant   - Unable to understand the Mandarin interpretor services  - His grandson Gordon was able to provide translation    Subjective    Family History   Problem Relation Age of Onset    Stroke Mother     Diabetes Father     Diabetes Brother      Social History     Socioeconomic History    Marital status: Single     Spouse name: Not on file    Number of children: Not on file    Years of education: Not on file    Highest education level: Not on file   Occupational History    Not on file   Tobacco Use    Smoking status: Former     Current packs/day: 0.00     Types: Cigarettes     Quit date: 2001     Years since quittin.1    Smokeless tobacco: Never   Vaping Use    Vaping status: Never Used   Substance and Sexual Activity    Alcohol use: No    Drug use: No    Sexual activity: Not on file   Other Topics Concern    Not on file   Social History Narrative    Not on file     Social Drivers of Health     Financial Resource Strain: Not on file   Food Insecurity: Not on file   Transportation Needs: Not on file   Physical Activity: Not on file   Stress: Not on file   Social Connections: Not on file   Intimate Partner Violence: Not on file   Housing Stability: Not on file     Past Surgical History:   Procedure Laterality Date    VITRECTOMY POSTERIOR Left 2021    Procedure: VITRECTOMY, POSTERIOR PORTION -23 GUAGE;  Surgeon: Eitan Helton M.D.;  [facility] SURGERY [facility] DAY [facility];  Service: Ophthalmology     Past Medical History:   Diagnosis Date    Dental  disorder     full dentures    Diabetes (HCC)     Diabetes mellitus out of control 2010    High cholesterol 2010    Hypertension 2010     Current Outpatient Medications   Medication Sig Dispense Refill    Blood Glucose Test Strips Test strips order: Test strips for Accu-Chek meter. Sig: use 3 times daily and prn ssx high or low sugar #100 RF x 3 300 Strip 3    Lancets Lancets order: Lancets for Accu-Chek meter. Sig: use 3 times a day and prn ssx high or low sugar. #100 RF x 3 300 Each 11    lisinopril (PRINIVIL) 40 MG tablet Take 1 Tablet by mouth every day. 100 Tablet 3    metformin (GLUCOPHAGE) 1000 MG tablet Take 1 Tablet by mouth 2 times a day with meals. 200 Tablet 2    chlorthalidone (HYGROTON) 25 MG Tab Take 1 Tablet by mouth every day. 100 Tablet 1    Insulin Glargine, 2 Unit Dial, (TOUJEO MAX SOLOSTAR) 300 UNIT/ML Solution Pen-injector Inject 10 Units under the skin every day. 10 mL 2    Insulin Pen Needle 32 G x 4 mm (BD PEN NEEDLE SHERWIN 2ND GEN) USE ONE PEN NEEDLE ONE TIME DAILY AND AS NEEDED. 200 Each 11    Semaglutide (RYBELSUS) 14 MG Tab Take 14 mg by mouth every day. 100 Tablet 3    atorvastatin (LIPITOR) 40 MG Tab Take 1 Tablet by mouth every evening. 100 Tablet 3    Blood Glucose Monitoring Suppl Device Meter: Dispense Device of Insurance Preference(Accu-Chek meter). Sig. Use as directed for blood sugar monitoring. #1. NR. 1 Each 0     No current facility-administered medications for this visit.     No Known Allergies    Review of systems was conducted and was negative except for as explicitly listed in the History of Present Illness.       Objective   There were no vitals taken for this visit.  Physical Exam  Vitals reviewed.   HENT:      Head: Normocephalic.      Nose: Nose normal.      Mouth/Throat:      Pharynx: Oropharynx is clear.   Eyes:      Conjunctiva/sclera: Conjunctivae normal.   Cardiovascular:      Rate and Rhythm: Normal rate.      Pulses: Normal pulses.   Pulmonary:      Effort:  Pulmonary effort is normal.   Abdominal:      Palpations: Abdomen is soft.   Musculoskeletal:         General: Normal range of motion.      Cervical back: Neck supple.   Skin:     General: Skin is warm.   Neurological:      Mental Status: He is alert. Mental status is at baseline.   Psychiatric:         Mood and Affect: Mood normal.         Lab/Radiology/Diagnostic Review:  PSA   Lab Results   Component Value Date/Time    PSATOTAL 6.20 (H) 01/25/2025 0744     I have reviewed and independently examined the lab results.  My findings are given in the HPI or above with the associated tests.        Assessment & Plan    It was a pleasure speaking with Howard Gamboa today.    Howard Gamboa is a 79 y.o. male w/ elevated PSA  - Discussed AUA and NCCN guidelines with the patient  - Discussed benign and malignant causes of elevated PSA  - Discussed the controversies associated with PSA screening    - Recommended biomarker testing, specifically 4Kscore to adjudicate his PSA elevation  - Recommended against biopsy at this time    - Alternatively discussed tamsulosin  - Discussed risks, benefits, alternatives, and side effects to this medication  - Would repeat PSA 3-6 months after starting medications

## 2025-02-13 RX ORDER — TAMSULOSIN HYDROCHLORIDE 0.4 MG/1
0.4 CAPSULE ORAL
Qty: 30 CAPSULE | Refills: 11 | Status: SHIPPED | OUTPATIENT
Start: 2025-02-13

## 2025-02-15 ENCOUNTER — HOSPITAL ENCOUNTER (OUTPATIENT)
Dept: LAB | Facility: MEDICAL CENTER | Age: 80
End: 2025-02-15
Attending: UROLOGY
Payer: MEDICARE

## 2025-02-15 DIAGNOSIS — E11.3553 TYPE 2 DIABETES MELLITUS WITH STABLE PROLIFERATIVE RETINOPATHY OF BOTH EYES, WITH LONG-TERM CURRENT USE OF INSULIN (HCC): ICD-10-CM

## 2025-02-15 DIAGNOSIS — Z79.4 TYPE 2 DIABETES MELLITUS WITH STABLE PROLIFERATIVE RETINOPATHY OF BOTH EYES, WITH LONG-TERM CURRENT USE OF INSULIN (HCC): ICD-10-CM

## 2025-02-15 DIAGNOSIS — R97.20 ELEVATED PSA: ICD-10-CM

## 2025-02-15 LAB
EST. AVERAGE GLUCOSE BLD GHB EST-MCNC: 200 MG/DL
HBA1C MFR BLD: 8.6 % (ref 4–5.6)

## 2025-02-15 PROCEDURE — 36415 COLL VENOUS BLD VENIPUNCTURE: CPT

## 2025-02-15 PROCEDURE — 81539 ONCOLOGY PROSTATE PROB SCORE: CPT

## 2025-02-15 PROCEDURE — 83036 HEMOGLOBIN GLYCOSYLATED A1C: CPT

## 2025-02-18 NOTE — Clinical Note
[facility]  [address] [place] [address]    [id number]    Howard Gamboa  [address] [place] [address]    February 18, 2025    Member Name: Howard Gamboa   Member Number: [id number]   Reference Number: [id number]   Approved Services: MRI/CAT Scan   Approved Service Dates: 01/31/2025 - 04/03/2025   Requesting Provider: Casey Walden   Requested Provider: [facility]     Dear Howard Gamboa:    The following medical service(s) requested by Casey Walden have been approved:    Procedure Code Procedure Code Name Approved Quantity Status   38547 (CPT®) HI CT ANGIO, CHEST (NON-CORON), COMBO, INCL * 1 Authorized   98934 (CPT®) HI CT ANGIO, ABD, COMBO,INCL IMAGE PROC 1 Authorized       The services should be provided by [facility] no later than 04/03/2025. Please contact the provider listed below with any questions.     Provider Information:  [facility]  [phone]    Your plan benefit may require a deductible, co-payment or coinsurance for these services. This authorization does not guarantee [facility] will pay the claim for services that you receive. Payment by [facility] for these services is subject to the terms of your Evidence of Coverage, your eligibility at the time of service, and confirmation of benefit coverage.    For any questions or additional information, please contact Customer Service:    [facility] Toll Free: [phone]  [employer] users dial: [phone]   Call Center Hours:  Oct 1 - Mar 31, Mon - Fri 7 AM to 8 PM PST  Oct 1 - Mar 31, Sat - Sun 8 AM to 8 PM PST  Apr 1 - Sep 30, Mon - Fri 7 AM to 8 PM PST   Office Hours: Mon - Fri 8 AM to 5 PM PST   E-mail: [email or web address]   Website:  [email or web address]      This information is available for free in other languages. Please contact Customer Service at the phone number above for more information. [facility] complies  with applicable Federal civil rights laws and does not discriminate on the basis of race, color, national origin, age, disability or sex.    Sincerely,     Healthcare Utilization Management Department     Cc: [facility]   SimónNaveenDonna Sanders Iram    Multi-Language Insert  Multi- Services  English: We have free  services to answer any questions you may have about our health or drug plan.  To get an , just call us at [phone].  Someone who speaks English/Language can help you.  This is a free service.  Hungarian: Tenemos servicios de intérprete sin costo alguno  para responder cualquier pregunta que pueda tener sobre nuestro plan de trinidad o medicamentos. Para hablar con un intérprete, por favor llame al [phone]. Alguien que hable español le podrá ayudar. Sommer es un servicio gratuito.  Chinese Mandarin: ?????????????????????????????? ???????????????? [phone]????????????????? ?????????  Chinese Cantonese: ?????????????????????????????? ????????????? [phone]???????????????????? ????????  Tagalog:  Uri kaming libreng serbisyo sa darrensasaling-liseth mendozanggilucius sa uriel camejo panggamot.  Elmira barahona tagasaling-main childersi  [phone]. Enid barahona Tagalog.  Pablo ay libreng serbisyo.  Estonian:  Nous proposons mingo services gratuits d'interprétation pour répondre à toutes valeri questions relatives à notre régime de santé ou d'assurance-médicaments. Pour accéder au service d'interprétation, il vous suffit de nous appeler au [phone]. Un interlocuteur [facility] pourra vous aider. Ce service est gratuit.  Wallisian:  Negro bahena có d?ch v? thông d?ch mi?n phí ð? tr? l?i các câu h?i v? chýõng s?c kh?e và chýõng trìn thu?c men. N?u quí v? c?n thông d?ch viên jeremy g?i [phone] s? có nhân viên nói ti?ng Vi?t  giúp ð? quí v?. Ðây là d?ch v? mi?n phí .  Eritrean:  Unser kostenser Dolmetscherservice beantwortet Ihren Fragen zu unserem Gesundheits- und Arzneimittelplan. Unsere Dolmetscher erreichen Sie [phone]. Man wird Ihnen roz auf [facility]. Dieser Service ist [facility].  Czech:  ??? ?? ?? ?? ?? ??? ?? ??? ?? ???? ?? ?? ???? ???? ????. ?? ???? ????? ?? [phone] ??? ??? ????.  ???? ?? ???? ?? ?? ????. ? ???? ??? ?????.   Ukrainian: Åñëè ó âàñ âîçíèêíóò âîïðîñû îòíîñèòåëüíî ñòðàõîâîãî èëè ìåäèêàìåíòíîãî ïëàíà, âû ìîæåòå âîñïîëüçîâàòüñÿ íàøèìè áåñïëàòíûìè óñëóãàìè ïåðåâîä÷èêîâ. ×òîáû âîñïîëüçîâàòüñÿ óñëóãàìè ïåðåâîä÷èêà, ïîçâîíèòå íàì ïî òåëåôîíó [phone]. Âàì îêàæåò ïîìîùü ñîòðóäíèê, êîòîðûé ãîâîðèò ïî-póññêè. Äàííàÿ óñëóãà áåñïëàòíàÿ.  Divehi: ÅääÇ äÞÏã ÎÏãÇÊ ÇáãÊÑÌã ÇáÝæÑí ÇáãÌÇäíÉ ááÅÌÇÈÉ Úä Ãí ÃÓÆáÉ ÊÊÚáÞ ÈÇáÕÍÉ Ãæ ÌÏæá ÇáÃÏæíÉ áÏíäÇ. ááÍÕæá Úáì ãÊÑÌã ÝæÑí¡ áíÓ Úáíß Óæì ÇáÇÊÕÇá ÈäÇ Úáì [phone] . ÓíÞæã ÔÎÕ ãÇ íÊÍÏË ÇáÚÑÈíÉ ÈãÓÇÚÏÊß. åÐå ÎÏãÉ ãÌÇäíÉ.  Sheldon: ????? ????????? ?? ??? ?? ????? ?? ???? ??? ???? ???? ?? ?????? ?? ???? ???? ?? ??? ????? ??? ????? ???????? ?????? ?????? ???. ?? ???????? ??????? ???? ?? ???, ?? ???? [phone] ?? ??? ????. ??? ??????? ?? ?????? ????? ?? ???? ??? ?? ???? ??. ?? ?? ????? ???? ??.   Arabic:  È disponibile un servizio di interpretariato gratuito per rispondere a eventuali domande sul nostro piano sanitario e farmaceutico. Per un interprete, contattare il collins [phone]. Un nostro incaricato [place] parla Italianovi fornirà l'assistenza necessaria. È un servizio gratuito.  Portugués:  Dispomos de serviços de interpretação gratuitos para responder a qualquer questão que tenha acerca do nosso plano de saúde ou de medicação. Para obter um intérprete, contacte-nos através do número [phone]. Irá encontrar alguém que fale o idioma  Português para o ajudar. Sommer serviço é gratuito.  Albanian Creole:  Nou genyen sèvis entèprèt gratis vinicio reponn  tout kesyon ou ta genyen konsènan plan medikal oswa dwòg nou an.  Georges jwenn yon entèprèt, jis rele nou nan [phone]. Yomary mojody thomas pale Kreyòl kapab hakeem w.  Sa a se yon sèvis ki gratis.  Polish:  Umo¿liwiamy bezp³atne skorzystanie z us³ug t³umacza ustnego, który pomo¿e w uzyskaniu odpowiedzi na temat planu zdrowotnego lub dawkowania leków. Tatianna skorzystaæ z pomocy t³umacza znaj¹cego rita gutierrez¿y zadzwoniæ pod numer [phone]. Ta us³uga jest bezp³atna.  Albanian: ????? ??????? ????????????????????? ??????????????????????????????????[phone] ???????????????? ? ????????????????? ?????

## 2025-03-03 ENCOUNTER — HOSPITAL ENCOUNTER (OUTPATIENT)
Dept: RADIOLOGY | Facility: MEDICAL CENTER | Age: 80
End: 2025-03-03
Attending: INTERNAL MEDICINE
Payer: MEDICARE

## 2025-03-03 ENCOUNTER — RESULTS FOLLOW-UP (OUTPATIENT)
Dept: MEDICAL GROUP | Facility: IMAGING CENTER | Age: 80
End: 2025-03-03

## 2025-03-03 DIAGNOSIS — I77.819 AORTIC DILATATION (HCC): ICD-10-CM

## 2025-03-03 PROCEDURE — 700117 HCHG RX CONTRAST REV CODE 255: Performed by: INTERNAL MEDICINE

## 2025-03-03 PROCEDURE — 71275 CT ANGIOGRAPHY CHEST: CPT

## 2025-03-03 RX ADMIN — IOHEXOL 100 ML: 350 INJECTION, SOLUTION INTRAVENOUS at 10:30

## 2025-05-20 ENCOUNTER — OFFICE VISIT (OUTPATIENT)
Dept: MEDICAL GROUP | Facility: IMAGING CENTER | Age: 80
End: 2025-05-20
Payer: MEDICARE

## 2025-05-20 VITALS
OXYGEN SATURATION: 96 % | TEMPERATURE: 98.8 F | SYSTOLIC BLOOD PRESSURE: 122 MMHG | WEIGHT: 168.6 LBS | DIASTOLIC BLOOD PRESSURE: 76 MMHG | RESPIRATION RATE: 14 BRPM | BODY MASS INDEX: 26.46 KG/M2 | HEART RATE: 70 BPM | HEIGHT: 67 IN

## 2025-05-20 DIAGNOSIS — R71.8 MICROCYTOSIS: ICD-10-CM

## 2025-05-20 DIAGNOSIS — Z79.4 TYPE 2 DIABETES MELLITUS WITH STABLE PROLIFERATIVE RETINOPATHY OF BOTH EYES, WITH LONG-TERM CURRENT USE OF INSULIN (HCC): Primary | ICD-10-CM

## 2025-05-20 DIAGNOSIS — E78.2 MIXED HYPERLIPIDEMIA: ICD-10-CM

## 2025-05-20 DIAGNOSIS — R97.20 ELEVATED PSA: ICD-10-CM

## 2025-05-20 DIAGNOSIS — R91.8 LUNG NODULES: ICD-10-CM

## 2025-05-20 DIAGNOSIS — I77.819 AORTIC DILATATION (HCC): ICD-10-CM

## 2025-05-20 DIAGNOSIS — I25.10 CORONARY ARTERY CALCIFICATION SEEN ON CT SCAN: ICD-10-CM

## 2025-05-20 DIAGNOSIS — E11.3553 TYPE 2 DIABETES MELLITUS WITH STABLE PROLIFERATIVE RETINOPATHY OF BOTH EYES, WITH LONG-TERM CURRENT USE OF INSULIN (HCC): Primary | ICD-10-CM

## 2025-05-20 DIAGNOSIS — I10 ESSENTIAL HYPERTENSION: ICD-10-CM

## 2025-05-20 LAB
HBA1C MFR BLD: 7.5 % (ref ?–5.8)
POCT INT CON NEG: NEGATIVE
POCT INT CON POS: POSITIVE

## 2025-05-20 PROCEDURE — 3078F DIAST BP <80 MM HG: CPT | Performed by: INTERNAL MEDICINE

## 2025-05-20 PROCEDURE — 83036 HEMOGLOBIN GLYCOSYLATED A1C: CPT | Performed by: INTERNAL MEDICINE

## 2025-05-20 PROCEDURE — 99214 OFFICE O/P EST MOD 30 MIN: CPT | Performed by: INTERNAL MEDICINE

## 2025-05-20 PROCEDURE — 3074F SYST BP LT 130 MM HG: CPT | Performed by: INTERNAL MEDICINE

## 2025-05-20 RX ORDER — ASPIRIN 81 MG/1
81 TABLET ORAL DAILY
COMMUNITY

## 2025-05-20 RX ORDER — LISINOPRIL 40 MG/1
40 TABLET ORAL DAILY
Qty: 100 TABLET | Refills: 3 | Status: SHIPPED | OUTPATIENT
Start: 2025-05-20

## 2025-05-20 RX ORDER — ATORVASTATIN CALCIUM 40 MG/1
40 TABLET, FILM COATED ORAL EVERY EVENING
Qty: 100 TABLET | Refills: 3 | Status: SHIPPED | OUTPATIENT
Start: 2025-05-20

## 2025-05-20 RX ORDER — ORAL SEMAGLUTIDE 14 MG/1
14 TABLET ORAL DAILY
Qty: 100 TABLET | Refills: 3 | Status: SHIPPED | OUTPATIENT
Start: 2025-05-20

## 2025-05-20 RX ORDER — CHLORTHALIDONE 25 MG/1
25 TABLET ORAL DAILY
Qty: 100 TABLET | Refills: 3 | Status: SHIPPED | OUTPATIENT
Start: 2025-05-20

## 2025-05-20 RX ORDER — INSULIN GLARGINE 300 U/ML
10 INJECTION, SOLUTION SUBCUTANEOUS DAILY
Qty: 10 ML | Refills: 2 | Status: SHIPPED | OUTPATIENT
Start: 2025-05-20

## 2025-05-20 ASSESSMENT — FIBROSIS 4 INDEX: FIB4 SCORE: 1.7

## 2025-05-20 NOTE — PROGRESS NOTES
Established Patient    Howard Gamboa is a 79 y.o. male who presents today with the following:    CC:   Chief Complaint   Patient presents with    Lab Results     3/3 imaging    Hypertension Follow-up       HPI:     Verbal consent was acquired by the patient to use [employer] ambient listening note generation during this visit Yes     History of Present Illness  The patient presents for follow-up on diabetes and hypertension, accompanied by his daughter.    Diabetes  Managing diabetes with Rybelsus, semaglutide, metformin, and insulin glargine 10 units daily. A1c improved from 8.6 in 02/2024 to 7.5 in 05/2025. Dietary habits generally healthy with occasional sweets.  - Onset: Ongoing management.  - Medication: Rybelsus, semaglutide, metformin, and insulin glargine 10 units daily.  - Duration: A1c improved over the course of approximately 15 months.  - Character: Improved A1c from 8.6 to 7.5.  - Alleviating/Aggravating Factors: Generally healthy dietary habits with occasional sweets.    Hypertension  Blood pressure well controlled with lisinopril 40 mg daily and chlorthalidone 25 mg daily.  - Onset: Ongoing management.  - Medication: Lisinopril 40 mg daily and chlorthalidone 25 mg daily.  - Character: Well controlled blood pressure.    Dyslipidemia  Dyslipidemia managed with Lipitor 40 mg every evening.  - Medication: Lipitor 40 mg every evening.    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    lisinopril (PRINIVIL) 40 MG tablet Take 1 Tablet by mouth every day.    metformin (GLUCOPHAGE) 1000 MG tablet Take 1 Tablet by mouth 2 times a day with meals.    Semaglutide (RYBELSUS) 14 MG Tab Take 14 mg by mouth every day.    chlorthalidone (HYGROTON) 25 MG Tab Take  "1 Tablet by mouth every day.    Insulin Glargine, 2 Unit Dial, (TOUJEO MAX SOLOSTAR) 300 UNIT/ML Solution Pen-injector Inject 10 Units under the skin every day.    aspirin 81 MG EC tablet Take 81 mg by mouth every day.    tamsulosin (FLOMAX) 0.4 MG capsule Take 1 Capsule by mouth 1/2 hour after breakfast.    Blood Glucose Test Strips Test strips order: Test strips for Accu-Chek meter. Sig: use 3 times daily and prn ssx high or low sugar #100 RF x 3    Lancets Lancets order: Lancets for Accu-Chek meter. Sig: use 3 times a day and prn ssx high or low sugar. #100 RF x 3    Insulin Pen Needle 32 G x 4 mm (BD PEN NEEDLE SHERWIN 2ND GEN) USE ONE PEN NEEDLE ONE TIME DAILY AND AS NEEDED.    Blood Glucose Monitoring Suppl Device Meter: Dispense Device of Insurance Preference(Accu-Chek meter). Sig. Use as directed for blood sugar monitoring. #1. NR.     Allergies[1]    Allergies, past medical history, past surgical history, medications, family history, social history reviewed and updated.    ROS Please see HPI    Physical Exam  Vitals: /76 (BP Location: Left arm, Patient Position: Sitting, BP Cuff Size: Adult)   Pulse 70   Temp 37.1 °C (98.8 °F) (Temporal)   Resp 14   Ht 1.702 m (5' 7\")   Wt 76.5 kg (168 lb 9.6 oz)   SpO2 96%   BMI 26.41 kg/m²   Physical Exam  Constitutional:       General: He is not in acute distress.     Appearance: Normal appearance.   HENT:      Head: Normocephalic and atraumatic.      Nose: Nose normal.      Mouth/Throat:      Pharynx: Oropharynx is clear.   Eyes:      Extraocular Movements: Extraocular movements intact.      Conjunctiva/sclera: Conjunctivae normal.   Cardiovascular:      Rate and Rhythm: Normal rate and regular rhythm.   Pulmonary:      Effort: Pulmonary effort is normal.      Breath sounds: Normal breath sounds.   Abdominal:      General: Bowel sounds are normal.      Palpations: Abdomen is soft.      Tenderness: There is no abdominal tenderness.   Musculoskeletal:      Right " lower leg: No edema.      Left lower leg: No edema.   Neurological:      Mental Status: He is alert. Mental status is at baseline.   Psychiatric:         Mood and Affect: Mood normal.         Behavior: Behavior normal.         Thought Content: Thought content normal.         Judgment: Judgment normal.           Imaging (3/3/25) was reviewed.  All questions were answered.    3/3/2025 7:57 AM     HISTORY/REASON FOR EXAM:  aortic dilation.        TECHNIQUE/EXAM DESCRIPTION:  CT angiogram without and with contrast, with reconstructions.     Initial precontrast thick helical sections were obtained from the top of the aortic arch through the diaphragmatic domes. Following this, thin-section postcontrast helical images were obtained from the lung apices through the iliac crests following the   bolus administration of 100 mL of nonionic Omnipaque 350 contrast.  CT angiographic technique was utilized.     Parasagittal reconstructed images of the aorta were generated utilizing multiplanar volume reformat technique.     Low dose optimization technique was utilized for this CT exam including automated exposure control and adjustment of the mA and/or kV according to patient size.     COMPARISON: 6/29/2018 CT abdomen pelvis     FINDINGS:     Aorta: Pre-contrast images demonstrate no evidence of acute intramural hematoma.     Moderate partially calcified plaque is located within the aorta     Aortic arch: Branching pattern is conventional.  The arch vessels are opacified and patent.     Diameter:  The ascending aorta measures 3.8 x 3.6 cm.  The aortic arch measures 3.1 x 3.4 cm.  The proximal descending thoracic aortic measures 3.4 x 3.6 cm.  The mid descending thoracic aorta measures 2.8 x 2.9 cm.  The distal descending thoracic aorta measures 2.7 x 2.8 cm.     The proximal abdominal aorta measures 2.7 x 2.7 cm.  The mid abdominal aorta measures 2.2 x 2.0 cm.  The distal bowel aorta measures 1.9 x 1.9 cm.     Dissection:  Absent.     Celiac artery origin: Patent.  Superior mesenteric artery origin: Patent  Renal artery origins: Patent  Inferior mesenteric artery: Patent.  Common iliac arteries: Nonaneurysmal and patent.     No pulmonary artery emboli identified.     Heart: Normal size. No pericardial effusion.     There are calcifications within the coronary arteries.  There are calcifications within the aortic valve.     3D angiographic/MIP images of the vasculature confirm the vascular findings as described above.     Lungs:  2.9 mm nodule left lower lobe/major fissure image 101.  4.2 mm nodule within the lingula image 125.     Dependent opacities within both posterior lung bases consistent with atelectasis. Mild edema or pneumonitis not excluded. No pleural effusion.     Liver: Homogeneous enhancement. No masses identified.     Biliary system: There are calcified gallstones.  No dilatation of the common duct.     Spleen: Unremarkable.  Adrenal glands: Unremarkable.  Pancreas: Unremarkable.  Kidneys: Symmetric enhancement. No solid mass is identified.  Bowel: Unremarkable. No pneumoperitoneum.  Ascites: None.  Lymph nodes: No adenopathy is identified.  Bones: Unremarkable.     Mild diffuse urinary bladder wall thickening.  The prostate is enlarged measuring 5.0 x 6.8 cm.  No pelvic free fluid     IMPRESSION:     1.  Ectatic ascending aorta measuring 3.8 x 3.6 cm.     2.  No aortic dissection.     3.  Moderate partially calcified plaque within the aorta.     4.  Coronary artery calcifications.     5.  4.2 mm nodule lingula unchanged. 2.9 mm nodule left lobe/major fissure not previously imaged.     6.  Dependent atelectasis within the lung bases. No pleural effusions.     7.  Cholelithiasis. No dilatation of the common duct.     8.  Enlarged prostate and mild diffuse urinary bladder wall thickening      Assessment & Plan            1. Type 2 diabetes mellitus with stable proliferative retinopathy of both eyes, with long-term current  use of insulin (HCC)  - POCT Hemoglobin A1C  Results for orders placed or performed in visit on 05/20/25   POCT Hemoglobin A1C    Collection Time: 05/20/25  8:17 AM   Result Value Ref Range    Glycohemoglobin 7.5 (A) <=5.8 %    Internal Control Positive Positive     Internal Control Negative Negative    - A1c improved from 8.6 in 02/2024 to 7.5 on 05/20/2025.  - Continue current medications: Rybelsus 14 mg daily, metformin 1000 mg twice daily, insulin glargine 10 units daily.    - atorvastatin (LIPITOR) 40 MG Tab; Take 1 Tablet by mouth every evening.  Dispense: 100 Tablet; Refill: 3  - metformin (GLUCOPHAGE) 1000 MG tablet; Take 1 Tablet by mouth 2 times a day with meals.  Dispense: 200 Tablet; Refill: 2  - Semaglutide (RYBELSUS) 14 MG Tab; Take 14 mg by mouth every day.  Dispense: 100 Tablet; Refill: 3  - Comp Metabolic Panel; Future  - HEMOGLOBIN A1C; Future  - Lipid Profile; Future  - Insulin Glargine, 2 Unit Dial, (TOUJEO MAX SOLOSTAR) 300 UNIT/ML Solution Pen-injector; Inject 10 Units under the skin every day.  Dispense: 10 mL; Refill: 2  Monitor blood glucose  Follow with retina specialist for his diabetic retinopathy    2. Mixed hyperlipidemia  Well controlled.  - Continue current medication: Lipitor 40 mg every evening.  - atorvastatin (LIPITOR) 40 MG Tab; Take 1 Tablet by mouth every evening.  Dispense: 100 Tablet; Refill: 3  - TSH WITH REFLEX TO FT4; Future  - Lipid Profile; Future    3. Essential hypertension  Well controlled.    - Continue current medications: lisinopril 40 mg daily, chlorthalidone 25 mg daily.    - lisinopril (PRINIVIL) 40 MG tablet; Take 1 Tablet by mouth every day.  Dispense: 100 Tablet; Refill: 3  - chlorthalidone (HYGROTON) 25 MG Tab; Take 1 Tablet by mouth every day.  Dispense: 100 Tablet; Refill: 3  - CBC WITH DIFFERENTIAL; Future  - Comp Metabolic Panel; Future  - TSH WITH REFLEX TO FT4; Future    4. Elevated PSA  Enlarged prostate  - PROSTATE SPECIFIC AG DIAGNOSTIC;  Future  Follow with urology    5. Microcytosis  - CBC WITH DIFFERENTIAL; Future  - FERRITIN; Future  - IRON/TOTAL IRON BIND; Future  - TSH WITH REFLEX TO FT4; Future    6. Aortic dilatation (HCC)  Aspirin 81 mg daily   Control BP and BG  Denies chest pain, SOB  Check CT and echo in one year  Ectatic ascending aorta measuring 3.8 x 3.6 cm.    7. Coronary artery calcification seen on CT scan  Aspirin 81 mg daily, atorvastatin 40 mg every evening  Control BP and BG  Denies chest pain, SOB    8. Lung nodules  Follow CT in March 2026      Follow-up:Return in about 4 months (around 9/20/2025), or if symptoms worsen or fail to improve.    This note was created using voice recognition software. There may be unintended errors in spelling, grammar or content.           [1] No Known Allergies

## 2025-05-29 ENCOUNTER — APPOINTMENT (OUTPATIENT)
Dept: MEDICAL GROUP | Facility: IMAGING CENTER | Age: 80
End: 2025-05-29
Payer: MEDICARE

## 2025-08-25 DIAGNOSIS — H91.93 PROBLEMS WITH HEARING, BILATERAL: Primary | ICD-10-CM

## 2025-08-30 ENCOUNTER — OFFICE VISIT (OUTPATIENT)
Dept: URGENT CARE | Facility: PHYSICIAN GROUP | Age: 80
End: 2025-08-30
Payer: MEDICARE

## 2025-08-30 VITALS
WEIGHT: 170 LBS | HEART RATE: 75 BPM | HEIGHT: 67 IN | TEMPERATURE: 98.4 F | RESPIRATION RATE: 15 BRPM | DIASTOLIC BLOOD PRESSURE: 82 MMHG | SYSTOLIC BLOOD PRESSURE: 128 MMHG | BODY MASS INDEX: 26.68 KG/M2 | OXYGEN SATURATION: 96 %

## 2025-08-30 DIAGNOSIS — L30.9 DERMATITIS: Primary | ICD-10-CM

## 2025-08-30 RX ORDER — TRIAMCINOLONE ACETONIDE 1 MG/G
OINTMENT TOPICAL
Qty: 30 G | Refills: 1 | Status: SHIPPED | OUTPATIENT
Start: 2025-08-30

## 2025-08-30 ASSESSMENT — ENCOUNTER SYMPTOMS
EYE DISCHARGE: 0
WEIGHT LOSS: 0
NAUSEA: 0
EYE REDNESS: 0
VOMITING: 0
MYALGIAS: 0

## 2025-08-30 ASSESSMENT — FIBROSIS 4 INDEX: FIB4 SCORE: 1.73

## 2025-10-07 ENCOUNTER — APPOINTMENT (OUTPATIENT)
Dept: MEDICAL GROUP | Facility: IMAGING CENTER | Age: 80
End: 2025-10-07
Payer: MEDICARE

## (undated) DEVICE — LACTATED RINGERS INJ 1000 ML - (14EA/CA 60CA/PF)

## (undated) DEVICE — LENS GRIESHABER MACULAR

## (undated) DEVICE — DRESSING TRANSPARENT FILM TEGADERM 2.375 X 2.75"  (100EA/BX)"

## (undated) DEVICE — SODIUM CHL IRRIGATION 0.9% 1000ML (12EA/CA)

## (undated) DEVICE — GOWN WARMING STANDARD FLEX - (30/CA)

## (undated) DEVICE — CANISTER SUCTION RIGID RED 1500CC (40EA/CA)

## (undated) DEVICE — WATER IRRIGATION STERILE 1000ML (12EA/CA)

## (undated) DEVICE — SET LEADWIRE 5 LEAD BEDSIDE DISPOSABLE ECG (1SET OF 5/EA)

## (undated) DEVICE — PACK VITRECTOMY (1EA/CA)

## (undated) DEVICE — TOWEL STOP TIMEOUT SAFETY FLAG (40EA/CA)

## (undated) DEVICE — MASK ANESTHESIA ADULT  - (100/CA)

## (undated) DEVICE — KIT ANESTHESIA W/CIRCUIT & 3/LT BAG W/FILTER (20EA/CA)

## (undated) DEVICE — SUTURE EYE

## (undated) DEVICE — ELECTRODE 850 FOAM ADHESIVE - HYDROGEL RADIOTRNSPRNT (50/PK)

## (undated) DEVICE — GLOVE BIOGEL SURGICAL PF LATEX M SIZE 8.5 (50PR/BX 4BX/CA)

## (undated) DEVICE — CATHETER IV 20 GA X 1-1/4 ---SURG.& SDS ONLY--- (50EA/BX)

## (undated) DEVICE — TUBING CLEARLINK DUO-VENT - C-FLO (48EA/CA)

## (undated) DEVICE — PROTECTOR ULNA NERVE - (36PR/CA)

## (undated) DEVICE — CANNULA SOFT TIP STERILE SINGLE USE VITRECTOMY 23GA 0.8MM (10EA/BX)

## (undated) DEVICE — PACK VITRECTOMY 23G 10K BEVELED (1EA/BX)

## (undated) DEVICE — SUTURE 7-0 VICRYL TG140-8 (12PK/BX)

## (undated) DEVICE — GLOVESZ 8.5 BIOGEL PI MICRO - PF LF (50PR/BX)

## (undated) DEVICE — SENSOR SPO2 NEO LNCS ADHESIVE (20/BX) SEE USER NOTES

## (undated) DEVICE — SUCTION INSTRUMENT YANKAUER BULBOUS TIP W/O VENT (50EA/CA)

## (undated) DEVICE — KIT  I.V. START (100EA/CA)

## (undated) DEVICE — CANISTER SUCTION 3000ML MECHANICAL FILTER AUTO SHUTOFF MEDI-VAC NONSTERILE LF DISP  (40EA/CA)

## (undated) DEVICE — NEPTUNE 4 PORT MANIFOLD - (20/PK)

## (undated) DEVICE — NEEDLE FILTER ASPIRATION 18 GA X 1 1/2 IN (100EA/BX)

## (undated) DEVICE — PROBE 23 GA ILLUM FLEX CURVED - LASER(6/BX)

## (undated) DEVICE — SLEEVE, VASO, THIGH, MED